# Patient Record
Sex: MALE | Race: WHITE | NOT HISPANIC OR LATINO | Employment: OTHER | URBAN - METROPOLITAN AREA
[De-identification: names, ages, dates, MRNs, and addresses within clinical notes are randomized per-mention and may not be internally consistent; named-entity substitution may affect disease eponyms.]

---

## 2017-01-04 NOTE — TELEPHONE ENCOUNTER
----- Message from Ria Winters sent at 1/4/2017  2:47 PM EST -----  Contact: patient  Needs prescription for 90 day supply    insulin aspart NovoLOG 100 UNIT/ML injection          Inject 30 Units under the skin 3 Three Times a Day Before Meals    [facility] - [facility] [address] [facility] - [phone]  - [phone] (Phone)  [phone] (Fax)      Novolog  30 units 3 times daily  90 ml 3 refills  sent to [facility]

## 2017-02-07 ENCOUNTER — LAB (OUTPATIENT)
Dept: ENDOCRINOLOGY | Age: 72
End: 2017-02-07

## 2017-02-07 DIAGNOSIS — IMO0002 UNCONTROLLED TYPE 2 DIABETES MELLITUS WITH OTHER NEUROLOGIC COMPLICATION, WITH LONG-TERM CURRENT USE OF INSULIN: ICD-10-CM

## 2017-02-08 LAB — HBA1C MFR BLD: 8.4 % (ref 4.8–5.6)

## 2017-02-21 ENCOUNTER — OFFICE VISIT (OUTPATIENT)
Dept: ENDOCRINOLOGY | Age: 72
End: 2017-02-21

## 2017-02-21 VITALS
HEART RATE: 71 BPM | BODY MASS INDEX: 35.28 KG/M2 | SYSTOLIC BLOOD PRESSURE: 118 MMHG | DIASTOLIC BLOOD PRESSURE: 64 MMHG | HEIGHT: 71 IN | WEIGHT: 252 LBS

## 2017-02-21 DIAGNOSIS — E55.9 VITAMIN D DEFICIENCY: ICD-10-CM

## 2017-02-21 DIAGNOSIS — E11.9 TYPE 2 DIABETES MELLITUS WITHOUT COMPLICATION, WITH LONG-TERM CURRENT USE OF INSULIN (HCC): Primary | ICD-10-CM

## 2017-02-21 DIAGNOSIS — Z79.4 TYPE 2 DIABETES MELLITUS WITHOUT COMPLICATION, WITH LONG-TERM CURRENT USE OF INSULIN (HCC): Primary | ICD-10-CM

## 2017-02-21 DIAGNOSIS — E78.49 OTHER HYPERLIPIDEMIA: ICD-10-CM

## 2017-02-21 PROCEDURE — 99214 OFFICE O/P EST MOD 30 MIN: CPT | Performed by: INTERNAL MEDICINE

## 2017-02-21 NOTE — PROGRESS NOTES
71 y.o.    Patient Care Team:  Felix Blackman MD as PCP - General  Felix Blackman MD as PCP - Family Medicine    Chief Complaint:      Subjective     HPI Felix Olmedo,71 y.o. WM is here as a follow up for the management of Type 2 dm. Used to see Dr. Chacko and Dr. Vivar in the past.     Type 2 dm - Pt has been a diabetic since 2000, He has been on insulin since 2009. Currently pt is on levemir 32 units at bed time, Novolog 10/10/12 units with BF, Lunch and dinner time, along with moderate dose ssi tid ac - 2 units for 50 above 150.  Patient reports missing his NovoLog sliding scale most of the time as he eats out so doesn't check his blood sugars at that time to follow the sliding scale.  Patient sometimes takes 12 units with lunch and 10 units with dinner based on his meal size.  Checks BG 3 - 4 times a day. FBG - 200 - 220 mg/dl, Lunch time - 163 - 248 mg/dl, Dinner time - 164 - 230 mg/dl. Bed time - 78 - 199 mg/dl.   No increased urination or increased thirst. No BG less than 60 mg/dl in the last one month, does have hypoglycemic awareness. Feels low BG symptoms when BG is around 70s.  Patient had 2-3 blood sugars around 70s in the last 3 months.  Highest BG in the last 1 month was - 280 mg/dl.   Pt forgot his log book. No nausea and vomiting.   Up to date with eye exam and no dm retinopathy per pt.   C/o tingling or numbness in his b/l feet, no ulcers and amputations of his feet. Not on lyrica or gabapentin.   No hx of CAD, had cardiac catheterization in nov 2016 which 60% stenosis, no hx of  CKD or CVA per pt.   Pt is physically active. Weight has been stable.   Pt tries to follow DM diet for most part but not as much as he is eating out side.  Not on Ace inb or ARB.     UyQ2y-8.4%  Negative urine microalbuminuria.     Hyperlipidemia on Lipitor 40 mg oral daily.  LDL at goal-54 mg/dL.    Interval History      The following portions of the patient's history were reviewed and updated as appropriate: allergies,  current medications, past family history, past medical history, past social history, past surgical history and problem list.    Past Medical History   Diagnosis Date   • Acute sinusitis    • Arthritis    • Chest pain    • Contusion of elbow, left      acute   • Coronary artery disease      Cath 10/18/16: RCA chronically occluded at ostium, and fills via left to right collaterals.  LAD with 40-50% proximal disease, and diffuse 60% disease in mid-distal segment.    • ZELAYA (dyspnea on exertion)    • History of bone density study      never   • History of chest x-ray 12/24/2014   • LAFB (left anterior fascicular block)    • Neuropathy    • Pneumonia    • Right-sided back pain    • Sprain of left shoulder    • Squamous cell carcinoma      Left cheek s/p resection   • Type 2 diabetes mellitus      Since 2000     Family History   Problem Relation Age of Onset   • Cancer Mother      uterine   • Heart disease Father      Father with fatal MI in 80's   • Diabetes Sister    • Diabetes Other      grandmother   • Stroke Other    • Hypertension Other    • Esophageal cancer Brother      Social History     Social History   • Marital status:      Spouse name: N/A   • Number of children: N/A   • Years of education: N/A     Occupational History   • Not on file.     Social History Main Topics   • Smoking status: Former Smoker     Packs/day: 1.50   • Smokeless tobacco: Never Used      Comment: Smoked up to 1.5 ppd.  Smoked for 50 years.  Quit 2009.   • Alcohol use Yes      Comment: Rarely   • Drug use: No   • Sexual activity: Defer     Other Topics Concern   • Not on file     Social History Narrative     No Known Allergies    Current Outpatient Prescriptions:   •  aspirin 81 MG tablet, Take 81 mg by mouth Daily., Disp: , Rfl:   •  atorvastatin (LIPITOR) 40 MG tablet, Take 1 tablet by mouth Daily., Disp: 30 tablet, Rfl: 11  •  glucose blood (ONE TOUCH ULTRA TEST) test strip, To check bg 3 -4 times a day, Disp: 60 each, Rfl: 5  •   insulin aspart (novoLOG) 100 UNIT/ML injection, Inject 30 Units under the skin 3 (Three) Times a Day Before Meals. (Patient taking differently: Inject 30 Units under the skin 3 (Three) Times a Day Before Meals. 14 units 3 times daily), Disp: 90 mL, Rfl: 3  •  isosorbide mononitrate (IMDUR) 30 MG 24 hr tablet, Take 1 tablet by mouth 2 (Two) Times a Day., Disp: 90 tablet, Rfl: 3  •  Lancets (ONETOUCH ULTRASOFT) lancets, To check 3 -4 times a day, Disp: 60 each, Rfl: 5  •  metoprolol tartrate (LOPRESSOR) 25 MG tablet, Take 0.5 tablets by mouth 2 (Two) Times a Day., Disp: 60 tablet, Rfl: 11  •  Multiple Vitamins-Minerals (MULTIVITAMIN ADULT PO), Take 1 tablet by mouth Daily., Disp: , Rfl:   •  insulin detemir (LEVEMIR FLEXPEN) 100 UNIT/ML injection, Inject 34 Units under the skin Every Night., Disp: 5 pen, Rfl: 3  •  metFORMIN (GLUCOPHAGE) 1000 MG tablet, Take 1 tablet by mouth 2 (Two) Times a Day With Meals., Disp: 60 tablet, Rfl: 11        Review of Systems   Constitutional: Negative for appetite change, chills, diaphoresis and fatigue.   HENT: Negative for hearing loss, nosebleeds, postnasal drip, trouble swallowing and voice change.    Eyes: Negative for photophobia, discharge and visual disturbance.   Respiratory: Positive for shortness of breath. Negative for cough and wheezing.    Cardiovascular: Positive for chest pain and leg swelling. Negative for palpitations.   Gastrointestinal: Negative for abdominal distention, abdominal pain, constipation, diarrhea, nausea and vomiting.   Endocrine: Positive for polydipsia. Negative for cold intolerance, heat intolerance and polyuria.   Genitourinary: Positive for frequency. Negative for dysuria and hematuria.   Musculoskeletal: Positive for back pain. Negative for arthralgias and myalgias.   Skin: Negative for pallor, rash and wound.   Neurological: Positive for numbness. Negative for dizziness, tremors, seizures, syncope, weakness and headaches.   Hematological:  "Negative for adenopathy.   Psychiatric/Behavioral: Negative for agitation, confusion and sleep disturbance. The patient is not nervous/anxious.        Objective       Vitals:    02/21/17 1416   BP: 118/64   Pulse: 71   Weight: 252 lb (114 kg)   Height: 70.5\" (179.1 cm)     Body mass index is 35.65 kg/(m^2).      Physical Exam   Constitutional: He is oriented to person, place, and time. He appears well-developed and well-nourished.   HENT:   Head: Normocephalic and atraumatic.   Mouth/Throat: Oropharynx is clear and moist.   Eyes: Conjunctivae and EOM are normal. Pupils are equal, round, and reactive to light.   Neck: Normal range of motion. Neck supple. Carotid bruit is not present. No tracheal deviation present. No thyromegaly present.   Acanthosis nigricans   Cardiovascular: Normal rate, regular rhythm and normal heart sounds.    Pulmonary/Chest: Effort normal and breath sounds normal. No stridor. He has no wheezes.   Abdominal: Soft. Bowel sounds are normal. He exhibits no distension. There is no tenderness. No hernia.   Central obesity   Musculoskeletal: Normal range of motion. He exhibits no edema.   Lymphadenopathy:     He has no cervical adenopathy.   Neurological: He is alert and oriented to person, place, and time. He has normal reflexes.   Decreased pin prick and intact proprioception   Skin: Skin is warm and dry.   Psychiatric: He has a normal mood and affect. His behavior is normal. Judgment normal.   Vitals reviewed.    Results Review:     I reviewed the patient's new clinical results.    Medical records reviewed  Summary:Done      Lab on 02/07/2017   Component Date Value Ref Range Status   • Hemoglobin A1C 02/07/2017 8.40* 4.80 - 5.60 % Final    Comment: Hemoglobin A1C Ranges:  Increased Risk for Diabetes  5.7% to 6.4%  Diabetes                     >= 6.5%  Diabetic Goal                < 7.0%       Lab Results   Component Value Date    HGBA1C 8.40 (H) 02/07/2017    HGBA1C 8.90 (H) 12/19/2016    HGBA1C " 9.4 (H) 04/05/2016     Lab Results   Component Value Date    MICROALBUR <3.0 12/19/2016    CREATININE 1.24 10/14/2016     Imaging Results (most recent)     None                Assessment and Plan:    Diagnoses and all orders for this visit:    Type 2 diabetes mellitus without complication, with long-term current use of insulin  -     Comprehensive Metabolic Panel; Future  -     Hemoglobin A1c; Future  -     Lipid Panel; Future  -     Microalbumin / Creatinine Urine Ratio; Future  -     TSH; Future  -     Vitamin D 25 Hydroxy; Future    Vitamin D deficiency   -     Vitamin D 25 Hydroxy; Future    Other hyperlipidemia    Other orders  -     insulin detemir (LEVEMIR FLEXPEN) 100 UNIT/ML injection; Inject 34 Units under the skin Every Night.  -     metFORMIN (GLUCOPHAGE) 1000 MG tablet; Take 1 tablet by mouth 2 (Two) Times a Day With Meals.    Type 2 diabetes mellitus with HbA1c improving since last visit  Will increase levemir to 34 units at bedtime  Will increase NovoLog to 12 units with each meal  Will cover with NovoLog moderate dose sliding scale 2 units for 50 about 150  Will start patient on metformin thousand milligrams twice daily  Advised patient to check his blood sugars at least 2-3 times a day and to get his log book during his next visit    Will consider changing to NovoLog 7030 on NPH/regular insulin based on his insurance issues during next visit    Hyperlipidemia  LDL at goal  Continue Lipitor 40 mg oral daily    The total time spent more than 25 min for old record and lab review and face- to- face, of which greater than 50% of time was spent in counseling the patient on treatment options, instructions for management/treatment and follow up and importance of compliance with chosen management or treatment options

## 2017-03-27 ENCOUNTER — OFFICE VISIT (OUTPATIENT)
Dept: FAMILY MEDICINE CLINIC | Facility: CLINIC | Age: 72
End: 2017-03-27

## 2017-03-27 VITALS
RESPIRATION RATE: 16 BRPM | BODY MASS INDEX: 35.42 KG/M2 | HEART RATE: 68 BPM | SYSTOLIC BLOOD PRESSURE: 112 MMHG | TEMPERATURE: 97.8 F | DIASTOLIC BLOOD PRESSURE: 78 MMHG | WEIGHT: 253 LBS | HEIGHT: 71 IN | OXYGEN SATURATION: 93 %

## 2017-03-27 DIAGNOSIS — J01.90 ACUTE SINUSITIS, RECURRENCE NOT SPECIFIED, UNSPECIFIED LOCATION: Primary | ICD-10-CM

## 2017-03-27 PROCEDURE — 99213 OFFICE O/P EST LOW 20 MIN: CPT | Performed by: PHYSICIAN ASSISTANT

## 2017-03-27 RX ORDER — BENZONATATE 200 MG/1
200 CAPSULE ORAL 3 TIMES DAILY PRN
Qty: 30 CAPSULE | Refills: 0 | Status: SHIPPED | OUTPATIENT
Start: 2017-03-27 | End: 2017-04-03

## 2017-03-27 RX ORDER — AMOXICILLIN AND CLAVULANATE POTASSIUM 875; 125 MG/1; MG/1
1 TABLET, FILM COATED ORAL 2 TIMES DAILY
Qty: 20 TABLET | Refills: 0 | Status: SHIPPED | OUTPATIENT
Start: 2017-03-27 | End: 2017-04-03

## 2017-03-27 NOTE — PATIENT INSTRUCTIONS
For throat pain:  Can gargle with 1/2 Maalox and 1/2 Benadryl liquid ---mix, gargle and spit Take Tylenol for fever or aches  Rest as needed  Call not better in 7 days  Increase fluids  Call if worse  Can use generic Afrin nasal spray up to 5 days for nasal congestion  Finish antibiotic course of therapy

## 2017-03-27 NOTE — PROGRESS NOTES
Subjective   Felix Olmedo is a 71 y.o. male.     History of Present Illness   Felix Olmedo 71 y.o. male who presents for evaluation of sinus pressure and congestion, fever, sore throat, cough, ear pressure, wheezing, shortness of air, fatigue. Symptoms include nasal blockage, post nasal drip, cough described as productive of yellow sputum and runny nose.  Onset of symptoms was 10 days ago, unchanged since that time. Patient denies diarrhea, vomiting.   Evaluation to date: none Treatment to date:  OTC oral decongestants, OTC antihistamines, OTC cough suppressant and Tylenol.   SOA is from heart and not worse with this illness  Lab Results   Component Value Date    GLUCOSE 385 (H) 10/14/2016    CALCIUM 9.3 10/14/2016     10/14/2016    K 4.8 10/14/2016    CO2 28.3 10/14/2016    CL 98 10/14/2016    BUN 16 10/14/2016    CREATININE 1.24 10/14/2016    EGFRIFAFRI 78 04/05/2016    EGFRIFNONA 57 (L) 10/14/2016    BCR 12.9 10/14/2016    ANIONGAP 9.7 10/14/2016     Has DM and on insulin    The following portions of the patient's history were reviewed and updated as appropriate: allergies, current medications, past family history, past medical history, past social history, past surgical history and problem list.    Review of Systems   Constitutional: Positive for diaphoresis, fatigue and fever. Negative for activity change and unexpected weight change.   HENT: Positive for congestion, postnasal drip, rhinorrhea, sinus pressure and sore throat. Negative for ear pain.    Eyes: Negative for pain and discharge.   Respiratory: Positive for cough, choking, chest tightness, shortness of breath (same as usual) and wheezing.    Cardiovascular: Negative for chest pain and palpitations.   Gastrointestinal: Negative for abdominal pain, diarrhea and vomiting.   Endocrine: Negative.    Genitourinary: Positive for frequency and urgency.   Musculoskeletal: Negative for joint swelling.   Skin: Negative for color change, rash and wound.    Allergic/Immunologic: Negative.    Neurological: Positive for numbness. Negative for seizures and syncope.   Psychiatric/Behavioral: Negative.        Objective   Physical Exam   Constitutional: He is oriented to person, place, and time. He appears well-developed and well-nourished.  Non-toxic appearance. No distress.   HENT:   Head: Normocephalic and atraumatic. Hair is normal.   Right Ear: External ear normal. No drainage, swelling or tenderness. Tympanic membrane is retracted.   Left Ear: External ear normal. No drainage, swelling or tenderness. Tympanic membrane is retracted.   Nose: Mucosal edema present. No epistaxis.   Mouth/Throat: Uvula is midline and mucous membranes are normal. No oral lesions. No uvula swelling. Posterior oropharyngeal erythema present. No oropharyngeal exudate.   Eyes: Conjunctivae and EOM are normal. Pupils are equal, round, and reactive to light. Right eye exhibits no discharge. Left eye exhibits no discharge. No scleral icterus.   Neck: Normal range of motion. Neck supple.   Cardiovascular: Normal rate and regular rhythm.  Exam reveals no gallop.    Murmur (1-2/6) heard.  Pulmonary/Chest: Effort normal and breath sounds normal. No stridor. No respiratory distress. He has no wheezes. He has no rales. He exhibits no tenderness.   Abdominal: Soft. There is no tenderness.   Lymphadenopathy:     He has cervical adenopathy.   Neurological: He is alert and oriented to person, place, and time. He exhibits normal muscle tone.   Skin: Skin is warm and dry. No rash noted. He is not diaphoretic.   Psychiatric: He has a normal mood and affect. His behavior is normal. Judgment and thought content normal.   Nursing note and vitals reviewed.      Assessment/Plan   Problems Addressed this Visit     None      Visit Diagnoses     Acute sinusitis, recurrence not specified, unspecified location    -  Primary

## 2017-03-31 ENCOUNTER — TRANSCRIBE ORDERS (OUTPATIENT)
Dept: CARDIOLOGY | Facility: CLINIC | Age: 72
End: 2017-03-31

## 2017-03-31 ENCOUNTER — OFFICE VISIT (OUTPATIENT)
Dept: CARDIOLOGY | Facility: CLINIC | Age: 72
End: 2017-03-31

## 2017-03-31 ENCOUNTER — LAB (OUTPATIENT)
Dept: LAB | Facility: HOSPITAL | Age: 72
End: 2017-03-31
Attending: INTERNAL MEDICINE

## 2017-03-31 VITALS
BODY MASS INDEX: 35.28 KG/M2 | SYSTOLIC BLOOD PRESSURE: 98 MMHG | HEIGHT: 71 IN | WEIGHT: 252 LBS | HEART RATE: 63 BPM | DIASTOLIC BLOOD PRESSURE: 56 MMHG

## 2017-03-31 DIAGNOSIS — I25.118 ATHEROSCLEROSIS OF NATIVE CORONARY ARTERY OF NATIVE HEART WITH OTHER FORM OF ANGINA PECTORIS (HCC): ICD-10-CM

## 2017-03-31 DIAGNOSIS — Z01.810 PRE-OPERATIVE CARDIOVASCULAR EXAMINATION: ICD-10-CM

## 2017-03-31 DIAGNOSIS — Z01.810 PRE-OPERATIVE CARDIOVASCULAR EXAMINATION: Primary | ICD-10-CM

## 2017-03-31 DIAGNOSIS — E11.59 TYPE 2 DIABETES MELLITUS WITH OTHER CIRCULATORY COMPLICATION: ICD-10-CM

## 2017-03-31 DIAGNOSIS — Z13.6 SCREENING FOR ISCHEMIC HEART DISEASE: ICD-10-CM

## 2017-03-31 DIAGNOSIS — I25.118 CORONARY ARTERY DISEASE OF NATIVE ARTERY OF NATIVE HEART WITH STABLE ANGINA PECTORIS (HCC): Primary | ICD-10-CM

## 2017-03-31 LAB
ANION GAP SERPL CALCULATED.3IONS-SCNC: 11.3 MMOL/L
BASOPHILS # BLD AUTO: 0.1 10*3/MM3 (ref 0–0.2)
BASOPHILS NFR BLD AUTO: 0.9 % (ref 0–1.5)
BUN BLD-MCNC: 10 MG/DL (ref 8–23)
BUN/CREAT SERPL: 9 (ref 7–25)
CALCIUM SPEC-SCNC: 9.3 MG/DL (ref 8.6–10.5)
CHLORIDE SERPL-SCNC: 100 MMOL/L (ref 98–107)
CO2 SERPL-SCNC: 27.7 MMOL/L (ref 22–29)
CREAT BLD-MCNC: 1.11 MG/DL (ref 0.76–1.27)
DEPRECATED RDW RBC AUTO: 50.6 FL (ref 37–54)
EOSINOPHIL # BLD AUTO: 0.57 10*3/MM3 (ref 0–0.7)
EOSINOPHIL NFR BLD AUTO: 5.2 % (ref 0.3–6.2)
ERYTHROCYTE [DISTWIDTH] IN BLOOD BY AUTOMATED COUNT: 14.9 % (ref 11.5–14.5)
GFR SERPL CREATININE-BSD FRML MDRD: 65 ML/MIN/1.73
GLUCOSE BLD-MCNC: 155 MG/DL (ref 65–99)
HCT VFR BLD AUTO: 46.2 % (ref 40.4–52.2)
HGB BLD-MCNC: 15.4 G/DL (ref 13.7–17.6)
IMM GRANULOCYTES # BLD: 0.02 10*3/MM3 (ref 0–0.03)
IMM GRANULOCYTES NFR BLD: 0.2 % (ref 0–0.5)
LYMPHOCYTES # BLD AUTO: 3.54 10*3/MM3 (ref 0.9–4.8)
LYMPHOCYTES NFR BLD AUTO: 32 % (ref 19.6–45.3)
MCH RBC QN AUTO: 30.6 PG (ref 27–32.7)
MCHC RBC AUTO-ENTMCNC: 33.3 G/DL (ref 32.6–36.4)
MCV RBC AUTO: 91.8 FL (ref 79.8–96.2)
MONOCYTES # BLD AUTO: 0.97 10*3/MM3 (ref 0.2–1.2)
MONOCYTES NFR BLD AUTO: 8.8 % (ref 5–12)
NEUTROPHILS # BLD AUTO: 5.86 10*3/MM3 (ref 1.9–8.1)
NEUTROPHILS NFR BLD AUTO: 52.9 % (ref 42.7–76)
PLATELET # BLD AUTO: 200 10*3/MM3 (ref 140–500)
PMV BLD AUTO: 10.5 FL (ref 6–12)
POTASSIUM BLD-SCNC: 4.7 MMOL/L (ref 3.5–5.2)
RBC # BLD AUTO: 5.03 10*6/MM3 (ref 4.6–6)
SODIUM BLD-SCNC: 139 MMOL/L (ref 136–145)
WBC NRBC COR # BLD: 11.06 10*3/MM3 (ref 4.5–10.7)

## 2017-03-31 PROCEDURE — 99214 OFFICE O/P EST MOD 30 MIN: CPT | Performed by: INTERNAL MEDICINE

## 2017-03-31 PROCEDURE — 36415 COLL VENOUS BLD VENIPUNCTURE: CPT

## 2017-03-31 PROCEDURE — 80048 BASIC METABOLIC PNL TOTAL CA: CPT

## 2017-03-31 PROCEDURE — 85025 COMPLETE CBC W/AUTO DIFF WBC: CPT

## 2017-04-03 RX ORDER — BENZONATATE 200 MG/1
200 CAPSULE ORAL 3 TIMES DAILY PRN
COMMUNITY
End: 2017-04-07

## 2017-04-03 RX ORDER — ATORVASTATIN CALCIUM 40 MG/1
40 TABLET, FILM COATED ORAL DAILY
COMMUNITY
End: 2017-06-23 | Stop reason: SDUPTHER

## 2017-04-03 RX ORDER — ISOSORBIDE MONONITRATE 30 MG/1
30 TABLET, EXTENDED RELEASE ORAL 2 TIMES DAILY
COMMUNITY
End: 2017-10-02 | Stop reason: SDUPTHER

## 2017-04-03 RX ORDER — AMOXICILLIN AND CLAVULANATE POTASSIUM 875; 125 MG/1; MG/1
1 TABLET, FILM COATED ORAL 2 TIMES DAILY
COMMUNITY
End: 2017-04-07

## 2017-04-03 NOTE — PROGRESS NOTES
Date of Office Visit: 2017  Encounter Provider: Noah Montejo MD  Place of Service: [facility] CARDIOLOGY  Patient Name: Felix Olmedo  :1945    Chief complaint: Follow-up for CAD.  Chest pain and SOA.    History of Present Illness:    Dear Dr. Blackman:      I again had the pleasure of seeing your patient in cardiology office on 2017. As you   well know, he is a very pleasant 71 year-old white male with a past medical history   significant for coronary artery disease and diabetes who presents for follow-up. The   patient initially saw me on 10/3/2016. He had been experiencing 5-6 weeks of a vague   chest discomfort intermittently which he described as an aching and burning sensation   in the center and left chest. He also had been more short of breath with exertion. He   underwent a Lexiscan Myoview stress test on 10/12/2016 which showed a small infarct    in the inferior wall with a moderate amount of evon-infarct ischemia. He subsequently   underwent a left heart catheterization on 10/18/2016 which showed the right coronary   artery to be chronically occluded at the ostium, although left to right collaterals filled the   distal right coronary artery and PDA. He also had disease in the LAD of 40-50% in the   proximal portion, as well as diffuse 60% disease in the mid to distal portion. Medical   management was recommended at that time with consideration for percutaneous   intervention versus bypass grafting if the patient's symptoms did not improve. He did   have an echocardiogram on 10/12/2016 which confirmed a normal ejection fraction of  62% and grade 1 diastolic dysfunction. The patient was placed on Imdur after his   cardiac catheterization.      The patient presents today for follow-up.  Unfortunately, he has been having worsening   exertional symptoms recently.  He states that he is very short of breath with minimal   exertion, and often has to stop and  rest.  He has also been having aching pain in the center   of his chest with exertion.  His symptoms resolve after several minutes of rest.  He is   taking his Imdur and metoprolol.      Past Medical History:   Diagnosis Date   • Acute sinusitis    • Arthritis    • Chest pain    • Contusion of elbow, left     acute   • Coronary artery disease     Cath 10/18/16: RCA chronically occluded at ostium, and fills via left to right collaterals.  LAD with 40-50% proximal disease, and diffuse 60% disease in mid-distal segment.    • ZELAYA (dyspnea on exertion)    • History of bone density study     never   • History of chest x-ray 12/24/2014   • LAFB (left anterior fascicular block)    • Neuropathy    • Pneumonia    • Right-sided back pain    • Sprain of left shoulder    • Squamous cell carcinoma     Left cheek s/p resection   • Type 2 diabetes mellitus     Since 2000       Past Surgical History:   Procedure Laterality Date   • CARDIAC CATHETERIZATION N/A 10/18/2016    Procedure: Coronary angiography;  Surgeon: Jesus Guzman MD;  Location: [facility] CATH INVASIVE LOCATION;  Service:    • CARDIAC CATHETERIZATION N/A 10/18/2016    Procedure: Left heart cath;  Surgeon: Jesus Guzman MD;  Location: [facility] CATH INVASIVE LOCATION;  Service:    • CARDIAC CATHETERIZATION N/A 10/18/2016    Procedure: Left ventriculography;  Surgeon: Jesus Guzman MD;  Location: [facility] INVASIVE LOCATION;  Service:    • CATARACT EXTRACTION W/ INTRAOCULAR LENS IMPLANT Left    • KNEE SURGERY Left 2005    Dr. Gallegos   • MOHS SURGERY Left     CHEEK       Current Outpatient Prescriptions on File Prior to Visit   Medication Sig Dispense Refill   • amoxicillin-clavulanate (AUGMENTIN) 875-125 MG per tablet Take 1 tablet by mouth 2 (Two) Times a Day. One PO BID for infection 20 tablet 0   • aspirin 81 MG tablet Take 81 mg by mouth Daily.     • atorvastatin (LIPITOR) 40 MG tablet Take 1 tablet by mouth Daily. 30 tablet 11   • benzonatate  (TESSALON) 200 MG capsule Take 1 capsule by mouth 3 (Three) Times a Day As Needed for Cough. 30 capsule 0   • glucose blood (ONE TOUCH ULTRA TEST) test strip To check bg 3 -4 times a day 60 each 5   • insulin aspart (novoLOG) 100 UNIT/ML injection Inject 30 Units under the skin 3 (Three) Times a Day Before Meals. (Patient taking differently: Inject 30 Units under the skin 3 (Three) Times a Day Before Meals. 14 units 3 times daily) 90 mL 3   • insulin detemir (LEVEMIR FLEXPEN) 100 UNIT/ML injection Inject 34 Units under the skin Every Night. 5 pen 3   • isosorbide mononitrate (IMDUR) 30 MG 24 hr tablet Take 1 tablet by mouth 2 (Two) Times a Day. 90 tablet 3   • Lancets (ONETOUCH ULTRASOFT) lancets To check 3 -4 times a day 60 each 5   • metoprolol tartrate (LOPRESSOR) 25 MG tablet Take 0.5 tablets by mouth 2 (Two) Times a Day. 60 tablet 11   • Multiple Vitamins-Minerals (MULTIVITAMIN ADULT PO) Take 1 tablet by mouth Daily.       No current facility-administered medications on file prior to visit.      Allergies as of 03/31/2017   • (No Known Allergies)     Social History     Social History   • Marital status:      Spouse name: N/A   • Number of children: N/A   • Years of education: N/A     Occupational History   • Not on file.     Social History Main Topics   • Smoking status: Former Smoker     Packs/day: 1.50   • Smokeless tobacco: Never Used      Comment: Smoked up to 1.5 ppd.  Smoked for 50 years.  Quit 2009.   • Alcohol use Yes      Comment: Rarely   • Drug use: No   • Sexual activity: Defer     Other Topics Concern   • Not on file     Social History Narrative     Family History   Problem Relation Age of Onset   • Cancer Mother      uterine   • Heart disease Father      Father with fatal MI in 80's   • Diabetes Sister    • Diabetes Other      grandmother   • Stroke Other    • Hypertension Other    • Esophageal cancer Brother        Review of Systems   Cardiovascular: Positive for chest pain and dyspnea on  "exertion.   All other systems reviewed and are negative.    Objective:     Vitals:    03/31/17 1223   BP: 98/56   Pulse: 63   Weight: 252 lb (114 kg)   Height: 70.5\" (179.1 cm)     Body mass index is 35.65 kg/(m^2).    Physical Exam   Constitutional: He is oriented to person, place, and time. He appears well-developed and well-nourished.   HENT:   Head: Normocephalic and atraumatic.   Eyes: Conjunctivae are normal.   Neck: Neck supple.   Cardiovascular: Normal rate and regular rhythm.  Exam reveals no gallop and no friction rub.    No murmur heard.  Pulmonary/Chest: Effort normal and breath sounds normal.   Abdominal: Soft. There is no tenderness.   Musculoskeletal: He exhibits no edema.   Neurological: He is alert and oriented to person, place, and time.   Skin: Skin is warm.   Psychiatric: He has a normal mood and affect. His behavior is normal.     Lab Review:   Procedures    Cardiac Procedures:  1. Echocardiogram on 10/12/2016: Ejection fraction was 62%. There was grade 1   diastolic dysfunction. There was moderate to severe thickening of the noncoronary   cusp of the aortic valve, but no aortic stenosis.  2. Left heart catheterization on 10/18/2016 by Dr. Guzman: The left main had 20%   distal disease. The LAD had a 40-50% proximal stenosis. The LAD had diffuse 60%   mid to distal disease. The left circumflex coronary artery had a 20% mid stenosis. The   right coronary artery was chronically occluded at the ostium, left to right collaterals   filled the distal vessel and PDA.    Assessment:       Diagnosis Plan   1. Coronary artery disease of native artery of native heart with stable angina pectoris  Case Request Cath Lab: Coronary angiography, Left heart cath, Left ventriculography   2. Type 2 diabetes mellitus with other circulatory complication       Plan:       The patient is having progressive exertional anginal symptoms at this point.  His RCA is   occluded at the ostium, and his LAD had diffuse 60% " disease on his last cath.  The RCA   is unlikely to be amenable to intervention.  Neither the Imdur or metoprolol can be increased   at this point as his blood pressure is 98/56.  I have recommended a repeat left heart cath,   and I will set this up for Dr. Guzman.  I will also discuss the case with Dr. Guzman.  He may   ultimately need coronary artery bypass surgery evaluation.  He will continue on the aspirin   and Lipitor.  Further plans will be made pending the results of the heart catheterization.      Coronary Artery Disease  Assessment  • The patient has CCS class III - angina with activities of daily living  • The patient has stable angina     Plan  • The patient is being referred to interventional cardiology  • Lifestyle modifications discussed include adhering to a heart healthy diet, avoidance of tobacco products, maintenance of a healthy weight, medication compliance, regular exercise and regular monitoring of cholesterol and blood pressure    Subjective - Objective  • Current antiplatelet therapy includes aspirin 81 mg

## 2017-04-04 ENCOUNTER — HOSPITAL ENCOUNTER (OUTPATIENT)
Facility: HOSPITAL | Age: 72
Setting detail: OBSERVATION
Discharge: HOME | End: 2017-04-05
Attending: INTERNAL MEDICINE | Admitting: INTERNAL MEDICINE

## 2017-04-04 ENCOUNTER — APPOINTMENT (OUTPATIENT)
Dept: CARDIOLOGY | Facility: HOSPITAL | Age: 72
End: 2017-04-04
Attending: THORACIC SURGERY (CARDIOTHORACIC VASCULAR SURGERY)

## 2017-04-04 ENCOUNTER — APPOINTMENT (OUTPATIENT)
Dept: RESPIRATORY THERAPY | Facility: HOSPITAL | Age: 72
End: 2017-04-04
Attending: THORACIC SURGERY (CARDIOTHORACIC VASCULAR SURGERY)

## 2017-04-04 ENCOUNTER — APPOINTMENT (OUTPATIENT)
Dept: GENERAL RADIOLOGY | Facility: HOSPITAL | Age: 72
End: 2017-04-04

## 2017-04-04 DIAGNOSIS — I25.118 CORONARY ARTERY DISEASE OF NATIVE ARTERY OF NATIVE HEART WITH STABLE ANGINA PECTORIS (HCC): ICD-10-CM

## 2017-04-04 LAB
ABO GROUP BLD: NORMAL
ABO GROUP BLD: NORMAL
ALBUMIN SERPL-MCNC: 3.4 G/DL (ref 3.5–5.2)
ALBUMIN/GLOB SERPL: 0.9 G/DL
ALP SERPL-CCNC: 113 U/L (ref 39–117)
ALT SERPL W P-5'-P-CCNC: 34 U/L (ref 1–41)
ANION GAP SERPL CALCULATED.3IONS-SCNC: 13.3 MMOL/L
APTT PPP: 40.4 SECONDS (ref 22.7–35.4)
AST SERPL-CCNC: 32 U/L (ref 1–40)
BASOPHILS # BLD AUTO: 0.09 10*3/MM3 (ref 0–0.2)
BASOPHILS NFR BLD AUTO: 0.8 % (ref 0–1.5)
BH CV XLRA MEAS - DIST GSV CALF DIST LEFT: 0.31 CM
BH CV XLRA MEAS - DIST GSV CALF DIST RIGHT: 0.29 CM
BH CV XLRA MEAS - DIST GSV THIGH DIST LEFT: 0.43 CM
BH CV XLRA MEAS - DIST GSV THIGH DIST RIGHT: 0.43 CM
BH CV XLRA MEAS - GSV ANKLE DIST LEFT: 0.27 CM
BH CV XLRA MEAS - GSV ANKLE DIST RIGHT: 0.3 CM
BH CV XLRA MEAS - GSV KNEE DIST LEFT: 0.4 CM
BH CV XLRA MEAS - GSV KNEE DIST RIGHT: 0.41 CM
BH CV XLRA MEAS - GSV ORIGIN DIST LEFT: 0.68 CM
BH CV XLRA MEAS - GSV ORIGIN DIST RIGHT: 0.75 CM
BH CV XLRA MEAS - MID GSV CALF LEFT: 0.24 CM
BH CV XLRA MEAS - MID GSV CALF RIGHT: 0.29 CM
BH CV XLRA MEAS - MID GSV THIGH  LEFT: 0.45 CM
BH CV XLRA MEAS - MID GSV THIGH  RIGHT: 0.41 CM
BH CV XLRA MEAS - PROX GSV CALF DIST LEFT: 0.39 CM
BH CV XLRA MEAS - PROX GSV CALF DIST RIGHT: 0.25 CM
BH CV XLRA MEAS - PROX GSV THIGH  LEFT: 0.34 CM
BH CV XLRA MEAS - PROX GSV THIGH  RIGHT: 0.36 CM
BH CV XLRA MEAS LEFT DIST CCA EDV: -16.4 CM/SEC
BH CV XLRA MEAS LEFT DIST CCA PSV: -97.9 CM/SEC
BH CV XLRA MEAS LEFT DIST ICA EDV: -28.1 CM/SEC
BH CV XLRA MEAS LEFT DIST ICA PSV: -99.7 CM/SEC
BH CV XLRA MEAS LEFT MID ICA EDV: -25.8 CM/SEC
BH CV XLRA MEAS LEFT MID ICA PSV: -99.7 CM/SEC
BH CV XLRA MEAS LEFT PROX CCA EDV: 16.4 CM/SEC
BH CV XLRA MEAS LEFT PROX CCA PSV: 99.1 CM/SEC
BH CV XLRA MEAS LEFT PROX ECA EDV: -8.8 CM/SEC
BH CV XLRA MEAS LEFT PROX ECA PSV: -86.8 CM/SEC
BH CV XLRA MEAS LEFT PROX ICA EDV: -28.1 CM/SEC
BH CV XLRA MEAS LEFT PROX ICA PSV: -93.2 CM/SEC
BH CV XLRA MEAS LEFT PROX SCLA PSV: 112 CM/SEC
BH CV XLRA MEAS LEFT VERTEBRAL A EDV: -7.6 CM/SEC
BH CV XLRA MEAS LEFT VERTEBRAL A PSV: -43.4 CM/SEC
BH CV XLRA MEAS RIGHT DIST CCA EDV: -18.1 CM/SEC
BH CV XLRA MEAS RIGHT DIST CCA PSV: -92.7 CM/SEC
BH CV XLRA MEAS RIGHT DIST ICA EDV: -27.5 CM/SEC
BH CV XLRA MEAS RIGHT DIST ICA PSV: -96.6 CM/SEC
BH CV XLRA MEAS RIGHT MID ICA EDV: -23.6 CM/SEC
BH CV XLRA MEAS RIGHT MID ICA PSV: -84.1 CM/SEC
BH CV XLRA MEAS RIGHT PROX CCA EDV: 16.5 CM/SEC
BH CV XLRA MEAS RIGHT PROX CCA PSV: 109 CM/SEC
BH CV XLRA MEAS RIGHT PROX ECA EDV: -10.2 CM/SEC
BH CV XLRA MEAS RIGHT PROX ECA PSV: -88.8 CM/SEC
BH CV XLRA MEAS RIGHT PROX ICA EDV: 40.9 CM/SEC
BH CV XLRA MEAS RIGHT PROX ICA PSV: 166 CM/SEC
BH CV XLRA MEAS RIGHT PROX SCLA PSV: 163 CM/SEC
BH CV XLRA MEAS RIGHT VERTEBRAL A EDV: -15.7 CM/SEC
BH CV XLRA MEAS RIGHT VERTEBRAL A PSV: -54.2 CM/SEC
BILIRUB SERPL-MCNC: 0.6 MG/DL (ref 0.1–1.2)
BILIRUB UR QL STRIP: NEGATIVE
BLD GP AB SCN SERPL QL: NEGATIVE
BUN BLD-MCNC: 13 MG/DL (ref 8–23)
BUN/CREAT SERPL: 13 (ref 7–25)
CALCIUM SPEC-SCNC: 8.9 MG/DL (ref 8.6–10.5)
CHLORIDE SERPL-SCNC: 102 MMOL/L (ref 98–107)
CHOLEST SERPL-MCNC: 115 MG/DL (ref 0–200)
CLARITY UR: CLEAR
CLOSE TME COLL+ADP + EPINEP PNL BLD: 58 %
CO2 SERPL-SCNC: 21.7 MMOL/L (ref 22–29)
COLOR UR: YELLOW
CREAT BLD-MCNC: 1 MG/DL (ref 0.76–1.27)
DEPRECATED RDW RBC AUTO: 50.7 FL (ref 37–54)
EOSINOPHIL # BLD AUTO: 0.49 10*3/MM3 (ref 0–0.7)
EOSINOPHIL NFR BLD AUTO: 4.1 % (ref 0.3–6.2)
ERYTHROCYTE [DISTWIDTH] IN BLOOD BY AUTOMATED COUNT: 15 % (ref 11.5–14.5)
GFR SERPL CREATININE-BSD FRML MDRD: 73 ML/MIN/1.73
GLOBULIN UR ELPH-MCNC: 3.6 GM/DL
GLUCOSE BLD-MCNC: 157 MG/DL (ref 65–99)
GLUCOSE BLDC GLUCOMTR-MCNC: 136 MG/DL (ref 70–130)
GLUCOSE BLDC GLUCOMTR-MCNC: 144 MG/DL (ref 70–130)
GLUCOSE BLDC GLUCOMTR-MCNC: 228 MG/DL (ref 70–130)
GLUCOSE BLDC GLUCOMTR-MCNC: 232 MG/DL (ref 70–130)
GLUCOSE UR STRIP-MCNC: NEGATIVE MG/DL
HBA1C MFR BLD: 8.6 % (ref 4.8–5.6)
HCT VFR BLD AUTO: 47.6 % (ref 40.4–52.2)
HDLC SERPL-MCNC: 42 MG/DL (ref 40–60)
HGB BLD-MCNC: 15.7 G/DL (ref 13.7–17.6)
HGB UR QL STRIP.AUTO: NEGATIVE
IMM GRANULOCYTES # BLD: 0.04 10*3/MM3 (ref 0–0.03)
IMM GRANULOCYTES NFR BLD: 0.3 % (ref 0–0.5)
INR PPP: 1.19 (ref 0.9–1.1)
KETONES UR QL STRIP: NEGATIVE
LDLC SERPL CALC-MCNC: 63 MG/DL (ref 0–100)
LDLC/HDLC SERPL: 1.5 {RATIO}
LEFT ARM BP: NORMAL MMHG
LEUKOCYTE ESTERASE UR QL STRIP.AUTO: NEGATIVE
LYMPHOCYTES # BLD AUTO: 3.17 10*3/MM3 (ref 0.9–4.8)
LYMPHOCYTES NFR BLD AUTO: 26.8 % (ref 19.6–45.3)
MCH RBC QN AUTO: 30.3 PG (ref 27–32.7)
MCHC RBC AUTO-ENTMCNC: 33 G/DL (ref 32.6–36.4)
MCV RBC AUTO: 91.9 FL (ref 79.8–96.2)
MONOCYTES # BLD AUTO: 1.25 10*3/MM3 (ref 0.2–1.2)
MONOCYTES NFR BLD AUTO: 10.5 % (ref 5–12)
NEUTROPHILS # BLD AUTO: 6.81 10*3/MM3 (ref 1.9–8.1)
NEUTROPHILS NFR BLD AUTO: 57.5 % (ref 42.7–76)
NITRITE UR QL STRIP: NEGATIVE
PH UR STRIP.AUTO: 5.5 [PH] (ref 5–8)
PLATELET # BLD AUTO: 102 10*3/MM3 (ref 140–500)
PMV BLD AUTO: 10.7 FL (ref 6–12)
POTASSIUM BLD-SCNC: 4.4 MMOL/L (ref 3.5–5.2)
PROT SERPL-MCNC: 7 G/DL (ref 6–8.5)
PROT UR QL STRIP: NEGATIVE
PROTHROMBIN TIME: 14.7 SECONDS (ref 11.7–14.2)
RBC # BLD AUTO: 5.18 10*6/MM3 (ref 4.6–6)
RH BLD: NEGATIVE
RH BLD: NEGATIVE
RIGHT ARM BP: NORMAL MMHG
SODIUM BLD-SCNC: 137 MMOL/L (ref 136–145)
SP GR UR STRIP: >=1.03 (ref 1–1.03)
TRIGL SERPL-MCNC: 49 MG/DL (ref 0–150)
UROBILINOGEN UR QL STRIP: NORMAL
VLDLC SERPL-MCNC: 9.8 MG/DL (ref 5–40)
WBC NRBC COR # BLD: 11.85 10*3/MM3 (ref 4.5–10.7)

## 2017-04-04 PROCEDURE — 93571 IV DOP VEL&/PRESS C FLO 1ST: CPT | Performed by: INTERNAL MEDICINE

## 2017-04-04 PROCEDURE — 63710000001 INSULIN ASPART PER 5 UNITS: Performed by: INTERNAL MEDICINE

## 2017-04-04 PROCEDURE — 85576 BLOOD PLATELET AGGREGATION: CPT | Performed by: THORACIC SURGERY (CARDIOTHORACIC VASCULAR SURGERY)

## 2017-04-04 PROCEDURE — G0378 HOSPITAL OBSERVATION PER HR: HCPCS

## 2017-04-04 PROCEDURE — 94060 EVALUATION OF WHEEZING: CPT

## 2017-04-04 PROCEDURE — 85025 COMPLETE CBC W/AUTO DIFF WBC: CPT | Performed by: THORACIC SURGERY (CARDIOTHORACIC VASCULAR SURGERY)

## 2017-04-04 PROCEDURE — 86901 BLOOD TYPING SEROLOGIC RH(D): CPT | Performed by: THORACIC SURGERY (CARDIOTHORACIC VASCULAR SURGERY)

## 2017-04-04 PROCEDURE — 86901 BLOOD TYPING SEROLOGIC RH(D): CPT

## 2017-04-04 PROCEDURE — 85610 PROTHROMBIN TIME: CPT | Performed by: THORACIC SURGERY (CARDIOTHORACIC VASCULAR SURGERY)

## 2017-04-04 PROCEDURE — 93970 EXTREMITY STUDY: CPT

## 2017-04-04 PROCEDURE — 86900 BLOOD TYPING SEROLOGIC ABO: CPT

## 2017-04-04 PROCEDURE — C1769 GUIDE WIRE: HCPCS | Performed by: INTERNAL MEDICINE

## 2017-04-04 PROCEDURE — 0 IOPAMIDOL PER 1 ML: Performed by: INTERNAL MEDICINE

## 2017-04-04 PROCEDURE — 86923 COMPATIBILITY TEST ELECTRIC: CPT

## 2017-04-04 PROCEDURE — 25010000002 BH (CUPID ONLY) ADENOSINE 6 MG/100ML MIXTURE: Performed by: INTERNAL MEDICINE

## 2017-04-04 PROCEDURE — 25010000002 HEPARIN (PORCINE) PER 1000 UNITS: Performed by: INTERNAL MEDICINE

## 2017-04-04 PROCEDURE — 93880 EXTRACRANIAL BILAT STUDY: CPT

## 2017-04-04 PROCEDURE — 80061 LIPID PANEL: CPT | Performed by: THORACIC SURGERY (CARDIOTHORACIC VASCULAR SURGERY)

## 2017-04-04 PROCEDURE — 93010 ELECTROCARDIOGRAM REPORT: CPT | Performed by: INTERNAL MEDICINE

## 2017-04-04 PROCEDURE — 86900 BLOOD TYPING SEROLOGIC ABO: CPT | Performed by: THORACIC SURGERY (CARDIOTHORACIC VASCULAR SURGERY)

## 2017-04-04 PROCEDURE — 93005 ELECTROCARDIOGRAM TRACING: CPT | Performed by: INTERNAL MEDICINE

## 2017-04-04 PROCEDURE — 85730 THROMBOPLASTIN TIME PARTIAL: CPT | Performed by: THORACIC SURGERY (CARDIOTHORACIC VASCULAR SURGERY)

## 2017-04-04 PROCEDURE — 25010000002 MIDAZOLAM PER 1 MG: Performed by: INTERNAL MEDICINE

## 2017-04-04 PROCEDURE — 81003 URINALYSIS AUTO W/O SCOPE: CPT | Performed by: THORACIC SURGERY (CARDIOTHORACIC VASCULAR SURGERY)

## 2017-04-04 PROCEDURE — 86850 RBC ANTIBODY SCREEN: CPT | Performed by: THORACIC SURGERY (CARDIOTHORACIC VASCULAR SURGERY)

## 2017-04-04 PROCEDURE — 83036 HEMOGLOBIN GLYCOSYLATED A1C: CPT | Performed by: THORACIC SURGERY (CARDIOTHORACIC VASCULAR SURGERY)

## 2017-04-04 PROCEDURE — 82962 GLUCOSE BLOOD TEST: CPT

## 2017-04-04 PROCEDURE — 80053 COMPREHEN METABOLIC PANEL: CPT | Performed by: THORACIC SURGERY (CARDIOTHORACIC VASCULAR SURGERY)

## 2017-04-04 PROCEDURE — 25010000002 FENTANYL CITRATE (PF) 100 MCG/2ML SOLUTION: Performed by: INTERNAL MEDICINE

## 2017-04-04 PROCEDURE — 93458 L HRT ARTERY/VENTRICLE ANGIO: CPT | Performed by: INTERNAL MEDICINE

## 2017-04-04 PROCEDURE — 63710000001 INSULIN DETEMER PER 5 UNITS: Performed by: INTERNAL MEDICINE

## 2017-04-04 PROCEDURE — C1894 INTRO/SHEATH, NON-LASER: HCPCS | Performed by: INTERNAL MEDICINE

## 2017-04-04 PROCEDURE — 71020 HC CHEST PA AND LATERAL: CPT

## 2017-04-04 RX ORDER — ACETAMINOPHEN 325 MG/1
650 TABLET ORAL EVERY 4 HOURS PRN
Status: DISCONTINUED | OUTPATIENT
Start: 2017-04-04 | End: 2017-04-04

## 2017-04-04 RX ORDER — TEMAZEPAM 7.5 MG/1
7.5 CAPSULE ORAL NIGHTLY PRN
Status: DISCONTINUED | OUTPATIENT
Start: 2017-04-04 | End: 2017-04-05 | Stop reason: HOSPADM

## 2017-04-04 RX ORDER — ACETAMINOPHEN 325 MG/1
650 TABLET ORAL EVERY 4 HOURS PRN
Status: DISCONTINUED | OUTPATIENT
Start: 2017-04-04 | End: 2017-04-05 | Stop reason: HOSPADM

## 2017-04-04 RX ORDER — SODIUM CHLORIDE 9 MG/ML
75 INJECTION, SOLUTION INTRAVENOUS CONTINUOUS
Status: DISCONTINUED | OUTPATIENT
Start: 2017-04-04 | End: 2017-04-05 | Stop reason: HOSPADM

## 2017-04-04 RX ORDER — PROMETHAZINE HYDROCHLORIDE 25 MG/ML
12.5 INJECTION, SOLUTION INTRAMUSCULAR; INTRAVENOUS EVERY 4 HOURS PRN
Status: DISCONTINUED | OUTPATIENT
Start: 2017-04-04 | End: 2017-04-04 | Stop reason: HOSPADM

## 2017-04-04 RX ORDER — ALPRAZOLAM 0.25 MG/1
0.25 TABLET ORAL EVERY 8 HOURS PRN
Status: DISCONTINUED | OUTPATIENT
Start: 2017-04-04 | End: 2017-04-05 | Stop reason: HOSPADM

## 2017-04-04 RX ORDER — ISOSORBIDE MONONITRATE 30 MG/1
30 TABLET, EXTENDED RELEASE ORAL DAILY
Status: DISCONTINUED | OUTPATIENT
Start: 2017-04-04 | End: 2017-04-05 | Stop reason: HOSPADM

## 2017-04-04 RX ORDER — NALOXONE HCL 0.4 MG/ML
0.4 VIAL (ML) INJECTION
Status: DISCONTINUED | OUTPATIENT
Start: 2017-04-04 | End: 2017-04-05 | Stop reason: HOSPADM

## 2017-04-04 RX ORDER — ATORVASTATIN CALCIUM 40 MG/1
40 TABLET, FILM COATED ORAL DAILY
Status: DISCONTINUED | OUTPATIENT
Start: 2017-04-04 | End: 2017-04-05 | Stop reason: HOSPADM

## 2017-04-04 RX ORDER — BENZONATATE 200 MG/1
200 CAPSULE ORAL 3 TIMES DAILY PRN
Status: DISCONTINUED | OUTPATIENT
Start: 2017-04-04 | End: 2017-04-05 | Stop reason: HOSPADM

## 2017-04-04 RX ORDER — ONDANSETRON 2 MG/ML
4 INJECTION INTRAMUSCULAR; INTRAVENOUS EVERY 6 HOURS PRN
Status: DISCONTINUED | OUTPATIENT
Start: 2017-04-04 | End: 2017-04-04 | Stop reason: HOSPADM

## 2017-04-04 RX ORDER — HYDROCODONE BITARTRATE AND ACETAMINOPHEN 5; 325 MG/1; MG/1
1 TABLET ORAL EVERY 4 HOURS PRN
Status: DISCONTINUED | OUTPATIENT
Start: 2017-04-04 | End: 2017-04-05 | Stop reason: HOSPADM

## 2017-04-04 RX ORDER — MIDAZOLAM HYDROCHLORIDE 1 MG/ML
INJECTION INTRAMUSCULAR; INTRAVENOUS AS NEEDED
Status: DISCONTINUED | OUTPATIENT
Start: 2017-04-04 | End: 2017-04-04 | Stop reason: HOSPADM

## 2017-04-04 RX ORDER — FENTANYL CITRATE 50 UG/ML
INJECTION, SOLUTION INTRAMUSCULAR; INTRAVENOUS AS NEEDED
Status: DISCONTINUED | OUTPATIENT
Start: 2017-04-04 | End: 2017-04-04 | Stop reason: HOSPADM

## 2017-04-04 RX ORDER — NICOTINE POLACRILEX 4 MG
15 LOZENGE BUCCAL
Status: DISCONTINUED | OUTPATIENT
Start: 2017-04-04 | End: 2017-04-05 | Stop reason: HOSPADM

## 2017-04-04 RX ORDER — ASPIRIN 81 MG/1
81 TABLET, CHEWABLE ORAL ONCE
Status: DISCONTINUED | OUTPATIENT
Start: 2017-04-04 | End: 2017-04-05 | Stop reason: HOSPADM

## 2017-04-04 RX ORDER — METOCLOPRAMIDE HYDROCHLORIDE 5 MG/ML
10 INJECTION INTRAMUSCULAR; INTRAVENOUS EVERY 6 HOURS PRN
Status: DISCONTINUED | OUTPATIENT
Start: 2017-04-04 | End: 2017-04-04 | Stop reason: HOSPADM

## 2017-04-04 RX ORDER — CEFAZOLIN SODIUM 2 G/100ML
2 INJECTION, SOLUTION INTRAVENOUS
Status: DISCONTINUED | OUTPATIENT
Start: 2017-04-05 | End: 2017-04-04

## 2017-04-04 RX ORDER — SODIUM CHLORIDE 0.9 % (FLUSH) 0.9 %
1-10 SYRINGE (ML) INJECTION AS NEEDED
Status: DISCONTINUED | OUTPATIENT
Start: 2017-04-04 | End: 2017-04-05 | Stop reason: HOSPADM

## 2017-04-04 RX ORDER — LIDOCAINE HYDROCHLORIDE 20 MG/ML
INJECTION, SOLUTION INFILTRATION; PERINEURAL AS NEEDED
Status: DISCONTINUED | OUTPATIENT
Start: 2017-04-04 | End: 2017-04-04 | Stop reason: HOSPADM

## 2017-04-04 RX ORDER — SODIUM CHLORIDE 0.9 % (FLUSH) 0.9 %
1-10 SYRINGE (ML) INJECTION AS NEEDED
Status: DISCONTINUED | OUTPATIENT
Start: 2017-04-04 | End: 2017-04-04 | Stop reason: HOSPADM

## 2017-04-04 RX ORDER — ALBUTEROL SULFATE 2.5 MG/3ML
2.5 SOLUTION RESPIRATORY (INHALATION) ONCE
Status: COMPLETED | OUTPATIENT
Start: 2017-04-04 | End: 2017-04-04

## 2017-04-04 RX ORDER — LIDOCAINE HYDROCHLORIDE 10 MG/ML
0.1 INJECTION, SOLUTION EPIDURAL; INFILTRATION; INTRACAUDAL; PERINEURAL ONCE AS NEEDED
Status: DISCONTINUED | OUTPATIENT
Start: 2017-04-04 | End: 2017-04-04 | Stop reason: HOSPADM

## 2017-04-04 RX ORDER — DEXTROSE MONOHYDRATE 25 G/50ML
25 INJECTION, SOLUTION INTRAVENOUS
Status: DISCONTINUED | OUTPATIENT
Start: 2017-04-04 | End: 2017-04-05 | Stop reason: HOSPADM

## 2017-04-04 RX ORDER — AMOXICILLIN AND CLAVULANATE POTASSIUM 875; 125 MG/1; MG/1
1 TABLET, FILM COATED ORAL EVERY 12 HOURS SCHEDULED
Status: DISCONTINUED | OUTPATIENT
Start: 2017-04-04 | End: 2017-04-05 | Stop reason: HOSPADM

## 2017-04-04 RX ORDER — CHLORHEXIDINE GLUCONATE 0.12 MG/ML
15 RINSE ORAL EVERY 12 HOURS SCHEDULED
Status: DISCONTINUED | OUTPATIENT
Start: 2017-04-04 | End: 2017-04-04

## 2017-04-04 RX ADMIN — AMOXICILLIN AND CLAVULANATE POTASSIUM 1 TABLET: 875; 125 TABLET, FILM COATED ORAL at 21:07

## 2017-04-04 RX ADMIN — ISOSORBIDE MONONITRATE 30 MG: 30 TABLET ORAL at 16:57

## 2017-04-04 RX ADMIN — ATORVASTATIN CALCIUM 40 MG: 40 TABLET, FILM COATED ORAL at 21:07

## 2017-04-04 RX ADMIN — INSULIN ASPART 3 UNITS: 100 INJECTION, SOLUTION INTRAVENOUS; SUBCUTANEOUS at 16:57

## 2017-04-04 RX ADMIN — METOPROLOL TARTRATE 25 MG: 25 TABLET ORAL at 16:58

## 2017-04-04 RX ADMIN — INSULIN ASPART 3 UNITS: 100 INJECTION, SOLUTION INTRAVENOUS; SUBCUTANEOUS at 21:07

## 2017-04-04 RX ADMIN — ALBUTEROL SULFATE 2.5 MG: 2.5 SOLUTION RESPIRATORY (INHALATION) at 17:16

## 2017-04-04 RX ADMIN — SODIUM CHLORIDE 75 ML/HR: 9 INJECTION, SOLUTION INTRAVENOUS at 09:24

## 2017-04-04 RX ADMIN — INSULIN DETEMIR 30 UNITS: 100 INJECTION, SOLUTION SUBCUTANEOUS at 21:07

## 2017-04-04 NOTE — CONSULTS
I saw patient in Cath Lab with Dr. Guzman. LAD 80% and %. CABG advisable but he is finishing course of antibiotics for URI. Wife has PNA. Will call him next week to see how he is doing to schedule surgery.

## 2017-04-04 NOTE — NURSING NOTE
Dr. Poole at bedside to see patient and wife. CABG surgery is being canceled at this time due to patient has apparently been on PO antibiotics for URI and still has a couple days of antibiotics left.  Dr. Poole spoke with cardiologist and informed him of his decision. Plan will be to monitor over night and D/C home tomorrow and bring back as an OP in the next week or two.

## 2017-04-04 NOTE — DISCHARGE INSTRUCTIONS
[facility]  [address] [place] [address]    Coronary Angiogram (Radial/Ulnar Approach) After Care    Refer to this sheet in the next few weeks. These instructions provide you with information on caring for yourself after your procedure. Your caregiver may also give you more specific instructions. Your treatment has been planned according to current medical practices, but problems sometimes occur. Call your caregiver if you have any problems or questions after your procedure.    Home Care Instructions:  · You may shower the day after the procedure. Remove the bandage (dressing) and gently wash the site with plain soap and water. Gently pat the site dry. You may apply a band aid daily for 2 days if desired.    · Do not apply powder or lotion to the site.  · Do not submerge the affected site in water for 3 to 5 days or until the site is completely healed.   · Do not lift, push or pull anything over 10 pounds for 2 days after your procedure.  · Inspect the site at least twice daily. You may notice some bruising at the site and it may be tender for 1 to 2 weeks.     · Increase your fluid intake for the next 2 days.    · Keep arm elevated for 24 hours. For the remainder of the day, keep your arm in “Pledge of Allegiance” position when up and about.     · You may drive 24 hours after the procedure unless otherwise instructed by your caregiver.  · Do not operate machinery or power tools for 24 hours.  · A responsible adult should be with you for the first 24 hours after you arrive home. Do not make any important legal decisions or sign legal papers for 24 hours.      Call Your Doctor if:   · You have unusual pain at the radial/ulnar (wrist) site.  · You have redness, warmth, swelling, or pain at the radial/ulnar (wrist) site.  · You have drainage (other than a small amount of blood on the dressing).  · You have chills or a fever > 101.  · Your arm becomes pale or dark, cool, tingly, or numb.  · You  have heavy bleeding from the site, hold pressure on the site for 20 minutes.  If the bleeding stops, apply a fresh bandage and call your cardiologist.  However, if you continue to have bleeding, call 911.

## 2017-04-04 NOTE — PLAN OF CARE
Problem: Patient Care Overview (Adult)  Goal: Plan of Care Review  Outcome: Ongoing (interventions implemented as appropriate)    04/04/17 1147   Coping/Psychosocial Response Interventions   Plan Of Care Reviewed With patient;spouse   Patient Care Overview   Progress improving   Outcome Evaluation   Outcome Summary/Follow up Plan ready for transfer       Goal: Adult Individualization and Mutuality  Outcome: Ongoing (interventions implemented as appropriate)  Goal: Discharge Needs Assessment  Outcome: Ongoing (interventions implemented as appropriate)    Problem: Cardiac Catheterization with/without PCI (Adult)  Goal: Signs and Symptoms of Listed Potential Problems Will be Absent or Manageable (Cardiac Catheterization with/without PCI)  Outcome: Ongoing (interventions implemented as appropriate)    04/04/17 1201   Cardiac Catheterization with/without PCI   Problems Assessed (Cardiac Catheterization) all   Problems Present (Cardiac Catheterization) none

## 2017-04-04 NOTE — H&P (VIEW-ONLY)
Date of Office Visit: 2017  Encounter Provider: Noah Montejo MD  Place of Service: [facility] CARDIOLOGY  Patient Name: Felix Olmedo  :1945    Chief complaint: Follow-up for CAD.  Chest pain and SOA.    History of Present Illness:    Dear Dr. Blackman:      I again had the pleasure of seeing your patient in cardiology office on 2017. As you   well know, he is a very pleasant 71 year-old white male with a past medical history   significant for coronary artery disease and diabetes who presents for follow-up. The   patient initially saw me on 10/3/2016. He had been experiencing 5-6 weeks of a vague   chest discomfort intermittently which he described as an aching and burning sensation   in the center and left chest. He also had been more short of breath with exertion. He   underwent a Lexiscan Myoview stress test on 10/12/2016 which showed a small infarct    in the inferior wall with a moderate amount of evon-infarct ischemia. He subsequently   underwent a left heart catheterization on 10/18/2016 which showed the right coronary   artery to be chronically occluded at the ostium, although left to right collaterals filled the   distal right coronary artery and PDA. He also had disease in the LAD of 40-50% in the   proximal portion, as well as diffuse 60% disease in the mid to distal portion. Medical   management was recommended at that time with consideration for percutaneous   intervention versus bypass grafting if the patient's symptoms did not improve. He did   have an echocardiogram on 10/12/2016 which confirmed a normal ejection fraction of  62% and grade 1 diastolic dysfunction. The patient was placed on Imdur after his   cardiac catheterization.      The patient presents today for follow-up.  Unfortunately, he has been having worsening   exertional symptoms recently.  He states that he is very short of breath with minimal   exertion, and often has to stop and  rest.  He has also been having aching pain in the center   of his chest with exertion.  His symptoms resolve after several minutes of rest.  He is   taking his Imdur and metoprolol.      Past Medical History:   Diagnosis Date   • Acute sinusitis    • Arthritis    • Chest pain    • Contusion of elbow, left     acute   • Coronary artery disease     Cath 10/18/16: RCA chronically occluded at ostium, and fills via left to right collaterals.  LAD with 40-50% proximal disease, and diffuse 60% disease in mid-distal segment.    • ZELAYA (dyspnea on exertion)    • History of bone density study     never   • History of chest x-ray 12/24/2014   • LAFB (left anterior fascicular block)    • Neuropathy    • Pneumonia    • Right-sided back pain    • Sprain of left shoulder    • Squamous cell carcinoma     Left cheek s/p resection   • Type 2 diabetes mellitus     Since 2000       Past Surgical History:   Procedure Laterality Date   • CARDIAC CATHETERIZATION N/A 10/18/2016    Procedure: Coronary angiography;  Surgeon: Jesus Guzman MD;  Location: [facility] CATH INVASIVE LOCATION;  Service:    • CARDIAC CATHETERIZATION N/A 10/18/2016    Procedure: Left heart cath;  Surgeon: Jesus Guzman MD;  Location: [facility] CATH INVASIVE LOCATION;  Service:    • CARDIAC CATHETERIZATION N/A 10/18/2016    Procedure: Left ventriculography;  Surgeon: Jesus Guzman MD;  Location: [facility] INVASIVE LOCATION;  Service:    • CATARACT EXTRACTION W/ INTRAOCULAR LENS IMPLANT Left    • KNEE SURGERY Left 2005    Dr. Gallegos   • MOHS SURGERY Left     CHEEK       Current Outpatient Prescriptions on File Prior to Visit   Medication Sig Dispense Refill   • amoxicillin-clavulanate (AUGMENTIN) 875-125 MG per tablet Take 1 tablet by mouth 2 (Two) Times a Day. One PO BID for infection 20 tablet 0   • aspirin 81 MG tablet Take 81 mg by mouth Daily.     • atorvastatin (LIPITOR) 40 MG tablet Take 1 tablet by mouth Daily. 30 tablet 11   • benzonatate  (TESSALON) 200 MG capsule Take 1 capsule by mouth 3 (Three) Times a Day As Needed for Cough. 30 capsule 0   • glucose blood (ONE TOUCH ULTRA TEST) test strip To check bg 3 -4 times a day 60 each 5   • insulin aspart (novoLOG) 100 UNIT/ML injection Inject 30 Units under the skin 3 (Three) Times a Day Before Meals. (Patient taking differently: Inject 30 Units under the skin 3 (Three) Times a Day Before Meals. 14 units 3 times daily) 90 mL 3   • insulin detemir (LEVEMIR FLEXPEN) 100 UNIT/ML injection Inject 34 Units under the skin Every Night. 5 pen 3   • isosorbide mononitrate (IMDUR) 30 MG 24 hr tablet Take 1 tablet by mouth 2 (Two) Times a Day. 90 tablet 3   • Lancets (ONETOUCH ULTRASOFT) lancets To check 3 -4 times a day 60 each 5   • metoprolol tartrate (LOPRESSOR) 25 MG tablet Take 0.5 tablets by mouth 2 (Two) Times a Day. 60 tablet 11   • Multiple Vitamins-Minerals (MULTIVITAMIN ADULT PO) Take 1 tablet by mouth Daily.       No current facility-administered medications on file prior to visit.      Allergies as of 03/31/2017   • (No Known Allergies)     Social History     Social History   • Marital status:      Spouse name: N/A   • Number of children: N/A   • Years of education: N/A     Occupational History   • Not on file.     Social History Main Topics   • Smoking status: Former Smoker     Packs/day: 1.50   • Smokeless tobacco: Never Used      Comment: Smoked up to 1.5 ppd.  Smoked for 50 years.  Quit 2009.   • Alcohol use Yes      Comment: Rarely   • Drug use: No   • Sexual activity: Defer     Other Topics Concern   • Not on file     Social History Narrative     Family History   Problem Relation Age of Onset   • Cancer Mother      uterine   • Heart disease Father      Father with fatal MI in 80's   • Diabetes Sister    • Diabetes Other      grandmother   • Stroke Other    • Hypertension Other    • Esophageal cancer Brother        Review of Systems   Cardiovascular: Positive for chest pain and dyspnea on  "exertion.   All other systems reviewed and are negative.    Objective:     Vitals:    03/31/17 1223   BP: 98/56   Pulse: 63   Weight: 252 lb (114 kg)   Height: 70.5\" (179.1 cm)     Body mass index is 35.65 kg/(m^2).    Physical Exam   Constitutional: He is oriented to person, place, and time. He appears well-developed and well-nourished.   HENT:   Head: Normocephalic and atraumatic.   Eyes: Conjunctivae are normal.   Neck: Neck supple.   Cardiovascular: Normal rate and regular rhythm.  Exam reveals no gallop and no friction rub.    No murmur heard.  Pulmonary/Chest: Effort normal and breath sounds normal.   Abdominal: Soft. There is no tenderness.   Musculoskeletal: He exhibits no edema.   Neurological: He is alert and oriented to person, place, and time.   Skin: Skin is warm.   Psychiatric: He has a normal mood and affect. His behavior is normal.     Lab Review:   Procedures    Cardiac Procedures:  1. Echocardiogram on 10/12/2016: Ejection fraction was 62%. There was grade 1   diastolic dysfunction. There was moderate to severe thickening of the noncoronary   cusp of the aortic valve, but no aortic stenosis.  2. Left heart catheterization on 10/18/2016 by Dr. Guzman: The left main had 20%   distal disease. The LAD had a 40-50% proximal stenosis. The LAD had diffuse 60%   mid to distal disease. The left circumflex coronary artery had a 20% mid stenosis. The   right coronary artery was chronically occluded at the ostium, left to right collaterals   filled the distal vessel and PDA.    Assessment:       Diagnosis Plan   1. Coronary artery disease of native artery of native heart with stable angina pectoris  Case Request Cath Lab: Coronary angiography, Left heart cath, Left ventriculography   2. Type 2 diabetes mellitus with other circulatory complication       Plan:       The patient is having progressive exertional anginal symptoms at this point.  His RCA is   occluded at the ostium, and his LAD had diffuse 60% " disease on his last cath.  The RCA   is unlikely to be amenable to intervention.  Neither the Imdur or metoprolol can be increased   at this point as his blood pressure is 98/56.  I have recommended a repeat left heart cath,   and I will set this up for Dr. Guzman.  I will also discuss the case with Dr. Guzman.  He may   ultimately need coronary artery bypass surgery evaluation.  He will continue on the aspirin   and Lipitor.  Further plans will be made pending the results of the heart catheterization.      Coronary Artery Disease  Assessment  • The patient has CCS class III - angina with activities of daily living  • The patient has stable angina     Plan  • The patient is being referred to interventional cardiology  • Lifestyle modifications discussed include adhering to a heart healthy diet, avoidance of tobacco products, maintenance of a healthy weight, medication compliance, regular exercise and regular monitoring of cholesterol and blood pressure    Subjective - Objective  • Current antiplatelet therapy includes aspirin 81 mg

## 2017-04-05 VITALS
TEMPERATURE: 98.2 F | DIASTOLIC BLOOD PRESSURE: 76 MMHG | HEIGHT: 70 IN | SYSTOLIC BLOOD PRESSURE: 160 MMHG | HEART RATE: 59 BPM | BODY MASS INDEX: 36.51 KG/M2 | RESPIRATION RATE: 18 BRPM | WEIGHT: 255 LBS | OXYGEN SATURATION: 92 %

## 2017-04-05 LAB
ABO + RH BLD: NORMAL
ABO + RH BLD: NORMAL
ANION GAP SERPL CALCULATED.3IONS-SCNC: 11.7 MMOL/L
BH BB BLOOD EXPIRATION DATE: NORMAL
BH BB BLOOD EXPIRATION DATE: NORMAL
BH BB BLOOD TYPE BARCODE: 600
BH BB BLOOD TYPE BARCODE: 600
BH BB DISPENSE STATUS: NORMAL
BH BB DISPENSE STATUS: NORMAL
BH BB PRODUCT CODE: NORMAL
BH BB PRODUCT CODE: NORMAL
BH BB UNIT NUMBER: NORMAL
BH BB UNIT NUMBER: NORMAL
BUN BLD-MCNC: 12 MG/DL (ref 8–23)
BUN/CREAT SERPL: 12.9 (ref 7–25)
CALCIUM SPEC-SCNC: 8.7 MG/DL (ref 8.6–10.5)
CHLORIDE SERPL-SCNC: 103 MMOL/L (ref 98–107)
CHOLEST SERPL-MCNC: 117 MG/DL (ref 0–200)
CO2 SERPL-SCNC: 24.3 MMOL/L (ref 22–29)
CREAT BLD-MCNC: 0.93 MG/DL (ref 0.76–1.27)
DEPRECATED RDW RBC AUTO: 48.1 FL (ref 37–54)
ERYTHROCYTE [DISTWIDTH] IN BLOOD BY AUTOMATED COUNT: 14.8 % (ref 11.5–14.5)
GFR SERPL CREATININE-BSD FRML MDRD: 80 ML/MIN/1.73
GLUCOSE BLD-MCNC: 176 MG/DL (ref 65–99)
GLUCOSE BLDC GLUCOMTR-MCNC: 127 MG/DL (ref 70–130)
HCT VFR BLD AUTO: 44.6 % (ref 40.4–52.2)
HDLC SERPL-MCNC: 41 MG/DL (ref 40–60)
HGB BLD-MCNC: 14.8 G/DL (ref 13.7–17.6)
LDLC SERPL CALC-MCNC: 60 MG/DL (ref 0–100)
LDLC/HDLC SERPL: 1.46 {RATIO}
MCH RBC QN AUTO: 29.8 PG (ref 27–32.7)
MCHC RBC AUTO-ENTMCNC: 33.2 G/DL (ref 32.6–36.4)
MCV RBC AUTO: 89.7 FL (ref 79.8–96.2)
PLATELET # BLD AUTO: 178 10*3/MM3 (ref 140–500)
PMV BLD AUTO: 10.8 FL (ref 6–12)
POTASSIUM BLD-SCNC: 3.8 MMOL/L (ref 3.5–5.2)
RBC # BLD AUTO: 4.97 10*6/MM3 (ref 4.6–6)
SODIUM BLD-SCNC: 139 MMOL/L (ref 136–145)
TRIGL SERPL-MCNC: 80 MG/DL (ref 0–150)
UNIT  ABO: NORMAL
UNIT  ABO: NORMAL
UNIT  RH: NORMAL
UNIT  RH: NORMAL
VLDLC SERPL-MCNC: 16 MG/DL (ref 5–40)
WBC NRBC COR # BLD: 11.12 10*3/MM3 (ref 4.5–10.7)

## 2017-04-05 PROCEDURE — 99217 PR OBSERVATION CARE DISCHARGE MANAGEMENT: CPT | Performed by: INTERNAL MEDICINE

## 2017-04-05 PROCEDURE — 93010 ELECTROCARDIOGRAM REPORT: CPT | Performed by: INTERNAL MEDICINE

## 2017-04-05 PROCEDURE — 82962 GLUCOSE BLOOD TEST: CPT

## 2017-04-05 PROCEDURE — 80061 LIPID PANEL: CPT | Performed by: INTERNAL MEDICINE

## 2017-04-05 PROCEDURE — G0378 HOSPITAL OBSERVATION PER HR: HCPCS

## 2017-04-05 PROCEDURE — 80048 BASIC METABOLIC PNL TOTAL CA: CPT | Performed by: INTERNAL MEDICINE

## 2017-04-05 PROCEDURE — 93005 ELECTROCARDIOGRAM TRACING: CPT | Performed by: INTERNAL MEDICINE

## 2017-04-05 PROCEDURE — 85027 COMPLETE CBC AUTOMATED: CPT | Performed by: INTERNAL MEDICINE

## 2017-04-05 RX ADMIN — METOPROLOL TARTRATE 25 MG: 25 TABLET ORAL at 08:30

## 2017-04-05 RX ADMIN — ISOSORBIDE MONONITRATE 30 MG: 30 TABLET ORAL at 08:30

## 2017-04-05 RX ADMIN — AMOXICILLIN AND CLAVULANATE POTASSIUM 1 TABLET: 875; 125 TABLET, FILM COATED ORAL at 08:30

## 2017-04-05 RX ADMIN — ATORVASTATIN CALCIUM 40 MG: 40 TABLET, FILM COATED ORAL at 08:30

## 2017-04-05 NOTE — DISCHARGE SUMMARY
Date of Admission: 4/4/2017    Date of Discharge:  4/5/2017    Discharge Diagnoses:   1.  Coronary artery disease (severe)  2.  Stable angina  3.  Diabetes  4.  URI    Procedures Performed:  1. Left heart cath on 4/4/17 with Dr. Guzman.       Hospital Course:     This is a very pleasant 71-year-old white male who I know very well in the office.  He has a history of coronary artery disease, including a known occlusion of the ostial right coronary artery, and up to 60% disease in the LAD in the past.  The LAD was also known to supply collaterals to the occluded right coronary artery.  The patient presented as an outpatient with progressive angina with exertion.  He was extremely short of breath and having aching discomfort with minimal exertion as an outpatient.  He subsequently underwent a repeat diagnostic left heart catheterization after medical therapy was maximized with metoprolol and Imdur.  He is ostial right coronary artery was again occluded, and the LAD had a 60-70% mid vessel stenosis.  FFR of this lesion was borderline at 0.8.  It was felt that the right coronary artery was very large and supplied a large territory.  This was felt not to be amenable to percutaneous intervention by Dr. Guzman.  It was felt that coronary artery bypass grafting surgery would be the best option for the patient at this point.  Dr. Poole saw the patient and agrees that bypass is indicated.  However, the patient has had an upper respiratory tract infection for the last several days, and is actually completing 2 more days of antibiotics.  Therefore, we are going to wait approximately one to 2 weeks before proceeding with his bypass.  Dr. Poole's office will call to schedule his bypass surgery.  I advised the patient to minimize exertion, and to return to the hospital immediately for any significant symptoms.  His cardiac catheterization site was normal with no hematoma or bruit.      Discharge Medications:   Felix Olmedo    Home Medication Instructions Banner:[id number]    Printed on:04/05/17 0704   Medication Information                      amoxicillin-clavulanate (AUGMENTIN) 875-125 MG per tablet  Take 1 tablet by mouth 2 (Two) Times a Day.             aspirin 81 MG tablet  Take 81 mg by mouth Daily.             atorvastatin (LIPITOR) 40 MG tablet  Take 40 mg by mouth Daily.             benzonatate (TESSALON) 200 MG capsule  Take 200 mg by mouth 3 (Three) Times a Day As Needed for Cough.             insulin aspart (novoLOG) 100 UNIT/ML injection  Inject  under the skin 3 (Three) Times a Day Before Meals.             insulin detemir (LEVEMIR) 100 UNIT/ML injection  Inject 34 Units under the skin Every Night.             isosorbide mononitrate (IMDUR) 30 MG 24 hr tablet  Take 30 mg by mouth Daily.             metoprolol tartrate (LOPRESSOR) 25 MG tablet  Take 25 mg by mouth 2 (Two) Times a Day.             Multiple Vitamins-Minerals (MULTIVITAMIN ADULT PO)  Take 1 tablet by mouth Daily.             Pseudoeph-Doxylamine-DM-APAP (NYQUIL D COLD/FLU PO)  Take  by mouth.                   Follow-up: Dr. Poole's office will call to schedule CABG.      Physical Exam:   General Appearance:    No acute distress, alert and oriented x4   Lungs:     Clear to auscultation bilaterally     Heart:    Regular rhythm and normal rate.  No murmurs, gallops, or       rubs.   Abdomen:     Soft, non-tender, non-distended.    Extremities:   Moves all extremities well.  No clubbing, cyanosis, or edema.       Time Spent on Discharge: >30 minutes       Noah Montejo MD  04/05/17  7:04 AM

## 2017-04-05 NOTE — PROGRESS NOTES
Continued Stay Note  [facility]     Patient Name: Felix Olmedo  MRN: [id number]  Today's Date: 4/5/2017    Admit Date: 4/4/2017          Discharge Plan       04/05/17 1040    Case Management/Social Work Plan    Additional Comments No needs per Nursing staff.  LIAM Webber    Final Note    Final Note Plan home.  LIAM Webber              Discharge Codes       04/05/17 1041    Discharge Codes    Discharge Codes 01  Discharge to home        Expected Discharge Date and Time     Expected Discharge Date Expected Discharge Time    Apr 5, 2017             Sophia Schmidt, RN

## 2017-04-05 NOTE — PLAN OF CARE
Problem: Patient Care Overview (Adult)  Goal: Plan of Care Review  Outcome: Ongoing (interventions implemented as appropriate)    04/04/17 2252   Coping/Psychosocial Response Interventions   Plan Of Care Reviewed With patient   Patient Care Overview   Progress improving       Goal: Adult Individualization and Mutuality  Outcome: Ongoing (interventions implemented as appropriate)  Goal: Discharge Needs Assessment  Outcome: Ongoing (interventions implemented as appropriate)    04/04/17 2252   Discharge Needs Assessment   Concerns To Be Addressed no discharge needs identified   Readmission Within The Last 30 Days no previous admission in last 30 days   Equipment Needed After Discharge none   Discharge Disposition still a patient   Current Health   Anticipated Changes Related to Illness none   Self-Care   Equipment Currently Used at Home none   Living Environment   Transportation Available family or friend will provide         Problem: Cardiac Catheterization with/without PCI (Adult)  Goal: Signs and Symptoms of Listed Potential Problems Will be Absent or Manageable (Cardiac Catheterization with/without PCI)  Outcome: Ongoing (interventions implemented as appropriate)    04/04/17 2252   Cardiac Catheterization with/without PCI   Problems Assessed (Cardiac Catheterization) all   Problems Present (Cardiac Catheterization) none

## 2017-04-07 ENCOUNTER — OFFICE VISIT (OUTPATIENT)
Dept: FAMILY MEDICINE CLINIC | Facility: CLINIC | Age: 72
End: 2017-04-07

## 2017-04-07 VITALS
BODY MASS INDEX: 35.93 KG/M2 | OXYGEN SATURATION: 93 % | DIASTOLIC BLOOD PRESSURE: 60 MMHG | HEIGHT: 70 IN | WEIGHT: 251 LBS | SYSTOLIC BLOOD PRESSURE: 126 MMHG | RESPIRATION RATE: 16 BRPM | TEMPERATURE: 97.5 F | HEART RATE: 65 BPM

## 2017-04-07 DIAGNOSIS — J32.0 MAXILLARY SINUSITIS, UNSPECIFIED CHRONICITY: ICD-10-CM

## 2017-04-07 DIAGNOSIS — J18.9 PNEUMONIA OF RIGHT LOWER LOBE DUE TO INFECTIOUS ORGANISM: Primary | ICD-10-CM

## 2017-04-07 PROCEDURE — 99213 OFFICE O/P EST LOW 20 MIN: CPT | Performed by: NURSE PRACTITIONER

## 2017-04-07 RX ORDER — LEVOFLOXACIN 500 MG/1
500 TABLET, FILM COATED ORAL DAILY
Qty: 10 TABLET | Refills: 0 | Status: SHIPPED | OUTPATIENT
Start: 2017-04-07 | End: 2017-04-17

## 2017-04-07 NOTE — PROGRESS NOTES
Subjective   Felix Olmedo is a 71 y.o. male.     History of Present Illness   Felix Olmedo 71 y.o. male who presents for evaluation of sinus pressure and congestion, cough. Symptoms include congestion, nasal blockage, post nasal drip, productive cough, headache and lower sinus pain.  Onset of symptoms was 2 weeks ago, gradually worsening since that time. Patient denies fever.   Evaluation to date: was seen in office on 3/27 and was given augmentin. He finished last dose yesterday.  Reports symptoms have not improved much.     The following portions of the patient's history were reviewed and updated as appropriate: allergies, current medications, past family history, past medical history, past social history, past surgical history and problem list.    Review of Systems   Constitutional: Negative for chills and fever.   HENT: Positive for congestion, postnasal drip, rhinorrhea and sinus pressure.    Respiratory: Positive for cough, chest tightness and shortness of breath.        Objective   Physical Exam   Constitutional: He is oriented to person, place, and time. He appears well-developed and well-nourished.   HENT:   Head: Normocephalic and atraumatic.   Right Ear: Tympanic membrane, external ear and ear canal normal.   Left Ear: Tympanic membrane, external ear and ear canal normal.   Nose: Right sinus exhibits maxillary sinus tenderness. Right sinus exhibits no frontal sinus tenderness. Left sinus exhibits maxillary sinus tenderness. Left sinus exhibits no frontal sinus tenderness.   Mouth/Throat: Uvula is midline and oropharynx is clear and moist.   Cardiovascular: Normal rate and regular rhythm.    Pulmonary/Chest: Effort normal and breath sounds normal.   Neurological: He is oriented to person, place, and time.   Skin: Skin is warm and dry.   Psychiatric: He has a normal mood and affect. His behavior is normal. Judgment and thought content normal.   Nursing note and vitals reviewed.      Assessment/Plan   Felix  was seen today for uri.    Diagnoses and all orders for this visit:    Pneumonia of right lower lobe due to infectious organism  -     levoFLOXacin (LEVAQUIN) 500 MG tablet; Take 1 tablet by mouth Daily for 10 days.    Maxillary sinusitis, unspecified chronicity  -     levoFLOXacin (LEVAQUIN) 500 MG tablet; Take 1 tablet by mouth Daily for 10 days.

## 2017-04-12 ENCOUNTER — TELEPHONE (OUTPATIENT)
Dept: CARDIAC SURGERY | Facility: CLINIC | Age: 72
End: 2017-04-12

## 2017-04-12 NOTE — TELEPHONE ENCOUNTER
I spoke to Mr Olmedo after speaking with Dr Poole. He will come to our office and see Dr Poole 4/19/17 to discuss surgery. He will finish the antibiotics on 4/16/17

## 2017-04-21 ENCOUNTER — PREP FOR SURGERY (OUTPATIENT)
Dept: CARDIOLOGY | Facility: CLINIC | Age: 72
End: 2017-04-21

## 2017-04-21 ENCOUNTER — PREP FOR SURGERY (OUTPATIENT)
Dept: CARDIAC SURGERY | Facility: CLINIC | Age: 72
End: 2017-04-21

## 2017-04-21 ENCOUNTER — OFFICE VISIT (OUTPATIENT)
Dept: CARDIAC SURGERY | Facility: CLINIC | Age: 72
End: 2017-04-21

## 2017-04-21 VITALS
HEART RATE: 60 BPM | SYSTOLIC BLOOD PRESSURE: 110 MMHG | OXYGEN SATURATION: 90 % | RESPIRATION RATE: 16 BRPM | BODY MASS INDEX: 36.59 KG/M2 | TEMPERATURE: 97.9 F | WEIGHT: 255 LBS | DIASTOLIC BLOOD PRESSURE: 62 MMHG

## 2017-04-21 DIAGNOSIS — I25.119 CORONARY ARTERY DISEASE INVOLVING NATIVE CORONARY ARTERY OF NATIVE HEART WITH ANGINA PECTORIS (HCC): Primary | ICD-10-CM

## 2017-04-21 DIAGNOSIS — I25.118 CORONARY ARTERY DISEASE OF NATIVE ARTERY OF NATIVE HEART WITH STABLE ANGINA PECTORIS (HCC): Primary | ICD-10-CM

## 2017-04-21 DIAGNOSIS — R79.1 ABNORMAL COAGULATION PROFILE: ICD-10-CM

## 2017-04-21 DIAGNOSIS — I99.8 OTHER DISORDER OF CIRCULATORY SYSTEM: ICD-10-CM

## 2017-04-21 DIAGNOSIS — R79.9 ABNORMAL FINDING OF BLOOD CHEMISTRY: ICD-10-CM

## 2017-04-21 DIAGNOSIS — E11.40 TYPE 2 DIABETES MELLITUS WITH DIABETIC NEUROPATHY, UNSPECIFIED LONG TERM INSULIN USE STATUS: ICD-10-CM

## 2017-04-21 PROCEDURE — 99213 OFFICE O/P EST LOW 20 MIN: CPT | Performed by: THORACIC SURGERY (CARDIOTHORACIC VASCULAR SURGERY)

## 2017-04-21 RX ORDER — CEFAZOLIN SODIUM 2 G/100ML
2 INJECTION, SOLUTION INTRAVENOUS ONCE
Status: CANCELLED | OUTPATIENT
Start: 2017-05-10

## 2017-04-21 RX ORDER — SODIUM CHLORIDE 0.9 % (FLUSH) 0.9 %
1-10 SYRINGE (ML) INJECTION AS NEEDED
Status: CANCELLED | OUTPATIENT
Start: 2017-04-21

## 2017-04-21 RX ORDER — CHLORHEXIDINE GLUCONATE 500 MG/1
1 CLOTH TOPICAL EVERY 12 HOURS PRN
Status: CANCELLED | OUTPATIENT
Start: 2017-04-21

## 2017-04-21 RX ORDER — CHLORHEXIDINE GLUCONATE 0.12 MG/ML
15 RINSE ORAL EVERY 12 HOURS
Status: CANCELLED | OUTPATIENT
Start: 2017-04-21 | End: 2017-04-22

## 2017-04-21 RX ORDER — NAPROXEN SODIUM 220 MG
220 TABLET ORAL AS NEEDED
COMMUNITY
End: 2017-05-09 | Stop reason: ALTCHOICE

## 2017-04-21 NOTE — PROGRESS NOTES
CARDIOVASCULAR SURGERY FOLLOW-UP PROGRESS NOTE  Chief Complaint: here for reevaluation for coronary artery disease after recent pulmonary infection        HPI:   Dear Dr. Felix Blackman MD and colleagues:    It was nice to see Felix Olmedo in follow up 2 weeks  after hospitalization and cardiac catheterization and respiratory infection.  As you know, he is a 71 y.o. male with coronary artery disease who underwent cardiac catheterization on 1/4/17. He had an upper respiratory tract infection at that time and was treated with anabiotic's.  His last dose was on Easter Sunday. He comes in today complaining of nothing.  His activity level has been good.       Physical Exam:         /62 (BP Location: Left arm, Patient Position: Sitting, Cuff Size: Adult)  Pulse 60  Temp 97.9 °F (36.6 °C) (Oral)   Resp 16  Wt 255 lb (116 kg)  SpO2 90%  BMI 36.59 kg/m2  Heart:  regular rate and rhythm, S1, S2 normal, no murmur, click, rub or gallop  Lungs:  clear to auscultation bilaterally  Extremities:  no edema  Incision(s):  None yet    Assessment/Plan:     S/P cardiac catheterization and treatment of respiratory infection. Overall, he is doing well.    He would like to have surgery on May 10 and we will set this up.    If he has any severe angina he'll come to the hospital ASAP.    No restrictions of activity.      Thank you for allowing me to participate in the care of your   patient.  Regards,  Alden Poole MD

## 2017-04-24 ENCOUNTER — TELEPHONE (OUTPATIENT)
Dept: CARDIAC SURGERY | Facility: CLINIC | Age: 72
End: 2017-04-24

## 2017-04-26 ENCOUNTER — RESULTS ENCOUNTER (OUTPATIENT)
Dept: CARDIAC SURGERY | Facility: CLINIC | Age: 72
End: 2017-04-26

## 2017-04-26 ENCOUNTER — RESULTS ENCOUNTER (OUTPATIENT)
Dept: CARDIOLOGY | Facility: CLINIC | Age: 72
End: 2017-04-26

## 2017-04-26 DIAGNOSIS — I25.118 CORONARY ARTERY DISEASE OF NATIVE ARTERY OF NATIVE HEART WITH STABLE ANGINA PECTORIS (HCC): ICD-10-CM

## 2017-04-26 DIAGNOSIS — I25.119 CORONARY ARTERY DISEASE INVOLVING NATIVE CORONARY ARTERY OF NATIVE HEART WITH ANGINA PECTORIS (HCC): ICD-10-CM

## 2017-05-09 ENCOUNTER — APPOINTMENT (OUTPATIENT)
Dept: PREADMISSION TESTING | Facility: HOSPITAL | Age: 72
End: 2017-05-09

## 2017-05-09 ENCOUNTER — HOSPITAL ENCOUNTER (OUTPATIENT)
Dept: GENERAL RADIOLOGY | Facility: HOSPITAL | Age: 72
Discharge: HOME | End: 2017-05-09
Admitting: THORACIC SURGERY (CARDIOTHORACIC VASCULAR SURGERY)

## 2017-05-09 ENCOUNTER — ANESTHESIA EVENT (OUTPATIENT)
Dept: PERIOP | Facility: HOSPITAL | Age: 72
End: 2017-05-09

## 2017-05-09 ENCOUNTER — PREP FOR SURGERY (OUTPATIENT)
Dept: CARDIAC SURGERY | Facility: CLINIC | Age: 72
End: 2017-05-09

## 2017-05-09 VITALS
HEART RATE: 64 BPM | TEMPERATURE: 96.8 F | BODY MASS INDEX: 36.73 KG/M2 | SYSTOLIC BLOOD PRESSURE: 111 MMHG | WEIGHT: 248 LBS | RESPIRATION RATE: 16 BRPM | OXYGEN SATURATION: 95 % | DIASTOLIC BLOOD PRESSURE: 62 MMHG | HEIGHT: 69 IN

## 2017-05-09 DIAGNOSIS — R79.9 ABNORMAL FINDING OF BLOOD CHEMISTRY: ICD-10-CM

## 2017-05-09 DIAGNOSIS — Z01.818 PREOP TESTING: Primary | ICD-10-CM

## 2017-05-09 DIAGNOSIS — R79.1 ABNORMAL COAGULATION PROFILE: ICD-10-CM

## 2017-05-09 DIAGNOSIS — I25.118 CORONARY ARTERY DISEASE OF NATIVE ARTERY OF NATIVE HEART WITH STABLE ANGINA PECTORIS (HCC): ICD-10-CM

## 2017-05-09 LAB
ABO GROUP BLD: NORMAL
ALBUMIN SERPL-MCNC: 3.6 G/DL (ref 3.5–5.2)
ALBUMIN/GLOB SERPL: 1 G/DL
ALP SERPL-CCNC: 103 U/L (ref 39–117)
ALT SERPL W P-5'-P-CCNC: 23 U/L (ref 1–41)
ANION GAP SERPL CALCULATED.3IONS-SCNC: 10.6 MMOL/L
APTT PPP: 34 SECONDS (ref 22.7–35.4)
ARTERIAL PATENCY WRIST A: POSITIVE
AST SERPL-CCNC: 24 U/L (ref 1–40)
ATMOSPHERIC PRESS: 749.5 MMHG
BASE EXCESS BLDA CALC-SCNC: 0.4 MMOL/L (ref 0–2)
BASOPHILS # BLD AUTO: 0.07 10*3/MM3 (ref 0–0.2)
BASOPHILS NFR BLD AUTO: 0.6 % (ref 0–1.5)
BDY SITE: ABNORMAL
BILIRUB SERPL-MCNC: 0.5 MG/DL (ref 0.1–1.2)
BILIRUB UR QL STRIP: NEGATIVE
BLD GP AB SCN SERPL QL: NEGATIVE
BUN BLD-MCNC: 12 MG/DL (ref 8–23)
BUN/CREAT SERPL: 12.4 (ref 7–25)
CALCIUM SPEC-SCNC: 9.2 MG/DL (ref 8.6–10.5)
CHLORIDE SERPL-SCNC: 101 MMOL/L (ref 98–107)
CLARITY UR: CLEAR
CLOSE TME COLL+ADP + EPINEP PNL BLD: 97 % (ref 86–100)
CO2 SERPL-SCNC: 26.4 MMOL/L (ref 22–29)
COLOR UR: YELLOW
CREAT BLD-MCNC: 0.97 MG/DL (ref 0.76–1.27)
DEPRECATED RDW RBC AUTO: 50.2 FL (ref 37–54)
EOSINOPHIL # BLD AUTO: 0.62 10*3/MM3 (ref 0–0.7)
EOSINOPHIL NFR BLD AUTO: 5.7 % (ref 0.3–6.2)
ERYTHROCYTE [DISTWIDTH] IN BLOOD BY AUTOMATED COUNT: 14.8 % (ref 11.5–14.5)
GFR SERPL CREATININE-BSD FRML MDRD: 76 ML/MIN/1.73
GLOBULIN UR ELPH-MCNC: 3.6 GM/DL
GLUCOSE BLD-MCNC: 198 MG/DL (ref 65–99)
GLUCOSE UR STRIP-MCNC: NEGATIVE MG/DL
HBA1C MFR BLD: 8.05 % (ref 4.8–5.6)
HCO3 BLDA-SCNC: 25.3 MMOL/L (ref 22–28)
HCT VFR BLD AUTO: 46.3 % (ref 40.4–52.2)
HGB BLD-MCNC: 15.2 G/DL (ref 13.7–17.6)
HGB UR QL STRIP.AUTO: NEGATIVE
IMM GRANULOCYTES # BLD: 0.02 10*3/MM3 (ref 0–0.03)
IMM GRANULOCYTES NFR BLD: 0.2 % (ref 0–0.5)
INR PPP: 1.11 (ref 0.9–1.1)
KETONES UR QL STRIP: NEGATIVE
LEUKOCYTE ESTERASE UR QL STRIP.AUTO: NEGATIVE
LYMPHOCYTES # BLD AUTO: 2.79 10*3/MM3 (ref 0.9–4.8)
LYMPHOCYTES NFR BLD AUTO: 25.7 % (ref 19.6–45.3)
MCH RBC QN AUTO: 30.6 PG (ref 27–32.7)
MCHC RBC AUTO-ENTMCNC: 32.8 G/DL (ref 32.6–36.4)
MCV RBC AUTO: 93.2 FL (ref 79.8–96.2)
MODALITY: ABNORMAL
MONOCYTES # BLD AUTO: 1.38 10*3/MM3 (ref 0.2–1.2)
MONOCYTES NFR BLD AUTO: 12.7 % (ref 5–12)
NEUTROPHILS # BLD AUTO: 5.98 10*3/MM3 (ref 1.9–8.1)
NEUTROPHILS NFR BLD AUTO: 55.1 % (ref 42.7–76)
NITRITE UR QL STRIP: NEGATIVE
PCO2 BLDA: 40.5 MM HG (ref 35–45)
PH BLDA: 7.4 PH UNITS (ref 7.35–7.45)
PH UR STRIP.AUTO: <=5 [PH] (ref 5–8)
PLATELET # BLD AUTO: 155 10*3/MM3 (ref 140–500)
PMV BLD AUTO: 10.3 FL (ref 6–12)
PO2 BLDA: 65.2 MM HG (ref 80–100)
POTASSIUM BLD-SCNC: 4.4 MMOL/L (ref 3.5–5.2)
PROT SERPL-MCNC: 7.2 G/DL (ref 6–8.5)
PROT UR QL STRIP: NEGATIVE
PROTHROMBIN TIME: 13.9 SECONDS (ref 11.7–14.2)
RBC # BLD AUTO: 4.97 10*6/MM3 (ref 4.6–6)
RH BLD: NEGATIVE
SAO2 % BLDCOA: 92.5 % (ref 92–99)
SODIUM BLD-SCNC: 138 MMOL/L (ref 136–145)
SP GR UR STRIP: 1.01 (ref 1–1.03)
TOTAL RATE: 16 BREATHS/MINUTE
UROBILINOGEN UR QL STRIP: NORMAL
WBC NRBC COR # BLD: 10.86 10*3/MM3 (ref 4.5–10.7)

## 2017-05-09 PROCEDURE — 86923 COMPATIBILITY TEST ELECTRIC: CPT

## 2017-05-09 PROCEDURE — 93010 ELECTROCARDIOGRAM REPORT: CPT | Performed by: INTERNAL MEDICINE

## 2017-05-09 RX ORDER — CEFAZOLIN SODIUM 2 G/100ML
2 INJECTION, SOLUTION INTRAVENOUS ONCE
Status: CANCELLED | OUTPATIENT
Start: 2017-05-09 | End: 2017-05-09

## 2017-05-09 RX ORDER — SODIUM CHLORIDE 0.9 % (FLUSH) 0.9 %
1-10 SYRINGE (ML) INJECTION AS NEEDED
Status: CANCELLED | OUTPATIENT
Start: 2017-05-09

## 2017-05-09 RX ORDER — CHLORHEXIDINE GLUCONATE 0.12 MG/ML
15 RINSE ORAL
COMMUNITY
End: 2017-05-15 | Stop reason: HOSPADM

## 2017-05-09 RX ORDER — CHLORHEXIDINE GLUCONATE 0.12 MG/ML
15 RINSE ORAL EVERY 12 HOURS
Status: DISPENSED | OUTPATIENT
Start: 2017-05-09 | End: 2017-05-10

## 2017-05-10 ENCOUNTER — HOSPITAL ENCOUNTER (INPATIENT)
Facility: HOSPITAL | Age: 72
LOS: 5 days | Discharge: NURSING, REHAB OR LONG-TERM CARE | End: 2017-05-15
Attending: THORACIC SURGERY (CARDIOTHORACIC VASCULAR SURGERY) | Admitting: THORACIC SURGERY (CARDIOTHORACIC VASCULAR SURGERY)

## 2017-05-10 ENCOUNTER — APPOINTMENT (OUTPATIENT)
Dept: PERIOP | Facility: HOSPITAL | Age: 72
End: 2017-05-10
Attending: ANESTHESIOLOGY

## 2017-05-10 ENCOUNTER — ANESTHESIA (OUTPATIENT)
Dept: PERIOP | Facility: HOSPITAL | Age: 72
End: 2017-05-10

## 2017-05-10 ENCOUNTER — APPOINTMENT (OUTPATIENT)
Dept: GENERAL RADIOLOGY | Facility: HOSPITAL | Age: 72
End: 2017-05-10

## 2017-05-10 DIAGNOSIS — Z95.1 S/P CABG X 2: Primary | ICD-10-CM

## 2017-05-10 DIAGNOSIS — R26.2 DIFFICULTY WALKING: ICD-10-CM

## 2017-05-10 DIAGNOSIS — I25.118 CORONARY ARTERY DISEASE OF NATIVE ARTERY OF NATIVE HEART WITH STABLE ANGINA PECTORIS (HCC): ICD-10-CM

## 2017-05-10 DIAGNOSIS — E11.8 UNCONTROLLED TYPE 2 DIABETES MELLITUS WITH COMPLICATION, UNSPECIFIED LONG TERM INSULIN USE STATUS: ICD-10-CM

## 2017-05-10 DIAGNOSIS — E11.65 UNCONTROLLED TYPE 2 DIABETES MELLITUS WITH COMPLICATION, UNSPECIFIED LONG TERM INSULIN USE STATUS: ICD-10-CM

## 2017-05-10 LAB
ACT BLD: 121 SECONDS (ref 82–152)
ACT BLD: 126 SECONDS (ref 82–152)
ACT BLD: 564 SECONDS (ref 82–152)
ACT BLD: 744 SECONDS (ref 82–152)
ALBUMIN SERPL-MCNC: 3.6 G/DL (ref 3.5–5.2)
ALBUMIN SERPL-MCNC: 3.8 G/DL (ref 3.5–5.2)
ANION GAP SERPL CALCULATED.3IONS-SCNC: 13 MMOL/L
ANION GAP SERPL CALCULATED.3IONS-SCNC: 13.9 MMOL/L
APTT PPP: 36.4 SECONDS (ref 22.7–35.4)
ARTERIAL PATENCY WRIST A: ABNORMAL
ATMOSPHERIC PRESS: 746.9 MMHG
ATMOSPHERIC PRESS: 747.5 MMHG
ATMOSPHERIC PRESS: 748.3 MMHG
BASE EXCESS BLDA CALC-SCNC: -0.1 MMOL/L (ref 0–2)
BASE EXCESS BLDA CALC-SCNC: -1 MMOL/L (ref -5–5)
BASE EXCESS BLDA CALC-SCNC: -1.7 MMOL/L (ref 0–2)
BASE EXCESS BLDA CALC-SCNC: -2.7 MMOL/L (ref 0–2)
BASE EXCESS BLDA CALC-SCNC: 0 MMOL/L (ref -5–5)
BASE EXCESS BLDA CALC-SCNC: 3 MMOL/L (ref -5–5)
BASE EXCESS BLDA CALC-SCNC: 4 MMOL/L (ref -5–5)
BASOPHILS # BLD AUTO: 0.04 10*3/MM3 (ref 0–0.2)
BASOPHILS NFR BLD AUTO: 0.3 % (ref 0–1.5)
BDY SITE: ABNORMAL
BUN BLD-MCNC: 10 MG/DL (ref 8–23)
BUN BLD-MCNC: 10 MG/DL (ref 8–23)
BUN/CREAT SERPL: 10.5 (ref 7–25)
BUN/CREAT SERPL: 10.8 (ref 7–25)
CA-I BLD-MCNC: 4.6 MG/DL (ref 4.6–5.4)
CA-I SERPL ISE-MCNC: 1.15 MMOL/L (ref 1.1–1.35)
CALCIUM SPEC-SCNC: 8.2 MG/DL (ref 8.6–10.5)
CALCIUM SPEC-SCNC: 8.4 MG/DL (ref 8.6–10.5)
CHLORIDE SERPL-SCNC: 102 MMOL/L (ref 98–107)
CHLORIDE SERPL-SCNC: 107 MMOL/L (ref 98–107)
CO2 BLDA-SCNC: 26 MMOL/L (ref 24–29)
CO2 BLDA-SCNC: 27 MMOL/L (ref 24–29)
CO2 BLDA-SCNC: 29 MMOL/L (ref 24–29)
CO2 BLDA-SCNC: 30 MMOL/L (ref 24–29)
CO2 SERPL-SCNC: 23.1 MMOL/L (ref 22–29)
CO2 SERPL-SCNC: 24 MMOL/L (ref 22–29)
CREAT BLD-MCNC: 0.93 MG/DL (ref 0.76–1.27)
CREAT BLD-MCNC: 0.95 MG/DL (ref 0.76–1.27)
DEPRECATED RDW RBC AUTO: 48.6 FL (ref 37–54)
DEPRECATED RDW RBC AUTO: 50 FL (ref 37–54)
EOSINOPHIL # BLD AUTO: 0.46 10*3/MM3 (ref 0–0.7)
EOSINOPHIL NFR BLD AUTO: 2.9 % (ref 0.3–6.2)
ERYTHROCYTE [DISTWIDTH] IN BLOOD BY AUTOMATED COUNT: 14.7 % (ref 11.5–14.5)
ERYTHROCYTE [DISTWIDTH] IN BLOOD BY AUTOMATED COUNT: 14.9 % (ref 11.5–14.5)
GFR SERPL CREATININE-BSD FRML MDRD: 78 ML/MIN/1.73
GFR SERPL CREATININE-BSD FRML MDRD: 80 ML/MIN/1.73
GLUCOSE BLD-MCNC: 110 MG/DL (ref 65–99)
GLUCOSE BLD-MCNC: 165 MG/DL (ref 65–99)
GLUCOSE BLDC GLUCOMTR-MCNC: 100 MG/DL (ref 70–130)
GLUCOSE BLDC GLUCOMTR-MCNC: 113 MG/DL (ref 70–130)
GLUCOSE BLDC GLUCOMTR-MCNC: 116 MG/DL (ref 70–130)
GLUCOSE BLDC GLUCOMTR-MCNC: 130 MG/DL (ref 70–130)
GLUCOSE BLDC GLUCOMTR-MCNC: 133 MG/DL (ref 70–130)
GLUCOSE BLDC GLUCOMTR-MCNC: 134 MG/DL (ref 70–130)
GLUCOSE BLDC GLUCOMTR-MCNC: 142 MG/DL (ref 70–130)
GLUCOSE BLDC GLUCOMTR-MCNC: 151 MG/DL (ref 70–130)
GLUCOSE BLDC GLUCOMTR-MCNC: 153 MG/DL (ref 70–130)
GLUCOSE BLDC GLUCOMTR-MCNC: 156 MG/DL (ref 70–130)
GLUCOSE BLDC GLUCOMTR-MCNC: 173 MG/DL (ref 70–130)
HCO3 BLDA-SCNC: 22.4 MMOL/L (ref 22–28)
HCO3 BLDA-SCNC: 24.8 MMOL/L (ref 22–26)
HCO3 BLDA-SCNC: 25.1 MMOL/L (ref 22–28)
HCO3 BLDA-SCNC: 25.2 MMOL/L (ref 22–28)
HCO3 BLDA-SCNC: 25.8 MMOL/L (ref 22–26)
HCO3 BLDA-SCNC: 27.4 MMOL/L (ref 22–26)
HCO3 BLDA-SCNC: 28.8 MMOL/L (ref 22–26)
HCT VFR BLD AUTO: 38.4 % (ref 40.4–52.2)
HCT VFR BLD AUTO: 38.8 % (ref 40.4–52.2)
HCT VFR BLDA CALC: 34 % (ref 38–51)
HCT VFR BLDA CALC: 35 % (ref 38–51)
HCT VFR BLDA CALC: 35 % (ref 38–51)
HCT VFR BLDA CALC: 46 % (ref 38–51)
HGB BLD-MCNC: 12.9 G/DL (ref 13.7–17.6)
HGB BLD-MCNC: 13.2 G/DL (ref 13.7–17.6)
HGB BLDA-MCNC: 11.6 G/DL (ref 12–17)
HGB BLDA-MCNC: 11.9 G/DL (ref 12–17)
HGB BLDA-MCNC: 11.9 G/DL (ref 12–17)
HGB BLDA-MCNC: 15.6 G/DL (ref 12–17)
HOROWITZ INDEX BLD+IHG-RTO: 100 %
HOROWITZ INDEX BLD+IHG-RTO: 40 %
HOROWITZ INDEX BLD+IHG-RTO: 40 %
IMM GRANULOCYTES # BLD: 0.08 10*3/MM3 (ref 0–0.03)
IMM GRANULOCYTES NFR BLD: 0.5 % (ref 0–0.5)
INR PPP: 1.5 (ref 0.9–1.1)
LYMPHOCYTES # BLD AUTO: 2.6 10*3/MM3 (ref 0.9–4.8)
LYMPHOCYTES NFR BLD AUTO: 16.7 % (ref 19.6–45.3)
MAGNESIUM SERPL-MCNC: 2.5 MG/DL (ref 1.6–2.4)
MAGNESIUM SERPL-MCNC: 2.5 MG/DL (ref 1.6–2.4)
MCH RBC QN AUTO: 30.5 PG (ref 27–32.7)
MCH RBC QN AUTO: 30.9 PG (ref 27–32.7)
MCHC RBC AUTO-ENTMCNC: 33.4 G/DL (ref 32.6–36.4)
MCHC RBC AUTO-ENTMCNC: 33.6 G/DL (ref 32.6–36.4)
MCV RBC AUTO: 89.6 FL (ref 79.8–96.2)
MCV RBC AUTO: 91.9 FL (ref 79.8–96.2)
MODALITY: ABNORMAL
MONOCYTES # BLD AUTO: 0.79 10*3/MM3 (ref 0.2–1.2)
MONOCYTES NFR BLD AUTO: 5.1 % (ref 5–12)
NEUTROPHILS # BLD AUTO: 11.64 10*3/MM3 (ref 1.9–8.1)
NEUTROPHILS NFR BLD AUTO: 74.5 % (ref 42.7–76)
O2 A-A PPRESDIFF RESPIRATORY: 0.4 MMHG
PCO2 BLDA: 39.3 MM HG (ref 35–45)
PCO2 BLDA: 42.1 MM HG (ref 35–45)
PCO2 BLDA: 44 MM HG (ref 35–45)
PCO2 BLDA: 45.6 MM HG (ref 35–45)
PCO2 BLDA: 45.9 MM HG (ref 35–45)
PCO2 BLDA: 49.3 MM HG (ref 35–45)
PCO2 BLDA: 50.1 MM HG (ref 35–45)
PEEP RESPIRATORY: 5 CM[H2O]
PH BLDA: 7.31 PH UNITS (ref 7.35–7.45)
PH BLDA: 7.34 PH UNITS (ref 7.35–7.6)
PH BLDA: 7.36 PH UNITS (ref 7.35–7.45)
PH BLDA: 7.36 PH UNITS (ref 7.35–7.6)
PH BLDA: 7.38 PH UNITS (ref 7.35–7.45)
PH BLDA: 7.38 PH UNITS (ref 7.35–7.6)
PH BLDA: 7.4 PH UNITS (ref 7.35–7.6)
PHOSPHATE SERPL-MCNC: 3.7 MG/DL (ref 2.5–4.5)
PHOSPHATE SERPL-MCNC: 3.7 MG/DL (ref 2.5–4.5)
PLAT MORPH BLD: NORMAL
PLATELET # BLD AUTO: 108 10*3/MM3 (ref 140–500)
PLATELET # BLD AUTO: 98 10*3/MM3 (ref 140–500)
PMV BLD AUTO: 10.3 FL (ref 6–12)
PMV BLD AUTO: 10.8 FL (ref 6–12)
PO2 BLDA: 275 MMHG (ref 80–105)
PO2 BLDA: 295.6 MM HG (ref 80–100)
PO2 BLDA: 435 MMHG (ref 80–105)
PO2 BLDA: 44 MMHG (ref 80–105)
PO2 BLDA: 86.3 MM HG (ref 80–100)
PO2 BLDA: 91 MMHG (ref 80–105)
PO2 BLDA: 95.3 MM HG (ref 80–100)
POTASSIUM BLD-SCNC: 3.7 MMOL/L (ref 3.5–5.2)
POTASSIUM BLD-SCNC: 4 MMOL/L (ref 3.5–5.2)
POTASSIUM BLDA-SCNC: 3.7 MMOL/L (ref 3.5–4.9)
POTASSIUM BLDA-SCNC: 3.9 MMOL/L (ref 3.5–4.9)
POTASSIUM BLDA-SCNC: 4 MMOL/L (ref 3.5–4.9)
POTASSIUM BLDA-SCNC: 4.2 MMOL/L (ref 3.5–4.9)
PROTHROMBIN TIME: 17.6 SECONDS (ref 11.7–14.2)
PSV: 6 CMH2O
RBC # BLD AUTO: 4.18 10*6/MM3 (ref 4.6–6)
RBC # BLD AUTO: 4.33 10*6/MM3 (ref 4.6–6)
RBC MORPH BLD: NORMAL
SAO2 % BLDA: 100 % (ref 95–98)
SAO2 % BLDA: 100 % (ref 95–98)
SAO2 % BLDA: 76 % (ref 95–98)
SAO2 % BLDA: 97 % (ref 95–98)
SAO2 % BLDCOA: 96.4 % (ref 92–99)
SAO2 % BLDCOA: 96.5 % (ref 92–99)
SAO2 % BLDCOA: 99.9 % (ref 92–99)
SET MECH RESP RATE: 14
SET MECH RESP RATE: 14
SODIUM BLD-SCNC: 139 MMOL/L (ref 136–145)
SODIUM BLD-SCNC: 144 MMOL/L (ref 136–145)
TOTAL RATE: 12 BREATHS/MINUTE
TOTAL RATE: 14 BREATHS/MINUTE
TOTAL RATE: 14 BREATHS/MINUTE
VENTILATOR MODE: ABNORMAL
VT ON VENT VENT: 684 ML
VT ON VENT VENT: 700 ML
VT ON VENT VENT: 700 ML
WBC MORPH BLD: NORMAL
WBC NRBC COR # BLD: 15.61 10*3/MM3 (ref 4.5–10.7)
WBC NRBC COR # BLD: 17.43 10*3/MM3 (ref 4.5–10.7)

## 2017-05-10 PROCEDURE — A4648 IMPLANTABLE TISSUE MARKER: HCPCS | Performed by: THORACIC SURGERY (CARDIOTHORACIC VASCULAR SURGERY)

## 2017-05-10 PROCEDURE — 25010000002 MAGNESIUM SULFATE PER 500 MG OF MAGNESIUM: Performed by: ANESTHESIOLOGY

## 2017-05-10 PROCEDURE — 25010000002 HEPARIN (PORCINE) PER 1000 UNITS: Performed by: ANESTHESIOLOGY

## 2017-05-10 PROCEDURE — 85347 COAGULATION TIME ACTIVATED: CPT

## 2017-05-10 PROCEDURE — P9041 ALBUMIN (HUMAN),5%, 50ML: HCPCS | Performed by: THORACIC SURGERY (CARDIOTHORACIC VASCULAR SURGERY)

## 2017-05-10 PROCEDURE — 25010000002 ALBUMIN HUMAN 25% PER 50 ML

## 2017-05-10 PROCEDURE — 25010000002 FENTANYL CITRATE (PF) 100 MCG/2ML SOLUTION: Performed by: ANESTHESIOLOGY

## 2017-05-10 PROCEDURE — 25010000002 HEPARIN (PORCINE) PER 1000 UNITS: Performed by: THORACIC SURGERY (CARDIOTHORACIC VASCULAR SURGERY)

## 2017-05-10 PROCEDURE — 85610 PROTHROMBIN TIME: CPT | Performed by: THORACIC SURGERY (CARDIOTHORACIC VASCULAR SURGERY)

## 2017-05-10 PROCEDURE — 02100Z9 BYPASS CORONARY ARTERY, ONE ARTERY FROM LEFT INTERNAL MAMMARY, OPEN APPROACH: ICD-10-PCS | Performed by: THORACIC SURGERY (CARDIOTHORACIC VASCULAR SURGERY)

## 2017-05-10 PROCEDURE — 80069 RENAL FUNCTION PANEL: CPT | Performed by: THORACIC SURGERY (CARDIOTHORACIC VASCULAR SURGERY)

## 2017-05-10 PROCEDURE — 25010000002 VANCOMYCIN PER 500 MG

## 2017-05-10 PROCEDURE — 25010000002 MIDAZOLAM PER 1 MG: Performed by: ANESTHESIOLOGY

## 2017-05-10 PROCEDURE — 25010000002 PROPOFOL 10 MG/ML EMULSION: Performed by: ANESTHESIOLOGY

## 2017-05-10 PROCEDURE — 25010000002 MAGNESIUM SULFATE IN D5W 1G/100ML (PREMIX) 1-5 GM/100ML-% SOLUTION: Performed by: THORACIC SURGERY (CARDIOTHORACIC VASCULAR SURGERY)

## 2017-05-10 PROCEDURE — 85014 HEMATOCRIT: CPT

## 2017-05-10 PROCEDURE — 82803 BLOOD GASES ANY COMBINATION: CPT

## 2017-05-10 PROCEDURE — 94002 VENT MGMT INPAT INIT DAY: CPT

## 2017-05-10 PROCEDURE — 93005 ELECTROCARDIOGRAM TRACING: CPT | Performed by: THORACIC SURGERY (CARDIOTHORACIC VASCULAR SURGERY)

## 2017-05-10 PROCEDURE — 25010000002 ALBUMIN HUMAN 5% PER 50 ML: Performed by: THORACIC SURGERY (CARDIOTHORACIC VASCULAR SURGERY)

## 2017-05-10 PROCEDURE — 83735 ASSAY OF MAGNESIUM: CPT | Performed by: THORACIC SURGERY (CARDIOTHORACIC VASCULAR SURGERY)

## 2017-05-10 PROCEDURE — 06BQ4ZZ EXCISION OF LEFT SAPHENOUS VEIN, PERCUTANEOUS ENDOSCOPIC APPROACH: ICD-10-PCS | Performed by: THORACIC SURGERY (CARDIOTHORACIC VASCULAR SURGERY)

## 2017-05-10 PROCEDURE — 94799 UNLISTED PULMONARY SVC/PX: CPT

## 2017-05-10 PROCEDURE — 82330 ASSAY OF CALCIUM: CPT | Performed by: THORACIC SURGERY (CARDIOTHORACIC VASCULAR SURGERY)

## 2017-05-10 PROCEDURE — C1729 CATH, DRAINAGE: HCPCS | Performed by: THORACIC SURGERY (CARDIOTHORACIC VASCULAR SURGERY)

## 2017-05-10 PROCEDURE — 33517 CABG ARTERY-VEIN SINGLE: CPT | Performed by: THORACIC SURGERY (CARDIOTHORACIC VASCULAR SURGERY)

## 2017-05-10 PROCEDURE — C1713 ANCHOR/SCREW BN/BN,TIS/BN: HCPCS | Performed by: THORACIC SURGERY (CARDIOTHORACIC VASCULAR SURGERY)

## 2017-05-10 PROCEDURE — 5A1221Z PERFORMANCE OF CARDIAC OUTPUT, CONTINUOUS: ICD-10-PCS | Performed by: THORACIC SURGERY (CARDIOTHORACIC VASCULAR SURGERY)

## 2017-05-10 PROCEDURE — 93318 ECHO TRANSESOPHAGEAL INTRAOP: CPT

## 2017-05-10 PROCEDURE — 021009W BYPASS CORONARY ARTERY, ONE ARTERY FROM AORTA WITH AUTOLOGOUS VENOUS TISSUE, OPEN APPROACH: ICD-10-PCS | Performed by: THORACIC SURGERY (CARDIOTHORACIC VASCULAR SURGERY)

## 2017-05-10 PROCEDURE — P9046 ALBUMIN (HUMAN), 25%, 20 ML: HCPCS

## 2017-05-10 PROCEDURE — 25010000002 ONDANSETRON PER 1 MG: Performed by: ANESTHESIOLOGY

## 2017-05-10 PROCEDURE — 85018 HEMOGLOBIN: CPT

## 2017-05-10 PROCEDURE — 85730 THROMBOPLASTIN TIME PARTIAL: CPT | Performed by: THORACIC SURGERY (CARDIOTHORACIC VASCULAR SURGERY)

## 2017-05-10 PROCEDURE — 25010000002 CALCIUM GLUCONATE

## 2017-05-10 PROCEDURE — 82947 ASSAY GLUCOSE BLOOD QUANT: CPT

## 2017-05-10 PROCEDURE — 82962 GLUCOSE BLOOD TEST: CPT

## 2017-05-10 PROCEDURE — 85025 COMPLETE CBC W/AUTO DIFF WBC: CPT | Performed by: THORACIC SURGERY (CARDIOTHORACIC VASCULAR SURGERY)

## 2017-05-10 PROCEDURE — 85027 COMPLETE CBC AUTOMATED: CPT | Performed by: THORACIC SURGERY (CARDIOTHORACIC VASCULAR SURGERY)

## 2017-05-10 PROCEDURE — 33508 ENDOSCOPIC VEIN HARVEST: CPT | Performed by: THORACIC SURGERY (CARDIOTHORACIC VASCULAR SURGERY)

## 2017-05-10 PROCEDURE — C1751 CATH, INF, PER/CENT/MIDLINE: HCPCS | Performed by: ANESTHESIOLOGY

## 2017-05-10 PROCEDURE — 71010 HC CHEST PA OR AP: CPT

## 2017-05-10 PROCEDURE — 25010000002 METOCLOPRAMIDE PER 10 MG: Performed by: THORACIC SURGERY (CARDIOTHORACIC VASCULAR SURGERY)

## 2017-05-10 PROCEDURE — 25010000002 HEPARIN (PORCINE) PER 1000 UNITS

## 2017-05-10 PROCEDURE — 25010000003 CEFAZOLIN IN DEXTROSE 2-4 GM/100ML-% SOLUTION: Performed by: THORACIC SURGERY (CARDIOTHORACIC VASCULAR SURGERY)

## 2017-05-10 PROCEDURE — 85007 BL SMEAR W/DIFF WBC COUNT: CPT | Performed by: THORACIC SURGERY (CARDIOTHORACIC VASCULAR SURGERY)

## 2017-05-10 PROCEDURE — 93318 ECHO TRANSESOPHAGEAL INTRAOP: CPT | Performed by: ANESTHESIOLOGY

## 2017-05-10 PROCEDURE — 3E080GC INTRODUCTION OF OTHER THERAPEUTIC SUBSTANCE INTO HEART, OPEN APPROACH: ICD-10-PCS | Performed by: THORACIC SURGERY (CARDIOTHORACIC VASCULAR SURGERY)

## 2017-05-10 PROCEDURE — 63710000001 INSULIN REGULAR HUMAN PER 5 UNITS: Performed by: ANESTHESIOLOGY

## 2017-05-10 PROCEDURE — 25010000003 PROTAMINE SULFATE PER 10 MG: Performed by: THORACIC SURGERY (CARDIOTHORACIC VASCULAR SURGERY)

## 2017-05-10 PROCEDURE — B246ZZ4 ULTRASONOGRAPHY OF RIGHT AND LEFT HEART, TRANSESOPHAGEAL: ICD-10-PCS | Performed by: THORACIC SURGERY (CARDIOTHORACIC VASCULAR SURGERY)

## 2017-05-10 PROCEDURE — 93010 ELECTROCARDIOGRAM REPORT: CPT | Performed by: INTERNAL MEDICINE

## 2017-05-10 PROCEDURE — 25010000002 PROPOFOL 1000 MG/ML EMULSION: Performed by: ANESTHESIOLOGY

## 2017-05-10 PROCEDURE — 25010000003 POTASSIUM CHLORIDE PER 2 MEQ: Performed by: THORACIC SURGERY (CARDIOTHORACIC VASCULAR SURGERY)

## 2017-05-10 PROCEDURE — 25010000002 PHENYLEPHRINE PER 1 ML: Performed by: ANESTHESIOLOGY

## 2017-05-10 PROCEDURE — 25010000003 CEFAZOLIN IN DEXTROSE 2-4 GM/100ML-% SOLUTION: Performed by: PHYSICIAN ASSISTANT

## 2017-05-10 PROCEDURE — 25010000002 MORPHINE PER 10 MG: Performed by: THORACIC SURGERY (CARDIOTHORACIC VASCULAR SURGERY)

## 2017-05-10 PROCEDURE — 33533 CABG ARTERIAL SINGLE: CPT | Performed by: THORACIC SURGERY (CARDIOTHORACIC VASCULAR SURGERY)

## 2017-05-10 DEVICE — STERN FIX SYS ZIPFIX PK/5: Type: IMPLANTABLE DEVICE | Site: CHEST | Status: FUNCTIONAL

## 2017-05-10 RX ORDER — SODIUM CHLORIDE 0.9 % (FLUSH) 0.9 %
1-10 SYRINGE (ML) INJECTION AS NEEDED
Status: DISCONTINUED | OUTPATIENT
Start: 2017-05-10 | End: 2017-05-10 | Stop reason: HOSPADM

## 2017-05-10 RX ORDER — MAGNESIUM SULFATE HEPTAHYDRATE 40 MG/ML
2 INJECTION, SOLUTION INTRAVENOUS AS NEEDED
Status: DISCONTINUED | OUTPATIENT
Start: 2017-05-10 | End: 2017-05-15 | Stop reason: HOSPADM

## 2017-05-10 RX ORDER — SODIUM CHLORIDE 0.9 % (FLUSH) 0.9 %
30 SYRINGE (ML) INJECTION ONCE AS NEEDED
Status: DISCONTINUED | OUTPATIENT
Start: 2017-05-10 | End: 2017-05-15 | Stop reason: HOSPADM

## 2017-05-10 RX ORDER — ONDANSETRON 2 MG/ML
INJECTION INTRAMUSCULAR; INTRAVENOUS AS NEEDED
Status: DISCONTINUED | OUTPATIENT
Start: 2017-05-10 | End: 2017-05-10 | Stop reason: SURG

## 2017-05-10 RX ORDER — BISACODYL 10 MG
10 SUPPOSITORY, RECTAL RECTAL DAILY PRN
Status: DISCONTINUED | OUTPATIENT
Start: 2017-05-11 | End: 2017-05-15 | Stop reason: HOSPADM

## 2017-05-10 RX ORDER — NITROGLYCERIN 20 MG/100ML
INJECTION INTRAVENOUS CONTINUOUS PRN
Status: DISCONTINUED | OUTPATIENT
Start: 2017-05-10 | End: 2017-05-10 | Stop reason: SURG

## 2017-05-10 RX ORDER — MORPHINE SULFATE 2 MG/ML
1 INJECTION, SOLUTION INTRAMUSCULAR; INTRAVENOUS EVERY 4 HOURS PRN
Status: DISCONTINUED | OUTPATIENT
Start: 2017-05-10 | End: 2017-05-12

## 2017-05-10 RX ORDER — POTASSIUM CHLORIDE 7.45 MG/ML
10 INJECTION INTRAVENOUS
Status: DISCONTINUED | OUTPATIENT
Start: 2017-05-10 | End: 2017-05-15 | Stop reason: HOSPADM

## 2017-05-10 RX ORDER — FENTANYL CITRATE 50 UG/ML
50 INJECTION, SOLUTION INTRAMUSCULAR; INTRAVENOUS
Status: DISCONTINUED | OUTPATIENT
Start: 2017-05-10 | End: 2017-05-10 | Stop reason: HOSPADM

## 2017-05-10 RX ORDER — SENNA AND DOCUSATE SODIUM 50; 8.6 MG/1; MG/1
2 TABLET, FILM COATED ORAL NIGHTLY
Status: DISCONTINUED | OUTPATIENT
Start: 2017-05-11 | End: 2017-05-15 | Stop reason: HOSPADM

## 2017-05-10 RX ORDER — ALPRAZOLAM 0.25 MG/1
0.25 TABLET ORAL EVERY 8 HOURS PRN
Status: DISCONTINUED | OUTPATIENT
Start: 2017-05-10 | End: 2017-05-15 | Stop reason: HOSPADM

## 2017-05-10 RX ORDER — MIDAZOLAM HYDROCHLORIDE 1 MG/ML
1 INJECTION INTRAMUSCULAR; INTRAVENOUS
Status: DISCONTINUED | OUTPATIENT
Start: 2017-05-10 | End: 2017-05-10 | Stop reason: HOSPADM

## 2017-05-10 RX ORDER — ASPIRIN 81 MG/1
81 TABLET ORAL DAILY
Status: DISCONTINUED | OUTPATIENT
Start: 2017-05-11 | End: 2017-05-15 | Stop reason: HOSPADM

## 2017-05-10 RX ORDER — ROSUVASTATIN CALCIUM 20 MG/1
20 TABLET, COATED ORAL NIGHTLY
Status: DISCONTINUED | OUTPATIENT
Start: 2017-05-11 | End: 2017-05-15 | Stop reason: HOSPADM

## 2017-05-10 RX ORDER — MAGNESIUM SULFATE 1 G/100ML
1 INJECTION INTRAVENOUS EVERY 8 HOURS
Status: DISPENSED | OUTPATIENT
Start: 2017-05-10 | End: 2017-05-11

## 2017-05-10 RX ORDER — ROSUVASTATIN CALCIUM 20 MG/1
20 TABLET, COATED ORAL NIGHTLY
Status: DISCONTINUED | OUTPATIENT
Start: 2017-05-11 | End: 2017-05-10 | Stop reason: SDUPTHER

## 2017-05-10 RX ORDER — AMINOCAPROIC ACID 250 MG/ML
INJECTION, SOLUTION INTRAVENOUS AS NEEDED
Status: DISCONTINUED | OUTPATIENT
Start: 2017-05-10 | End: 2017-05-10 | Stop reason: SURG

## 2017-05-10 RX ORDER — FAMOTIDINE 10 MG/ML
20 INJECTION, SOLUTION INTRAVENOUS
Status: DISCONTINUED | OUTPATIENT
Start: 2017-05-10 | End: 2017-05-10 | Stop reason: HOSPADM

## 2017-05-10 RX ORDER — POTASSIUM CHLORIDE 1.5 G/1.77G
40 POWDER, FOR SOLUTION ORAL AS NEEDED
Status: DISCONTINUED | OUTPATIENT
Start: 2017-05-10 | End: 2017-05-15 | Stop reason: HOSPADM

## 2017-05-10 RX ORDER — NITROGLYCERIN 5 MG/ML
INJECTION, SOLUTION INTRAVENOUS AS NEEDED
Status: DISCONTINUED | OUTPATIENT
Start: 2017-05-10 | End: 2017-05-10 | Stop reason: SURG

## 2017-05-10 RX ORDER — SODIUM CHLORIDE, SODIUM LACTATE, POTASSIUM CHLORIDE, CALCIUM CHLORIDE 600; 310; 30; 20 MG/100ML; MG/100ML; MG/100ML; MG/100ML
9 INJECTION, SOLUTION INTRAVENOUS CONTINUOUS
Status: DISCONTINUED | OUTPATIENT
Start: 2017-05-10 | End: 2017-05-10

## 2017-05-10 RX ORDER — POTASSIUM CHLORIDE 29.8 MG/ML
20 INJECTION INTRAVENOUS
Status: DISCONTINUED | OUTPATIENT
Start: 2017-05-10 | End: 2017-05-15 | Stop reason: HOSPADM

## 2017-05-10 RX ORDER — SODIUM CHLORIDE 9 MG/ML
30 INJECTION, SOLUTION INTRAVENOUS CONTINUOUS PRN
Status: DISCONTINUED | OUTPATIENT
Start: 2017-05-10 | End: 2017-05-11

## 2017-05-10 RX ORDER — PAPAVERINE HYDROCHLORIDE 30 MG/ML
INJECTION INTRAMUSCULAR; INTRAVENOUS AS NEEDED
Status: DISCONTINUED | OUTPATIENT
Start: 2017-05-10 | End: 2017-05-10 | Stop reason: HOSPADM

## 2017-05-10 RX ORDER — PANTOPRAZOLE SODIUM 40 MG/10ML
40 INJECTION, POWDER, LYOPHILIZED, FOR SOLUTION INTRAVENOUS
Status: DISCONTINUED | OUTPATIENT
Start: 2017-05-11 | End: 2017-05-12

## 2017-05-10 RX ORDER — CEFAZOLIN SODIUM 2 G/100ML
2 INJECTION, SOLUTION INTRAVENOUS EVERY 8 HOURS
Status: COMPLETED | OUTPATIENT
Start: 2017-05-10 | End: 2017-05-12

## 2017-05-10 RX ORDER — PROTAMINE SULFATE 10 MG/ML
INJECTION, SOLUTION INTRAVENOUS AS NEEDED
Status: DISCONTINUED | OUTPATIENT
Start: 2017-05-10 | End: 2017-05-10 | Stop reason: HOSPADM

## 2017-05-10 RX ORDER — OXYCODONE HYDROCHLORIDE 5 MG/1
10 TABLET ORAL EVERY 4 HOURS PRN
Status: DISCONTINUED | OUTPATIENT
Start: 2017-05-10 | End: 2017-05-15 | Stop reason: HOSPADM

## 2017-05-10 RX ORDER — NITROGLYCERIN 20 MG/100ML
5-200 INJECTION INTRAVENOUS CONTINUOUS PRN
Status: DISCONTINUED | OUTPATIENT
Start: 2017-05-10 | End: 2017-05-11

## 2017-05-10 RX ORDER — SUFENTANIL CITRATE 50 UG/ML
INJECTION EPIDURAL; INTRAVENOUS AS NEEDED
Status: DISCONTINUED | OUTPATIENT
Start: 2017-05-10 | End: 2017-05-10 | Stop reason: SURG

## 2017-05-10 RX ORDER — HEPARIN SODIUM 5000 [USP'U]/ML
INJECTION, SOLUTION INTRAVENOUS; SUBCUTANEOUS AS NEEDED
Status: DISCONTINUED | OUTPATIENT
Start: 2017-05-10 | End: 2017-05-10 | Stop reason: HOSPADM

## 2017-05-10 RX ORDER — HEPARIN SODIUM 1000 [USP'U]/ML
INJECTION, SOLUTION INTRAVENOUS; SUBCUTANEOUS AS NEEDED
Status: DISCONTINUED | OUTPATIENT
Start: 2017-05-10 | End: 2017-05-10 | Stop reason: SURG

## 2017-05-10 RX ORDER — CEFAZOLIN SODIUM 2 G/100ML
2 INJECTION, SOLUTION INTRAVENOUS ONCE
Status: COMPLETED | OUTPATIENT
Start: 2017-05-10 | End: 2017-05-10

## 2017-05-10 RX ORDER — LIDOCAINE HYDROCHLORIDE 40 MG/ML
SOLUTION TOPICAL AS NEEDED
Status: DISCONTINUED | OUTPATIENT
Start: 2017-05-10 | End: 2017-05-10 | Stop reason: SURG

## 2017-05-10 RX ORDER — SODIUM CHLORIDE 9 MG/ML
9 INJECTION, SOLUTION INTRAVENOUS CONTINUOUS
Status: DISCONTINUED | OUTPATIENT
Start: 2017-05-10 | End: 2017-05-10

## 2017-05-10 RX ORDER — POTASSIUM CHLORIDE 750 MG/1
40 CAPSULE, EXTENDED RELEASE ORAL AS NEEDED
Status: DISCONTINUED | OUTPATIENT
Start: 2017-05-10 | End: 2017-05-15 | Stop reason: HOSPADM

## 2017-05-10 RX ORDER — ATORVASTATIN CALCIUM 40 MG/1
40 TABLET, FILM COATED ORAL NIGHTLY
Status: DISCONTINUED | OUTPATIENT
Start: 2017-05-11 | End: 2017-05-10 | Stop reason: CLARIF

## 2017-05-10 RX ORDER — MILRINONE LACTATE 0.2 MG/ML
.25-.75 INJECTION, SOLUTION INTRAVENOUS CONTINUOUS PRN
Status: DISCONTINUED | OUTPATIENT
Start: 2017-05-10 | End: 2017-05-11

## 2017-05-10 RX ORDER — MAGNESIUM SULFATE HEPTAHYDRATE 500 MG/ML
INJECTION, SOLUTION INTRAMUSCULAR; INTRAVENOUS AS NEEDED
Status: DISCONTINUED | OUTPATIENT
Start: 2017-05-10 | End: 2017-05-10 | Stop reason: SURG

## 2017-05-10 RX ORDER — CYCLOBENZAPRINE HCL 10 MG
10 TABLET ORAL EVERY 8 HOURS PRN
Status: DISCONTINUED | OUTPATIENT
Start: 2017-05-11 | End: 2017-05-15 | Stop reason: HOSPADM

## 2017-05-10 RX ORDER — POTASSIUM CHLORIDE 29.8 MG/ML
20 INJECTION INTRAVENOUS
Status: COMPLETED | OUTPATIENT
Start: 2017-05-10 | End: 2017-05-10

## 2017-05-10 RX ORDER — PROMETHAZINE HYDROCHLORIDE 25 MG/ML
12.5 INJECTION, SOLUTION INTRAMUSCULAR; INTRAVENOUS EVERY 6 HOURS PRN
Status: DISCONTINUED | OUTPATIENT
Start: 2017-05-10 | End: 2017-05-15 | Stop reason: HOSPADM

## 2017-05-10 RX ORDER — PROPOFOL 10 MG/ML
VIAL (ML) INTRAVENOUS AS NEEDED
Status: DISCONTINUED | OUTPATIENT
Start: 2017-05-10 | End: 2017-05-10 | Stop reason: SURG

## 2017-05-10 RX ORDER — MIDAZOLAM HYDROCHLORIDE 1 MG/ML
2 INJECTION INTRAMUSCULAR; INTRAVENOUS
Status: DISCONTINUED | OUTPATIENT
Start: 2017-05-10 | End: 2017-05-10 | Stop reason: HOSPADM

## 2017-05-10 RX ORDER — ACETAMINOPHEN 325 MG/1
650 TABLET ORAL EVERY 4 HOURS PRN
Status: DISCONTINUED | OUTPATIENT
Start: 2017-05-10 | End: 2017-05-15 | Stop reason: HOSPADM

## 2017-05-10 RX ORDER — NALOXONE HCL 0.4 MG/ML
0.4 VIAL (ML) INJECTION
Status: DISCONTINUED | OUTPATIENT
Start: 2017-05-10 | End: 2017-05-12

## 2017-05-10 RX ORDER — MIDAZOLAM HYDROCHLORIDE 1 MG/ML
2 INJECTION INTRAMUSCULAR; INTRAVENOUS
Status: DISCONTINUED | OUTPATIENT
Start: 2017-05-10 | End: 2017-05-11

## 2017-05-10 RX ORDER — CHLORHEXIDINE GLUCONATE 0.12 MG/ML
15 RINSE ORAL EVERY 12 HOURS
Status: DISCONTINUED | OUTPATIENT
Start: 2017-05-10 | End: 2017-05-11

## 2017-05-10 RX ORDER — ALBUMIN, HUMAN INJ 5% 5 %
1500 SOLUTION INTRAVENOUS AS NEEDED
Status: DISCONTINUED | OUTPATIENT
Start: 2017-05-10 | End: 2017-05-11

## 2017-05-10 RX ORDER — METOCLOPRAMIDE HYDROCHLORIDE 5 MG/ML
10 INJECTION INTRAMUSCULAR; INTRAVENOUS EVERY 6 HOURS
Status: DISPENSED | OUTPATIENT
Start: 2017-05-10 | End: 2017-05-11

## 2017-05-10 RX ORDER — ACETAMINOPHEN 10 MG/ML
1000 INJECTION, SOLUTION INTRAVENOUS ONCE
Status: COMPLETED | OUTPATIENT
Start: 2017-05-10 | End: 2017-05-10

## 2017-05-10 RX ORDER — SODIUM CHLORIDE 9 MG/ML
30 INJECTION, SOLUTION INTRAVENOUS CONTINUOUS
Status: DISCONTINUED | OUTPATIENT
Start: 2017-05-10 | End: 2017-05-11

## 2017-05-10 RX ORDER — BISACODYL 5 MG/1
10 TABLET, DELAYED RELEASE ORAL DAILY PRN
Status: DISCONTINUED | OUTPATIENT
Start: 2017-05-10 | End: 2017-05-15 | Stop reason: HOSPADM

## 2017-05-10 RX ORDER — PANTOPRAZOLE SODIUM 40 MG/1
40 TABLET, DELAYED RELEASE ORAL
Status: DISCONTINUED | OUTPATIENT
Start: 2017-05-11 | End: 2017-05-15 | Stop reason: HOSPADM

## 2017-05-10 RX ORDER — FUROSEMIDE 10 MG/ML
40 INJECTION INTRAMUSCULAR; INTRAVENOUS EVERY 6 HOURS PRN
Status: DISCONTINUED | OUTPATIENT
Start: 2017-05-10 | End: 2017-05-11

## 2017-05-10 RX ORDER — DOPAMINE HYDROCHLORIDE 160 MG/100ML
2-20 INJECTION, SOLUTION INTRAVENOUS CONTINUOUS PRN
Status: DISCONTINUED | OUTPATIENT
Start: 2017-05-10 | End: 2017-05-11

## 2017-05-10 RX ORDER — ONDANSETRON 2 MG/ML
4 INJECTION INTRAMUSCULAR; INTRAVENOUS EVERY 6 HOURS PRN
Status: DISCONTINUED | OUTPATIENT
Start: 2017-05-10 | End: 2017-05-15 | Stop reason: HOSPADM

## 2017-05-10 RX ORDER — CEFAZOLIN SODIUM 2 G/100ML
2 INJECTION, SOLUTION INTRAVENOUS ONCE
Status: DISCONTINUED | OUTPATIENT
Start: 2017-05-10 | End: 2017-05-10

## 2017-05-10 RX ORDER — SODIUM CHLORIDE, SODIUM LACTATE, POTASSIUM CHLORIDE, CALCIUM CHLORIDE 600; 310; 30; 20 MG/100ML; MG/100ML; MG/100ML; MG/100ML
9 INJECTION, SOLUTION INTRAVENOUS CONTINUOUS PRN
Status: DISCONTINUED | OUTPATIENT
Start: 2017-05-10 | End: 2017-05-10 | Stop reason: HOSPADM

## 2017-05-10 RX ORDER — PROMETHAZINE HYDROCHLORIDE 25 MG/1
12.5 TABLET ORAL EVERY 6 HOURS PRN
Status: DISCONTINUED | OUTPATIENT
Start: 2017-05-10 | End: 2017-05-15 | Stop reason: HOSPADM

## 2017-05-10 RX ORDER — VECURONIUM BROMIDE 1 MG/ML
INJECTION, POWDER, LYOPHILIZED, FOR SOLUTION INTRAVENOUS AS NEEDED
Status: DISCONTINUED | OUTPATIENT
Start: 2017-05-10 | End: 2017-05-10 | Stop reason: SURG

## 2017-05-10 RX ORDER — FAMOTIDINE 10 MG/ML
20 INJECTION, SOLUTION INTRAVENOUS ONCE
Status: COMPLETED | OUTPATIENT
Start: 2017-05-10 | End: 2017-05-10

## 2017-05-10 RX ORDER — DEXMEDETOMIDINE HYDROCHLORIDE 4 UG/ML
.2-1.5 INJECTION, SOLUTION INTRAVENOUS
Status: DISCONTINUED | OUTPATIENT
Start: 2017-05-10 | End: 2017-05-11

## 2017-05-10 RX ORDER — HYDROCODONE BITARTRATE AND ACETAMINOPHEN 5; 325 MG/1; MG/1
2 TABLET ORAL EVERY 4 HOURS PRN
Status: DISCONTINUED | OUTPATIENT
Start: 2017-05-10 | End: 2017-05-15 | Stop reason: HOSPADM

## 2017-05-10 RX ORDER — ACETAMINOPHEN 650 MG/1
650 SUPPOSITORY RECTAL EVERY 4 HOURS PRN
Status: DISCONTINUED | OUTPATIENT
Start: 2017-05-10 | End: 2017-05-15 | Stop reason: HOSPADM

## 2017-05-10 RX ORDER — MAGNESIUM SULFATE HEPTAHYDRATE 40 MG/ML
4 INJECTION, SOLUTION INTRAVENOUS AS NEEDED
Status: DISCONTINUED | OUTPATIENT
Start: 2017-05-10 | End: 2017-05-15 | Stop reason: HOSPADM

## 2017-05-10 RX ADMIN — NITROGLYCERIN 100 MCG: 5 INJECTION, SOLUTION INTRAVENOUS at 13:46

## 2017-05-10 RX ADMIN — MIDAZOLAM HYDROCHLORIDE 5 MG: 1 INJECTION, SOLUTION INTRAMUSCULAR; INTRAVENOUS at 11:51

## 2017-05-10 RX ADMIN — PROPOFOL 120 MG: 10 INJECTION, EMULSION INTRAVENOUS at 11:51

## 2017-05-10 RX ADMIN — HEPARIN SODIUM 30000 UNITS: 1000 INJECTION, SOLUTION INTRAVENOUS; SUBCUTANEOUS at 12:45

## 2017-05-10 RX ADMIN — SODIUM CHLORIDE 1.6 UNITS/HR: 9 INJECTION, SOLUTION INTRAVENOUS at 12:15

## 2017-05-10 RX ADMIN — MORPHINE SULFATE 4 MG: 4 INJECTION, SOLUTION INTRAMUSCULAR; INTRAVENOUS at 20:02

## 2017-05-10 RX ADMIN — SUFENTANIL CITRATE 100 MCG: 50 INJECTION, SOLUTION EPIDURAL; INTRAVENOUS at 11:51

## 2017-05-10 RX ADMIN — POTASSIUM CHLORIDE 20 MEQ: 29.8 INJECTION, SOLUTION INTRAVENOUS at 20:03

## 2017-05-10 RX ADMIN — MIDAZOLAM HYDROCHLORIDE 5 MG: 1 INJECTION, SOLUTION INTRAMUSCULAR; INTRAVENOUS at 13:29

## 2017-05-10 RX ADMIN — NITROGLYCERIN 100 MCG: 5 INJECTION, SOLUTION INTRAVENOUS at 13:55

## 2017-05-10 RX ADMIN — FENTANYL CITRATE 50 MCG: 50 INJECTION INTRAMUSCULAR; INTRAVENOUS at 09:57

## 2017-05-10 RX ADMIN — PROPOFOL 80 MG: 10 INJECTION, EMULSION INTRAVENOUS at 12:31

## 2017-05-10 RX ADMIN — VECURONIUM BROMIDE 10 MG: 1 INJECTION, POWDER, LYOPHILIZED, FOR SOLUTION INTRAVENOUS at 11:51

## 2017-05-10 RX ADMIN — EPHEDRINE SULFATE 10 MG: 50 INJECTION INTRAMUSCULAR; INTRAVENOUS; SUBCUTANEOUS at 11:57

## 2017-05-10 RX ADMIN — PHENYLEPHRINE HYDROCHLORIDE 100 MCG: 10 INJECTION INTRAVENOUS at 12:06

## 2017-05-10 RX ADMIN — NITROGLYCERIN 0.2 MCG/KG/MIN: 20 INJECTION INTRAVENOUS at 12:27

## 2017-05-10 RX ADMIN — CEFAZOLIN SODIUM 2 G: 2 INJECTION, SOLUTION INTRAVENOUS at 20:28

## 2017-05-10 RX ADMIN — PHENYLEPHRINE HYDROCHLORIDE 100 MCG: 10 INJECTION INTRAVENOUS at 11:57

## 2017-05-10 RX ADMIN — SUFENTANIL CITRATE 50 MCG: 50 INJECTION, SOLUTION EPIDURAL; INTRAVENOUS at 13:07

## 2017-05-10 RX ADMIN — VECURONIUM BROMIDE 5 MG: 1 INJECTION, POWDER, LYOPHILIZED, FOR SOLUTION INTRAVENOUS at 12:48

## 2017-05-10 RX ADMIN — SODIUM CHLORIDE 15 ML/HR: 9 INJECTION, SOLUTION INTRAVENOUS at 15:04

## 2017-05-10 RX ADMIN — NITROGLYCERIN 50 MCG: 5 INJECTION, SOLUTION INTRAVENOUS at 12:57

## 2017-05-10 RX ADMIN — ACETAMINOPHEN 1000 MG: 10 INJECTION, SOLUTION INTRAVENOUS at 15:07

## 2017-05-10 RX ADMIN — PROPOFOL 50 MCG/KG/MIN: 10 INJECTION, EMULSION INTRAVENOUS at 12:46

## 2017-05-10 RX ADMIN — HYDROCODONE BITARTRATE AND ACETAMINOPHEN 2 TABLET: 5; 325 TABLET ORAL at 23:30

## 2017-05-10 RX ADMIN — SODIUM CHLORIDE: 9 INJECTION, SOLUTION INTRAVENOUS at 11:30

## 2017-05-10 RX ADMIN — NITROGLYCERIN 100 MCG: 5 INJECTION, SOLUTION INTRAVENOUS at 13:48

## 2017-05-10 RX ADMIN — ALBUMIN (HUMAN) 250 ML: 12.5 INJECTION, SOLUTION INTRAVENOUS at 15:53

## 2017-05-10 RX ADMIN — EPHEDRINE SULFATE 20 MG: 50 INJECTION INTRAMUSCULAR; INTRAVENOUS; SUBCUTANEOUS at 12:06

## 2017-05-10 RX ADMIN — SODIUM CHLORIDE 9 ML/HR: 9 INJECTION, SOLUTION INTRAVENOUS at 09:32

## 2017-05-10 RX ADMIN — VECURONIUM BROMIDE 5 MG: 1 INJECTION, POWDER, LYOPHILIZED, FOR SOLUTION INTRAVENOUS at 13:37

## 2017-05-10 RX ADMIN — AMINOCAPROIC ACID 10 G: 250 INJECTION, SOLUTION INTRAVENOUS at 12:11

## 2017-05-10 RX ADMIN — CEFAZOLIN SODIUM 2 G: 2 INJECTION, SOLUTION INTRAVENOUS at 12:10

## 2017-05-10 RX ADMIN — SUFENTANIL CITRATE 50 MCG: 50 INJECTION, SOLUTION EPIDURAL; INTRAVENOUS at 12:27

## 2017-05-10 RX ADMIN — NITROGLYCERIN 100 MCG: 5 INJECTION, SOLUTION INTRAVENOUS at 13:43

## 2017-05-10 RX ADMIN — MIDAZOLAM HYDROCHLORIDE 1 MG: 1 INJECTION, SOLUTION INTRAMUSCULAR; INTRAVENOUS at 09:47

## 2017-05-10 RX ADMIN — FAMOTIDINE 20 MG: 10 INJECTION, SOLUTION INTRAVENOUS at 09:47

## 2017-05-10 RX ADMIN — LIDOCAINE HYDROCHLORIDE 1 EACH: 40 SPRAY LARYNGEAL; TRANSTRACHEAL at 11:55

## 2017-05-10 RX ADMIN — NITROGLYCERIN 100 MCG: 5 INJECTION, SOLUTION INTRAVENOUS at 14:32

## 2017-05-10 RX ADMIN — AMINOCAPROIC ACID 10 G: 250 INJECTION, SOLUTION INTRAVENOUS at 13:45

## 2017-05-10 RX ADMIN — FENTANYL CITRATE 50 MCG: 50 INJECTION INTRAMUSCULAR; INTRAVENOUS at 09:47

## 2017-05-10 RX ADMIN — ALBUMIN (HUMAN) 500 ML: 12.5 INJECTION, SOLUTION INTRAVENOUS at 16:38

## 2017-05-10 RX ADMIN — MIDAZOLAM HYDROCHLORIDE 1 MG: 1 INJECTION, SOLUTION INTRAMUSCULAR; INTRAVENOUS at 09:57

## 2017-05-10 RX ADMIN — ONDANSETRON 4 MG: 2 INJECTION INTRAMUSCULAR; INTRAVENOUS at 13:35

## 2017-05-10 RX ADMIN — METOCLOPRAMIDE 10 MG: 5 INJECTION, SOLUTION INTRAMUSCULAR; INTRAVENOUS at 15:08

## 2017-05-10 RX ADMIN — MAGNESIUM SULFATE HEPTAHYDRATE 1 G: 1 INJECTION, SOLUTION INTRAVENOUS at 22:46

## 2017-05-10 RX ADMIN — CEFAZOLIN SODIUM 2 G: 2 INJECTION, SOLUTION INTRAVENOUS at 13:35

## 2017-05-10 RX ADMIN — MAGNESIUM SULFATE HEPTAHYDRATE 1 G: 1 INJECTION, SOLUTION INTRAVENOUS at 16:10

## 2017-05-10 RX ADMIN — METOPROLOL TARTRATE 12.5 MG: 25 TABLET ORAL at 09:34

## 2017-05-10 RX ADMIN — NITROGLYCERIN 100 MCG: 5 INJECTION, SOLUTION INTRAVENOUS at 12:31

## 2017-05-10 RX ADMIN — ALBUMIN (HUMAN) 500 ML: 12.5 INJECTION, SOLUTION INTRAVENOUS at 20:06

## 2017-05-10 RX ADMIN — MAGNESIUM SULFATE HEPTAHYDRATE 2 G: 500 INJECTION, SOLUTION INTRAMUSCULAR; INTRAVENOUS at 13:20

## 2017-05-10 RX ADMIN — METOCLOPRAMIDE 10 MG: 5 INJECTION, SOLUTION INTRAMUSCULAR; INTRAVENOUS at 21:11

## 2017-05-10 RX ADMIN — NITROGLYCERIN 100 MCG: 5 INJECTION, SOLUTION INTRAVENOUS at 12:28

## 2017-05-10 RX ADMIN — SUFENTANIL CITRATE 50 MCG: 50 INJECTION, SOLUTION EPIDURAL; INTRAVENOUS at 13:39

## 2017-05-11 ENCOUNTER — APPOINTMENT (OUTPATIENT)
Dept: GENERAL RADIOLOGY | Facility: HOSPITAL | Age: 72
End: 2017-05-11

## 2017-05-11 LAB
ALBUMIN SERPL-MCNC: 4.3 G/DL (ref 3.5–5.2)
ANION GAP SERPL CALCULATED.3IONS-SCNC: 16 MMOL/L
BASOPHILS # BLD AUTO: 0.03 10*3/MM3 (ref 0–0.2)
BASOPHILS NFR BLD AUTO: 0.1 % (ref 0–1.5)
BUN BLD-MCNC: 11 MG/DL (ref 8–23)
BUN/CREAT SERPL: 11.2 (ref 7–25)
CALCIUM SPEC-SCNC: 8.2 MG/DL (ref 8.6–10.5)
CHLORIDE SERPL-SCNC: 106 MMOL/L (ref 98–107)
CO2 SERPL-SCNC: 23 MMOL/L (ref 22–29)
CREAT BLD-MCNC: 0.98 MG/DL (ref 0.76–1.27)
DEPRECATED RDW RBC AUTO: 51.6 FL (ref 37–54)
EOSINOPHIL # BLD AUTO: 0 10*3/MM3 (ref 0–0.7)
EOSINOPHIL NFR BLD AUTO: 0 % (ref 0.3–6.2)
ERYTHROCYTE [DISTWIDTH] IN BLOOD BY AUTOMATED COUNT: 15.1 % (ref 11.5–14.5)
GFR SERPL CREATININE-BSD FRML MDRD: 75 ML/MIN/1.73
GLUCOSE BLD-MCNC: 166 MG/DL (ref 65–99)
GLUCOSE BLDC GLUCOMTR-MCNC: 135 MG/DL (ref 70–130)
GLUCOSE BLDC GLUCOMTR-MCNC: 140 MG/DL (ref 70–130)
GLUCOSE BLDC GLUCOMTR-MCNC: 155 MG/DL (ref 70–130)
GLUCOSE BLDC GLUCOMTR-MCNC: 161 MG/DL (ref 70–130)
GLUCOSE BLDC GLUCOMTR-MCNC: 232 MG/DL (ref 70–130)
GLUCOSE BLDC GLUCOMTR-MCNC: 237 MG/DL (ref 70–130)
GLUCOSE BLDC GLUCOMTR-MCNC: 272 MG/DL (ref 70–130)
HCT VFR BLD AUTO: 39.1 % (ref 40.4–52.2)
HGB BLD-MCNC: 12.5 G/DL (ref 13.7–17.6)
IMM GRANULOCYTES # BLD: 0.08 10*3/MM3 (ref 0–0.03)
IMM GRANULOCYTES NFR BLD: 0.4 % (ref 0–0.5)
INR PPP: 1.36 (ref 0.9–1.1)
LYMPHOCYTES # BLD AUTO: 0.97 10*3/MM3 (ref 0.9–4.8)
LYMPHOCYTES NFR BLD AUTO: 4.7 % (ref 19.6–45.3)
MAGNESIUM SERPL-MCNC: 2.6 MG/DL (ref 1.6–2.4)
MCH RBC QN AUTO: 29.9 PG (ref 27–32.7)
MCHC RBC AUTO-ENTMCNC: 32 G/DL (ref 32.6–36.4)
MCV RBC AUTO: 93.5 FL (ref 79.8–96.2)
MONOCYTES # BLD AUTO: 1.81 10*3/MM3 (ref 0.2–1.2)
MONOCYTES NFR BLD AUTO: 8.8 % (ref 5–12)
NEUTROPHILS # BLD AUTO: 17.67 10*3/MM3 (ref 1.9–8.1)
NEUTROPHILS NFR BLD AUTO: 86 % (ref 42.7–76)
PHOSPHATE SERPL-MCNC: 4.1 MG/DL (ref 2.5–4.5)
PLATELET # BLD AUTO: 102 10*3/MM3 (ref 140–500)
PMV BLD AUTO: 10.7 FL (ref 6–12)
POTASSIUM BLD-SCNC: 3.8 MMOL/L (ref 3.5–5.2)
PROTHROMBIN TIME: 16.3 SECONDS (ref 11.7–14.2)
RBC # BLD AUTO: 4.18 10*6/MM3 (ref 4.6–6)
SODIUM BLD-SCNC: 145 MMOL/L (ref 136–145)
WBC NRBC COR # BLD: 20.56 10*3/MM3 (ref 4.5–10.7)

## 2017-05-11 PROCEDURE — 71010 HC CHEST PA OR AP: CPT

## 2017-05-11 PROCEDURE — 25010000003 CEFAZOLIN IN DEXTROSE 2-4 GM/100ML-% SOLUTION: Performed by: THORACIC SURGERY (CARDIOTHORACIC VASCULAR SURGERY)

## 2017-05-11 PROCEDURE — 25010000002 MORPHINE PER 10 MG: Performed by: THORACIC SURGERY (CARDIOTHORACIC VASCULAR SURGERY)

## 2017-05-11 PROCEDURE — 25010000003 POTASSIUM CHLORIDE PER 2 MEQ: Performed by: THORACIC SURGERY (CARDIOTHORACIC VASCULAR SURGERY)

## 2017-05-11 PROCEDURE — 80069 RENAL FUNCTION PANEL: CPT | Performed by: THORACIC SURGERY (CARDIOTHORACIC VASCULAR SURGERY)

## 2017-05-11 PROCEDURE — 63710000001 INSULIN ASPART PER 5 UNITS: Performed by: THORACIC SURGERY (CARDIOTHORACIC VASCULAR SURGERY)

## 2017-05-11 PROCEDURE — 83735 ASSAY OF MAGNESIUM: CPT | Performed by: THORACIC SURGERY (CARDIOTHORACIC VASCULAR SURGERY)

## 2017-05-11 PROCEDURE — 99024 POSTOP FOLLOW-UP VISIT: CPT | Performed by: NURSE PRACTITIONER

## 2017-05-11 PROCEDURE — 99232 SBSQ HOSP IP/OBS MODERATE 35: CPT | Performed by: INTERNAL MEDICINE

## 2017-05-11 PROCEDURE — 82962 GLUCOSE BLOOD TEST: CPT

## 2017-05-11 PROCEDURE — 97162 PT EVAL MOD COMPLEX 30 MIN: CPT

## 2017-05-11 PROCEDURE — 85610 PROTHROMBIN TIME: CPT | Performed by: THORACIC SURGERY (CARDIOTHORACIC VASCULAR SURGERY)

## 2017-05-11 PROCEDURE — 97110 THERAPEUTIC EXERCISES: CPT

## 2017-05-11 PROCEDURE — 25010000002 METOCLOPRAMIDE PER 10 MG: Performed by: THORACIC SURGERY (CARDIOTHORACIC VASCULAR SURGERY)

## 2017-05-11 PROCEDURE — 85025 COMPLETE CBC W/AUTO DIFF WBC: CPT | Performed by: THORACIC SURGERY (CARDIOTHORACIC VASCULAR SURGERY)

## 2017-05-11 PROCEDURE — 93005 ELECTROCARDIOGRAM TRACING: CPT | Performed by: THORACIC SURGERY (CARDIOTHORACIC VASCULAR SURGERY)

## 2017-05-11 PROCEDURE — 25010000002 FUROSEMIDE PER 20 MG: Performed by: THORACIC SURGERY (CARDIOTHORACIC VASCULAR SURGERY)

## 2017-05-11 PROCEDURE — 93010 ELECTROCARDIOGRAM REPORT: CPT | Performed by: INTERNAL MEDICINE

## 2017-05-11 RX ORDER — SODIUM CHLORIDE 9 MG/ML
100 INJECTION, SOLUTION INTRAVENOUS CONTINUOUS
Status: ACTIVE | OUTPATIENT
Start: 2017-05-11 | End: 2017-05-11

## 2017-05-11 RX ORDER — FUROSEMIDE 10 MG/ML
40 INJECTION INTRAMUSCULAR; INTRAVENOUS ONCE
Status: COMPLETED | OUTPATIENT
Start: 2017-05-11 | End: 2017-05-11

## 2017-05-11 RX ORDER — DEXTROSE MONOHYDRATE 25 G/50ML
25 INJECTION, SOLUTION INTRAVENOUS
Status: DISCONTINUED | OUTPATIENT
Start: 2017-05-11 | End: 2017-05-15 | Stop reason: HOSPADM

## 2017-05-11 RX ORDER — NICOTINE POLACRILEX 4 MG
15 LOZENGE BUCCAL
Status: DISCONTINUED | OUTPATIENT
Start: 2017-05-11 | End: 2017-05-15 | Stop reason: HOSPADM

## 2017-05-11 RX ORDER — MORPHINE SULFATE 2 MG/ML
2 INJECTION, SOLUTION INTRAMUSCULAR; INTRAVENOUS EVERY 4 HOURS PRN
Status: DISCONTINUED | OUTPATIENT
Start: 2017-05-11 | End: 2017-05-11

## 2017-05-11 RX ORDER — CHLORHEXIDINE GLUCONATE 500 MG/1
1 CLOTH TOPICAL EVERY 12 HOURS PRN
Status: DISCONTINUED | OUTPATIENT
Start: 2017-05-11 | End: 2017-05-15 | Stop reason: HOSPADM

## 2017-05-11 RX ORDER — CHLORHEXIDINE GLUCONATE 0.12 MG/ML
15 RINSE ORAL EVERY 12 HOURS
Status: ACTIVE | OUTPATIENT
Start: 2017-05-11 | End: 2017-05-12

## 2017-05-11 RX ADMIN — CHLORHEXIDINE GLUCONATE 15 ML: 1.2 RINSE ORAL at 02:40

## 2017-05-11 RX ADMIN — ASPIRIN 81 MG: 81 TABLET ORAL at 08:07

## 2017-05-11 RX ADMIN — METOPROLOL TARTRATE 12.5 MG: 25 TABLET ORAL at 21:10

## 2017-05-11 RX ADMIN — METOCLOPRAMIDE 10 MG: 5 INJECTION, SOLUTION INTRAMUSCULAR; INTRAVENOUS at 02:40

## 2017-05-11 RX ADMIN — HYDROCODONE BITARTRATE AND ACETAMINOPHEN 2 TABLET: 5; 325 TABLET ORAL at 21:48

## 2017-05-11 RX ADMIN — CEFAZOLIN SODIUM 2 G: 2 INJECTION, SOLUTION INTRAVENOUS at 04:27

## 2017-05-11 RX ADMIN — SODIUM CHLORIDE 100 ML/HR: 9 INJECTION, SOLUTION INTRAVENOUS at 11:09

## 2017-05-11 RX ADMIN — FUROSEMIDE 40 MG: 10 INJECTION, SOLUTION INTRAMUSCULAR; INTRAVENOUS at 21:48

## 2017-05-11 RX ADMIN — CYCLOBENZAPRINE HYDROCHLORIDE 10 MG: 10 TABLET, FILM COATED ORAL at 13:35

## 2017-05-11 RX ADMIN — MORPHINE SULFATE 1 MG: 2 INJECTION, SOLUTION INTRAMUSCULAR; INTRAVENOUS at 21:11

## 2017-05-11 RX ADMIN — CEFAZOLIN SODIUM 2 G: 2 INJECTION, SOLUTION INTRAVENOUS at 21:11

## 2017-05-11 RX ADMIN — ALPRAZOLAM 0.25 MG: 0.25 TABLET ORAL at 00:24

## 2017-05-11 RX ADMIN — METOPROLOL TARTRATE 12.5 MG: 25 TABLET ORAL at 08:41

## 2017-05-11 RX ADMIN — INSULIN ASPART 6 UNITS: 100 INJECTION, SOLUTION INTRAVENOUS; SUBCUTANEOUS at 16:10

## 2017-05-11 RX ADMIN — MUPIROCIN 1 APPLICATION: 20 OINTMENT TOPICAL at 21:11

## 2017-05-11 RX ADMIN — INSULIN ASPART 4 UNITS: 100 INJECTION, SOLUTION INTRAVENOUS; SUBCUTANEOUS at 11:15

## 2017-05-11 RX ADMIN — MUPIROCIN CALCIUM 1 APPLICATION: 20 OINTMENT TOPICAL at 08:04

## 2017-05-11 RX ADMIN — PANTOPRAZOLE SODIUM 40 MG: 40 INJECTION, POWDER, FOR SOLUTION INTRAVENOUS at 05:07

## 2017-05-11 RX ADMIN — DOCUSATE SODIUM,SENNOSIDES 2 TABLET: 50; 8.6 TABLET, FILM COATED ORAL at 21:10

## 2017-05-11 RX ADMIN — HYDROCODONE BITARTRATE AND ACETAMINOPHEN 2 TABLET: 5; 325 TABLET ORAL at 16:10

## 2017-05-11 RX ADMIN — HYDROCODONE BITARTRATE AND ACETAMINOPHEN 2 TABLET: 5; 325 TABLET ORAL at 11:15

## 2017-05-11 RX ADMIN — OXYCODONE HYDROCHLORIDE 10 MG: 5 TABLET ORAL at 07:31

## 2017-05-11 RX ADMIN — CEFAZOLIN SODIUM 2 G: 2 INJECTION, SOLUTION INTRAVENOUS at 13:20

## 2017-05-11 RX ADMIN — CHLORHEXIDINE GLUCONATE 15 ML: 1.2 RINSE ORAL at 21:10

## 2017-05-11 RX ADMIN — INSULIN ASPART 4 UNITS: 100 INJECTION, SOLUTION INTRAVENOUS; SUBCUTANEOUS at 21:11

## 2017-05-11 RX ADMIN — POTASSIUM CHLORIDE 20 MEQ: 29.8 INJECTION, SOLUTION INTRAVENOUS at 03:58

## 2017-05-11 RX ADMIN — CYCLOBENZAPRINE HYDROCHLORIDE 10 MG: 10 TABLET, FILM COATED ORAL at 02:08

## 2017-05-11 RX ADMIN — MORPHINE SULFATE 2 MG: 2 INJECTION, SOLUTION INTRAMUSCULAR; INTRAVENOUS at 07:42

## 2017-05-12 ENCOUNTER — APPOINTMENT (OUTPATIENT)
Dept: GENERAL RADIOLOGY | Facility: HOSPITAL | Age: 72
End: 2017-05-12

## 2017-05-12 LAB
ANION GAP SERPL CALCULATED.3IONS-SCNC: 19.1 MMOL/L
BACTERIA UR QL AUTO: ABNORMAL /HPF
BILIRUB UR QL STRIP: NEGATIVE
BUN BLD-MCNC: 26 MG/DL (ref 8–23)
BUN/CREAT SERPL: 17.2 (ref 7–25)
CALCIUM SPEC-SCNC: 7.9 MG/DL (ref 8.6–10.5)
CHLORIDE SERPL-SCNC: 97 MMOL/L (ref 98–107)
CLARITY UR: ABNORMAL
CO2 SERPL-SCNC: 16.9 MMOL/L (ref 22–29)
COLOR UR: ABNORMAL
CREAT BLD-MCNC: 1.51 MG/DL (ref 0.76–1.27)
DEPRECATED RDW RBC AUTO: 54.5 FL (ref 37–54)
ERYTHROCYTE [DISTWIDTH] IN BLOOD BY AUTOMATED COUNT: 15.7 % (ref 11.5–14.5)
GFR SERPL CREATININE-BSD FRML MDRD: 46 ML/MIN/1.73
GLUCOSE BLD-MCNC: 262 MG/DL (ref 65–99)
GLUCOSE BLDC GLUCOMTR-MCNC: 174 MG/DL (ref 70–130)
GLUCOSE BLDC GLUCOMTR-MCNC: 219 MG/DL (ref 70–130)
GLUCOSE BLDC GLUCOMTR-MCNC: 245 MG/DL (ref 70–130)
GLUCOSE BLDC GLUCOMTR-MCNC: 315 MG/DL (ref 70–130)
GLUCOSE UR STRIP-MCNC: ABNORMAL MG/DL
HCT VFR BLD AUTO: 39.4 % (ref 40.4–52.2)
HGB BLD-MCNC: 12.6 G/DL (ref 13.7–17.6)
HGB UR QL STRIP.AUTO: ABNORMAL
HYALINE CASTS UR QL AUTO: ABNORMAL /LPF
KETONES UR QL STRIP: ABNORMAL
LEUKOCYTE ESTERASE UR QL STRIP.AUTO: ABNORMAL
MCH RBC QN AUTO: 30.3 PG (ref 27–32.7)
MCHC RBC AUTO-ENTMCNC: 32 G/DL (ref 32.6–36.4)
MCV RBC AUTO: 94.7 FL (ref 79.8–96.2)
NITRITE UR QL STRIP: NEGATIVE
PH UR STRIP.AUTO: <=5 [PH] (ref 5–8)
PLATELET # BLD AUTO: 106 10*3/MM3 (ref 140–500)
PMV BLD AUTO: 11.5 FL (ref 6–12)
POTASSIUM BLD-SCNC: 4.5 MMOL/L (ref 3.5–5.2)
PROT UR QL STRIP: ABNORMAL
RBC # BLD AUTO: 4.16 10*6/MM3 (ref 4.6–6)
RBC # UR: ABNORMAL /HPF
REF LAB TEST METHOD: ABNORMAL
SODIUM BLD-SCNC: 133 MMOL/L (ref 136–145)
SP GR UR STRIP: 1.03 (ref 1–1.03)
SQUAMOUS #/AREA URNS HPF: ABNORMAL /HPF
UROBILINOGEN UR QL STRIP: ABNORMAL
WBC NRBC COR # BLD: 30.96 10*3/MM3 (ref 4.5–10.7)
WBC UR QL AUTO: ABNORMAL /HPF

## 2017-05-12 PROCEDURE — 82962 GLUCOSE BLOOD TEST: CPT

## 2017-05-12 PROCEDURE — 99024 POSTOP FOLLOW-UP VISIT: CPT | Performed by: NURSE PRACTITIONER

## 2017-05-12 PROCEDURE — 87086 URINE CULTURE/COLONY COUNT: CPT | Performed by: NURSE PRACTITIONER

## 2017-05-12 PROCEDURE — 93010 ELECTROCARDIOGRAM REPORT: CPT | Performed by: INTERNAL MEDICINE

## 2017-05-12 PROCEDURE — 87040 BLOOD CULTURE FOR BACTERIA: CPT | Performed by: NURSE PRACTITIONER

## 2017-05-12 PROCEDURE — 80048 BASIC METABOLIC PNL TOTAL CA: CPT | Performed by: THORACIC SURGERY (CARDIOTHORACIC VASCULAR SURGERY)

## 2017-05-12 PROCEDURE — 97110 THERAPEUTIC EXERCISES: CPT

## 2017-05-12 PROCEDURE — 94799 UNLISTED PULMONARY SVC/PX: CPT

## 2017-05-12 PROCEDURE — 71010 HC CHEST PA OR AP: CPT

## 2017-05-12 PROCEDURE — 25010000003 CEFAZOLIN IN DEXTROSE 2-4 GM/100ML-% SOLUTION: Performed by: THORACIC SURGERY (CARDIOTHORACIC VASCULAR SURGERY)

## 2017-05-12 PROCEDURE — 63710000001 INSULIN DETEMER PER 5 UNITS: Performed by: NURSE PRACTITIONER

## 2017-05-12 PROCEDURE — 87070 CULTURE OTHR SPECIMN AEROBIC: CPT | Performed by: NURSE PRACTITIONER

## 2017-05-12 PROCEDURE — 81001 URINALYSIS AUTO W/SCOPE: CPT | Performed by: NURSE PRACTITIONER

## 2017-05-12 PROCEDURE — 85027 COMPLETE CBC AUTOMATED: CPT | Performed by: THORACIC SURGERY (CARDIOTHORACIC VASCULAR SURGERY)

## 2017-05-12 PROCEDURE — 93005 ELECTROCARDIOGRAM TRACING: CPT | Performed by: THORACIC SURGERY (CARDIOTHORACIC VASCULAR SURGERY)

## 2017-05-12 PROCEDURE — 87205 SMEAR GRAM STAIN: CPT | Performed by: NURSE PRACTITIONER

## 2017-05-12 PROCEDURE — 63710000001 INSULIN ASPART PER 5 UNITS: Performed by: THORACIC SURGERY (CARDIOTHORACIC VASCULAR SURGERY)

## 2017-05-12 PROCEDURE — 99232 SBSQ HOSP IP/OBS MODERATE 35: CPT | Performed by: INTERNAL MEDICINE

## 2017-05-12 PROCEDURE — 63710000001 INSULIN ASPART PER 5 UNITS: Performed by: NURSE PRACTITIONER

## 2017-05-12 PROCEDURE — 25010000002 MORPHINE PER 10 MG: Performed by: THORACIC SURGERY (CARDIOTHORACIC VASCULAR SURGERY)

## 2017-05-12 RX ORDER — NALOXONE HCL 0.4 MG/ML
0.4 VIAL (ML) INJECTION
Status: DISCONTINUED | OUTPATIENT
Start: 2017-05-12 | End: 2017-05-15 | Stop reason: HOSPADM

## 2017-05-12 RX ORDER — MORPHINE SULFATE 2 MG/ML
2 INJECTION, SOLUTION INTRAMUSCULAR; INTRAVENOUS EVERY 4 HOURS PRN
Status: DISCONTINUED | OUTPATIENT
Start: 2017-05-12 | End: 2017-05-15 | Stop reason: HOSPADM

## 2017-05-12 RX ORDER — BUMETANIDE 0.25 MG/ML
2 INJECTION INTRAMUSCULAR; INTRAVENOUS ONCE
Status: COMPLETED | OUTPATIENT
Start: 2017-05-12 | End: 2017-05-12

## 2017-05-12 RX ADMIN — ASPIRIN 81 MG: 81 TABLET ORAL at 09:35

## 2017-05-12 RX ADMIN — INSULIN DETEMIR 30 UNITS: 100 INJECTION, SOLUTION SUBCUTANEOUS at 20:54

## 2017-05-12 RX ADMIN — ACETAMINOPHEN 650 MG: 325 TABLET ORAL at 06:28

## 2017-05-12 RX ADMIN — METOPROLOL TARTRATE 25 MG: 25 TABLET ORAL at 09:35

## 2017-05-12 RX ADMIN — MORPHINE SULFATE 2 MG: 2 INJECTION, SOLUTION INTRAMUSCULAR; INTRAVENOUS at 01:23

## 2017-05-12 RX ADMIN — INSULIN ASPART 2 UNITS: 100 INJECTION, SOLUTION INTRAVENOUS; SUBCUTANEOUS at 20:54

## 2017-05-12 RX ADMIN — INSULIN ASPART 30 UNITS: 100 INJECTION, SOLUTION INTRAVENOUS; SUBCUTANEOUS at 11:17

## 2017-05-12 RX ADMIN — OXYCODONE HYDROCHLORIDE 10 MG: 5 TABLET ORAL at 06:28

## 2017-05-12 RX ADMIN — INSULIN ASPART 6 UNITS: 100 INJECTION, SOLUTION INTRAVENOUS; SUBCUTANEOUS at 06:28

## 2017-05-12 RX ADMIN — PANTOPRAZOLE SODIUM 40 MG: 40 TABLET, DELAYED RELEASE ORAL at 05:43

## 2017-05-12 RX ADMIN — OXYCODONE HYDROCHLORIDE 10 MG: 5 TABLET ORAL at 02:14

## 2017-05-12 RX ADMIN — HYDROCODONE BITARTRATE AND ACETAMINOPHEN 2 TABLET: 5; 325 TABLET ORAL at 14:54

## 2017-05-12 RX ADMIN — CEFAZOLIN SODIUM 2 G: 2 INJECTION, SOLUTION INTRAVENOUS at 05:43

## 2017-05-12 RX ADMIN — DOCUSATE SODIUM,SENNOSIDES 2 TABLET: 50; 8.6 TABLET, FILM COATED ORAL at 20:53

## 2017-05-12 RX ADMIN — INSULIN ASPART 4 UNITS: 100 INJECTION, SOLUTION INTRAVENOUS; SUBCUTANEOUS at 17:36

## 2017-05-12 RX ADMIN — METOPROLOL TARTRATE 25 MG: 25 TABLET ORAL at 20:53

## 2017-05-12 RX ADMIN — ROSUVASTATIN CALCIUM 20 MG: 20 TABLET, FILM COATED ORAL at 20:53

## 2017-05-12 RX ADMIN — CYCLOBENZAPRINE HYDROCHLORIDE 10 MG: 10 TABLET, FILM COATED ORAL at 00:04

## 2017-05-12 RX ADMIN — BUMETANIDE 2 MG: 0.25 INJECTION, SOLUTION INTRAMUSCULAR; INTRAVENOUS at 11:16

## 2017-05-12 RX ADMIN — INSULIN ASPART 7 UNITS: 100 INJECTION, SOLUTION INTRAVENOUS; SUBCUTANEOUS at 11:17

## 2017-05-12 RX ADMIN — ACETAMINOPHEN 650 MG: 325 TABLET ORAL at 02:14

## 2017-05-12 RX ADMIN — INSULIN ASPART 30 UNITS: 100 INJECTION, SOLUTION INTRAVENOUS; SUBCUTANEOUS at 17:36

## 2017-05-12 RX ADMIN — MUPIROCIN: 20 OINTMENT TOPICAL at 09:35

## 2017-05-13 ENCOUNTER — APPOINTMENT (OUTPATIENT)
Dept: GENERAL RADIOLOGY | Facility: HOSPITAL | Age: 72
End: 2017-05-13

## 2017-05-13 LAB
ANION GAP SERPL CALCULATED.3IONS-SCNC: 14.1 MMOL/L
BACTERIA SPEC AEROBE CULT: NO GROWTH
BUN BLD-MCNC: 25 MG/DL (ref 8–23)
BUN/CREAT SERPL: 25 (ref 7–25)
CALCIUM SPEC-SCNC: 7.9 MG/DL (ref 8.6–10.5)
CHLORIDE SERPL-SCNC: 97 MMOL/L (ref 98–107)
CO2 SERPL-SCNC: 22.9 MMOL/L (ref 22–29)
CREAT BLD-MCNC: 1 MG/DL (ref 0.76–1.27)
DEPRECATED RDW RBC AUTO: 50.9 FL (ref 37–54)
ERYTHROCYTE [DISTWIDTH] IN BLOOD BY AUTOMATED COUNT: 15.1 % (ref 11.5–14.5)
GFR SERPL CREATININE-BSD FRML MDRD: 73 ML/MIN/1.73
GLUCOSE BLD-MCNC: 165 MG/DL (ref 65–99)
GLUCOSE BLDC GLUCOMTR-MCNC: 159 MG/DL (ref 70–130)
GLUCOSE BLDC GLUCOMTR-MCNC: 175 MG/DL (ref 70–130)
GLUCOSE BLDC GLUCOMTR-MCNC: 229 MG/DL (ref 70–130)
GLUCOSE BLDC GLUCOMTR-MCNC: 248 MG/DL (ref 70–130)
HCT VFR BLD AUTO: 35.7 % (ref 40.4–52.2)
HGB BLD-MCNC: 11.9 G/DL (ref 13.7–17.6)
MCH RBC QN AUTO: 30.7 PG (ref 27–32.7)
MCHC RBC AUTO-ENTMCNC: 33.3 G/DL (ref 32.6–36.4)
MCV RBC AUTO: 92.2 FL (ref 79.8–96.2)
PLATELET # BLD AUTO: 120 10*3/MM3 (ref 140–500)
PMV BLD AUTO: 11.8 FL (ref 6–12)
POTASSIUM BLD-SCNC: 3.9 MMOL/L (ref 3.5–5.2)
RBC # BLD AUTO: 3.87 10*6/MM3 (ref 4.6–6)
SODIUM BLD-SCNC: 134 MMOL/L (ref 136–145)
WBC NRBC COR # BLD: 26.56 10*3/MM3 (ref 4.5–10.7)

## 2017-05-13 PROCEDURE — 82962 GLUCOSE BLOOD TEST: CPT

## 2017-05-13 PROCEDURE — 71010 HC CHEST PA OR AP: CPT

## 2017-05-13 PROCEDURE — 99024 POSTOP FOLLOW-UP VISIT: CPT | Performed by: THORACIC SURGERY (CARDIOTHORACIC VASCULAR SURGERY)

## 2017-05-13 PROCEDURE — 63710000001 INSULIN ASPART PER 5 UNITS: Performed by: NURSE PRACTITIONER

## 2017-05-13 PROCEDURE — 63710000001 INSULIN ASPART PER 5 UNITS: Performed by: THORACIC SURGERY (CARDIOTHORACIC VASCULAR SURGERY)

## 2017-05-13 PROCEDURE — 97110 THERAPEUTIC EXERCISES: CPT

## 2017-05-13 PROCEDURE — 99232 SBSQ HOSP IP/OBS MODERATE 35: CPT | Performed by: INTERNAL MEDICINE

## 2017-05-13 PROCEDURE — 85027 COMPLETE CBC AUTOMATED: CPT | Performed by: THORACIC SURGERY (CARDIOTHORACIC VASCULAR SURGERY)

## 2017-05-13 PROCEDURE — 80048 BASIC METABOLIC PNL TOTAL CA: CPT | Performed by: THORACIC SURGERY (CARDIOTHORACIC VASCULAR SURGERY)

## 2017-05-13 PROCEDURE — 63710000001 INSULIN DETEMER PER 5 UNITS: Performed by: NURSE PRACTITIONER

## 2017-05-13 RX ADMIN — METOPROLOL TARTRATE 25 MG: 25 TABLET ORAL at 08:49

## 2017-05-13 RX ADMIN — MUPIROCIN: 20 OINTMENT TOPICAL at 08:49

## 2017-05-13 RX ADMIN — INSULIN ASPART 30 UNITS: 100 INJECTION, SOLUTION INTRAVENOUS; SUBCUTANEOUS at 08:50

## 2017-05-13 RX ADMIN — ASPIRIN 81 MG: 81 TABLET ORAL at 08:50

## 2017-05-13 RX ADMIN — METOPROLOL TARTRATE 25 MG: 25 TABLET ORAL at 20:42

## 2017-05-13 RX ADMIN — ROSUVASTATIN CALCIUM 20 MG: 20 TABLET, FILM COATED ORAL at 20:42

## 2017-05-13 RX ADMIN — INSULIN ASPART 30 UNITS: 100 INJECTION, SOLUTION INTRAVENOUS; SUBCUTANEOUS at 11:14

## 2017-05-13 RX ADMIN — INSULIN ASPART 2 UNITS: 100 INJECTION, SOLUTION INTRAVENOUS; SUBCUTANEOUS at 17:53

## 2017-05-13 RX ADMIN — INSULIN ASPART 2 UNITS: 100 INJECTION, SOLUTION INTRAVENOUS; SUBCUTANEOUS at 08:50

## 2017-05-13 RX ADMIN — INSULIN ASPART 30 UNITS: 100 INJECTION, SOLUTION INTRAVENOUS; SUBCUTANEOUS at 17:53

## 2017-05-13 RX ADMIN — INSULIN DETEMIR 30 UNITS: 100 INJECTION, SOLUTION SUBCUTANEOUS at 20:43

## 2017-05-13 RX ADMIN — PANTOPRAZOLE SODIUM 40 MG: 40 TABLET, DELAYED RELEASE ORAL at 06:45

## 2017-05-13 RX ADMIN — INSULIN ASPART 4 UNITS: 100 INJECTION, SOLUTION INTRAVENOUS; SUBCUTANEOUS at 20:42

## 2017-05-13 RX ADMIN — INSULIN ASPART 4 UNITS: 100 INJECTION, SOLUTION INTRAVENOUS; SUBCUTANEOUS at 11:15

## 2017-05-14 LAB
ANION GAP SERPL CALCULATED.3IONS-SCNC: 14.2 MMOL/L
BUN BLD-MCNC: 20 MG/DL (ref 8–23)
BUN/CREAT SERPL: 20.8 (ref 7–25)
CALCIUM SPEC-SCNC: 8.1 MG/DL (ref 8.6–10.5)
CHLORIDE SERPL-SCNC: 100 MMOL/L (ref 98–107)
CO2 SERPL-SCNC: 23.8 MMOL/L (ref 22–29)
CREAT BLD-MCNC: 0.96 MG/DL (ref 0.76–1.27)
DEPRECATED RDW RBC AUTO: 51.8 FL (ref 37–54)
ERYTHROCYTE [DISTWIDTH] IN BLOOD BY AUTOMATED COUNT: 15.2 % (ref 11.5–14.5)
GFR SERPL CREATININE-BSD FRML MDRD: 77 ML/MIN/1.73
GLUCOSE BLD-MCNC: 86 MG/DL (ref 65–99)
GLUCOSE BLDC GLUCOMTR-MCNC: 131 MG/DL (ref 70–130)
GLUCOSE BLDC GLUCOMTR-MCNC: 135 MG/DL (ref 70–130)
GLUCOSE BLDC GLUCOMTR-MCNC: 63 MG/DL (ref 70–130)
GLUCOSE BLDC GLUCOMTR-MCNC: 66 MG/DL (ref 70–130)
GLUCOSE BLDC GLUCOMTR-MCNC: 92 MG/DL (ref 70–130)
GLUCOSE BLDC GLUCOMTR-MCNC: 93 MG/DL (ref 70–130)
HCT VFR BLD AUTO: 35.6 % (ref 40.4–52.2)
HGB BLD-MCNC: 11.8 G/DL (ref 13.7–17.6)
MCH RBC QN AUTO: 30.7 PG (ref 27–32.7)
MCHC RBC AUTO-ENTMCNC: 33.1 G/DL (ref 32.6–36.4)
MCV RBC AUTO: 92.7 FL (ref 79.8–96.2)
PLATELET # BLD AUTO: 166 10*3/MM3 (ref 140–500)
PMV BLD AUTO: 10.7 FL (ref 6–12)
POTASSIUM BLD-SCNC: 3.5 MMOL/L (ref 3.5–5.2)
POTASSIUM BLD-SCNC: 4 MMOL/L (ref 3.5–5.2)
RBC # BLD AUTO: 3.84 10*6/MM3 (ref 4.6–6)
SODIUM BLD-SCNC: 138 MMOL/L (ref 136–145)
WBC NRBC COR # BLD: 21.93 10*3/MM3 (ref 4.5–10.7)

## 2017-05-14 PROCEDURE — 84132 ASSAY OF SERUM POTASSIUM: CPT | Performed by: THORACIC SURGERY (CARDIOTHORACIC VASCULAR SURGERY)

## 2017-05-14 PROCEDURE — 82962 GLUCOSE BLOOD TEST: CPT

## 2017-05-14 PROCEDURE — 94762 N-INVAS EAR/PLS OXIMTRY CONT: CPT

## 2017-05-14 PROCEDURE — 80048 BASIC METABOLIC PNL TOTAL CA: CPT | Performed by: THORACIC SURGERY (CARDIOTHORACIC VASCULAR SURGERY)

## 2017-05-14 PROCEDURE — 93010 ELECTROCARDIOGRAM REPORT: CPT | Performed by: INTERNAL MEDICINE

## 2017-05-14 PROCEDURE — 94799 UNLISTED PULMONARY SVC/PX: CPT

## 2017-05-14 PROCEDURE — 99232 SBSQ HOSP IP/OBS MODERATE 35: CPT | Performed by: INTERNAL MEDICINE

## 2017-05-14 PROCEDURE — 93005 ELECTROCARDIOGRAM TRACING: CPT | Performed by: THORACIC SURGERY (CARDIOTHORACIC VASCULAR SURGERY)

## 2017-05-14 PROCEDURE — 63710000001 INSULIN ASPART PER 5 UNITS: Performed by: NURSE PRACTITIONER

## 2017-05-14 PROCEDURE — 97110 THERAPEUTIC EXERCISES: CPT

## 2017-05-14 PROCEDURE — 85027 COMPLETE CBC AUTOMATED: CPT | Performed by: THORACIC SURGERY (CARDIOTHORACIC VASCULAR SURGERY)

## 2017-05-14 PROCEDURE — 99024 POSTOP FOLLOW-UP VISIT: CPT | Performed by: THORACIC SURGERY (CARDIOTHORACIC VASCULAR SURGERY)

## 2017-05-14 RX ORDER — DIPHENOXYLATE HYDROCHLORIDE AND ATROPINE SULFATE 2.5; .025 MG/1; MG/1
1 TABLET ORAL DAILY
Status: DISCONTINUED | OUTPATIENT
Start: 2017-05-14 | End: 2017-05-15 | Stop reason: HOSPADM

## 2017-05-14 RX ORDER — FERROUS SULFATE 325(65) MG
325 TABLET ORAL
Status: DISCONTINUED | OUTPATIENT
Start: 2017-05-14 | End: 2017-05-15 | Stop reason: HOSPADM

## 2017-05-14 RX ORDER — LEVOFLOXACIN 500 MG/1
500 TABLET, FILM COATED ORAL EVERY 24 HOURS
Status: DISCONTINUED | OUTPATIENT
Start: 2017-05-14 | End: 2017-05-15

## 2017-05-14 RX ADMIN — POTASSIUM CHLORIDE 40 MEQ: 750 CAPSULE, EXTENDED RELEASE ORAL at 09:54

## 2017-05-14 RX ADMIN — ACETAMINOPHEN 650 MG: 325 TABLET ORAL at 08:36

## 2017-05-14 RX ADMIN — METOPROLOL TARTRATE 25 MG: 25 TABLET ORAL at 08:08

## 2017-05-14 RX ADMIN — INSULIN DETEMIR 30 UNITS: 100 INJECTION, SOLUTION SUBCUTANEOUS at 20:29

## 2017-05-14 RX ADMIN — ROSUVASTATIN CALCIUM 20 MG: 20 TABLET, FILM COATED ORAL at 20:31

## 2017-05-14 RX ADMIN — ASPIRIN 81 MG: 81 TABLET ORAL at 08:08

## 2017-05-14 RX ADMIN — PANTOPRAZOLE SODIUM 40 MG: 40 TABLET, DELAYED RELEASE ORAL at 05:38

## 2017-05-14 RX ADMIN — MUPIROCIN: 20 OINTMENT TOPICAL at 08:08

## 2017-05-14 RX ADMIN — FERROUS SULFATE TAB 325 MG (65 MG ELEMENTAL FE) 325 MG: 325 (65 FE) TAB at 12:41

## 2017-05-14 RX ADMIN — METOPROLOL TARTRATE 25 MG: 25 TABLET ORAL at 20:29

## 2017-05-14 RX ADMIN — Medication 1 TABLET: at 12:41

## 2017-05-14 RX ADMIN — INSULIN ASPART 30 UNITS: 100 INJECTION, SOLUTION INTRAVENOUS; SUBCUTANEOUS at 11:30

## 2017-05-14 RX ADMIN — LEVOFLOXACIN 500 MG: 500 TABLET, FILM COATED ORAL at 21:10

## 2017-05-14 RX ADMIN — INSULIN ASPART 30 UNITS: 100 INJECTION, SOLUTION INTRAVENOUS; SUBCUTANEOUS at 08:08

## 2017-05-14 RX ADMIN — POTASSIUM CHLORIDE 40 MEQ: 750 CAPSULE, EXTENDED RELEASE ORAL at 05:38

## 2017-05-15 VITALS
BODY MASS INDEX: 36.46 KG/M2 | RESPIRATION RATE: 16 BRPM | OXYGEN SATURATION: 94 % | TEMPERATURE: 98.1 F | WEIGHT: 246.2 LBS | SYSTOLIC BLOOD PRESSURE: 114 MMHG | HEART RATE: 86 BPM | DIASTOLIC BLOOD PRESSURE: 73 MMHG | HEIGHT: 69 IN

## 2017-05-15 LAB
ANION GAP SERPL CALCULATED.3IONS-SCNC: 12.5 MMOL/L
BACTERIA SPEC RESP CULT: NORMAL
BUN BLD-MCNC: 14 MG/DL (ref 8–23)
BUN/CREAT SERPL: 16.9 (ref 7–25)
CALCIUM SPEC-SCNC: 8.1 MG/DL (ref 8.6–10.5)
CHLORIDE SERPL-SCNC: 99 MMOL/L (ref 98–107)
CO2 SERPL-SCNC: 24.5 MMOL/L (ref 22–29)
CREAT BLD-MCNC: 0.83 MG/DL (ref 0.76–1.27)
GFR SERPL CREATININE-BSD FRML MDRD: 91 ML/MIN/1.73
GLUCOSE BLD-MCNC: 124 MG/DL (ref 65–99)
GLUCOSE BLDC GLUCOMTR-MCNC: 121 MG/DL (ref 70–130)
GLUCOSE BLDC GLUCOMTR-MCNC: 151 MG/DL (ref 70–130)
GRAM STN SPEC: NORMAL
POTASSIUM BLD-SCNC: 3.7 MMOL/L (ref 3.5–5.2)
SODIUM BLD-SCNC: 136 MMOL/L (ref 136–145)

## 2017-05-15 PROCEDURE — 99231 SBSQ HOSP IP/OBS SF/LOW 25: CPT | Performed by: INTERNAL MEDICINE

## 2017-05-15 PROCEDURE — 97110 THERAPEUTIC EXERCISES: CPT

## 2017-05-15 PROCEDURE — 63710000001 INSULIN ASPART PER 5 UNITS: Performed by: THORACIC SURGERY (CARDIOTHORACIC VASCULAR SURGERY)

## 2017-05-15 PROCEDURE — 80048 BASIC METABOLIC PNL TOTAL CA: CPT | Performed by: THORACIC SURGERY (CARDIOTHORACIC VASCULAR SURGERY)

## 2017-05-15 PROCEDURE — 63710000001 INSULIN ASPART PER 5 UNITS: Performed by: NURSE PRACTITIONER

## 2017-05-15 PROCEDURE — 94799 UNLISTED PULMONARY SVC/PX: CPT

## 2017-05-15 PROCEDURE — 99024 POSTOP FOLLOW-UP VISIT: CPT | Performed by: NURSE PRACTITIONER

## 2017-05-15 PROCEDURE — 82962 GLUCOSE BLOOD TEST: CPT

## 2017-05-15 RX ORDER — ACETAMINOPHEN 325 MG/1
650 TABLET ORAL EVERY 4 HOURS PRN
Qty: 1 BOTTLE | Refills: 99 | Status: SHIPPED | OUTPATIENT
Start: 2017-05-15 | End: 2021-05-10

## 2017-05-15 RX ORDER — ISOSORBIDE MONONITRATE 30 MG/1
30 TABLET, EXTENDED RELEASE ORAL EVERY 12 HOURS
Status: DISCONTINUED | OUTPATIENT
Start: 2017-05-15 | End: 2017-05-15 | Stop reason: HOSPADM

## 2017-05-15 RX ORDER — HYDROCODONE BITARTRATE AND ACETAMINOPHEN 5; 325 MG/1; MG/1
2 TABLET ORAL EVERY 4 HOURS PRN
Qty: 90 TABLET | Refills: 0 | Status: SHIPPED | OUTPATIENT
Start: 2017-05-15 | End: 2017-05-22 | Stop reason: HOSPADM

## 2017-05-15 RX ADMIN — INSULIN ASPART 30 UNITS: 100 INJECTION, SOLUTION INTRAVENOUS; SUBCUTANEOUS at 08:56

## 2017-05-15 RX ADMIN — Medication 1 TABLET: at 08:55

## 2017-05-15 RX ADMIN — ASPIRIN 81 MG: 81 TABLET ORAL at 08:55

## 2017-05-15 RX ADMIN — PANTOPRAZOLE SODIUM 40 MG: 40 TABLET, DELAYED RELEASE ORAL at 05:27

## 2017-05-15 RX ADMIN — INSULIN ASPART 2 UNITS: 100 INJECTION, SOLUTION INTRAVENOUS; SUBCUTANEOUS at 11:12

## 2017-05-15 RX ADMIN — FERROUS SULFATE TAB 325 MG (65 MG ELEMENTAL FE) 325 MG: 325 (65 FE) TAB at 08:55

## 2017-05-15 RX ADMIN — METOPROLOL TARTRATE 25 MG: 25 TABLET ORAL at 08:55

## 2017-05-15 RX ADMIN — ISOSORBIDE MONONITRATE 30 MG: 30 TABLET ORAL at 08:55

## 2017-05-15 RX ADMIN — MUPIROCIN: 20 OINTMENT TOPICAL at 08:56

## 2017-05-16 ENCOUNTER — HOSPITAL ENCOUNTER (EMERGENCY)
Facility: HOSPITAL | Age: 72
Discharge: HOME | End: 2017-05-16
Attending: EMERGENCY MEDICINE | Admitting: EMERGENCY MEDICINE

## 2017-05-16 ENCOUNTER — APPOINTMENT (OUTPATIENT)
Dept: GENERAL RADIOLOGY | Facility: HOSPITAL | Age: 72
End: 2017-05-16

## 2017-05-16 ENCOUNTER — TELEPHONE (OUTPATIENT)
Dept: FAMILY MEDICINE CLINIC | Facility: CLINIC | Age: 72
End: 2017-05-16

## 2017-05-16 VITALS
HEIGHT: 70 IN | TEMPERATURE: 98.5 F | OXYGEN SATURATION: 93 % | SYSTOLIC BLOOD PRESSURE: 129 MMHG | WEIGHT: 245 LBS | RESPIRATION RATE: 16 BRPM | BODY MASS INDEX: 35.07 KG/M2 | DIASTOLIC BLOOD PRESSURE: 79 MMHG | HEART RATE: 94 BPM

## 2017-05-16 DIAGNOSIS — R79.89 ELEVATED LFTS: ICD-10-CM

## 2017-05-16 DIAGNOSIS — I47.1 PAROXYSMAL SVT (SUPRAVENTRICULAR TACHYCARDIA) (HCC): Primary | ICD-10-CM

## 2017-05-16 LAB
ABO + RH BLD: NORMAL
ABO + RH BLD: NORMAL
ALBUMIN SERPL-MCNC: 3 G/DL (ref 3.5–5.2)
ALBUMIN/GLOB SERPL: 0.9 G/DL
ALP SERPL-CCNC: 465 U/L (ref 39–117)
ALT SERPL W P-5'-P-CCNC: 48 U/L (ref 1–41)
ANION GAP SERPL CALCULATED.3IONS-SCNC: 17.2 MMOL/L
AST SERPL-CCNC: 59 U/L (ref 1–40)
BASOPHILS # BLD AUTO: 0.11 10*3/MM3 (ref 0–0.2)
BASOPHILS NFR BLD AUTO: 0.8 % (ref 0–1.5)
BH BB BLOOD EXPIRATION DATE: NORMAL
BH BB BLOOD EXPIRATION DATE: NORMAL
BH BB BLOOD TYPE BARCODE: 600
BH BB BLOOD TYPE BARCODE: 600
BH BB DISPENSE STATUS: NORMAL
BH BB DISPENSE STATUS: NORMAL
BH BB PRODUCT CODE: NORMAL
BH BB PRODUCT CODE: NORMAL
BH BB UNIT NUMBER: NORMAL
BH BB UNIT NUMBER: NORMAL
BILIRUB SERPL-MCNC: 1.1 MG/DL (ref 0.1–1.2)
BUN BLD-MCNC: 20 MG/DL (ref 8–23)
BUN/CREAT SERPL: 19.4 (ref 7–25)
CALCIUM SPEC-SCNC: 8.3 MG/DL (ref 8.6–10.5)
CHLORIDE SERPL-SCNC: 97 MMOL/L (ref 98–107)
CO2 SERPL-SCNC: 21.8 MMOL/L (ref 22–29)
CREAT BLD-MCNC: 1.03 MG/DL (ref 0.76–1.27)
DEPRECATED RDW RBC AUTO: 51.9 FL (ref 37–54)
EOSINOPHIL # BLD AUTO: 0.67 10*3/MM3 (ref 0–0.7)
EOSINOPHIL NFR BLD AUTO: 4.7 % (ref 0.3–6.2)
ERYTHROCYTE [DISTWIDTH] IN BLOOD BY AUTOMATED COUNT: 15.4 % (ref 11.5–14.5)
GFR SERPL CREATININE-BSD FRML MDRD: 71 ML/MIN/1.73
GLOBULIN UR ELPH-MCNC: 3.5 GM/DL
GLUCOSE BLD-MCNC: 203 MG/DL (ref 65–99)
HCT VFR BLD AUTO: 35.1 % (ref 40.4–52.2)
HGB BLD-MCNC: 11.7 G/DL (ref 13.7–17.6)
HOLD SPECIMEN: NORMAL
HOLD SPECIMEN: NORMAL
IMM GRANULOCYTES # BLD: 0.26 10*3/MM3 (ref 0–0.03)
IMM GRANULOCYTES NFR BLD: 1.8 % (ref 0–0.5)
LYMPHOCYTES # BLD AUTO: 2.47 10*3/MM3 (ref 0.9–4.8)
LYMPHOCYTES NFR BLD AUTO: 17.5 % (ref 19.6–45.3)
MAGNESIUM SERPL-MCNC: 2.2 MG/DL (ref 1.6–2.4)
MCH RBC QN AUTO: 30.6 PG (ref 27–32.7)
MCHC RBC AUTO-ENTMCNC: 33.3 G/DL (ref 32.6–36.4)
MCV RBC AUTO: 91.9 FL (ref 79.8–96.2)
MONOCYTES # BLD AUTO: 2.24 10*3/MM3 (ref 0.2–1.2)
MONOCYTES NFR BLD AUTO: 15.9 % (ref 5–12)
NEUTROPHILS # BLD AUTO: 8.37 10*3/MM3 (ref 1.9–8.1)
NEUTROPHILS NFR BLD AUTO: 59.3 % (ref 42.7–76)
NRBC BLD MANUAL-RTO: 0 /100 WBC (ref 0–0)
PLATELET # BLD AUTO: 250 10*3/MM3 (ref 140–500)
PMV BLD AUTO: 11.1 FL (ref 6–12)
POTASSIUM BLD-SCNC: 4.4 MMOL/L (ref 3.5–5.2)
PROT SERPL-MCNC: 6.5 G/DL (ref 6–8.5)
RBC # BLD AUTO: 3.82 10*6/MM3 (ref 4.6–6)
SODIUM BLD-SCNC: 136 MMOL/L (ref 136–145)
TROPONIN T SERPL-MCNC: 0.01 NG/ML (ref 0–0.03)
UNIT  ABO: NORMAL
UNIT  ABO: NORMAL
UNIT  RH: NORMAL
UNIT  RH: NORMAL
WBC NRBC COR # BLD: 14.12 10*3/MM3 (ref 4.5–10.7)
WHOLE BLOOD HOLD SPECIMEN: NORMAL
WHOLE BLOOD HOLD SPECIMEN: NORMAL

## 2017-05-16 PROCEDURE — 93010 ELECTROCARDIOGRAM REPORT: CPT | Performed by: INTERNAL MEDICINE

## 2017-05-16 RX ORDER — ASPIRIN 325 MG
325 TABLET ORAL ONCE
Status: DISCONTINUED | OUTPATIENT
Start: 2017-05-16 | End: 2017-05-16

## 2017-05-16 RX ORDER — SODIUM CHLORIDE 0.9 % (FLUSH) 0.9 %
10 SYRINGE (ML) INJECTION AS NEEDED
Status: DISCONTINUED | OUTPATIENT
Start: 2017-05-16 | End: 2017-05-16 | Stop reason: HOSPADM

## 2017-05-17 LAB
BACTERIA SPEC AEROBE CULT: NORMAL
BACTERIA SPEC AEROBE CULT: NORMAL

## 2017-05-19 ENCOUNTER — HOSPITAL ENCOUNTER (INPATIENT)
Facility: HOSPITAL | Age: 72
LOS: 3 days | Discharge: HOME HEALTH | End: 2017-05-22
Attending: INTERNAL MEDICINE | Admitting: INTERNAL MEDICINE

## 2017-05-19 ENCOUNTER — APPOINTMENT (OUTPATIENT)
Dept: GENERAL RADIOLOGY | Facility: HOSPITAL | Age: 72
End: 2017-05-19
Attending: THORACIC SURGERY (CARDIOTHORACIC VASCULAR SURGERY)

## 2017-05-19 ENCOUNTER — OFFICE VISIT (OUTPATIENT)
Dept: CARDIOLOGY | Facility: CLINIC | Age: 72
End: 2017-05-19

## 2017-05-19 VITALS
BODY MASS INDEX: 35.36 KG/M2 | DIASTOLIC BLOOD PRESSURE: 68 MMHG | WEIGHT: 247 LBS | HEIGHT: 70 IN | HEART RATE: 90 BPM | SYSTOLIC BLOOD PRESSURE: 142 MMHG

## 2017-05-19 DIAGNOSIS — R60.9 EDEMA, UNSPECIFIED TYPE: ICD-10-CM

## 2017-05-19 DIAGNOSIS — I47.1 SVT (SUPRAVENTRICULAR TACHYCARDIA) (HCC): ICD-10-CM

## 2017-05-19 DIAGNOSIS — R06.02 SHORTNESS OF BREATH: ICD-10-CM

## 2017-05-19 DIAGNOSIS — L24.A9 WOUND DRAINAGE: ICD-10-CM

## 2017-05-19 DIAGNOSIS — E11.59 TYPE 2 DIABETES MELLITUS WITH OTHER CIRCULATORY COMPLICATION: ICD-10-CM

## 2017-05-19 DIAGNOSIS — I25.810 CORONARY ARTERY DISEASE INVOLVING CORONARY BYPASS GRAFT OF NATIVE HEART WITHOUT ANGINA PECTORIS: Primary | ICD-10-CM

## 2017-05-19 LAB
ALBUMIN SERPL-MCNC: 2.9 G/DL (ref 3.5–5.2)
ALBUMIN/GLOB SERPL: 0.7 G/DL
ALP SERPL-CCNC: 339 U/L (ref 39–117)
ALT SERPL W P-5'-P-CCNC: 39 U/L (ref 1–41)
ANION GAP SERPL CALCULATED.3IONS-SCNC: 12.2 MMOL/L
AST SERPL-CCNC: 34 U/L (ref 1–40)
BASOPHILS # BLD AUTO: 0.13 10*3/MM3 (ref 0–0.2)
BASOPHILS NFR BLD AUTO: 0.8 % (ref 0–1.5)
BILIRUB SERPL-MCNC: 0.7 MG/DL (ref 0.1–1.2)
BUN BLD-MCNC: 8 MG/DL (ref 8–23)
BUN/CREAT SERPL: 9.3 (ref 7–25)
CALCIUM SPEC-SCNC: 9.1 MG/DL (ref 8.6–10.5)
CHLORIDE SERPL-SCNC: 98 MMOL/L (ref 98–107)
CO2 SERPL-SCNC: 26.8 MMOL/L (ref 22–29)
CREAT BLD-MCNC: 0.86 MG/DL (ref 0.76–1.27)
DEPRECATED RDW RBC AUTO: 52.7 FL (ref 37–54)
EOSINOPHIL # BLD AUTO: 0.81 10*3/MM3 (ref 0–0.7)
EOSINOPHIL NFR BLD AUTO: 4.7 % (ref 0.3–6.2)
ERYTHROCYTE [DISTWIDTH] IN BLOOD BY AUTOMATED COUNT: 15.5 % (ref 11.5–14.5)
GFR SERPL CREATININE-BSD FRML MDRD: 87 ML/MIN/1.73
GLOBULIN UR ELPH-MCNC: 4 GM/DL
GLUCOSE BLD-MCNC: 233 MG/DL (ref 65–99)
GLUCOSE BLDC GLUCOMTR-MCNC: 170 MG/DL (ref 70–130)
GLUCOSE BLDC GLUCOMTR-MCNC: 225 MG/DL (ref 70–130)
HCT VFR BLD AUTO: 34.6 % (ref 40.4–52.2)
HGB BLD-MCNC: 11.4 G/DL (ref 13.7–17.6)
IMM GRANULOCYTES # BLD: 0.34 10*3/MM3 (ref 0–0.03)
IMM GRANULOCYTES NFR BLD: 2 % (ref 0–0.5)
LYMPHOCYTES # BLD AUTO: 3.1 10*3/MM3 (ref 0.9–4.8)
LYMPHOCYTES NFR BLD AUTO: 17.9 % (ref 19.6–45.3)
MAGNESIUM SERPL-MCNC: 1.9 MG/DL (ref 1.6–2.4)
MCH RBC QN AUTO: 30.7 PG (ref 27–32.7)
MCHC RBC AUTO-ENTMCNC: 32.9 G/DL (ref 32.6–36.4)
MCV RBC AUTO: 93.3 FL (ref 79.8–96.2)
MONOCYTES # BLD AUTO: 1.96 10*3/MM3 (ref 0.2–1.2)
MONOCYTES NFR BLD AUTO: 11.3 % (ref 5–12)
NEUTROPHILS # BLD AUTO: 10.98 10*3/MM3 (ref 1.9–8.1)
NEUTROPHILS NFR BLD AUTO: 63.3 % (ref 42.7–76)
PLATELET # BLD AUTO: 496 10*3/MM3 (ref 140–500)
PMV BLD AUTO: 10.1 FL (ref 6–12)
POTASSIUM BLD-SCNC: 3.8 MMOL/L (ref 3.5–5.2)
PROT SERPL-MCNC: 6.9 G/DL (ref 6–8.5)
RBC # BLD AUTO: 3.71 10*6/MM3 (ref 4.6–6)
SODIUM BLD-SCNC: 137 MMOL/L (ref 136–145)
WBC NRBC COR # BLD: 17.32 10*3/MM3 (ref 4.5–10.7)

## 2017-05-19 PROCEDURE — 93000 ELECTROCARDIOGRAM COMPLETE: CPT | Performed by: INTERNAL MEDICINE

## 2017-05-19 PROCEDURE — 63710000001 INSULIN ASPART PER 5 UNITS: Performed by: INTERNAL MEDICINE

## 2017-05-19 PROCEDURE — 87070 CULTURE OTHR SPECIMN AEROBIC: CPT | Performed by: INTERNAL MEDICINE

## 2017-05-19 PROCEDURE — 85025 COMPLETE CBC W/AUTO DIFF WBC: CPT | Performed by: INTERNAL MEDICINE

## 2017-05-19 PROCEDURE — 87040 BLOOD CULTURE FOR BACTERIA: CPT | Performed by: INTERNAL MEDICINE

## 2017-05-19 PROCEDURE — 25010000002 ENOXAPARIN PER 10 MG: Performed by: INTERNAL MEDICINE

## 2017-05-19 PROCEDURE — 82962 GLUCOSE BLOOD TEST: CPT

## 2017-05-19 PROCEDURE — 83735 ASSAY OF MAGNESIUM: CPT | Performed by: INTERNAL MEDICINE

## 2017-05-19 PROCEDURE — 99214 OFFICE O/P EST MOD 30 MIN: CPT | Performed by: INTERNAL MEDICINE

## 2017-05-19 PROCEDURE — 87205 SMEAR GRAM STAIN: CPT | Performed by: INTERNAL MEDICINE

## 2017-05-19 PROCEDURE — 63710000001 INSULIN DETEMER PER 5 UNITS: Performed by: INTERNAL MEDICINE

## 2017-05-19 PROCEDURE — 80053 COMPREHEN METABOLIC PANEL: CPT | Performed by: INTERNAL MEDICINE

## 2017-05-19 PROCEDURE — 71020 HC CHEST PA AND LATERAL: CPT

## 2017-05-19 RX ORDER — HYDROCODONE BITARTRATE AND ACETAMINOPHEN 5; 325 MG/1; MG/1
2 TABLET ORAL EVERY 4 HOURS PRN
Status: DISPENSED | OUTPATIENT
Start: 2017-05-19 | End: 2017-05-20

## 2017-05-19 RX ORDER — FUROSEMIDE 10 MG/ML
40 INJECTION INTRAMUSCULAR; INTRAVENOUS ONCE
Status: DISCONTINUED | OUTPATIENT
Start: 2017-05-19 | End: 2017-05-19

## 2017-05-19 RX ORDER — SODIUM CHLORIDE 0.9 % (FLUSH) 0.9 %
1-10 SYRINGE (ML) INJECTION AS NEEDED
Status: DISCONTINUED | OUTPATIENT
Start: 2017-05-19 | End: 2017-05-22 | Stop reason: HOSPADM

## 2017-05-19 RX ORDER — ASPIRIN 81 MG/1
81 TABLET ORAL DAILY
Status: DISCONTINUED | OUTPATIENT
Start: 2017-05-19 | End: 2017-05-22 | Stop reason: HOSPADM

## 2017-05-19 RX ORDER — POTASSIUM CHLORIDE 750 MG/1
20 CAPSULE, EXTENDED RELEASE ORAL DAILY
Status: DISCONTINUED | OUTPATIENT
Start: 2017-05-19 | End: 2017-05-22 | Stop reason: HOSPADM

## 2017-05-19 RX ORDER — ISOSORBIDE MONONITRATE 30 MG/1
30 TABLET, EXTENDED RELEASE ORAL 2 TIMES DAILY
Status: DISCONTINUED | OUTPATIENT
Start: 2017-05-19 | End: 2017-05-22 | Stop reason: HOSPADM

## 2017-05-19 RX ORDER — FUROSEMIDE 40 MG/1
40 TABLET ORAL DAILY
Status: DISCONTINUED | OUTPATIENT
Start: 2017-05-19 | End: 2017-05-19

## 2017-05-19 RX ORDER — BUMETANIDE 0.25 MG/ML
2 INJECTION INTRAMUSCULAR; INTRAVENOUS 2 TIMES DAILY
Status: DISCONTINUED | OUTPATIENT
Start: 2017-05-19 | End: 2017-05-21

## 2017-05-19 RX ORDER — ROSUVASTATIN CALCIUM 20 MG/1
20 TABLET, COATED ORAL DAILY
Status: DISCONTINUED | OUTPATIENT
Start: 2017-05-19 | End: 2017-05-22 | Stop reason: HOSPADM

## 2017-05-19 RX ADMIN — ROSUVASTATIN CALCIUM 20 MG: 20 TABLET, FILM COATED ORAL at 18:43

## 2017-05-19 RX ADMIN — ISOSORBIDE MONONITRATE 30 MG: 30 TABLET ORAL at 18:43

## 2017-05-19 RX ADMIN — POTASSIUM CHLORIDE 20 MEQ: 750 CAPSULE, EXTENDED RELEASE ORAL at 18:43

## 2017-05-19 RX ADMIN — ASPIRIN 81 MG: 81 TABLET ORAL at 18:43

## 2017-05-19 RX ADMIN — ENOXAPARIN SODIUM 40 MG: 40 INJECTION SUBCUTANEOUS at 18:42

## 2017-05-19 RX ADMIN — METOPROLOL TARTRATE 25 MG: 25 TABLET ORAL at 18:43

## 2017-05-19 RX ADMIN — BUMETANIDE 2 MG: 0.25 INJECTION, SOLUTION INTRAMUSCULAR; INTRAVENOUS at 18:44

## 2017-05-19 RX ADMIN — INSULIN ASPART 30 UNITS: 100 INJECTION, SOLUTION INTRAVENOUS; SUBCUTANEOUS at 18:43

## 2017-05-19 RX ADMIN — INSULIN DETEMIR 34 UNITS: 100 INJECTION, SOLUTION SUBCUTANEOUS at 21:43

## 2017-05-20 ENCOUNTER — APPOINTMENT (OUTPATIENT)
Dept: CARDIOLOGY | Facility: HOSPITAL | Age: 72
End: 2017-05-20
Attending: INTERNAL MEDICINE

## 2017-05-20 LAB
ANION GAP SERPL CALCULATED.3IONS-SCNC: 10.6 MMOL/L
ASCENDING AORTA: 3.3 CM
BASOPHILS # BLD AUTO: 0.14 10*3/MM3 (ref 0–0.2)
BASOPHILS NFR BLD AUTO: 0.8 % (ref 0–1.5)
BH CV ECHO MEAS - ACS: 0.7 CM
BH CV ECHO MEAS - AO MAX PG (FULL): 2.5 MMHG
BH CV ECHO MEAS - AO MAX PG: 8.8 MMHG
BH CV ECHO MEAS - AO MEAN PG (FULL): 1 MMHG
BH CV ECHO MEAS - AO MEAN PG: 4 MMHG
BH CV ECHO MEAS - AO ROOT AREA (BSA CORRECTED): 1.4
BH CV ECHO MEAS - AO ROOT AREA: 8 CM^2
BH CV ECHO MEAS - AO ROOT DIAM: 3.2 CM
BH CV ECHO MEAS - AO V2 MAX: 148 CM/SEC
BH CV ECHO MEAS - AO V2 MEAN: 96 CM/SEC
BH CV ECHO MEAS - AO V2 VTI: 28.2 CM
BH CV ECHO MEAS - ASC AORTA: 2.8 CM
BH CV ECHO MEAS - AVA(I,A): 2.6 CM^2
BH CV ECHO MEAS - AVA(I,D): 2.6 CM^2
BH CV ECHO MEAS - AVA(V,A): 2.9 CM^2
BH CV ECHO MEAS - AVA(V,D): 2.9 CM^2
BH CV ECHO MEAS - BSA(HAYCOCK): 2.4 M^2
BH CV ECHO MEAS - BSA: 2.3 M^2
BH CV ECHO MEAS - BZI_BMI: 35.4 KILOGRAMS/M^2
BH CV ECHO MEAS - BZI_METRIC_HEIGHT: 177.8 CM
BH CV ECHO MEAS - BZI_METRIC_WEIGHT: 112 KG
BH CV ECHO MEAS - CONTRAST EF (2CH): 50.5 ML/M^2
BH CV ECHO MEAS - CONTRAST EF 4CH: 53.8 ML/M^2
BH CV ECHO MEAS - EDV(CUBED): 79.5 ML
BH CV ECHO MEAS - EDV(MOD-SP2): 91 ML
BH CV ECHO MEAS - EDV(MOD-SP4): 93 ML
BH CV ECHO MEAS - EDV(TEICH): 83.1 ML
BH CV ECHO MEAS - EF(CUBED): 13.3 %
BH CV ECHO MEAS - EF(MOD-SP2): 50.5 %
BH CV ECHO MEAS - EF(MOD-SP4): 53.8 %
BH CV ECHO MEAS - EF(TEICH): 10.6 %
BH CV ECHO MEAS - ESV(CUBED): 68.9 ML
BH CV ECHO MEAS - ESV(MOD-SP2): 45 ML
BH CV ECHO MEAS - ESV(MOD-SP4): 43 ML
BH CV ECHO MEAS - ESV(TEICH): 74.2 ML
BH CV ECHO MEAS - FS: 4.7 %
BH CV ECHO MEAS - IVS/LVPW: 1
BH CV ECHO MEAS - IVSD: 1.2 CM
BH CV ECHO MEAS - LAT PEAK E' VEL: 7 CM/SEC
BH CV ECHO MEAS - LV DIASTOLIC VOL/BSA (35-75): 40.7 ML/M^2
BH CV ECHO MEAS - LV MASS(C)D: 179.1 GRAMS
BH CV ECHO MEAS - LV MASS(C)DI: 78.5 GRAMS/M^2
BH CV ECHO MEAS - LV MAX PG: 6.3 MMHG
BH CV ECHO MEAS - LV MEAN PG: 3 MMHG
BH CV ECHO MEAS - LV SYSTOLIC VOL/BSA (12-30): 18.8 ML/M^2
BH CV ECHO MEAS - LV V1 MAX: 125 CM/SEC
BH CV ECHO MEAS - LV V1 MEAN: 85.7 CM/SEC
BH CV ECHO MEAS - LV V1 VTI: 21.1 CM
BH CV ECHO MEAS - LVIDD: 4.3 CM
BH CV ECHO MEAS - LVIDS: 4.1 CM
BH CV ECHO MEAS - LVLD AP2: 7.9 CM
BH CV ECHO MEAS - LVLD AP4: 8.6 CM
BH CV ECHO MEAS - LVLS AP2: 7 CM
BH CV ECHO MEAS - LVLS AP4: 7.3 CM
BH CV ECHO MEAS - LVOT AREA (M): 3.5 CM^2
BH CV ECHO MEAS - LVOT AREA: 3.5 CM^2
BH CV ECHO MEAS - LVOT DIAM: 2.1 CM
BH CV ECHO MEAS - LVPWD: 1.2 CM
BH CV ECHO MEAS - MED PEAK E' VEL: 4 CM/SEC
BH CV ECHO MEAS - MV A DUR: 0.14 SEC
BH CV ECHO MEAS - MV A MAX VEL: 115 CM/SEC
BH CV ECHO MEAS - MV DEC SLOPE: 297 CM/SEC^2
BH CV ECHO MEAS - MV DEC TIME: 0.31 SEC
BH CV ECHO MEAS - MV E MAX VEL: 95.8 CM/SEC
BH CV ECHO MEAS - MV E/A: 0.83
BH CV ECHO MEAS - MV MAX PG: 5.1 MMHG
BH CV ECHO MEAS - MV MEAN PG: 2 MMHG
BH CV ECHO MEAS - MV P1/2T MAX VEL: 83.6 CM/SEC
BH CV ECHO MEAS - MV P1/2T: 82.4 MSEC
BH CV ECHO MEAS - MV V2 MAX: 113 CM/SEC
BH CV ECHO MEAS - MV V2 MEAN: 64.5 CM/SEC
BH CV ECHO MEAS - MV V2 VTI: 22.4 CM
BH CV ECHO MEAS - MVA P1/2T LCG: 2.6 CM^2
BH CV ECHO MEAS - MVA(P1/2T): 2.7 CM^2
BH CV ECHO MEAS - MVA(VTI): 3.3 CM^2
BH CV ECHO MEAS - PA ACC TIME: 0.11 SEC
BH CV ECHO MEAS - PA MAX PG (FULL): 1.6 MMHG
BH CV ECHO MEAS - PA MAX PG: 3.3 MMHG
BH CV ECHO MEAS - PA PR(ACCEL): 31.3 MMHG
BH CV ECHO MEAS - PA V2 MAX: 90.9 CM/SEC
BH CV ECHO MEAS - PULM A REVS DUR: 0.13 SEC
BH CV ECHO MEAS - PULM A REVS VEL: 43.3 CM/SEC
BH CV ECHO MEAS - PULM DIAS VEL: 38.4 CM/SEC
BH CV ECHO MEAS - PULM S/D: 1.4
BH CV ECHO MEAS - PULM SYS VEL: 54.7 CM/SEC
BH CV ECHO MEAS - PVA(V,A): 2.7 CM^2
BH CV ECHO MEAS - PVA(V,D): 2.7 CM^2
BH CV ECHO MEAS - QP/QS: 0.57
BH CV ECHO MEAS - RAP SYSTOLE: 8 MMHG
BH CV ECHO MEAS - RV MAX PG: 1.7 MMHG
BH CV ECHO MEAS - RV MEAN PG: 1 MMHG
BH CV ECHO MEAS - RV V1 MAX: 64.9 CM/SEC
BH CV ECHO MEAS - RV V1 MEAN: 33.9 CM/SEC
BH CV ECHO MEAS - RV V1 VTI: 10.9 CM
BH CV ECHO MEAS - RVOT AREA: 3.8 CM^2
BH CV ECHO MEAS - RVOT DIAM: 2.2 CM
BH CV ECHO MEAS - SI(AO): 99.3 ML/M^2
BH CV ECHO MEAS - SI(CUBED): 4.6 ML/M^2
BH CV ECHO MEAS - SI(LVOT): 32 ML/M^2
BH CV ECHO MEAS - SI(MOD-SP2): 20.1 ML/M^2
BH CV ECHO MEAS - SI(MOD-SP4): 21.9 ML/M^2
BH CV ECHO MEAS - SI(TEICH): 3.9 ML/M^2
BH CV ECHO MEAS - SUP REN AO DIAM: 2.19 CM
BH CV ECHO MEAS - SV(AO): 226.8 ML
BH CV ECHO MEAS - SV(CUBED): 10.6 ML
BH CV ECHO MEAS - SV(LVOT): 73.1 ML
BH CV ECHO MEAS - SV(MOD-SP2): 46 ML
BH CV ECHO MEAS - SV(MOD-SP4): 50 ML
BH CV ECHO MEAS - SV(RVOT): 41.4 ML
BH CV ECHO MEAS - SV(TEICH): 8.8 ML
BH CV ECHO MEAS - TAPSE (>1.6): 1 CM2
BH CV XLRA - TDI S': 10 CM/SEC
BUN BLD-MCNC: 9 MG/DL (ref 8–23)
BUN/CREAT SERPL: 10.6 (ref 7–25)
CALCIUM SPEC-SCNC: 8.7 MG/DL (ref 8.6–10.5)
CHLORIDE SERPL-SCNC: 94 MMOL/L (ref 98–107)
CO2 SERPL-SCNC: 29.4 MMOL/L (ref 22–29)
CREAT BLD-MCNC: 0.85 MG/DL (ref 0.76–1.27)
DEPRECATED RDW RBC AUTO: 52 FL (ref 37–54)
E/E' RATIO: 20
EOSINOPHIL # BLD AUTO: 0.94 10*3/MM3 (ref 0–0.7)
EOSINOPHIL NFR BLD AUTO: 5.4 % (ref 0.3–6.2)
ERYTHROCYTE [DISTWIDTH] IN BLOOD BY AUTOMATED COUNT: 15.4 % (ref 11.5–14.5)
GFR SERPL CREATININE-BSD FRML MDRD: 88 ML/MIN/1.73
GLUCOSE BLD-MCNC: 246 MG/DL (ref 65–99)
GLUCOSE BLDC GLUCOMTR-MCNC: 171 MG/DL (ref 70–130)
GLUCOSE BLDC GLUCOMTR-MCNC: 179 MG/DL (ref 70–130)
GLUCOSE BLDC GLUCOMTR-MCNC: 211 MG/DL (ref 70–130)
GLUCOSE BLDC GLUCOMTR-MCNC: 229 MG/DL (ref 70–130)
HCT VFR BLD AUTO: 35.4 % (ref 40.4–52.2)
HGB BLD-MCNC: 11.5 G/DL (ref 13.7–17.6)
IMM GRANULOCYTES # BLD: 0.3 10*3/MM3 (ref 0–0.03)
IMM GRANULOCYTES NFR BLD: 1.7 % (ref 0–0.5)
LEFT ATRIUM VOLUME INDEX: 27 ML/M2
LV EF 2D ECHO EST: 54 %
LYMPHOCYTES # BLD AUTO: 2.9 10*3/MM3 (ref 0.9–4.8)
LYMPHOCYTES NFR BLD AUTO: 16.7 % (ref 19.6–45.3)
MCH RBC QN AUTO: 30 PG (ref 27–32.7)
MCHC RBC AUTO-ENTMCNC: 32.5 G/DL (ref 32.6–36.4)
MCV RBC AUTO: 92.4 FL (ref 79.8–96.2)
MONOCYTES # BLD AUTO: 2 10*3/MM3 (ref 0.2–1.2)
MONOCYTES NFR BLD AUTO: 11.5 % (ref 5–12)
NEUTROPHILS # BLD AUTO: 11.04 10*3/MM3 (ref 1.9–8.1)
NEUTROPHILS NFR BLD AUTO: 63.9 % (ref 42.7–76)
NRBC BLD MANUAL-RTO: 0 /100 WBC (ref 0–0)
PLAT MORPH BLD: NORMAL
PLATELET # BLD AUTO: 474 10*3/MM3 (ref 140–500)
PMV BLD AUTO: 9.9 FL (ref 6–12)
POTASSIUM BLD-SCNC: 4.2 MMOL/L (ref 3.5–5.2)
PROCALCITONIN SERPL-MCNC: 0.08 NG/ML (ref 0.1–0.25)
RBC # BLD AUTO: 3.83 10*6/MM3 (ref 4.6–6)
RBC MORPH BLD: NORMAL
SODIUM BLD-SCNC: 134 MMOL/L (ref 136–145)
WBC MORPH BLD: NORMAL
WBC NRBC COR # BLD: 17.32 10*3/MM3 (ref 4.5–10.7)

## 2017-05-20 PROCEDURE — 80048 BASIC METABOLIC PNL TOTAL CA: CPT | Performed by: INTERNAL MEDICINE

## 2017-05-20 PROCEDURE — 99232 SBSQ HOSP IP/OBS MODERATE 35: CPT | Performed by: INTERNAL MEDICINE

## 2017-05-20 PROCEDURE — 84145 PROCALCITONIN (PCT): CPT | Performed by: INTERNAL MEDICINE

## 2017-05-20 PROCEDURE — C8929 TTE W OR WO FOL WCON,DOPPLER: HCPCS

## 2017-05-20 PROCEDURE — 85025 COMPLETE CBC W/AUTO DIFF WBC: CPT | Performed by: INTERNAL MEDICINE

## 2017-05-20 PROCEDURE — 82962 GLUCOSE BLOOD TEST: CPT

## 2017-05-20 PROCEDURE — 25010000002 PERFLUTREN (DEFINITY) 8.476 MG IN SODIUM CHLORIDE 10 ML INJECTION: Performed by: INTERNAL MEDICINE

## 2017-05-20 PROCEDURE — 85007 BL SMEAR W/DIFF WBC COUNT: CPT | Performed by: INTERNAL MEDICINE

## 2017-05-20 PROCEDURE — 25010000002 ENOXAPARIN PER 10 MG: Performed by: INTERNAL MEDICINE

## 2017-05-20 PROCEDURE — 63710000001 INSULIN DETEMER PER 5 UNITS: Performed by: INTERNAL MEDICINE

## 2017-05-20 PROCEDURE — 93306 TTE W/DOPPLER COMPLETE: CPT | Performed by: INTERNAL MEDICINE

## 2017-05-20 PROCEDURE — 63710000001 INSULIN ASPART PER 5 UNITS: Performed by: INTERNAL MEDICINE

## 2017-05-20 RX ADMIN — PERFLUTREN 3 ML: 6.52 INJECTION, SUSPENSION INTRAVENOUS at 09:56

## 2017-05-20 RX ADMIN — BUMETANIDE 2 MG: 0.25 INJECTION, SOLUTION INTRAMUSCULAR; INTRAVENOUS at 18:12

## 2017-05-20 RX ADMIN — METOPROLOL TARTRATE 25 MG: 25 TABLET ORAL at 18:11

## 2017-05-20 RX ADMIN — INSULIN ASPART 10 UNITS: 100 INJECTION, SOLUTION INTRAVENOUS; SUBCUTANEOUS at 08:51

## 2017-05-20 RX ADMIN — METOPROLOL TARTRATE 25 MG: 25 TABLET ORAL at 08:43

## 2017-05-20 RX ADMIN — ISOSORBIDE MONONITRATE 30 MG: 30 TABLET ORAL at 08:43

## 2017-05-20 RX ADMIN — BUMETANIDE 2 MG: 0.25 INJECTION, SOLUTION INTRAMUSCULAR; INTRAVENOUS at 10:55

## 2017-05-20 RX ADMIN — ENOXAPARIN SODIUM 40 MG: 40 INJECTION SUBCUTANEOUS at 08:44

## 2017-05-20 RX ADMIN — ISOSORBIDE MONONITRATE 30 MG: 30 TABLET ORAL at 18:11

## 2017-05-20 RX ADMIN — INSULIN ASPART 10 UNITS: 100 INJECTION, SOLUTION INTRAVENOUS; SUBCUTANEOUS at 12:43

## 2017-05-20 RX ADMIN — INSULIN ASPART 12 UNITS: 100 INJECTION, SOLUTION INTRAVENOUS; SUBCUTANEOUS at 18:11

## 2017-05-20 RX ADMIN — ROSUVASTATIN CALCIUM 20 MG: 20 TABLET, FILM COATED ORAL at 08:43

## 2017-05-20 RX ADMIN — INSULIN DETEMIR 34 UNITS: 100 INJECTION, SOLUTION SUBCUTANEOUS at 20:55

## 2017-05-20 RX ADMIN — POTASSIUM CHLORIDE 20 MEQ: 750 CAPSULE, EXTENDED RELEASE ORAL at 08:44

## 2017-05-20 RX ADMIN — ASPIRIN 81 MG: 81 TABLET ORAL at 08:43

## 2017-05-21 ENCOUNTER — APPOINTMENT (OUTPATIENT)
Dept: GENERAL RADIOLOGY | Facility: HOSPITAL | Age: 72
End: 2017-05-21

## 2017-05-21 LAB
ANION GAP SERPL CALCULATED.3IONS-SCNC: 13.3 MMOL/L
BASOPHILS # BLD AUTO: 0.13 10*3/MM3 (ref 0–0.2)
BASOPHILS NFR BLD AUTO: 0.7 % (ref 0–1.5)
BUN BLD-MCNC: 14 MG/DL (ref 8–23)
BUN/CREAT SERPL: 14 (ref 7–25)
CALCIUM SPEC-SCNC: 8.6 MG/DL (ref 8.6–10.5)
CHLORIDE SERPL-SCNC: 94 MMOL/L (ref 98–107)
CO2 SERPL-SCNC: 28.7 MMOL/L (ref 22–29)
CREAT BLD-MCNC: 1 MG/DL (ref 0.76–1.27)
DEPRECATED RDW RBC AUTO: 50.4 FL (ref 37–54)
EOSINOPHIL # BLD AUTO: 0.7 10*3/MM3 (ref 0–0.7)
EOSINOPHIL NFR BLD AUTO: 3.9 % (ref 0.3–6.2)
ERYTHROCYTE [DISTWIDTH] IN BLOOD BY AUTOMATED COUNT: 15 % (ref 11.5–14.5)
GFR SERPL CREATININE-BSD FRML MDRD: 73 ML/MIN/1.73
GLUCOSE BLD-MCNC: 216 MG/DL (ref 65–99)
GLUCOSE BLDC GLUCOMTR-MCNC: 102 MG/DL (ref 70–130)
GLUCOSE BLDC GLUCOMTR-MCNC: 139 MG/DL (ref 70–130)
GLUCOSE BLDC GLUCOMTR-MCNC: 166 MG/DL (ref 70–130)
GLUCOSE BLDC GLUCOMTR-MCNC: 243 MG/DL (ref 70–130)
HCT VFR BLD AUTO: 37.4 % (ref 40.4–52.2)
HGB BLD-MCNC: 12.4 G/DL (ref 13.7–17.6)
IMM GRANULOCYTES # BLD: 0.19 10*3/MM3 (ref 0–0.03)
IMM GRANULOCYTES NFR BLD: 1.1 % (ref 0–0.5)
LYMPHOCYTES # BLD AUTO: 3 10*3/MM3 (ref 0.9–4.8)
LYMPHOCYTES NFR BLD AUTO: 16.7 % (ref 19.6–45.3)
MCH RBC QN AUTO: 30.5 PG (ref 27–32.7)
MCHC RBC AUTO-ENTMCNC: 33.2 G/DL (ref 32.6–36.4)
MCV RBC AUTO: 91.9 FL (ref 79.8–96.2)
MONOCYTES # BLD AUTO: 1.63 10*3/MM3 (ref 0.2–1.2)
MONOCYTES NFR BLD AUTO: 9.1 % (ref 5–12)
NEUTROPHILS # BLD AUTO: 12.31 10*3/MM3 (ref 1.9–8.1)
NEUTROPHILS NFR BLD AUTO: 68.5 % (ref 42.7–76)
PLATELET # BLD AUTO: 519 10*3/MM3 (ref 140–500)
PMV BLD AUTO: 9.7 FL (ref 6–12)
POTASSIUM BLD-SCNC: 3.7 MMOL/L (ref 3.5–5.2)
RBC # BLD AUTO: 4.07 10*6/MM3 (ref 4.6–6)
SODIUM BLD-SCNC: 136 MMOL/L (ref 136–145)
WBC NRBC COR # BLD: 17.96 10*3/MM3 (ref 4.5–10.7)

## 2017-05-21 PROCEDURE — 82962 GLUCOSE BLOOD TEST: CPT

## 2017-05-21 PROCEDURE — 99024 POSTOP FOLLOW-UP VISIT: CPT | Performed by: NURSE PRACTITIONER

## 2017-05-21 PROCEDURE — 85025 COMPLETE CBC W/AUTO DIFF WBC: CPT | Performed by: INTERNAL MEDICINE

## 2017-05-21 PROCEDURE — 80048 BASIC METABOLIC PNL TOTAL CA: CPT | Performed by: INTERNAL MEDICINE

## 2017-05-21 PROCEDURE — 99232 SBSQ HOSP IP/OBS MODERATE 35: CPT | Performed by: INTERNAL MEDICINE

## 2017-05-21 PROCEDURE — 25010000002 ENOXAPARIN PER 10 MG: Performed by: INTERNAL MEDICINE

## 2017-05-21 PROCEDURE — 71010 HC CHEST PA OR AP: CPT

## 2017-05-21 PROCEDURE — 63710000001 INSULIN ASPART PER 5 UNITS: Performed by: INTERNAL MEDICINE

## 2017-05-21 RX ORDER — BUMETANIDE 2 MG/1
2 TABLET ORAL DAILY
Status: DISCONTINUED | OUTPATIENT
Start: 2017-05-21 | End: 2017-05-22 | Stop reason: HOSPADM

## 2017-05-21 RX ADMIN — INSULIN DETEMIR 34 UNITS: 100 INJECTION, SOLUTION SUBCUTANEOUS at 21:37

## 2017-05-21 RX ADMIN — POTASSIUM CHLORIDE 20 MEQ: 750 CAPSULE, EXTENDED RELEASE ORAL at 08:40

## 2017-05-21 RX ADMIN — METOPROLOL TARTRATE 25 MG: 25 TABLET ORAL at 17:30

## 2017-05-21 RX ADMIN — INSULIN ASPART 10 UNITS: 100 INJECTION, SOLUTION INTRAVENOUS; SUBCUTANEOUS at 17:30

## 2017-05-21 RX ADMIN — INSULIN ASPART 12 UNITS: 100 INJECTION, SOLUTION INTRAVENOUS; SUBCUTANEOUS at 12:18

## 2017-05-21 RX ADMIN — BUMETANIDE 2 MG: 0.25 INJECTION, SOLUTION INTRAMUSCULAR; INTRAVENOUS at 10:41

## 2017-05-21 RX ADMIN — INSULIN ASPART 6 UNITS: 100 INJECTION, SOLUTION INTRAVENOUS; SUBCUTANEOUS at 08:39

## 2017-05-21 RX ADMIN — ISOSORBIDE MONONITRATE 30 MG: 30 TABLET ORAL at 17:30

## 2017-05-21 RX ADMIN — ISOSORBIDE MONONITRATE 30 MG: 30 TABLET ORAL at 10:41

## 2017-05-21 RX ADMIN — ASPIRIN 81 MG: 81 TABLET ORAL at 08:40

## 2017-05-21 RX ADMIN — METOPROLOL TARTRATE 25 MG: 25 TABLET ORAL at 10:41

## 2017-05-21 RX ADMIN — ROSUVASTATIN CALCIUM 20 MG: 20 TABLET, FILM COATED ORAL at 08:40

## 2017-05-21 RX ADMIN — ENOXAPARIN SODIUM 40 MG: 40 INJECTION SUBCUTANEOUS at 08:40

## 2017-05-22 ENCOUNTER — RESULTS ENCOUNTER (OUTPATIENT)
Dept: ENDOCRINOLOGY | Age: 72
End: 2017-05-22

## 2017-05-22 ENCOUNTER — TELEPHONE (OUTPATIENT)
Dept: CARDIOLOGY | Facility: CLINIC | Age: 72
End: 2017-05-22

## 2017-05-22 VITALS
BODY MASS INDEX: 34.36 KG/M2 | DIASTOLIC BLOOD PRESSURE: 69 MMHG | HEIGHT: 70 IN | HEART RATE: 86 BPM | TEMPERATURE: 98.3 F | WEIGHT: 240 LBS | OXYGEN SATURATION: 93 % | SYSTOLIC BLOOD PRESSURE: 120 MMHG | RESPIRATION RATE: 17 BRPM

## 2017-05-22 DIAGNOSIS — E55.9 VITAMIN D DEFICIENCY: ICD-10-CM

## 2017-05-22 DIAGNOSIS — E11.9 TYPE 2 DIABETES MELLITUS WITHOUT COMPLICATION, WITH LONG-TERM CURRENT USE OF INSULIN (HCC): ICD-10-CM

## 2017-05-22 DIAGNOSIS — Z79.4 TYPE 2 DIABETES MELLITUS WITHOUT COMPLICATION, WITH LONG-TERM CURRENT USE OF INSULIN (HCC): ICD-10-CM

## 2017-05-22 LAB
ANION GAP SERPL CALCULATED.3IONS-SCNC: 12.8 MMOL/L
BACTERIA SPEC AEROBE CULT: NORMAL
BUN BLD-MCNC: 17 MG/DL (ref 8–23)
BUN/CREAT SERPL: 14.7 (ref 7–25)
CALCIUM SPEC-SCNC: 8.6 MG/DL (ref 8.6–10.5)
CHLORIDE SERPL-SCNC: 94 MMOL/L (ref 98–107)
CO2 SERPL-SCNC: 30.2 MMOL/L (ref 22–29)
CREAT BLD-MCNC: 1.16 MG/DL (ref 0.76–1.27)
GFR SERPL CREATININE-BSD FRML MDRD: 62 ML/MIN/1.73
GLUCOSE BLD-MCNC: 123 MG/DL (ref 65–99)
GLUCOSE BLDC GLUCOMTR-MCNC: 120 MG/DL (ref 70–130)
GRAM STN SPEC: NORMAL
POTASSIUM BLD-SCNC: 3.9 MMOL/L (ref 3.5–5.2)
SODIUM BLD-SCNC: 137 MMOL/L (ref 136–145)

## 2017-05-22 PROCEDURE — 82962 GLUCOSE BLOOD TEST: CPT

## 2017-05-22 PROCEDURE — 99239 HOSP IP/OBS DSCHRG MGMT >30: CPT | Performed by: INTERNAL MEDICINE

## 2017-05-22 PROCEDURE — 63710000001 INSULIN ASPART PER 5 UNITS: Performed by: INTERNAL MEDICINE

## 2017-05-22 PROCEDURE — 94620 HC PULMONARY STRESS TEST SIMPLE: CPT

## 2017-05-22 PROCEDURE — 25010000002 ENOXAPARIN PER 10 MG: Performed by: INTERNAL MEDICINE

## 2017-05-22 PROCEDURE — 80048 BASIC METABOLIC PNL TOTAL CA: CPT | Performed by: INTERNAL MEDICINE

## 2017-05-22 RX ORDER — BUMETANIDE 2 MG/1
2 TABLET ORAL DAILY
Qty: 30 TABLET | Refills: 6 | Status: SHIPPED | OUTPATIENT
Start: 2017-05-22 | End: 2017-05-31 | Stop reason: SDUPTHER

## 2017-05-22 RX ORDER — POTASSIUM CHLORIDE 750 MG/1
20 CAPSULE, EXTENDED RELEASE ORAL DAILY
Qty: 30 CAPSULE | Refills: 6 | Status: SHIPPED | OUTPATIENT
Start: 2017-05-22 | End: 2017-05-22 | Stop reason: SDUPTHER

## 2017-05-22 RX ORDER — POTASSIUM CHLORIDE 750 MG/1
20 CAPSULE, EXTENDED RELEASE ORAL DAILY
Status: DISCONTINUED | OUTPATIENT
Start: 2017-05-22 | End: 2017-05-22 | Stop reason: HOSPADM

## 2017-05-22 RX ORDER — POTASSIUM CHLORIDE 750 MG/1
20 CAPSULE, EXTENDED RELEASE ORAL DAILY
Qty: 60 CAPSULE | Refills: 6 | Status: SHIPPED | OUTPATIENT
Start: 2017-05-22 | End: 2017-05-23 | Stop reason: SDUPTHER

## 2017-05-22 RX ADMIN — ROSUVASTATIN CALCIUM 20 MG: 20 TABLET, FILM COATED ORAL at 08:19

## 2017-05-22 RX ADMIN — BUMETANIDE 2 MG: 2 TABLET ORAL at 08:18

## 2017-05-22 RX ADMIN — METOPROLOL TARTRATE 25 MG: 25 TABLET ORAL at 08:18

## 2017-05-22 RX ADMIN — POTASSIUM CHLORIDE 20 MEQ: 750 CAPSULE, EXTENDED RELEASE ORAL at 08:19

## 2017-05-22 RX ADMIN — INSULIN ASPART 30 UNITS: 100 INJECTION, SOLUTION INTRAVENOUS; SUBCUTANEOUS at 08:19

## 2017-05-22 RX ADMIN — ENOXAPARIN SODIUM 40 MG: 40 INJECTION SUBCUTANEOUS at 08:19

## 2017-05-22 RX ADMIN — ISOSORBIDE MONONITRATE 30 MG: 30 TABLET ORAL at 08:19

## 2017-05-22 RX ADMIN — ASPIRIN 81 MG: 81 TABLET ORAL at 08:19

## 2017-05-23 RX ORDER — POTASSIUM CHLORIDE 750 MG/1
20 CAPSULE, EXTENDED RELEASE ORAL DAILY
Qty: 180 CAPSULE | Refills: 1 | Status: SHIPPED | OUTPATIENT
Start: 2017-05-23 | End: 2017-06-23 | Stop reason: SDUPTHER

## 2017-05-24 LAB
BACTERIA SPEC AEROBE CULT: NORMAL
BACTERIA SPEC AEROBE CULT: NORMAL

## 2017-05-31 ENCOUNTER — OFFICE VISIT (OUTPATIENT)
Dept: CARDIOLOGY | Facility: CLINIC | Age: 72
End: 2017-05-31

## 2017-05-31 ENCOUNTER — LAB (OUTPATIENT)
Dept: LAB | Facility: HOSPITAL | Age: 72
End: 2017-05-31

## 2017-05-31 VITALS
WEIGHT: 236.4 LBS | SYSTOLIC BLOOD PRESSURE: 108 MMHG | HEIGHT: 70 IN | BODY MASS INDEX: 33.84 KG/M2 | DIASTOLIC BLOOD PRESSURE: 64 MMHG | HEART RATE: 82 BPM

## 2017-05-31 DIAGNOSIS — I25.118 CORONARY ARTERY DISEASE OF NATIVE ARTERY OF NATIVE HEART WITH STABLE ANGINA PECTORIS (HCC): ICD-10-CM

## 2017-05-31 DIAGNOSIS — E11.59 TYPE 2 DIABETES MELLITUS WITH OTHER CIRCULATORY COMPLICATION: ICD-10-CM

## 2017-05-31 DIAGNOSIS — I25.118 CORONARY ARTERY DISEASE OF NATIVE ARTERY OF NATIVE HEART WITH STABLE ANGINA PECTORIS (HCC): Primary | ICD-10-CM

## 2017-05-31 DIAGNOSIS — I47.1 SVT (SUPRAVENTRICULAR TACHYCARDIA) (HCC): ICD-10-CM

## 2017-05-31 DIAGNOSIS — I50.31 ACUTE DIASTOLIC CHF (CONGESTIVE HEART FAILURE) (HCC): ICD-10-CM

## 2017-05-31 LAB
ANION GAP SERPL CALCULATED.3IONS-SCNC: 12.5 MMOL/L
BUN BLD-MCNC: 19 MG/DL (ref 8–23)
BUN/CREAT SERPL: 16.5 (ref 7–25)
CALCIUM SPEC-SCNC: 9.1 MG/DL (ref 8.6–10.5)
CHLORIDE SERPL-SCNC: 95 MMOL/L (ref 98–107)
CO2 SERPL-SCNC: 27.5 MMOL/L (ref 22–29)
CREAT BLD-MCNC: 1.15 MG/DL (ref 0.76–1.27)
GFR SERPL CREATININE-BSD FRML MDRD: 62 ML/MIN/1.73
GLUCOSE BLD-MCNC: 101 MG/DL (ref 65–99)
POTASSIUM BLD-SCNC: 3.8 MMOL/L (ref 3.5–5.2)
SODIUM BLD-SCNC: 135 MMOL/L (ref 136–145)

## 2017-05-31 PROCEDURE — 80048 BASIC METABOLIC PNL TOTAL CA: CPT

## 2017-05-31 PROCEDURE — 93000 ELECTROCARDIOGRAM COMPLETE: CPT | Performed by: NURSE PRACTITIONER

## 2017-05-31 PROCEDURE — 99214 OFFICE O/P EST MOD 30 MIN: CPT | Performed by: NURSE PRACTITIONER

## 2017-05-31 PROCEDURE — 36415 COLL VENOUS BLD VENIPUNCTURE: CPT

## 2017-05-31 RX ORDER — BUMETANIDE 2 MG/1
1 TABLET ORAL DAILY
Qty: 30 TABLET | Refills: 6
Start: 2017-05-31 | End: 2017-06-23 | Stop reason: SDUPTHER

## 2017-06-07 ENCOUNTER — TRANSCRIBE ORDERS (OUTPATIENT)
Dept: CARDIAC REHAB | Facility: HOSPITAL | Age: 72
End: 2017-06-07

## 2017-06-20 ENCOUNTER — OFFICE VISIT (OUTPATIENT)
Dept: CARDIAC REHAB | Facility: HOSPITAL | Age: 72
End: 2017-06-20

## 2017-06-20 VITALS — HEIGHT: 70 IN | BODY MASS INDEX: 34.22 KG/M2 | WEIGHT: 239 LBS

## 2017-06-20 DIAGNOSIS — Z95.1 STATUS POST CORONARY ARTERY BYPASS GRAFT: Primary | ICD-10-CM

## 2017-06-20 PROCEDURE — 93797 PHYS/QHP OP CAR RHAB WO ECG: CPT

## 2017-06-20 NOTE — PROGRESS NOTES
Cardiac Rehab Initial Assessment      Name: Felix Olmedo  :1945 Allergies:Adhesive tape   MRN: [id number] 71 y.o. Physician: Felix Blackman MD   Primary Diagnosis:    Diagnosis Plan   1. Status post coronary artery bypass graft      Event Date: 5/10/17 Specialist: Dr. Montejo   Secondary Diagnosis:  Risk Stratification:Moderate Risk Note Author: Duyen Colindres RN     Cardiovascular History: Comments first heart event     EXERCISE AT HOME  Yes was not doing Cardio was doing stretches and weights  40min  2 days per week    EF: 54%      Source: Echo 17            Ambulatory Status:Independent  Ambulatory Fall Risk Assessed on Initial Visit: yes 6 Minute Walk Pre- Cardiac Rehab:  Distance:918ft      RPE:3  Max. HR: 90       SPO2:89    MET: 2.6  MPH: 2.0             RPD: 3  Resting BP: 116/62 LA, 122/66 RA    Peak BP: 160/60  Recovery BP: 110/64  Comments: At 2 minutes oxygen saturation dropped to 89% rested for one minute O2 back up to 93 and walk continued.       NUTRITION  Lipids:yes If yes, labs as follows;  Total: No components found for: CHOLESTEROL  HDL:   HDL Cholesterol   Date Value Ref Range Status   2017 41 40 - 60 mg/dL Final    Lipids continued:  LDL:  LDL Cholesterol    Date Value Ref Range Status   2016 54 0 - 100 mg/dL Final     Triglyceride: No components found for: TRIGLYCERIDE   Weight Management:                 Weight: 239  Height: 70                                BMI: Body mass index is 34.29 kg/(m^2).  Waist Circumference: 49.5 inches   Alcohol Use: defer   Diabetes:Yes,  Monitors BS at home- yes, Frequency: 3 times daily, Random BS: 166 fasting    Last HGBA1C with date if applicable:No components found for: A1C         SOCIAL HISTORY  Social History     Social History   • Marital status:      Spouse name: N/A   • Number of children: N/A   • Years of education: N/A     Social History Main Topics   • Smoking status: Former Smoker     Packs/day: 1.50      Years: 40.00     Quit date: 2007   • Smokeless tobacco: Never Used      Comment: Smoked up to 1.5 ppd.  Smoked for 50 years.  Quit 2009.   • Alcohol use Yes      Comment: 2-3 BEERS YEARLY    • Drug use: No   • Sexual activity: Defer     Other Topics Concern   • Not on file     Social History Narrative       Educational Level (choose one that applies) college graduate Learning Barriers:Ready to Learn    Family Support:yes    Living Arrangement: lives with their spouse    Risk Factors: Stress  Yes, Clinical Depression  No, Heredity  Yes If Yes father was in his 80's with onset, Hyperlipidemia  No, Diabetes  Yes If Yes: Do you check blood glucose daily  Yes Today's glucose level 166, Exercise prior to event  Yes If Yes: Activity stretching and resistance training, Minutes per day2, Days per week 2, Obesity  Yes and correction exercises 45 minutes 2 times per week.      Tobacco Adjunct: No  Quit smoking April 2007  Smoked 1 and 1/2 ppd for about 50 years      Comorbidities: Diabetes Mellitus      PSYCHOSOCIAL  Clinical Depression: no    Stress: yes     Assess presence or absence of depression using a valid screening tool: yes      PHYSICAL ASSESSMENT  Influenza vaccine: no  Pneumococcal vaccine: yes          Angina: no    Describe angina scale of 0 - 4: 0 = none    Today are you having incisional pain? Yes. If, Yes, Scale: 2  Feels as if the pain is in the sternum.  Midline incision well-healed..   Below knee sites bilaterally at GSV harvest sites are well healed.         Today are you having any other pain?No . If, Yes, Scale: none     Diagnosed with Hypertension:no    Heart Sounds: regular rhythm     Lung Sounds: normal air entry, lungs clear to auscultation         Assessment: Some ankle swelling after surgery and was re-hospitalized Volume overload and acute diastolic CHF and left lower lobe atelectasis 5/19/17. Orthopedic Problems: Had cartilage surgery on left knee about 10 years ago and this still gives him some  difficulty.     Are you being hurt, hit, or frightened by anyone at home or in your life? no    Are you being neglected by a caregiver? N/A Shoulder flexibility/Range of motion: Average     Recommended arm activity: Any    Chair sit and reach within: 4 inches   Leg flexibility: Average    Leg Strength/Balance/Five times sit to stand: 13 seconds.     Chose one: Average    Recommended stretching: Standing    Assessment: Good flexibility and balance demonstrated.     Family attends IA: no Time of arrival: 0930  Time of departure: 1035     Patient Goals: To build up stamina strength and energy.  To get back to cutting his own grass uses self-propelled mower and do the weed-eating and trimming. 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  To be able to crank the wench of the boat.           6/20/2017  10:45 AM  Duyen Colindres RN

## 2017-06-21 ENCOUNTER — TREATMENT (OUTPATIENT)
Dept: CARDIAC REHAB | Facility: HOSPITAL | Age: 72
End: 2017-06-21

## 2017-06-21 DIAGNOSIS — Z95.1 STATUS POST CORONARY ARTERY BYPASS GRAFT: Primary | ICD-10-CM

## 2017-06-21 PROCEDURE — 93798 PHYS/QHP OP CAR RHAB W/ECG: CPT

## 2017-06-23 ENCOUNTER — TREATMENT (OUTPATIENT)
Dept: CARDIAC REHAB | Facility: HOSPITAL | Age: 72
End: 2017-06-23

## 2017-06-23 ENCOUNTER — OFFICE VISIT (OUTPATIENT)
Dept: CARDIOLOGY | Facility: CLINIC | Age: 72
End: 2017-06-23

## 2017-06-23 VITALS
BODY MASS INDEX: 34.22 KG/M2 | WEIGHT: 239 LBS | HEART RATE: 79 BPM | SYSTOLIC BLOOD PRESSURE: 102 MMHG | DIASTOLIC BLOOD PRESSURE: 62 MMHG | HEIGHT: 70 IN

## 2017-06-23 DIAGNOSIS — Z95.1 STATUS POST CORONARY ARTERY BYPASS GRAFT: Primary | ICD-10-CM

## 2017-06-23 DIAGNOSIS — I25.810 CORONARY ARTERY DISEASE INVOLVING CORONARY BYPASS GRAFT OF NATIVE HEART WITHOUT ANGINA PECTORIS: Primary | ICD-10-CM

## 2017-06-23 DIAGNOSIS — I51.89 DIASTOLIC DYSFUNCTION: ICD-10-CM

## 2017-06-23 DIAGNOSIS — E11.59 TYPE 2 DIABETES MELLITUS WITH OTHER CIRCULATORY COMPLICATION: ICD-10-CM

## 2017-06-23 PROCEDURE — 93798 PHYS/QHP OP CAR RHAB W/ECG: CPT

## 2017-06-23 PROCEDURE — 99213 OFFICE O/P EST LOW 20 MIN: CPT | Performed by: INTERNAL MEDICINE

## 2017-06-23 RX ORDER — BUMETANIDE 2 MG/1
1 TABLET ORAL DAILY
Qty: 90 TABLET | Refills: 3 | Status: SHIPPED | OUTPATIENT
Start: 2017-06-23 | End: 2020-10-05 | Stop reason: SDUPTHER

## 2017-06-23 RX ORDER — POTASSIUM CHLORIDE 750 MG/1
10 CAPSULE, EXTENDED RELEASE ORAL DAILY
Qty: 180 CAPSULE | Refills: 3 | Status: SHIPPED | OUTPATIENT
Start: 2017-06-23 | End: 2017-07-25 | Stop reason: SDUPTHER

## 2017-06-23 RX ORDER — ATORVASTATIN CALCIUM 40 MG/1
40 TABLET, FILM COATED ORAL DAILY
Qty: 90 TABLET | Refills: 3 | Status: SHIPPED | OUTPATIENT
Start: 2017-06-23 | End: 2018-07-26 | Stop reason: SDUPTHER

## 2017-06-26 ENCOUNTER — OFFICE VISIT (OUTPATIENT)
Dept: CARDIAC SURGERY | Facility: CLINIC | Age: 72
End: 2017-06-26

## 2017-06-26 ENCOUNTER — TREATMENT (OUTPATIENT)
Dept: CARDIAC REHAB | Facility: HOSPITAL | Age: 72
End: 2017-06-26

## 2017-06-26 VITALS
RESPIRATION RATE: 16 BRPM | OXYGEN SATURATION: 93 % | TEMPERATURE: 98.5 F | HEART RATE: 78 BPM | WEIGHT: 239 LBS | SYSTOLIC BLOOD PRESSURE: 113 MMHG | BODY MASS INDEX: 34.29 KG/M2 | DIASTOLIC BLOOD PRESSURE: 72 MMHG

## 2017-06-26 DIAGNOSIS — Z95.1 STATUS POST CORONARY ARTERY BYPASS GRAFT: Primary | ICD-10-CM

## 2017-06-26 DIAGNOSIS — Z95.1 S/P CABG X 2: Primary | ICD-10-CM

## 2017-06-26 PROCEDURE — 93798 PHYS/QHP OP CAR RHAB W/ECG: CPT

## 2017-06-26 PROCEDURE — 99024 POSTOP FOLLOW-UP VISIT: CPT | Performed by: THORACIC SURGERY (CARDIOTHORACIC VASCULAR SURGERY)

## 2017-06-26 NOTE — PROGRESS NOTES
CARDIOVASCULAR SURGERY FOLLOW-UP PROGRESS NOTE  Chief Complaint: Here for follow-up 4 weeks after CABG ×2        HPI:   Dear Dr. Felix Blackman MD and colleagues:    It was nice to see Felix Olmedo in follow up 4 weeks  after surgery.  As you know, he is a 71 y.o. male with coronary artery disease who underwent CABG ×2 on 5/10/17. He did well postoperatively and continues to do well. He comes in today complaining of nothing.  His activity level has been good.       Physical Exam:         /72 (BP Location: Left arm, Patient Position: Sitting, Cuff Size: Adult)  Pulse 78  Temp 98.5 °F (36.9 °C) (Oral)   Resp 16  Wt 239 lb (108 kg)  SpO2 93%  BMI 34.29 kg/m2  Heart:  regular rate and rhythm, S1, S2 normal, no murmur, click, rub or gallop  Lungs:  clear to auscultation bilaterally  Extremities:  no edema  Incision(s):  mid chest healing well    Assessment/Plan:     S/P CABG. Overall, he is doing well.    No significant post-op complications    OK to drive if not taking narcotic pain medicine  OK to begin cardiac rehab  Follow-up as scheduled with cardiology  Follow-up as scheduled with PCP  Follow-up with CT surgery prn    No restrictions of activity.      Thank you for allowing me to participate in the care of your   patient.  Regards,  Alden Poole MD

## 2017-06-28 ENCOUNTER — TREATMENT (OUTPATIENT)
Dept: CARDIAC REHAB | Facility: HOSPITAL | Age: 72
End: 2017-06-28

## 2017-06-28 DIAGNOSIS — Z95.1 STATUS POST CORONARY ARTERY BYPASS GRAFT: Primary | ICD-10-CM

## 2017-06-28 PROCEDURE — 93798 PHYS/QHP OP CAR RHAB W/ECG: CPT

## 2017-06-30 ENCOUNTER — TREATMENT (OUTPATIENT)
Dept: CARDIAC REHAB | Facility: HOSPITAL | Age: 72
End: 2017-06-30

## 2017-06-30 DIAGNOSIS — Z95.1 STATUS POST CORONARY ARTERY BYPASS GRAFT: Primary | ICD-10-CM

## 2017-06-30 PROCEDURE — 93798 PHYS/QHP OP CAR RHAB W/ECG: CPT

## 2017-07-03 ENCOUNTER — TREATMENT (OUTPATIENT)
Dept: CARDIAC REHAB | Facility: HOSPITAL | Age: 72
End: 2017-07-03

## 2017-07-03 DIAGNOSIS — Z95.1 STATUS POST CORONARY ARTERY BYPASS GRAFT: Primary | ICD-10-CM

## 2017-07-03 PROCEDURE — 93798 PHYS/QHP OP CAR RHAB W/ECG: CPT

## 2017-07-04 NOTE — PROGRESS NOTES
Date of Office Visit: 2017  Encounter Provider: Noah Montejo MD  Place of Service: [facility] CARDIOLOGY  Patient Name: Felix Olmedo  :1945    Chief complaint: Follow-up for coronary artery disease, diastolic dysfunction.    History of Present Illness:    Dear Dr. Blackman:     I again had the pleasure of seeing your patient in cardiology office on 2017. As you   well know, he is a very pleasant 71 year-old white male with a past medical history   significant for coronary artery disease status post coronary artery bypass grafting,   diabetes, and postoperative SVT who presents for follow-up. I initially saw the patient   on 10/3/2016. He had been experiencing 5-6 weeks of a vague chest discomfort   intermittently which she described as an aching and burning sensation in the center of   his chest and over the left precordium. He underwent a Lexiscan Myoview stress test   on 10/12/2016 which showed a small infarct and inferior wall with a moderate amount   of evon-infarct ischemia. He continued to have symptoms, and underwent a left heart   catheterization on 10/18/2016 which showed the right coronary artery to be chronically   occluded the ostium, although left to right collaterals filled the distal right coronary   artery and PDA. The LAD also had up to 60% disease in the mid to distal section.   Medical management was recommended at that time, although the patient was placed   on beta blockers and Imdur and still continued to have exertional angina which actually   worsened. He underwent a repeat left heart catheterization on 2017 by Dr. Guzman, which again showed the right coronary artery to be occluded at the ostium   chronically, and a 60-70% stenosis in the mid LAD. FFR of the mid LAD was   borderline significant at 0.80. These lesions were not felt to be amenable to   percutaneous intervention optimally, and he was referred for coronary artery  bypass   surgery. He ultimately underwent a two-vessel coronary artery bypass grafting   surgery on 5/10/2017 by Dr. Poole (BEAN to LAD, SVG to PDA). He did have some   brief postoperative SVT, but no atrial fibrillation. He also had postoperative leukocytosis   without evidence of infection.     The patient presents today for follow-up.  At his last visit, he was significantly volume   overloaded postoperatively.  He was readmitted and diuresis.  He has been on Bumex   1 mg per day, and is doing much better.  His edema has been much better controlled,   and he has been much less short of breath.  He has not had any significant chest   discomfort.  He did start cardiac rehabilitation within the last several days.    Past Medical History:   Diagnosis Date   • Acute sinusitis    • Arthritis     OSTEO   • Atelectasis of left lung    • Chest pain    • Contusion of elbow, left     acute   • Coronary artery disease     Status post 2 vessel CABG 5/10/17 by Dr. Poole (LIMA-LAD, SVG-PDA)   • Diabetes    • Diastolic dysfunction    • ZELAYA (dyspnea on exertion)    • History of bone density study     never   • History of chest x-ray 12/24/2014   • Hyperlipidemia    • Hypertension    • LAFB (left anterior fascicular block)    • Leukocytosis     post-operative   • Neuropathy    • Pneumonia 02/2017   • Right-sided back pain    • SOB (shortness of breath)    • Sprain of left shoulder    • Squamous cell carcinoma     Left cheek s/p resection   • SVT (supraventricular tachycardia)     Diagnosed following CABG   • Type 2 diabetes mellitus     Since 2000   • Volume overload    • Wound drainage     from central chest tube sites       Past Surgical History:   Procedure Laterality Date   • CARDIAC CATHETERIZATION N/A 10/18/2016    Procedure: Coronary angiography;  Surgeon: Jesus Guzman MD;  Location: [facility] CATH INVASIVE LOCATION;  Service:    • CARDIAC CATHETERIZATION N/A 10/18/2016    Procedure: Left heart cath;  Surgeon:  Jesus Guzman MD;  Location: [facility] CATH INVASIVE LOCATION;  Service:    • CARDIAC CATHETERIZATION N/A 10/18/2016    Procedure: Left ventriculography;  Surgeon: Jesus Guzman MD;  Location: [facility] CATH INVASIVE LOCATION;  Service:    • CARDIAC CATHETERIZATION N/A 4/4/2017    Procedure: Coronary angiography;  Surgeon: Jesus Guzman MD;  Location: [facility] CATH INVASIVE LOCATION;  Service:    • CARDIAC CATHETERIZATION N/A 4/4/2017    Procedure: Left heart cath;  Surgeon: Jesus Guzman MD;  Location: [facility] CATH INVASIVE LOCATION;  Service:    • CARDIAC CATHETERIZATION N/A 4/4/2017    Procedure: Left ventriculography;  Surgeon: Jesus Guzman MD;  Location: [facility] CATH INVASIVE LOCATION;  Service:    • CARDIAC CATHETERIZATION  4/4/2017    Procedure: Functional Flow [place];  Surgeon: Jesus Guzman MD;  Location: [facility] CATH INVASIVE LOCATION;  Service:    • CARDIAC SURGERY     • CATARACT EXTRACTION W/ INTRAOCULAR LENS IMPLANT Left    • COLONOSCOPY     • CORONARY ARTERY BYPASS GRAFT N/A 5/10/2017    Procedure: CORONARY ARTERY BYPASS TIMES TWO UTILIZING THE LEFT GREATER SAPHENOUS VEIN WITH INTERNAL MAMMARY ARTERY GRAFT;  TRANSESOPHAGEAL ECHOCARDIOGRAM;  Surgeon: Alden Poole MD;  Location: [facility];  Service:    • EYE SURGERY     • KNEE SURGERY Left 2005    Dr. Gallegos   • MOHS SURGERY Left     CHEEK   • SKIN BIOPSY         Current Outpatient Prescriptions on File Prior to Visit   Medication Sig Dispense Refill   • acetaminophen (TYLENOL) 325 MG tablet Take 2 tablets by mouth Every 4 (Four) Hours As Needed for Mild Pain (1-3). 1 bottle 99   • aspirin 81 MG tablet Take 81 mg by mouth Daily.     • insulin aspart (novoLOG) 100 UNIT/ML injection Inject 30 Units under the skin 3 (Three) Times a Day Before Meals. CAN CHANGE DEPENDING ON MEAL - 30-34 units     • insulin detemir (LEVEMIR) 100 UNIT/ML injection Inject 34 Units under the skin Every Night. 32-34 unit     •  "isosorbide mononitrate (IMDUR) 30 MG 24 hr tablet Take 30 mg by mouth 2 (Two) Times a Day.     • Menthol, Topical Analgesic, (BIOFREEZE ROLL-ON) 4 % gel Apply 1 application topically Daily As Needed.     • Multiple Vitamins-Minerals (MULTIVITAMIN ADULT PO) Take 1 tablet by mouth Daily.       No current facility-administered medications on file prior to visit.      Allergies as of 06/23/2017 - Juan Ramon as Reviewed 06/23/2017   Allergen Reaction Noted   • Adhesive tape  05/19/2017     Social History     Social History   • Marital status:      Spouse name: N/A   • Number of children: N/A   • Years of education: N/A     Occupational History   • Not on file.     Social History Main Topics   • Smoking status: Former Smoker     Packs/day: 1.50     Years: 40.00     Quit date: 2007   • Smokeless tobacco: Never Used      Comment: Smoked up to 1.5 ppd.  Smoked for 50 years.  Quit 2009.   • Alcohol use Yes      Comment: 2-3 BEERS YEARLY    • Drug use: No   • Sexual activity: Defer     Other Topics Concern   • Not on file     Social History Narrative     Family History   Problem Relation Age of Onset   • Cancer Mother      uterine   • Heart disease Father      Father with fatal MI in 80's   • Diabetes Sister    • Diabetes Other      grandmother   • Stroke Other    • Hypertension Other    • Esophageal cancer Brother        Review of Systems   Constitution: Positive for malaise/fatigue.   Cardiovascular: Positive for dyspnea on exertion.   Respiratory: Positive for cough.    Musculoskeletal: Positive for joint pain.   Genitourinary: Positive for frequency.   Neurological: Positive for excessive daytime sleepiness.   All other systems reviewed and are negative.    Objective:     Vitals:    06/23/17 1205   BP: 102/62   Pulse: 79   Weight: 239 lb (108 kg)   Height: 70\" (177.8 cm)     Body mass index is 34.29 kg/(m^2).    Physical Exam   Constitutional: He is oriented to person, place, and time. He appears well-developed and " well-nourished.   HENT:   Head: Normocephalic and atraumatic.   Eyes: Conjunctivae are normal.   Neck: Neck supple.   Cardiovascular: Normal rate and regular rhythm.  Exam reveals no gallop and no friction rub.    No murmur heard.  Pulmonary/Chest: Effort normal and breath sounds normal.   Abdominal: Soft. There is no tenderness.   Musculoskeletal: He exhibits no edema.   Neurological: He is alert and oriented to person, place, and time.   Skin: Skin is warm.   Psychiatric: He has a normal mood and affect. His behavior is normal.     Lab Review:   Procedures    Cardiac Procedures:  1. Echocardiogram on 10/12/2016: Ejection fraction was 62%. There was grade 1   diastolic dysfunction. There was moderate to severe thickening of the noncoronary   cusp of the aortic valve, but no aortic stenosis.  2. Left heart catheterization on 10/18/2016 by Dr. Guzman: The left main had 20%   distal disease. The LAD had a 40-50% proximal stenosis. The LAD had diffuse 60%   mid to distal disease. The left circumflex coronary artery had a 20% mid stenosis. The   right coronary artery was chronically occluded at the ostium, left to right collaterals   filled the distal vessel and PDA.  3.  Left heart catheterization on 4/4/2017: The left main was normal. The LAD had   30-40% proximal disease and 60-70% mid disease. FFR the mid LAD was borderline   significant at 0.80. The left circumflex had a 20% mid stenosis. The right coronary   artery was chronically occluded at the ostium. Coronary artery bypass grafting was   recommended at that time.  4. Carotid Doppler ultrasound on 4/4/2017: The right internal carotid artery had   50-59% disease. The left internal carotid artery had 16-49% disease.  5. Status post two-vessel coronary artery bypass grafting on 5/10/2017 by Dr. Poole   (LIMA to LAD, SVG to PDA).  6.  Echocardiogram on 5/20/2017: Ejection fraction was 54%.  There was mild LVH.    There was grade 1a diastolic  dysfunction.    Assessment:       Diagnosis Plan   1. Coronary artery disease involving coronary bypass graft of native heart without angina pectoris     2. Diastolic dysfunction     3. Type 2 diabetes mellitus with other circulatory complication       Plan:       The patient does have diastolic dysfunction, and I do feel that he needs to be on a   baseline diuretic.  He did not do well off of diuretics after surgery, and ended up   back in the hospital for IV diuresis briefly.  He is currently on Bumex at 1 mg per   day, and appears to be euvolemic on exam.  I am going to keep him on this.  He   did ask whether he can get off any medications.  I told him that he could try to wean   his Imdur if possible.  He is currently on 30 mg twice a day.  If he does not have any   significant chest discomfort while coming off of this, this would be one potential   medication that could be discontinued.  He will remain on the aspirin, Lipitor, and   metoprolol given the coronary artery disease.  He has not had any angina.  He did   start cardiac rehabilitation.  I will see him back in the office within 2 months unless   other issues arise.    Coronary Artery Disease  Assessment  • The patient has no angina   Plan  • Lifestyle modifications discussed include adhering to a heart healthy diet, avoidance of tobacco products, maintenance of a healthy weight, medication compliance, regular exercise and regular monitoring of cholesterol and blood pressure   Subjective - Objective  • There is a history of previous coronary artery bypass graft on or around 5/10/2017  • Current antiplatelet therapy includes aspirin 81 mg

## 2017-07-05 ENCOUNTER — TREATMENT (OUTPATIENT)
Dept: CARDIAC REHAB | Facility: HOSPITAL | Age: 72
End: 2017-07-05

## 2017-07-05 DIAGNOSIS — Z95.1 STATUS POST CORONARY ARTERY BYPASS GRAFT: Primary | ICD-10-CM

## 2017-07-05 PROCEDURE — 93798 PHYS/QHP OP CAR RHAB W/ECG: CPT

## 2017-07-07 ENCOUNTER — TREATMENT (OUTPATIENT)
Dept: CARDIAC REHAB | Facility: HOSPITAL | Age: 72
End: 2017-07-07

## 2017-07-07 DIAGNOSIS — Z95.1 STATUS POST CORONARY ARTERY BYPASS GRAFT: Primary | ICD-10-CM

## 2017-07-07 PROCEDURE — 93798 PHYS/QHP OP CAR RHAB W/ECG: CPT

## 2017-07-10 ENCOUNTER — TREATMENT (OUTPATIENT)
Dept: CARDIAC REHAB | Facility: HOSPITAL | Age: 72
End: 2017-07-10

## 2017-07-10 DIAGNOSIS — Z95.1 STATUS POST CORONARY ARTERY BYPASS GRAFT: Primary | ICD-10-CM

## 2017-07-10 PROCEDURE — 93798 PHYS/QHP OP CAR RHAB W/ECG: CPT

## 2017-07-12 ENCOUNTER — TREATMENT (OUTPATIENT)
Dept: CARDIAC REHAB | Facility: HOSPITAL | Age: 72
End: 2017-07-12

## 2017-07-12 DIAGNOSIS — Z95.1 STATUS POST CORONARY ARTERY BYPASS GRAFT: Primary | ICD-10-CM

## 2017-07-12 PROCEDURE — 93798 PHYS/QHP OP CAR RHAB W/ECG: CPT

## 2017-07-14 ENCOUNTER — TREATMENT (OUTPATIENT)
Dept: CARDIAC REHAB | Facility: HOSPITAL | Age: 72
End: 2017-07-14

## 2017-07-14 DIAGNOSIS — Z95.1 STATUS POST CORONARY ARTERY BYPASS GRAFT: Primary | ICD-10-CM

## 2017-07-14 PROCEDURE — 93798 PHYS/QHP OP CAR RHAB W/ECG: CPT

## 2017-07-17 ENCOUNTER — TELEPHONE (OUTPATIENT)
Dept: CARDIOLOGY | Facility: CLINIC | Age: 72
End: 2017-07-17

## 2017-07-17 ENCOUNTER — TREATMENT (OUTPATIENT)
Dept: CARDIAC REHAB | Facility: HOSPITAL | Age: 72
End: 2017-07-17

## 2017-07-17 DIAGNOSIS — Z95.1 STATUS POST CORONARY ARTERY BYPASS GRAFT: Primary | ICD-10-CM

## 2017-07-17 DIAGNOSIS — R07.89 OTHER CHEST PAIN: Primary | ICD-10-CM

## 2017-07-17 PROCEDURE — 93798 PHYS/QHP OP CAR RHAB W/ECG: CPT

## 2017-07-17 NOTE — TELEPHONE ENCOUNTER
Marissa,    I spoke with him on the phone.  This is not cardiac, but seems to be getting worse (with movement, sneezing, coughing).  I am going to schedule a CT scan of the chest without contrast in the upcoming days.  Thanks     GM

## 2017-07-17 NOTE — TELEPHONE ENCOUNTER
Pt's wife called. Pt has been experiencing pain in the side of his chest since his CABG in May. She states they are very sharp and appear to be getting worse. Pt is also questioning if he is getting a cold as he has developed a cough and has been sneezing more. Pt is still attending rehab.    They would like you to advise.     Pt can be reached at [phone] or [phone]

## 2017-07-19 ENCOUNTER — TREATMENT (OUTPATIENT)
Dept: CARDIAC REHAB | Facility: HOSPITAL | Age: 72
End: 2017-07-19

## 2017-07-19 DIAGNOSIS — Z95.1 STATUS POST CORONARY ARTERY BYPASS GRAFT: Primary | ICD-10-CM

## 2017-07-19 PROCEDURE — 93798 PHYS/QHP OP CAR RHAB W/ECG: CPT

## 2017-07-21 ENCOUNTER — HOSPITAL ENCOUNTER (OUTPATIENT)
Dept: CT IMAGING | Facility: HOSPITAL | Age: 72
Discharge: HOME | End: 2017-07-21
Attending: INTERNAL MEDICINE | Admitting: INTERNAL MEDICINE

## 2017-07-21 ENCOUNTER — TREATMENT (OUTPATIENT)
Dept: CARDIAC REHAB | Facility: HOSPITAL | Age: 72
End: 2017-07-21

## 2017-07-21 DIAGNOSIS — Z95.1 STATUS POST CORONARY ARTERY BYPASS GRAFT: Primary | ICD-10-CM

## 2017-07-21 DIAGNOSIS — R07.89 OTHER CHEST PAIN: ICD-10-CM

## 2017-07-21 PROCEDURE — 71250 CT THORAX DX C-: CPT

## 2017-07-21 PROCEDURE — 93798 PHYS/QHP OP CAR RHAB W/ECG: CPT

## 2017-07-24 ENCOUNTER — TREATMENT (OUTPATIENT)
Dept: CARDIAC REHAB | Facility: HOSPITAL | Age: 72
End: 2017-07-24

## 2017-07-24 DIAGNOSIS — Z95.1 STATUS POST CORONARY ARTERY BYPASS GRAFT: Primary | ICD-10-CM

## 2017-07-24 PROCEDURE — 93798 PHYS/QHP OP CAR RHAB W/ECG: CPT

## 2017-07-25 LAB
25(OH)D3+25(OH)D2 SERPL-MCNC: 39.4 NG/ML (ref 30–100)
ALBUMIN SERPL-MCNC: 3.7 G/DL (ref 3.5–5.2)
ALBUMIN/CREAT UR: 3.1 MG/G CREAT (ref 0–30)
ALBUMIN/GLOB SERPL: 1 G/DL
ALP SERPL-CCNC: 117 U/L (ref 39–117)
ALT SERPL-CCNC: 21 U/L (ref 1–41)
AST SERPL-CCNC: 23 U/L (ref 1–40)
BILIRUB SERPL-MCNC: 0.3 MG/DL (ref 0.1–1.2)
BUN SERPL-MCNC: 9 MG/DL (ref 8–23)
BUN/CREAT SERPL: 8.8 (ref 7–25)
CALCIUM SERPL-MCNC: 9.3 MG/DL (ref 8.6–10.5)
CHLORIDE SERPL-SCNC: 102 MMOL/L (ref 98–107)
CHOLEST SERPL-MCNC: 114 MG/DL (ref 0–200)
CO2 SERPL-SCNC: 25.3 MMOL/L (ref 22–29)
CREAT SERPL-MCNC: 1.02 MG/DL (ref 0.76–1.27)
CREAT UR-MCNC: 108.1 MG/DL
GLOBULIN SER CALC-MCNC: 3.7 GM/DL
GLUCOSE SERPL-MCNC: 177 MG/DL (ref 65–99)
HBA1C MFR BLD: 8.3 % (ref 4.8–5.6)
HDLC SERPL-MCNC: 44 MG/DL (ref 40–60)
LDLC SERPL CALC-MCNC: 57 MG/DL (ref 0–100)
MICROALBUMIN UR-MCNC: 3.4 UG/ML
POTASSIUM SERPL-SCNC: 4.5 MMOL/L (ref 3.5–5.2)
PROT SERPL-MCNC: 7.4 G/DL (ref 6–8.5)
SODIUM SERPL-SCNC: 142 MMOL/L (ref 136–145)
TRIGL SERPL-MCNC: 63 MG/DL (ref 0–150)
TSH SERPL DL<=0.005 MIU/L-ACNC: 2.07 MIU/ML (ref 0.27–4.2)
VLDLC SERPL CALC-MCNC: 12.6 MG/DL (ref 5–40)

## 2017-07-25 RX ORDER — POTASSIUM CHLORIDE 750 MG/1
10 CAPSULE, EXTENDED RELEASE ORAL 2 TIMES DAILY
Qty: 180 CAPSULE | Refills: 3 | Status: SHIPPED | OUTPATIENT
Start: 2017-07-25 | End: 2017-12-05 | Stop reason: SDUPTHER

## 2017-07-26 ENCOUNTER — TREATMENT (OUTPATIENT)
Dept: CARDIAC REHAB | Facility: HOSPITAL | Age: 72
End: 2017-07-26

## 2017-07-26 DIAGNOSIS — Z95.1 STATUS POST CORONARY ARTERY BYPASS GRAFT: Primary | ICD-10-CM

## 2017-07-26 PROCEDURE — 93798 PHYS/QHP OP CAR RHAB W/ECG: CPT

## 2017-07-28 ENCOUNTER — TREATMENT (OUTPATIENT)
Dept: CARDIAC REHAB | Facility: HOSPITAL | Age: 72
End: 2017-07-28

## 2017-07-28 DIAGNOSIS — Z95.1 STATUS POST CORONARY ARTERY BYPASS GRAFT: Primary | ICD-10-CM

## 2017-07-28 PROCEDURE — 93798 PHYS/QHP OP CAR RHAB W/ECG: CPT

## 2017-07-31 ENCOUNTER — TREATMENT (OUTPATIENT)
Dept: CARDIAC REHAB | Facility: HOSPITAL | Age: 72
End: 2017-07-31

## 2017-07-31 DIAGNOSIS — Z95.1 STATUS POST CORONARY ARTERY BYPASS GRAFT: Primary | ICD-10-CM

## 2017-07-31 PROCEDURE — 93798 PHYS/QHP OP CAR RHAB W/ECG: CPT

## 2017-08-02 ENCOUNTER — TREATMENT (OUTPATIENT)
Dept: CARDIAC REHAB | Facility: HOSPITAL | Age: 72
End: 2017-08-02

## 2017-08-02 DIAGNOSIS — Z95.1 STATUS POST CORONARY ARTERY BYPASS GRAFT: Primary | ICD-10-CM

## 2017-08-02 PROCEDURE — 93798 PHYS/QHP OP CAR RHAB W/ECG: CPT

## 2017-08-07 ENCOUNTER — OFFICE VISIT (OUTPATIENT)
Dept: ENDOCRINOLOGY | Age: 72
End: 2017-08-07

## 2017-08-07 ENCOUNTER — TREATMENT (OUTPATIENT)
Dept: CARDIAC REHAB | Facility: HOSPITAL | Age: 72
End: 2017-08-07

## 2017-08-07 VITALS
BODY MASS INDEX: 34.5 KG/M2 | HEART RATE: 67 BPM | WEIGHT: 241 LBS | DIASTOLIC BLOOD PRESSURE: 62 MMHG | SYSTOLIC BLOOD PRESSURE: 116 MMHG | OXYGEN SATURATION: 92 % | HEIGHT: 70 IN

## 2017-08-07 DIAGNOSIS — E78.49 OTHER HYPERLIPIDEMIA: ICD-10-CM

## 2017-08-07 DIAGNOSIS — E11.9 TYPE 2 DIABETES MELLITUS WITHOUT COMPLICATION, WITH LONG-TERM CURRENT USE OF INSULIN (HCC): Primary | ICD-10-CM

## 2017-08-07 DIAGNOSIS — Z79.4 TYPE 2 DIABETES MELLITUS WITHOUT COMPLICATION, WITH LONG-TERM CURRENT USE OF INSULIN (HCC): Primary | ICD-10-CM

## 2017-08-07 DIAGNOSIS — Z95.1 STATUS POST CORONARY ARTERY BYPASS GRAFT: Primary | ICD-10-CM

## 2017-08-07 DIAGNOSIS — Z95.1 S/P CABG X 2: ICD-10-CM

## 2017-08-07 PROCEDURE — 99214 OFFICE O/P EST MOD 30 MIN: CPT | Performed by: INTERNAL MEDICINE

## 2017-08-07 PROCEDURE — 93798 PHYS/QHP OP CAR RHAB W/ECG: CPT

## 2017-08-07 NOTE — PROGRESS NOTES
71 y.o.    Patient Care Team:  Felix Blackman MD as PCP - General  Felix Blackman MD as PCP - Family Medicine  Noah Montejo MD as Consulting Physician (Cardiology)  Alden Poole MD as Surgeon (Cardiothoracic Surgery)  Jesus Guzman MD as Consulting Physician (Cardiology)  Mukul Alicea MD as Consulting Physician (Pulmonary Disease)    Chief Complaint:    Follow Up / Type 2 Diabetes Mellitus  Subjective     HPI  Felix Olmedo,71 y.o. WM is here as a follow up for the management of Type 2 dm. Pt underwent CABG on May 10 th 2017, at [facility]. He is currently in the cardiac rehab.     Type 2 dm - Pt has been a diabetic since 2000, He has been on insulin since 2009.   Currently on levemir 32 - 34 units at bed time, Novolog 8 - 10 units for BF, 6 - 12 units with lunch, 10 - 12 units for dinner. Pt doesn't follow sliding scale. Pt counts carbohydrates and does 1 unit for 8 gm.   Checks 2 - 4 times a day. FBG - 140 - 160 mg/dl, pre - meal - 200's. Pt didn't get his log book for me to review.   Does c/o increased urination and increased thirst. No BG less than 60 mg/dl in the last month, but reports that he feels low when he is around 70's with symptoms of dizzy, blurry vision and sweaty.   Highest BG is 230 mg/dl.  Last eye exam was in April 2017, reports mild retinopathy in his eyes.   C/o tingling or numbness in his b/l feet, no ulcers and amputations of his feet. Not on lyrica or gabapentin.   Hx of CAD s/p CABG in May 2017, no CKD and CVA.   Pt is physically active, participating in rehab. Weight has been stable.   Pt tries to follow DM diet for most part. He reports the is trying hard.   Not on Ace inb or ARB.      HpU1b-0.3%  Negative urine microalbuminuria.      Hyperlipidemia on Lipitor 40 mg oral daily.      CAD - s/p CABG in May 2017.     Interval History      The following portions of the patient's history were reviewed and updated as appropriate: allergies, current medications, past  family history, past medical history, past social history, past surgical history and problem list.    Past Medical History:   Diagnosis Date   • Acute sinusitis    • Arthritis     OSTEO   • Atelectasis of left lung    • Chest pain    • Contusion of elbow, left     acute   • Coronary artery disease     Status post 2 vessel CABG 5/10/17 by Dr. Poole (LIMA-LAD, SVG-PDA)   • Diabetes    • Diastolic dysfunction    • ZELAYA (dyspnea on exertion)    • History of bone density study     never   • History of chest x-ray 12/24/2014   • Hyperlipidemia    • Hypertension    • LAFB (left anterior fascicular block)    • Leukocytosis     post-operative   • Neuropathy    • Pneumonia 02/2017   • Right-sided back pain    • SOB (shortness of breath)    • Sprain of left shoulder    • Squamous cell carcinoma     Left cheek s/p resection   • SVT (supraventricular tachycardia)     Diagnosed following CABG   • Type 2 diabetes mellitus     Since 2000   • Volume overload    • Wound drainage     from central chest tube sites     Family History   Problem Relation Age of Onset   • Cancer Mother      uterine   • Heart disease Father      Father with fatal MI in 80's   • Diabetes Sister    • Diabetes Other      grandmother   • Stroke Other    • Hypertension Other    • Esophageal cancer Brother      Social History     Social History   • Marital status:      Spouse name: N/A   • Number of children: N/A   • Years of education: N/A     Occupational History   • Not on file.     Social History Main Topics   • Smoking status: Former Smoker     Packs/day: 1.50     Years: 40.00     Quit date: 2007   • Smokeless tobacco: Never Used      Comment: Smoked up to 1.5 ppd.  Smoked for 50 years.  Quit 2009.   • Alcohol use Yes      Comment: 2-3 BEERS YEARLY    • Drug use: No   • Sexual activity: Defer     Other Topics Concern   • Not on file     Social History Narrative     Allergies   Allergen Reactions   • Adhesive Tape        Current Outpatient  Prescriptions:   •  acetaminophen (TYLENOL) 325 MG tablet, Take 2 tablets by mouth Every 4 (Four) Hours As Needed for Mild Pain (1-3)., Disp: 1 bottle, Rfl: 99  •  aspirin 81 MG tablet, Take 81 mg by mouth Daily., Disp: , Rfl:   •  atorvastatin (LIPITOR) 40 MG tablet, Take 1 tablet by mouth Daily., Disp: 90 tablet, Rfl: 3  •  bumetanide (BUMEX) 2 MG tablet, Take 0.5 tablets by mouth Daily., Disp: 90 tablet, Rfl: 3  •  insulin aspart (novoLOG) 100 UNIT/ML injection, Inject 30 Units under the skin 3 (Three) Times a Day Before Meals. CAN CHANGE DEPENDING ON MEAL - 30-34 units, Disp: , Rfl:   •  insulin detemir (LEVEMIR) 100 UNIT/ML injection, Inject 34 Units under the skin Every Night. 32-34 unit, Disp: , Rfl:   •  isosorbide mononitrate (IMDUR) 30 MG 24 hr tablet, Take 30 mg by mouth 2 (Two) Times a Day., Disp: , Rfl:   •  Menthol, Topical Analgesic, (BIOFREEZE ROLL-ON) 4 % gel, Apply 1 application topically Daily As Needed., Disp: , Rfl:   •  metoprolol tartrate (LOPRESSOR) 25 MG tablet, Take 1 tablet by mouth 2 (Two) Times a Day., Disp: 180 tablet, Rfl: 3  •  Multiple Vitamins-Minerals (MULTIVITAMIN ADULT PO), Take 1 tablet by mouth Daily., Disp: , Rfl:   •  potassium chloride (MICRO-K) 10 MEQ CR capsule, Take 1 capsule by mouth 2 (Two) Times a Day., Disp: 180 capsule, Rfl: 3  •  Empagliflozin (JARDIANCE) 25 MG tablet, Take 25 mg by mouth Daily., Disp: 30 tablet, Rfl: 11        Review of Systems   Constitutional: Negative for fever.   HENT: Positive for voice change. Negative for facial swelling, nosebleeds and trouble swallowing.    Eyes: Negative for pain and redness.   Respiratory: Positive for shortness of breath and wheezing.    Cardiovascular: Positive for leg swelling. Negative for palpitations.   Gastrointestinal: Negative for abdominal pain, diarrhea and vomiting.   Endocrine: Positive for polydipsia. Negative for polyuria.   Genitourinary: Positive for frequency. Negative for decreased urine volume.  "  Musculoskeletal: Positive for joint swelling. Negative for neck pain.   Skin: Negative for rash.   Allergic/Immunologic: Negative for immunocompromised state.   Neurological: Negative for seizures and facial asymmetry.   Hematological: Bruises/bleeds easily.   Psychiatric/Behavioral: Negative for agitation and confusion.       Objective       Vitals:    08/07/17 1441   BP: 116/62   Pulse: 67   SpO2: 92%   Weight: 241 lb (109 kg)   Height: 70\" (177.8 cm)     Body mass index is 34.58 kg/(m^2).      Physical Exam   Constitutional: He is oriented to person, place, and time. He appears well-nourished.   obese   HENT:   Head: Normocephalic and atraumatic.   Eyes: Conjunctivae and EOM are normal.   Neck: Normal range of motion. Neck supple. Carotid bruit is not present. No thyromegaly present.   Acanthosis nigricans   Cardiovascular: Normal rate and normal heart sounds.    CABG scar   Pulmonary/Chest: Effort normal and breath sounds normal. No stridor. No respiratory distress. He has no wheezes.   Abdominal: Soft. Bowel sounds are normal. He exhibits no distension. There is no tenderness.   Central obesity   Musculoskeletal: He exhibits edema. He exhibits no tenderness.   Lymphadenopathy:     He has no cervical adenopathy.   Neurological: He is alert and oriented to person, place, and time.   Hammer toes, decreased pin prick and intact proprioception.    Skin: Skin is warm and dry.   Psychiatric: He has a normal mood and affect. His behavior is normal.   Vitals reviewed.    Results Review:     I reviewed the patient's new clinical results.    Medical records reviewed  Summary: done      Lab on 05/31/2017   Component Date Value Ref Range Status   • Glucose 05/31/2017 101* 65 - 99 mg/dL Final   • BUN 05/31/2017 19  8 - 23 mg/dL Final   • Creatinine 05/31/2017 1.15  0.76 - 1.27 mg/dL Final   • Sodium 05/31/2017 135* 136 - 145 mmol/L Final   • Potassium 05/31/2017 3.8  3.5 - 5.2 mmol/L Final   • Chloride 05/31/2017 95* 98 - " 107 mmol/L Final   • CO2 05/31/2017 27.5  22.0 - 29.0 mmol/L Final   • Calcium 05/31/2017 9.1  8.6 - 10.5 mg/dL Final   • eGFR Non African Amer 05/31/2017 62  >60 mL/min/1.73 Final   • BUN/Creatinine Ratio 05/31/2017 16.5  7.0 - 25.0 Final   • Anion Gap 05/31/2017 12.5  mmol/L Final     Lab Results   Component Value Date    HGBA1C 8.30 (H) 07/24/2017    HGBA1C 8.05 (H) 05/09/2017    HGBA1C 8.60 (H) 04/04/2017     Lab Results   Component Value Date    MICROALBUR 3.4 07/24/2017    LDLCALC 60 04/05/2017    CREATININE 1.02 07/24/2017     Imaging Results (most recent)     None                Assessment and Plan:    Felix was seen today for diabetes.    Diagnoses and all orders for this visit:    Type 2 diabetes mellitus without complication, with long-term current use of insulin  -     Hemoglobin A1c; Future  -     Basic Metabolic Panel; Future  -     Lipid Panel; Future  -     Microalbumin / Creatinine Urine Ratio; Future  -     TSH; Future  -     Vitamin B12 & Folate; Future    Other hyperlipidemia  -     Hemoglobin A1c; Future  -     Basic Metabolic Panel; Future  -     Lipid Panel; Future  -     Microalbumin / Creatinine Urine Ratio; Future  -     TSH; Future  -     Vitamin B12 & Folate; Future    S/P CABG x 2  -     Hemoglobin A1c; Future  -     Basic Metabolic Panel; Future  -     Lipid Panel; Future  -     Microalbumin / Creatinine Urine Ratio; Future  -     TSH; Future  -     Vitamin B12 & Folate; Future    Other orders  -     Empagliflozin (JARDIANCE) 25 MG tablet; Take 25 mg by mouth Daily.    Type 2 diabetes mellitus-uncontrolled with long term insulin use status  Continue the current insulin regimen  Based on patient's continues glucose monitoring data will further adjust his insulin regimen.  Placed continuous glucose monitoring 1 the patient to give us information on his blood glucose trends.  Will start patient on Jardiance 25 mg oral daily.  Advised the patient to start the medication 2 weeks later as we  don't want the medication interferes with and continues glucose monitoring data. ( Patient might or might not continue this medication due to cost issues)   Counseled the patient the importance of glycemic control and preventing further cardiac issues.  Counseled the patient the side effects of Jardiance-urinary tract infections and yeast infections.    Hyperlipidemia continue Lipitor 40 mg oral daily.    I suspect that patient's carbohydrate counting skills are not so good that he needs a refresher on the carbohydrate counting.  Patient at this time reports that he is busy with cardiac rehabilitation activities and would want to defer this until his next appointment.    13 minutes out of 25 minutes face to face spent in counseling the patient extensively on benefit of continuous glucose monitoring, new medication Jardiance.

## 2017-08-11 ENCOUNTER — TREATMENT (OUTPATIENT)
Dept: CARDIAC REHAB | Facility: HOSPITAL | Age: 72
End: 2017-08-11

## 2017-08-11 DIAGNOSIS — Z95.1 STATUS POST CORONARY ARTERY BYPASS GRAFT: Primary | ICD-10-CM

## 2017-08-11 PROCEDURE — 93798 PHYS/QHP OP CAR RHAB W/ECG: CPT

## 2017-08-14 ENCOUNTER — TREATMENT (OUTPATIENT)
Dept: CARDIAC REHAB | Facility: HOSPITAL | Age: 72
End: 2017-08-14

## 2017-08-14 DIAGNOSIS — Z95.1 STATUS POST CORONARY ARTERY BYPASS GRAFT: Primary | ICD-10-CM

## 2017-08-14 PROCEDURE — 93798 PHYS/QHP OP CAR RHAB W/ECG: CPT

## 2017-08-16 ENCOUNTER — TREATMENT (OUTPATIENT)
Dept: CARDIAC REHAB | Facility: HOSPITAL | Age: 72
End: 2017-08-16

## 2017-08-16 DIAGNOSIS — Z95.1 STATUS POST CORONARY ARTERY BYPASS GRAFT: Primary | ICD-10-CM

## 2017-08-16 PROCEDURE — 93798 PHYS/QHP OP CAR RHAB W/ECG: CPT

## 2017-08-18 ENCOUNTER — TREATMENT (OUTPATIENT)
Dept: CARDIAC REHAB | Facility: HOSPITAL | Age: 72
End: 2017-08-18

## 2017-08-18 DIAGNOSIS — Z95.1 STATUS POST CORONARY ARTERY BYPASS GRAFT: Primary | ICD-10-CM

## 2017-08-18 PROCEDURE — 93798 PHYS/QHP OP CAR RHAB W/ECG: CPT

## 2017-08-21 ENCOUNTER — TREATMENT (OUTPATIENT)
Dept: CARDIAC REHAB | Facility: HOSPITAL | Age: 72
End: 2017-08-21

## 2017-08-21 DIAGNOSIS — Z95.1 STATUS POST CORONARY ARTERY BYPASS GRAFT: Primary | ICD-10-CM

## 2017-08-21 PROCEDURE — 93798 PHYS/QHP OP CAR RHAB W/ECG: CPT

## 2017-08-23 ENCOUNTER — TELEPHONE (OUTPATIENT)
Dept: ENDOCRINOLOGY | Age: 72
End: 2017-08-23

## 2017-08-23 ENCOUNTER — TREATMENT (OUTPATIENT)
Dept: ENDOCRINOLOGY | Age: 72
End: 2017-08-23

## 2017-08-23 ENCOUNTER — TREATMENT (OUTPATIENT)
Dept: CARDIAC REHAB | Facility: HOSPITAL | Age: 72
End: 2017-08-23

## 2017-08-23 ENCOUNTER — OFFICE VISIT (OUTPATIENT)
Dept: CARDIOLOGY | Facility: CLINIC | Age: 72
End: 2017-08-23

## 2017-08-23 VITALS
BODY MASS INDEX: 34.36 KG/M2 | DIASTOLIC BLOOD PRESSURE: 64 MMHG | WEIGHT: 240 LBS | HEART RATE: 68 BPM | SYSTOLIC BLOOD PRESSURE: 124 MMHG | HEIGHT: 70 IN | OXYGEN SATURATION: 98 %

## 2017-08-23 DIAGNOSIS — Z95.1 STATUS POST CORONARY ARTERY BYPASS GRAFT: Primary | ICD-10-CM

## 2017-08-23 DIAGNOSIS — E11.9 DIABETES MELLITUS TYPE 2, NONINSULIN DEPENDENT (HCC): ICD-10-CM

## 2017-08-23 DIAGNOSIS — E11.59 TYPE 2 DIABETES MELLITUS WITH OTHER CIRCULATORY COMPLICATION: ICD-10-CM

## 2017-08-23 DIAGNOSIS — I10 ESSENTIAL HYPERTENSION: ICD-10-CM

## 2017-08-23 DIAGNOSIS — I51.89 DIASTOLIC DYSFUNCTION: ICD-10-CM

## 2017-08-23 DIAGNOSIS — I25.810 CORONARY ARTERY DISEASE INVOLVING CORONARY BYPASS GRAFT OF NATIVE HEART WITHOUT ANGINA PECTORIS: Primary | ICD-10-CM

## 2017-08-23 PROCEDURE — 99213 OFFICE O/P EST LOW 20 MIN: CPT | Performed by: INTERNAL MEDICINE

## 2017-08-23 PROCEDURE — 95251 CONT GLUC MNTR ANALYSIS I&R: CPT | Performed by: INTERNAL MEDICINE

## 2017-08-23 PROCEDURE — 93798 PHYS/QHP OP CAR RHAB W/ECG: CPT

## 2017-08-23 NOTE — PROGRESS NOTES
Continues glucose monitoring data    Patient wore continuous glucose monitoring-free style aurea Pro from 7 August 2017-21st August, 2017-15 days  Average blood glucose reading was 1 58 mg/dL  Estimated HbA1c 7.1%  Likelihood of low blood glucose levels was 0  Likelihood of high blood glucose levels was moderate  Blood glucose trends showed mildly elevated blood sugars after breakfast and mildly elevated blood sugars after dinner    Current treatment regimen  Currently on levemir 32 - 34 units at bed time, Novolog 8 - 10 units for BF, 6 - 12 units with lunch, 10 - 12 units for dinner. Pt doesn't follow sliding scale. Pt counts carbohydrates and does 1 unit for 8 gm.       Changes to the treatment regimen  Start Jardiance 25 mg oral daily.  Continue levemir 32-34 units at bedtime.  NovoLog 1 unit for 6 g of carbohydrates for breakfast  NovoLog 1 unit for 8 g of carbohydrates for lunch  NovoLog 1 unit for 6 g of carbohydrates for dinner  Will call the patient and make this changes.

## 2017-08-28 ENCOUNTER — TREATMENT (OUTPATIENT)
Dept: CARDIAC REHAB | Facility: HOSPITAL | Age: 72
End: 2017-08-28

## 2017-08-28 DIAGNOSIS — Z95.1 STATUS POST CORONARY ARTERY BYPASS GRAFT: Primary | ICD-10-CM

## 2017-08-28 PROCEDURE — 93798 PHYS/QHP OP CAR RHAB W/ECG: CPT

## 2017-08-30 NOTE — PROGRESS NOTES
Date of Office Visit: 2017  Encounter Provider: Noah Montejo MD  Place of Service: [facility] CARDIOLOGY  Patient Name: Felix Olmedo  :1945    Chief complaint: Follow-up for coronary artery disease, diastolic dysfunction.    History of Present Illness:    Dear Dr. Blackman:      I again had the pleasure of seeing your patient in cardiology office on 2017. As you   well know, he is a very pleasant 71 year-old white male with a past medical history   significant for coronary artery disease status post coronary artery bypass grafting,   diabetes, and postoperative SVT who presents for follow-up. I initially saw the patient   on 10/3/2016. He had been experiencing 5-6 weeks of a vague chest discomfort   intermittently which she described as an aching and burning sensation in the center of   his chest and over the left precordium. He underwent a Lexiscan Myoview stress test   on 10/12/2016 which showed a small infarct and inferior wall with a moderate amount   of evon-infarct ischemia. He continued to have symptoms, and underwent a left heart   catheterization on 10/18/2016 which showed the right coronary artery to be chronically   occluded the ostium, although left to right collaterals filled the distal right coronary   artery and PDA. The LAD also had up to 60% disease in the mid to distal section.   Medical management was recommended at that time, although the patient was placed   on beta blockers and Imdur and still continued to have exertional angina which actually   worsened. He underwent a repeat left heart catheterization on 2017 by Dr. Guzman, which again showed the right coronary artery to be occluded at the ostium   chronically, and a 60-70% stenosis in the mid LAD. FFR of the mid LAD was   borderline significant at 0.80. These lesions were not felt to be amenable to   percutaneous intervention optimally, and he was referred for coronary artery  bypass   surgery. He ultimately underwent a two-vessel coronary artery bypass grafting   surgery on 5/10/2017 by Dr. Poole (BEAN to LAD, SVG to PDA). He did have some   brief postoperative SVT, but no atrial fibrillation. He also had postoperative leukocytosis   without evidence of infection.      The patient presents today for follow-up.  Overall, he is doing very well.  He has not   had any chest pressure with exertion or significant shortness of breath.  He is still   coughing up a significant amount of phlegm, and he continues to have some left   sided chest discomfort which is more pleuritic and with movement.  A recent CT   scan of the chest on 7/21/2017 did not show any significant pathology, although he   did have a 5 mm pulmonary nodule in the lingula, and a CT was recommended in 6   months.  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    Past Medical History:   Diagnosis Date   • Acute sinusitis    • Arthritis     OSTEO   • Atelectasis of left lung    • Chest pain    • Contusion of elbow, left     acute   • Coronary artery disease     Status post 2 vessel CABG 5/10/17 by Dr. Poole (LIMA-LAD, SVG-PDA)   • Diabetes    • Diastolic dysfunction    • ZELAYA (dyspnea on exertion)    • History of bone density study     never   • History of chest x-ray 12/24/2014   • Hyperlipidemia    • Hypertension    • LAFB (left anterior fascicular block)    • Leukocytosis     post-operative   • Neuropathy    • Pneumonia 02/2017   • Right-sided back pain    • SOB (shortness of breath)    • Sprain of left shoulder    • Squamous cell carcinoma     Left cheek s/p resection   • SVT (supraventricular tachycardia)     Diagnosed following CABG   • Type 2 diabetes mellitus     Since 2000   • Volume overload    • Wound drainage     from central chest tube sites       Past Surgical History:   Procedure Laterality Date   • CARDIAC CATHETERIZATION N/A 10/18/2016    Procedure: Coronary angiography;  Surgeon: Jesus  KIMBERLY Guzman MD;  Location: [facility] CATH INVASIVE LOCATION;  Service:    • CARDIAC CATHETERIZATION N/A 10/18/2016    Procedure: Left heart cath;  Surgeon: Jesus Guzman MD;  Location: [facility] CATH INVASIVE LOCATION;  Service:    • CARDIAC CATHETERIZATION N/A 10/18/2016    Procedure: Left ventriculography;  Surgeon: Jesus Guzman MD;  Location: [facility] CATH INVASIVE LOCATION;  Service:    • CARDIAC CATHETERIZATION N/A 4/4/2017    Procedure: Coronary angiography;  Surgeon: Jesus Guzman MD;  Location: [facility] CATH INVASIVE LOCATION;  Service:    • CARDIAC CATHETERIZATION N/A 4/4/2017    Procedure: Left heart cath;  Surgeon: Jesus Guzman MD;  Location: [facility] CATH INVASIVE LOCATION;  Service:    • CARDIAC CATHETERIZATION N/A 4/4/2017    Procedure: Left ventriculography;  Surgeon: Jesus Guzman MD;  Location: [facility] CATH INVASIVE LOCATION;  Service:    • CARDIAC CATHETERIZATION  4/4/2017    Procedure: Functional Flow [place];  Surgeon: Jesus Guzman MD;  Location: [facility] INVASIVE LOCATION;  Service:    • CARDIAC SURGERY     • CATARACT EXTRACTION W/ INTRAOCULAR LENS IMPLANT Left    • COLONOSCOPY     • CORONARY ARTERY BYPASS GRAFT N/A 5/10/2017    Procedure: CORONARY ARTERY BYPASS TIMES TWO UTILIZING THE LEFT GREATER SAPHENOUS VEIN WITH INTERNAL MAMMARY ARTERY GRAFT;  TRANSESOPHAGEAL ECHOCARDIOGRAM;  Surgeon: Alden Poole MD;  Location: [facility];  Service:    • EYE SURGERY     • KNEE SURGERY Left 2005    Dr. Gallegos   • MOHS SURGERY Left     CHEEK   • SKIN BIOPSY         Current Outpatient Prescriptions on File Prior to Visit   Medication Sig Dispense Refill   • acetaminophen (TYLENOL) 325 MG tablet Take 2 tablets by mouth Every 4 (Four) Hours As Needed for Mild Pain (1-3). 1 bottle 99   • aspirin 81 MG tablet Take 81 mg by mouth Daily.     • atorvastatin (LIPITOR) 40 MG tablet Take 1 tablet by mouth Daily. 90 tablet 3   • bumetanide (BUMEX) 2 MG tablet Take 0.5  tablets by mouth Daily. 90 tablet 3   • Empagliflozin (JARDIANCE) 25 MG tablet Take 25 mg by mouth Daily. 30 tablet 11   • insulin aspart (novoLOG) 100 UNIT/ML injection Inject 30 Units under the skin 3 (Three) Times a Day Before Meals. CAN CHANGE DEPENDING ON MEAL - 30-34 units     • insulin detemir (LEVEMIR) 100 UNIT/ML injection Inject 34 Units under the skin Every Night. 32-34 unit     • isosorbide mononitrate (IMDUR) 30 MG 24 hr tablet Take 30 mg by mouth 2 (Two) Times a Day.     • Menthol, Topical Analgesic, (BIOFREEZE ROLL-ON) 4 % gel Apply 1 application topically Daily As Needed.     • metoprolol tartrate (LOPRESSOR) 25 MG tablet Take 1 tablet by mouth 2 (Two) Times a Day. 180 tablet 3   • Multiple Vitamins-Minerals (MULTIVITAMIN ADULT PO) Take 1 tablet by mouth Daily.     • potassium chloride (MICRO-K) 10 MEQ CR capsule Take 1 capsule by mouth 2 (Two) Times a Day. 180 capsule 3     No current facility-administered medications on file prior to visit.      Allergies as of 08/23/2017 - Juan Ramon as Reviewed 08/23/2017   Allergen Reaction Noted   • Adhesive tape  05/19/2017     Social History     Social History   • Marital status:      Spouse name: N/A   • Number of children: N/A   • Years of education: N/A     Occupational History   • Not on file.     Social History Main Topics   • Smoking status: Former Smoker     Packs/day: 1.50     Years: 40.00     Quit date: 2007   • Smokeless tobacco: Never Used      Comment: Smoked up to 1.5 ppd.  Smoked for 50 years.  Quit 2009.   • Alcohol use Yes      Comment: 2-3 BEERS YEARLY    • Drug use: No   • Sexual activity: Defer     Other Topics Concern   • Not on file     Social History Narrative     Family History   Problem Relation Age of Onset   • Cancer Mother      uterine   • Heart disease Father      Father with fatal MI in 80's   • Diabetes Sister    • Diabetes Other      grandmother   • Stroke Other    • Hypertension Other    • Esophageal cancer Brother   "      Review of Systems   Respiratory: Positive for cough.    All other systems reviewed and are negative.    Objective:     Vitals:    08/23/17 1400   BP: 124/64   Pulse: 68   SpO2: 98%   Weight: 240 lb (109 kg)   Height: 70\" (177.8 cm)     Body mass index is 34.44 kg/(m^2).    Physical Exam   Constitutional: He is oriented to person, place, and time. He appears well-developed and well-nourished.   HENT:   Head: Normocephalic and atraumatic.   Eyes: Conjunctivae are normal.   Neck: Neck supple.   Cardiovascular: Normal rate and regular rhythm.  Exam reveals no gallop and no friction rub.    No murmur heard.  Pulmonary/Chest: Effort normal and breath sounds normal.   Abdominal: Soft. There is no tenderness.   Musculoskeletal: He exhibits no edema.   Neurological: He is alert and oriented to person, place, and time.   Skin: Skin is warm.   Psychiatric: He has a normal mood and affect. His behavior is normal.     Lab Review:   Procedures    Cardiac Procedures:  1. Echocardiogram on 10/12/2016: Ejection fraction was 62%. There was grade 1   diastolic dysfunction. There was moderate to severe thickening of the noncoronary   cusp of the aortic valve, but no aortic stenosis.  2. Left heart catheterization on 10/18/2016 by Dr. Guzman: The left main had 20%   distal disease. The LAD had a 40-50% proximal stenosis. The LAD had diffuse 60%   mid to distal disease. The left circumflex coronary artery had a 20% mid stenosis. The   right coronary artery was chronically occluded at the ostium, left to right collaterals   filled the distal vessel and PDA.  3.  Left heart catheterization on 4/4/2017: The left main was normal. The LAD had   30-40% proximal disease and 60-70% mid disease. FFR the mid LAD was borderline   significant at 0.80. The left circumflex had a 20% mid stenosis. The right coronary   artery was chronically occluded at the ostium. Coronary artery bypass grafting was   recommended at that time.  4. Carotid Doppler " ultrasound on 4/4/2017: The right internal carotid artery had   50-59% disease. The left internal carotid artery had 16-49% disease.  5. Status post two-vessel coronary artery bypass grafting on 5/10/2017 by Dr. Poole   (LIMA to LAD, SVG to PDA).  6.  Echocardiogram on 5/20/2017: Ejection fraction was 54%.  There was mild LVH.    There was grade 1a diastolic dysfunction.    Assessment:       Diagnosis Plan   1. Coronary artery disease involving coronary bypass graft of native heart without angina pectoris     2. Essential hypertension     3. Type 2 diabetes mellitus with other circulatory complication     4. Diastolic dysfunction       Plan:       The patient seems to be doing well.  The left sided discomfort that he is experiencing is   not cardiac.  It is mainly up near his shoulder, and worsens with shoulder movement at   times.  His CT scan of the chest without contrast did not show any pathology in this   area.  He did have a noncalcified 5 mm pulmonary nodule in the lingular segment, and   a 6 month follow-up CT of the chest was recommended.  Either myself or Dr. Blackman   can order this scan at the recommended 6 month interval.  He also had a porcelain   gallbladder incidentally noted, but is having no symptoms currently.  He will continue on   aspirin for life.  He is not having any angina currently on the Imdur at 30 mg twice a day   and the metoprolol at 25 mg twice a day.  I do feel that he will need a baseline diuretic,   and he is going to continue on the Bumex at 1 mg per day.  He appears to be euvolemic   on exam today.  He is taking the Lipitor without any difficulty.  He is participating in   cardiac rehabilitation and doing well.  For now, I will plan on seeing him back in the   office within the next 4 months unless other issues arise.    Coronary Artery Disease  Assessment  • The patient has no angina    Plan  • Lifestyle modifications discussed include adhering to a heart healthy diet,  avoidance of tobacco products, maintenance of a healthy weight, medication compliance, regular exercise and regular monitoring of cholesterol and blood pressure    Subjective - Objective  • There is a history of previous coronary artery bypass graft on or around 5/10/2017  • Current antiplatelet therapy includes aspirin 81 mg  • The patient qualifies for cardiac rehabilitation, and is already participating in a cardiac rehab program

## 2017-09-25 ENCOUNTER — OFFICE VISIT (OUTPATIENT)
Dept: INTERNAL MEDICINE | Age: 72
End: 2017-09-25

## 2017-09-25 ENCOUNTER — LAB (OUTPATIENT)
Dept: ENDOCRINOLOGY | Age: 72
End: 2017-09-25

## 2017-09-25 VITALS
OXYGEN SATURATION: 93 % | TEMPERATURE: 95.9 F | HEIGHT: 70 IN | HEART RATE: 74 BPM | WEIGHT: 238 LBS | SYSTOLIC BLOOD PRESSURE: 138 MMHG | BODY MASS INDEX: 34.07 KG/M2 | DIASTOLIC BLOOD PRESSURE: 70 MMHG

## 2017-09-25 DIAGNOSIS — R91.1 PULMONARY NODULE: ICD-10-CM

## 2017-09-25 DIAGNOSIS — IMO0002 UNCONTROLLED TYPE 2 DIABETES MELLITUS WITH OTHER NEUROLOGIC COMPLICATION, WITH LONG-TERM CURRENT USE OF INSULIN: Primary | Chronic | ICD-10-CM

## 2017-09-25 DIAGNOSIS — Z79.4 TYPE 2 DIABETES MELLITUS WITHOUT COMPLICATION, WITH LONG-TERM CURRENT USE OF INSULIN (HCC): ICD-10-CM

## 2017-09-25 DIAGNOSIS — E66.9 OBESITY (BMI 30-39.9): Chronic | ICD-10-CM

## 2017-09-25 DIAGNOSIS — I25.118 CORONARY ARTERY DISEASE OF NATIVE ARTERY OF NATIVE HEART WITH STABLE ANGINA PECTORIS (HCC): Chronic | ICD-10-CM

## 2017-09-25 DIAGNOSIS — E78.2 MIXED HYPERLIPIDEMIA: Chronic | ICD-10-CM

## 2017-09-25 DIAGNOSIS — Z95.1 S/P CABG X 2: ICD-10-CM

## 2017-09-25 DIAGNOSIS — E11.9 TYPE 2 DIABETES MELLITUS WITHOUT COMPLICATION, WITH LONG-TERM CURRENT USE OF INSULIN (HCC): ICD-10-CM

## 2017-09-25 DIAGNOSIS — E78.49 OTHER HYPERLIPIDEMIA: ICD-10-CM

## 2017-09-25 DIAGNOSIS — Z23 NEED FOR 23-POLYVALENT PNEUMOCOCCAL POLYSACCHARIDE VACCINE: ICD-10-CM

## 2017-09-25 DIAGNOSIS — M25.562 CHRONIC PAIN OF BOTH KNEES: Chronic | ICD-10-CM

## 2017-09-25 DIAGNOSIS — G89.29 CHRONIC PAIN OF BOTH KNEES: Chronic | ICD-10-CM

## 2017-09-25 DIAGNOSIS — M25.561 CHRONIC PAIN OF BOTH KNEES: Chronic | ICD-10-CM

## 2017-09-25 PROBLEM — I10 HYPERTENSION: Chronic | Status: ACTIVE | Noted: 2017-09-25

## 2017-09-25 PROBLEM — E78.5 HYPERLIPIDEMIA: Chronic | Status: ACTIVE | Noted: 2017-09-25

## 2017-09-25 PROCEDURE — 90732 PPSV23 VACC 2 YRS+ SUBQ/IM: CPT | Performed by: INTERNAL MEDICINE

## 2017-09-25 PROCEDURE — 99215 OFFICE O/P EST HI 40 MIN: CPT | Performed by: INTERNAL MEDICINE

## 2017-09-25 PROCEDURE — G0009 ADMIN PNEUMOCOCCAL VACCINE: HCPCS | Performed by: INTERNAL MEDICINE

## 2017-09-25 RX ORDER — CALCIUM CITRATE/VITAMIN D3 200MG-6.25
1 TABLET ORAL
COMMUNITY
Start: 2017-09-20 | End: 2020-06-22 | Stop reason: SDUPTHER

## 2017-09-25 RX ORDER — INSULIN ASPART 100 [IU]/ML
INJECTION, SOLUTION INTRAVENOUS; SUBCUTANEOUS
COMMUNITY
Start: 2017-09-20 | End: 2017-09-25

## 2017-09-25 RX ORDER — INSULIN DETEMIR 100 [IU]/ML
INJECTION, SOLUTION SUBCUTANEOUS
COMMUNITY
Start: 2017-08-09 | End: 2017-09-25

## 2017-09-26 LAB
BUN SERPL-MCNC: 23 MG/DL (ref 8–23)
BUN/CREAT SERPL: 18 (ref 7–25)
CALCIUM SERPL-MCNC: 9.8 MG/DL (ref 8.6–10.5)
CHLORIDE SERPL-SCNC: 99 MMOL/L (ref 98–107)
CHOLEST SERPL-MCNC: 116 MG/DL (ref 0–200)
CO2 SERPL-SCNC: 27.2 MMOL/L (ref 22–29)
CREAT SERPL-MCNC: 1.28 MG/DL (ref 0.76–1.27)
FOLATE SERPL-MCNC: >20 NG/ML (ref 4.78–24.2)
GLUCOSE SERPL-MCNC: 194 MG/DL (ref 65–99)
HBA1C MFR BLD: 8.4 % (ref 4.8–5.6)
HDLC SERPL-MCNC: 50 MG/DL (ref 40–60)
LDLC SERPL CALC-MCNC: 41 MG/DL (ref 0–100)
POTASSIUM SERPL-SCNC: 4.9 MMOL/L (ref 3.5–5.2)
SODIUM SERPL-SCNC: 139 MMOL/L (ref 136–145)
TRIGL SERPL-MCNC: 125 MG/DL (ref 0–150)
TSH SERPL DL<=0.005 MIU/L-ACNC: 2.2 MIU/ML (ref 0.27–4.2)
UNABLE TO VOID: NORMAL
VIT B12 SERPL-MCNC: 471 PG/ML (ref 211–946)
VLDLC SERPL CALC-MCNC: 25 MG/DL (ref 5–40)

## 2017-09-27 ENCOUNTER — HOSPITAL ENCOUNTER (OUTPATIENT)
Dept: GENERAL RADIOLOGY | Facility: HOSPITAL | Age: 72
Discharge: HOME | End: 2017-09-27
Admitting: INTERNAL MEDICINE

## 2017-09-27 PROCEDURE — 73565 X-RAY EXAM OF KNEES: CPT

## 2017-10-02 RX ORDER — ISOSORBIDE MONONITRATE 30 MG/1
30 TABLET, EXTENDED RELEASE ORAL 2 TIMES DAILY
Qty: 180 TABLET | Refills: 3 | Status: SHIPPED | OUTPATIENT
Start: 2017-10-02 | End: 2018-01-19 | Stop reason: SDUPTHER

## 2017-10-09 ENCOUNTER — OFFICE VISIT (OUTPATIENT)
Dept: ENDOCRINOLOGY | Age: 72
End: 2017-10-09

## 2017-10-09 VITALS
OXYGEN SATURATION: 93 % | HEART RATE: 64 BPM | BODY MASS INDEX: 34.22 KG/M2 | WEIGHT: 239 LBS | DIASTOLIC BLOOD PRESSURE: 64 MMHG | HEIGHT: 70 IN | SYSTOLIC BLOOD PRESSURE: 126 MMHG

## 2017-10-09 DIAGNOSIS — Z95.1 S/P CABG X 2: Chronic | ICD-10-CM

## 2017-10-09 DIAGNOSIS — E78.49 OTHER HYPERLIPIDEMIA: ICD-10-CM

## 2017-10-09 DIAGNOSIS — IMO0002 UNCONTROLLED TYPE 2 DIABETES MELLITUS WITH OTHER NEUROLOGIC COMPLICATION, WITH LONG-TERM CURRENT USE OF INSULIN: Primary | ICD-10-CM

## 2017-10-09 PROCEDURE — 99214 OFFICE O/P EST MOD 30 MIN: CPT | Performed by: INTERNAL MEDICINE

## 2017-10-09 NOTE — PROGRESS NOTES
72 y.o.    Patient Care Team:  Hilario Rahman MD as PCP - General (Internal Medicine)  Noah Montejo MD as Consulting Physician (Cardiology)  Alden Poole MD as Surgeon (Cardiothoracic Surgery)  Samira Recinos MD as Consulting Physician (Endocrinology)    Chief Complaint:    8 Week Follow Up/ Type 2 Diabetes Mellitus  Subjective     HPI    Felix Olmedo,71 y.o. WM is here as a follow up for the management of Type 2 dm. Pt underwent CABG on May 10 th 2017, at [facility]. He is currently in the cardiac rehab.      Type 2 dm - Pt has been a diabetic since 2000, He has been on insulin since 2009.   Currently on levemir 32  units at bed time, Novolog 8 - 10 units for BF, If he eats lunch, probably around 10 units with lunch, 10 units for dinner. Pt doesn't follow sliding scale.  Checks 2 - 3 times a day. FBG - 144 - 200 mg/dl, pre - meal - 200 - 250's. Pt didn't get his log book for me to review.    c/o increased urination and increased thirst.   Some BG around 60's per pt, but no BG less than 60 mg/dl. Today pt thinks he was forgetful and took extra novolog with BF and thinks he had a low BG, didn't check Bg at that time but had crackers and Juice which improved his symptoms of dizziness, fatigue and increased sweating.   Highest BG is 320 mg/dl.  Last eye exam was in April 2017, reports mild retinopathy in his eyes.   C/o tingling or numbness in his b/l feet, no ulcers and amputations of his feet. Not on lyrica or gabapentin.   Hx of CAD s/p CABG in May 2017, no CKD and CVA.   Pt is physically active, going Gym. Weight has been stable.   Pt tries to follow DM diet for most part. He reports the is trying hard.   Not on Ace inb or ARB.       VxI9z-6.3%  Negative urine microalbuminuria.      Hyperlipidemia on Lipitor 40 mg oral daily.       CAD - s/p CABG in May 2017.       Interval History      The following portions of the patient's history were reviewed and updated as appropriate: allergies, current  medications, past family history, past medical history, past social history, past surgical history and problem list.    Past Medical History:   Diagnosis Date   • Arthritis     OSTEO   • Coronary artery disease     Status post 2 vessel CABG 5/10/17 by Dr. Poole (LIMA-LAD, SVG-PDA)   • Diastolic dysfunction    • Hyperlipidemia    • Hypertension    • LAFB (left anterior fascicular block)    • Pneumonia 02/2017   • Squamous cell carcinoma     Left cheek s/p resection   • SVT (supraventricular tachycardia)     Diagnosed following CABG     Family History   Problem Relation Age of Onset   • Cancer Mother      uterine   • Heart disease Father      Father with fatal MI in 80's   • Diabetes Sister    • Esophageal cancer Brother    • Alzheimer's disease Maternal Grandfather      Social History     Social History   • Marital status:      Spouse name: Mendy Vargas   • Number of children: 3   • Years of education: N/A     Occupational History   • Retired (from [employer])      Social History Main Topics   • Smoking status: Former Smoker     Packs/day: 1.50     Years: 40.00     Types: Cigarettes     Quit date: 2007   • Smokeless tobacco: Never Used      Comment: Smoked up to 1.5 ppd.  Smoked for 50 years.  Quit 2009.   • Alcohol use Yes      Comment: 2-3 BEERS YEARLY    • Drug use: No   • Sexual activity: Not Currently     Other Topics Concern   • Not on file     Social History Narrative     Allergies   Allergen Reactions   • Adhesive Tape        Current Outpatient Prescriptions:   •  acetaminophen (TYLENOL) 325 MG tablet, Take 2 tablets by mouth Every 4 (Four) Hours As Needed for Mild Pain (1-3)., Disp: 1 bottle, Rfl: 99  •  aspirin 81 MG tablet, Take 81 mg by mouth Daily., Disp: , Rfl:   •  atorvastatin (LIPITOR) 40 MG tablet, Take 1 tablet by mouth Daily., Disp: 90 tablet, Rfl: 3  •  bumetanide (BUMEX) 2 MG tablet, Take 0.5 tablets by mouth Daily., Disp: 90 tablet, Rfl: 3  •  insulin aspart (novoLOG) 100  UNIT/ML injection, Inject 30 Units under the skin 3 (Three) Times a Day Before Meals. CAN CHANGE DEPENDING ON MEAL - 30-34 units, Disp: , Rfl:   •  insulin detemir (LEVEMIR) 100 UNIT/ML injection, Inject 34 Units under the skin Every Night. 32-34 unit, Disp: , Rfl:   •  isosorbide mononitrate (IMDUR) 30 MG 24 hr tablet, Take 1 tablet by mouth 2 (Two) Times a Day., Disp: 180 tablet, Rfl: 3  •  Menthol, Topical Analgesic, (BIOFREEZE ROLL-ON) 4 % gel, Apply 1 application topically Daily As Needed., Disp: , Rfl:   •  metoprolol tartrate (LOPRESSOR) 25 MG tablet, Take 1 tablet by mouth 2 (Two) Times a Day., Disp: 180 tablet, Rfl: 3  •  Multiple Vitamins-Minerals (MULTIVITAMIN ADULT PO), Take 1 tablet by mouth Daily., Disp: , Rfl:   •  potassium chloride (MICRO-K) 10 MEQ CR capsule, Take 1 capsule by mouth 2 (Two) Times a Day., Disp: 180 capsule, Rfl: 3  •  TRUE METRIX BLOOD GLUCOSE TEST test strip, 1 each by Other route., Disp: , Rfl:         Review of Systems   Constitutional: Negative for fever.   HENT: Negative for facial swelling, nosebleeds, trouble swallowing and voice change.    Eyes: Positive for visual disturbance (when sugar is low). Negative for pain and redness.   Respiratory: Negative for shortness of breath and wheezing.    Cardiovascular: Negative for palpitations and leg swelling.   Gastrointestinal: Negative for abdominal pain, diarrhea and vomiting.   Endocrine: Positive for polydipsia. Negative for polyuria.   Genitourinary: Positive for frequency. Negative for decreased urine volume.   Musculoskeletal: Positive for arthralgias. Negative for joint swelling and neck pain.   Skin: Negative for rash.   Allergic/Immunologic: Negative for immunocompromised state.   Neurological: Negative for seizures and facial asymmetry.   Hematological: Bruises/bleeds easily.   Psychiatric/Behavioral: Negative for agitation and confusion.       Objective       Vitals:    10/09/17 1314   BP: 126/64   Pulse: 64   SpO2: 93%  "  Weight: 239 lb (108 kg)   Height: 70\" (177.8 cm)     Body mass index is 34.29 kg/(m^2).      Physical Exam   Gen exam - alert and oriented x 3, obese male, not in distress.   HEENT - Acanthosis nigricans. Thyroid palpable.   Resp - Clear to auscultation.   CVS - S1,S2 heard and no murmurs.   Abd - Non tender, BS heard.   Ext - No edema and hammer toes, decreased pin prick and intact proprioception.    Results Review:     I reviewed the patient's new clinical results.    Medical records reviewed  Summary: done      Lab on 09/25/2017   Component Date Value Ref Range Status   • Hemoglobin A1C 09/25/2017 8.40* 4.80 - 5.60 % Final    Comment: Hemoglobin A1C Ranges:  Increased Risk for Diabetes  5.7% to 6.4%  Diabetes                     >= 6.5%  Diabetic Goal                < 7.0%     • Glucose 09/25/2017 194* 65 - 99 mg/dL Final   • BUN 09/25/2017 23  8 - 23 mg/dL Final   • Creatinine 09/25/2017 1.28* 0.76 - 1.27 mg/dL Final   • eGFR Non African Am 09/25/2017 55* >60 mL/min/1.73 Final    Comment: The MDRD GFR formula is only valid for adults with stable  renal function between ages 18 and 70.     • eGFR  Am 09/25/2017 67  >60 mL/min/1.73 Final   • BUN/Creatinine Ratio 09/25/2017 18.0  7.0 - 25.0 Final   • Sodium 09/25/2017 139  136 - 145 mmol/L Final   • Potassium 09/25/2017 4.9  3.5 - 5.2 mmol/L Final   • Chloride 09/25/2017 99  98 - 107 mmol/L Final   • Total CO2 09/25/2017 27.2  22.0 - 29.0 mmol/L Final   • Calcium 09/25/2017 9.8  8.6 - 10.5 mg/dL Final   • Total Cholesterol 09/25/2017 116  0 - 200 mg/dL Final   • Triglycerides 09/25/2017 125  0 - 150 mg/dL Final   • HDL Cholesterol 09/25/2017 50  40 - 60 mg/dL Final   • VLDL Cholesterol 09/25/2017 25  5 - 40 mg/dL Final   • LDL Cholesterol  09/25/2017 41  0 - 100 mg/dL Final   • TSH 09/25/2017 2.200  0.270 - 4.200 mIU/mL Final   • Vitamin B-12 09/25/2017 471  211 - 946 pg/mL Final   • Folate 09/25/2017 >20.00  4.78 - 24.20 ng/mL Final   • Unable to Void " 09/25/2017 Comment   Final    Comment: The patient was not able to render a urine sample and has been  instructed to return for a urine collection at their earliest  convenience.  The urine testing that you have requested has  been deleted from this report.  When the patient returns and  provides a urine specimen, the urine testing will be performed  and separately reported.       Lab Results   Component Value Date    HGBA1C 8.40 (H) 09/25/2017    HGBA1C 8.30 (H) 07/24/2017    HGBA1C 8.05 (H) 05/09/2017     Lab Results   Component Value Date    MICROALBUR 3.4 07/24/2017    LDLCALC 60 04/05/2017    CREATININE 1.28 (H) 09/25/2017     Imaging Results (most recent)     None                Assessment and Plan:    Felix was seen today for diabetes.    Diagnoses and all orders for this visit:    Uncontrolled type 2 diabetes mellitus with other neurologic complication, with long-term current use of insulin  -     Basic Metabolic Panel; Future  -     Hemoglobin A1c; Future  -     Lipid Panel; Future  -     Microalbumin / Creatinine Urine Ratio - Urine, Clean Catch; Future  -     TSH; Future  -     Vitamin B12 & Folate; Future    Other hyperlipidemia  -     Basic Metabolic Panel; Future  -     Hemoglobin A1c; Future  -     Lipid Panel; Future  -     Microalbumin / Creatinine Urine Ratio - Urine, Clean Catch; Future  -     TSH; Future  -     Vitamin B12 & Folate; Future    S/P CABG x 2  -     Basic Metabolic Panel; Future  -     Hemoglobin A1c; Future  -     Lipid Panel; Future  -     Microalbumin / Creatinine Urine Ratio - Urine, Clean Catch; Future  -     TSH; Future  -     Vitamin B12 & Folate; Future    Type 2 diabetes mellitus with diabetic neuropathy-uncontrolled  Increase levemir to 34 units at bedtime  Add levemir 10 units in the morning  NovoLog 10-12 units with breakfast, 10 units with lunch, 10-12 units with dinner  Patient does not want to be started on metformin.  Counseled the patient that he needs to watch his  carbohydrate intake for better blood sugar control.  Patient needs to get his log book prior to his next visit.    Hyperlipidemia  Continue Lipitor 40 mg oral daily.    Patient doesn't want to be started on any blood pressure medication- ACE or ARB at this time.      14 minutes out of 25 minutes face to face spent in counseling the patient extensively on insulin regimen changes

## 2017-12-05 RX ORDER — POTASSIUM CHLORIDE 750 MG/1
10 CAPSULE, EXTENDED RELEASE ORAL 2 TIMES DAILY
Qty: 60 CAPSULE | Refills: 0 | Status: ON HOLD | OUTPATIENT
Start: 2017-12-05 | End: 2022-01-01 | Stop reason: SDUPTHER

## 2017-12-27 ENCOUNTER — OFFICE VISIT (OUTPATIENT)
Dept: INTERNAL MEDICINE | Age: 72
End: 2017-12-27

## 2017-12-27 VITALS
HEIGHT: 70 IN | OXYGEN SATURATION: 95 % | SYSTOLIC BLOOD PRESSURE: 126 MMHG | BODY MASS INDEX: 35.07 KG/M2 | DIASTOLIC BLOOD PRESSURE: 68 MMHG | HEART RATE: 60 BPM | WEIGHT: 245 LBS | TEMPERATURE: 98.2 F

## 2017-12-27 DIAGNOSIS — IMO0002 UNCONTROLLED TYPE 2 DIABETES MELLITUS WITH OTHER NEUROLOGIC COMPLICATION, WITH LONG-TERM CURRENT USE OF INSULIN: Primary | Chronic | ICD-10-CM

## 2017-12-27 DIAGNOSIS — I25.118 CORONARY ARTERY DISEASE OF NATIVE ARTERY OF NATIVE HEART WITH STABLE ANGINA PECTORIS (HCC): Chronic | ICD-10-CM

## 2017-12-27 DIAGNOSIS — E78.2 MIXED HYPERLIPIDEMIA: Chronic | ICD-10-CM

## 2017-12-27 PROBLEM — R91.1 PULMONARY NODULE: Chronic | Status: ACTIVE | Noted: 2017-09-25

## 2017-12-27 PROCEDURE — 99214 OFFICE O/P EST MOD 30 MIN: CPT | Performed by: INTERNAL MEDICINE

## 2017-12-27 NOTE — PROGRESS NOTES
"[facility]  PEDRO STILES M.D.      Felix  Honorio / 72 y.o. / male  12/27/2017      MEDICATIONS  Current Outpatient Prescriptions   Medication Sig Dispense Refill   • acetaminophen (TYLENOL) 325 MG tablet Take 2 tablets by mouth Every 4 (Four) Hours As Needed for Mild Pain (1-3). 1 bottle 99   • aspirin 81 MG tablet Take 81 mg by mouth Daily.     • atorvastatin (LIPITOR) 40 MG tablet Take 1 tablet by mouth Daily. 90 tablet 3   • bumetanide (BUMEX) 2 MG tablet Take 0.5 tablets by mouth Daily. 90 tablet 3   • insulin aspart (novoLOG) 100 UNIT/ML injection Inject 30 Units under the skin 3 (Three) Times a Day Before Meals. CAN CHANGE DEPENDING ON MEAL - 30-34 units     • insulin detemir (LEVEMIR) 100 UNIT/ML injection Inject 34 Units under the skin Every Night. 32-34 unit     • isosorbide mononitrate (IMDUR) 30 MG 24 hr tablet Take 1 tablet by mouth 2 (Two) Times a Day. 180 tablet 3   • Menthol, Topical Analgesic, (BIOFREEZE ROLL-ON) 4 % gel Apply 1 application topically Daily As Needed.     • metoprolol tartrate (LOPRESSOR) 25 MG tablet Take 1 tablet by mouth 2 (Two) Times a Day. 180 tablet 3   • Multiple Vitamins-Minerals (MULTIVITAMIN ADULT PO) Take 1 tablet by mouth Daily.     • potassium chloride (MICRO-K) 10 MEQ CR capsule Take 1 capsule (10 mEq total) by mouth 2 (Two) Times a Day 60 capsule 0   • TRUE METRIX BLOOD GLUCOSE TEST test strip 1 each by Other route.       No current facility-administered medications for this visit.          VITALS:    Visit Vitals   • /68   • Pulse 60   • Temp 98.2 °F (36.8 °C)   • Ht 177.8 cm (70\")   • Wt 111 kg (245 lb)   • SpO2 95%   • BMI 35.15 kg/m2       BP Readings from Last 3 Encounters:   12/27/17 126/68   10/09/17 126/64   09/25/17 138/70     Wt Readings from Last 3 Encounters:   12/27/17 111 kg (245 lb)   10/09/17 108 kg (239 lb)   09/25/17 108 kg (238 lb)      Body mass index is 35.15 kg/(m^2).    CC:  Main reason(s) for today's visit: Diabetes      HPI: "      is followed by endocrine for DM 2. Most recent A1c was 8.4 (8.3). Sees endo every 3 months. Had 1 episode of low BS in the 60s. Previously insulin dose was raised.    CAD denies angina or new ZELAYA. On aspirin, statin, bblocker and nitroglycerin.     Chronic hyperlipidemia:  Current therapy include atorvastatin, denies problems with medication.    Most recent labs:   Lab Results   Component Value Date    LDL 41 09/25/2017    LDL 57 07/24/2017    LDL 54 12/19/2016    HDL 50 09/25/2017    HDL 44 07/24/2017    TRIG 125 09/25/2017     To have f/u CT chest next month for pulmonary nodule. Managed currently by cardiologist.     Patient Care Team:  Hilario Rahman MD as PCP - General (Internal Medicine)  Noah Montejo MD as Consulting Physician (Cardiology)  Alden Poole MD as Surgeon (Cardiothoracic Surgery)  Samira Recinos MD as Consulting Physician (Endocrinology)  ____________________________________________________________________    ASSESSMENT & PLAN:    Problem List Items Addressed This Visit     Type II diabetes mellitus with neurological manifestations, uncontrolled - Primary (Chronic)    Overview     Dx'ed 2000  *Sees endocrine, started insulin 2009  +retinopathy and peripheral neuropathy         Current Assessment & Plan     Counseled on improved diet. Noted increased dose of insulin by endo previously.          Relevant Medications    insulin aspart (novoLOG) 100 UNIT/ML injection    insulin detemir (LEVEMIR) 100 UNIT/ML injection    Coronary artery disease of native artery of native heart with stable angina pectoris (Chronic)    Overview     S/p 2V CABG ([place]) (5/2017)  *[place] (cards)    Stable. Continue ASA, atorvastatin, metoprolol and Imdur.          Relevant Medications    metoprolol tartrate (LOPRESSOR) 25 MG tablet    isosorbide mononitrate (IMDUR) 30 MG 24 hr tablet    Hyperlipidemia (Chronic)    Overview     Stable. Continue atorvastatin 40 mg daily         Relevant Medications     atorvastatin (LIPITOR) 40 MG tablet        No orders of the defined types were placed in this encounter.      Summary/Discussion:  ·     Return in about 6 months (around 6/27/2018) for Diabetes and co-morbid conditions, Hypertension, hyperlipidemia, CAD.    Future Appointments  Date Time Provider Department Center   12/28/2017 2:00 PM Noah Montejo MD [facility] None   12/28/2017 3:20 PM [facility] None   1/9/2018 11:00 AM MD [facility] None   6/29/2018 10:00 AM Hilario Rahman MD [facility] None       ____________________________________________________________________    REVIEW OF SYSTEMS    Review of Systems  Constitutional neg  Resp neg  CV neg  Gi neg      PHYSICAL EXAMINATION    Physical Exam  Constitutional  No distress, obese  Cardiovascular Rate  normal . Rhythm: regular . Heart sounds:  Normal. 1+ble edema.  Pulmonary/Chest  Effort normal. Breath sounds:  No rales, wheezing  Psychiatric  Alert. Judgment and thought content normal. Mood normal    REVIEWED DATA:    Labs:     Lab Results   Component Value Date     09/25/2017    K 4.9 09/25/2017    AST 23 07/24/2017    ALT 21 07/24/2017    BUN 23 09/25/2017    CREATININE 1.28 (H) 09/25/2017    CREATININE 1.02 07/24/2017    CREATININE 1.15 05/31/2017    EGFRIFNONA 55 (L) 09/25/2017    EGFRIFAFRI 67 09/25/2017       Lab Results   Component Value Date    HGBA1C 8.40 (H) 09/25/2017    HGBA1C 8.30 (H) 07/24/2017    HGBA1C 8.05 (H) 05/09/2017     (H) 09/25/2017     (H) 07/24/2017     (H) 04/05/2016    MICROALBUR 3.4 07/24/2017       Lab Results   Component Value Date    LDL 41 09/25/2017    LDL 57 07/24/2017    LDL 54 12/19/2016    HDL 50 09/25/2017    TRIG 125 09/25/2017       Lab Results   Component Value Date    TSH 2.200 09/25/2017       Lab Results   Component Value Date    WBC 17.96 (H) 05/21/2017    HGB 12.4 (L) 05/21/2017    HGB 11.5 (L) 05/20/2017    HGB 11.4 (L) 05/19/2017    PLT  519 (H) 05/21/2017       Imaging:   CT CHEST WITHOUT CONTRAST-      CLINICAL: Left-sided pleuritic chest pain, worsening after CABG.  Discomfort with movement.      COMPARISON: None.      FINDINGS: Sternotomy site is typical in appearance. No thoracic spine or  rib fracture.      Extensive bleb and bullae formation consistent with COPD/emphysema.  There are several calcified subpleural nodules demonstrated within both  lungs, greatest along the costophrenic sulcus of the right lower lobe.  Few calcified pleural plaques noted. Within the lingular segment left  upper lobe on image #62 there is a tiny noncalcified pulmonary nodule  measuring 5 mm in diameter. It is less than optimally demonstrated due  to respiratory motion artifact. There is a calcified benign granuloma  noted within the right lower lobe on image 73. The bilateral subpleural  calcified nodules may be related to old granulomatous disease or some  other postinflammatory process. No asymmetric left-sided pleural  thickening identified.      There are benign calcified hilar and mediastinal lymph nodes. There are  small noncalcified, centimeter/subcentimeter size mediastinal lymph  nodes. There is typical scarring within the anterior mediastinum deep to  the sternotomy. Cardiac size within normal limits, no pericardial or  esophageal abnormality. Limited images through the upper abdomen  demonstrate a porcelain gallbladder.      CONCLUSION:  1. Bleb and bullae formation compatible with COPD/emphysema.  2. Old granulomatous disease.  3. Noncalcified 5 mm pulmonary nodule lingular segment, 6 month  follow-up CT chest recommended.  4. No pleural thickening, no acute airspace disease. There are multiple  tiny calcified benign appearing subpleural nodules and pleural plaques,  these are greater on the right than the left and are consistent with  either old granulomatous disease or other postinflammatory process.  5. Porcelain gallbladder.  6. No rib  fracture/bone lesion or other chest wall abnormality with  special attention to the left.      This report was finalized on 7/25/2017       Medical Tests:   Echocardiogram  · Left ventricular wall thickness is consistent with mild concentric hypertrophy.  · Left ventricular diastolic dysfunction (grade I a) consistent with impaired relaxation.  · Left ventricular systolic function is normal. Estimated EF = 54%.    Summary of old records / correspondence / consultant report:         Request outside records:         ALLERGIES  Allergies   Allergen Reactions   • Adhesive Tape         [facility]:     The following portions of the patient's history were reviewed and updated as appropriate: Allergies / Current Medications / Past Medical History / Surgical History / Social History / Family History    PROBLEM LIST   Patient Active Problem List   Diagnosis   • Type II diabetes mellitus with neurological manifestations, uncontrolled   • Coronary artery disease of native artery of native heart with stable angina pectoris   • SVT (supraventricular tachycardia)   • Acute diastolic CHF (congestive heart failure)   • S/P CABG x 2   • Hyperlipidemia   • Chronic pain of both knees   • Obesity (BMI 30-39.9)   • Pulmonary nodule       PAST MEDICAL HISTORY  Past Medical History:   Diagnosis Date   • Arthritis     OSTEO   • Coronary artery disease     Status post 2 vessel CABG 5/10/17 by Dr. Poole (LIMA-LAD, SVG-PDA)   • Diastolic dysfunction    • Hyperlipidemia    • Hypertension    • LAFB (left anterior fascicular block)    • Pneumonia 02/2017   • Squamous cell carcinoma     Left cheek s/p resection   • SVT (supraventricular tachycardia)     Diagnosed following CABG       SURGICAL HISTORY  Past Surgical History:   Procedure Laterality Date   • CARDIAC CATHETERIZATION N/A 10/18/2016    Procedure: Coronary angiography;  Surgeon: Jesus Guzman MD;  Location: [facility] INVASIVE LOCATION;  Service:    • CARDIAC CATHETERIZATION N/A  10/18/2016    Procedure: Left heart cath;  Surgeon: Jesus Guzman MD;  Location: [facility] CATH INVASIVE LOCATION;  Service:    • CARDIAC CATHETERIZATION N/A 10/18/2016    Procedure: Left ventriculography;  Surgeon: Jesus Guzman MD;  Location: [facility] CATH INVASIVE LOCATION;  Service:    • CARDIAC CATHETERIZATION N/A 4/4/2017    Procedure: Coronary angiography;  Surgeon: Jesus Guzman MD;  Location: [facility] CATH INVASIVE LOCATION;  Service:    • CARDIAC CATHETERIZATION N/A 4/4/2017    Procedure: Left heart cath;  Surgeon: Jesus Guzman MD;  Location: [facility] CATH INVASIVE LOCATION;  Service:    • CARDIAC CATHETERIZATION N/A 4/4/2017    Procedure: Left ventriculography;  Surgeon: Jesus Guzman MD;  Location: [facility] CATH INVASIVE LOCATION;  Service:    • CARDIAC CATHETERIZATION  4/4/2017    Procedure: Functional Flow [place];  Surgeon: Jesus Guzman MD;  Location: [facility] INVASIVE LOCATION;  Service:    • CARDIAC SURGERY     • CATARACT EXTRACTION W/ INTRAOCULAR LENS IMPLANT Left    • COLONOSCOPY     • CORONARY ARTERY BYPASS GRAFT N/A 5/10/2017    Procedure: CORONARY ARTERY BYPASS TIMES TWO UTILIZING THE LEFT GREATER SAPHENOUS VEIN WITH INTERNAL MAMMARY ARTERY GRAFT;  TRANSESOPHAGEAL ECHOCARDIOGRAM;  Surgeon: Alden Poole MD;  Location: [facility];  Service:    • EYE SURGERY     • KNEE SURGERY Left 2005    Dr. Gallegos   • MOHS SURGERY Left     CHEEK   • SKIN BIOPSY         SOCIAL HISTORY  Social History     Social History   • Marital status:      Spouse name: Mendy Vargas   • Number of children: 3   • Years of education: N/A     Occupational History   • Retired (from [employer])      Social History Main Topics   • Smoking status: Former Smoker     Packs/day: 1.50     Years: 40.00     Types: Cigarettes     Quit date: 2009   • Smokeless tobacco: Never Used      Comment: Smoked up to 1.5 ppd.  Smoked for 50 years.  Quit 2009.   • Alcohol use Yes       Comment: 2-3 BEERS YEARLY    • Drug use: No   • Sexual activity: Not Currently     Other Topics Concern   • None     Social History Narrative       FAMILY HISTORY  Family History   Problem Relation Age of Onset   • Cancer Mother      uterine   • Heart disease Father      Father with fatal MI in 80's   • Diabetes Sister    • Esophageal cancer Brother    • Alzheimer's disease Maternal Grandfather          **Daryl Disclaimer:   Much of this encounter note is an electronic transcription/translation of spoken language to printed text. The electronic translation of spoken language may permit erroneous, or at times, nonsensical words or phrases to be inadvertently transcribed. Although I have reviewed the note for such errors, some may still exist.

## 2017-12-28 ENCOUNTER — OFFICE VISIT (OUTPATIENT)
Dept: CARDIOLOGY | Facility: CLINIC | Age: 72
End: 2017-12-28

## 2017-12-28 VITALS
OXYGEN SATURATION: 94 % | BODY MASS INDEX: 35.36 KG/M2 | DIASTOLIC BLOOD PRESSURE: 58 MMHG | HEART RATE: 66 BPM | HEIGHT: 70 IN | SYSTOLIC BLOOD PRESSURE: 110 MMHG | WEIGHT: 247 LBS

## 2017-12-28 DIAGNOSIS — I51.89 DIASTOLIC DYSFUNCTION: ICD-10-CM

## 2017-12-28 DIAGNOSIS — I25.810 CORONARY ARTERY DISEASE INVOLVING CORONARY BYPASS GRAFT OF NATIVE HEART WITHOUT ANGINA PECTORIS: Primary | ICD-10-CM

## 2017-12-28 DIAGNOSIS — R91.1 LUNG NODULE: ICD-10-CM

## 2017-12-28 DIAGNOSIS — Z95.1 S/P CABG X 2: Chronic | ICD-10-CM

## 2017-12-28 PROCEDURE — 99213 OFFICE O/P EST LOW 20 MIN: CPT | Performed by: INTERNAL MEDICINE

## 2018-01-11 ENCOUNTER — APPOINTMENT (OUTPATIENT)
Dept: CT IMAGING | Facility: HOSPITAL | Age: 73
End: 2018-01-11
Attending: INTERNAL MEDICINE

## 2018-01-19 RX ORDER — ISOSORBIDE MONONITRATE 30 MG/1
30 TABLET, EXTENDED RELEASE ORAL 2 TIMES DAILY
Qty: 60 TABLET | Refills: 3 | Status: SHIPPED | OUTPATIENT
Start: 2018-01-19 | End: 2019-01-29 | Stop reason: SDUPTHER

## 2018-01-22 ENCOUNTER — HOSPITAL ENCOUNTER (OUTPATIENT)
Dept: CT IMAGING | Facility: HOSPITAL | Age: 73
Discharge: HOME | End: 2018-01-22
Attending: INTERNAL MEDICINE | Admitting: INTERNAL MEDICINE

## 2018-01-22 DIAGNOSIS — R91.1 LUNG NODULE: ICD-10-CM

## 2018-01-22 PROCEDURE — 71250 CT THORAX DX C-: CPT

## 2018-02-06 ENCOUNTER — OFFICE VISIT (OUTPATIENT)
Dept: ENDOCRINOLOGY | Age: 73
End: 2018-02-06

## 2018-02-06 VITALS
HEART RATE: 68 BPM | WEIGHT: 243.6 LBS | SYSTOLIC BLOOD PRESSURE: 114 MMHG | BODY MASS INDEX: 34.88 KG/M2 | OXYGEN SATURATION: 94 % | DIASTOLIC BLOOD PRESSURE: 78 MMHG | HEIGHT: 70 IN

## 2018-02-06 DIAGNOSIS — E11.9 TYPE 2 DIABETES MELLITUS WITHOUT COMPLICATION, WITH LONG-TERM CURRENT USE OF INSULIN (HCC): Primary | ICD-10-CM

## 2018-02-06 DIAGNOSIS — Z95.1 S/P CABG X 2: ICD-10-CM

## 2018-02-06 DIAGNOSIS — E78.49 OTHER HYPERLIPIDEMIA: ICD-10-CM

## 2018-02-06 DIAGNOSIS — Z79.4 TYPE 2 DIABETES MELLITUS WITHOUT COMPLICATION, WITH LONG-TERM CURRENT USE OF INSULIN (HCC): Primary | ICD-10-CM

## 2018-02-06 DIAGNOSIS — E55.9 VITAMIN D DEFICIENCY: ICD-10-CM

## 2018-02-06 PROCEDURE — 99214 OFFICE O/P EST MOD 30 MIN: CPT | Performed by: INTERNAL MEDICINE

## 2018-02-06 NOTE — PROGRESS NOTES
72 y.o.    Patient Care Team:  Hilario Rahman MD as PCP - General (Internal Medicine)  Noah Montejo MD as Consulting Physician (Cardiology)  Alden Poole MD as Surgeon (Cardiothoracic Surgery)  Samira Recinos MD as Consulting Physician (Endocrinology)    Chief Complaint:    3 MONTH FOLLOW UP/ TYPE 2 DIABETES MELLITUS  Subjective     HPI    Felix Olmedo,72 y.o. WM is here as a follow up for the management of Type 2 dm. Pt underwent CABG on May 10th 2017, at [facility]. He is currently in the cardiac rehab.       Type 2 dm - Pt has been a diabetic since 2000, He has been on insulin since 2009.   Currently on levemir 34 units at bed time, novolog 10 -12 units for each meal, decides the doses based on what he eats.   Checks about 2 - 3 times a day.   FBG - 133 - 150 mg/dl but later in the day in 200's. Lowest Bg was 79 mg/dl, felt weak, increased sweats, highest Bg - 299 mg/dl.   No increased urination or thirst. No nausea/ vomiting.   Due for eye exam, last exam was in April 2017, mild dm retinopathy in eyes but stable.   C/o tingling or numbness in his b/l feet, no ulcers and amputations of his feet. Not on lyrica or gabapentin. Feels the symptoms are same.  Hx of CAD s/p CABG in May 2017, no CKD and CVA. No recent hospitalizations.   Pt is physically active. Weight has been stable.   Pt tries to follow DM diet for most part.   Not on Ace inb or ARB.       Hyperlipidemia on Lipitor 40 mg oral daily.        CAD - s/p CABG in May 2017.     Interval History      The following portions of the patient's history were reviewed and updated as appropriate: allergies, current medications, past family history, past medical history, past social history, past surgical history and problem list.    Past Medical History:   Diagnosis Date   • Arthritis     OSTEO   • Coronary artery disease     Status post 2 vessel CABG 5/10/17 by Dr. Poole (LIMA-LAD, SVG-PDA)   • Diastolic dysfunction    • Hyperlipidemia    •  Hypertension    • LAFB (left anterior fascicular block)    • Pneumonia 02/2017   • Squamous cell carcinoma     Left cheek s/p resection   • SVT (supraventricular tachycardia)     Diagnosed following CABG     Family History   Problem Relation Age of Onset   • Cancer Mother      uterine   • Heart disease Father      Father with fatal MI in 80's   • Diabetes Sister    • Esophageal cancer Brother    • Alzheimer's disease Maternal Grandfather      Social History     Social History   • Marital status:      Spouse name: Mendy Vargas   • Number of children: 3   • Years of education: N/A     Occupational History   • Retired (from [employer])      Social History Main Topics   • Smoking status: Former Smoker     Packs/day: 1.50     Years: 40.00     Types: Cigarettes     Quit date: 2009   • Smokeless tobacco: Never Used      Comment: Smoked up to 1.5 ppd.  Smoked for 50 years.  Quit 2009.   • Alcohol use Yes      Comment: 2-3 BEERS YEARLY    • Drug use: No   • Sexual activity: Not Currently     Other Topics Concern   • Not on file     Social History Narrative     Allergies   Allergen Reactions   • Adhesive Tape        Current Outpatient Prescriptions:   •  acetaminophen (TYLENOL) 325 MG tablet, Take 2 tablets by mouth Every 4 (Four) Hours As Needed for Mild Pain (1-3)., Disp: 1 bottle, Rfl: 99  •  aspirin 81 MG tablet, Take 81 mg by mouth Daily., Disp: , Rfl:   •  atorvastatin (LIPITOR) 40 MG tablet, Take 1 tablet by mouth Daily., Disp: 90 tablet, Rfl: 3  •  bumetanide (BUMEX) 2 MG tablet, Take 0.5 tablets by mouth Daily., Disp: 90 tablet, Rfl: 3  •  insulin aspart (novoLOG) 100 UNIT/ML injection, Inject 30 Units under the skin 3 (Three) Times a Day Before Meals. CAN CHANGE DEPENDING ON MEAL - 30-34 units, Disp: , Rfl:   •  insulin detemir (LEVEMIR) 100 UNIT/ML injection, Inject 34 Units under the skin Every Night. 32-34 unit, Disp: , Rfl:   •  isosorbide mononitrate (IMDUR) 30 MG 24 hr tablet, Take 1 tablet by  "mouth 2 (Two) Times a Day., Disp: 60 tablet, Rfl: 3  •  Menthol, Topical Analgesic, (BIOFREEZE ROLL-ON) 4 % gel, Apply 1 application topically Daily As Needed., Disp: , Rfl:   •  metoprolol tartrate (LOPRESSOR) 25 MG tablet, Take 1 tablet by mouth 2 (Two) Times a Day., Disp: 180 tablet, Rfl: 3  •  Multiple Vitamins-Minerals (MULTIVITAMIN ADULT PO), Take 1 tablet by mouth Daily., Disp: , Rfl:   •  potassium chloride (MICRO-K) 10 MEQ CR capsule, Take 1 capsule (10 mEq total) by mouth 2 (Two) Times a Day, Disp: 60 capsule, Rfl: 0  •  TRUE METRIX BLOOD GLUCOSE TEST test strip, 1 each by Other route., Disp: , Rfl:   •  Empagliflozin 25 MG tablet, Take 25 mg by mouth Daily., Disp: 30 tablet, Rfl: 11  •  Liraglutide (VICTOZA) 18 MG/3ML solution pen-injector injection, Inject 1.8 mg under the skin Daily., Disp: 5 pen, Rfl: 3        Review of Systems   Constitutional: Positive for fatigue. Negative for fever.   HENT: Negative for facial swelling, nosebleeds, trouble swallowing and voice change.    Eyes: Negative for pain and redness.   Respiratory: Positive for shortness of breath. Negative for wheezing.    Cardiovascular: Positive for leg swelling. Negative for palpitations.   Gastrointestinal: Negative for abdominal pain, diarrhea and vomiting.   Endocrine: Positive for polyuria. Negative for polydipsia.   Genitourinary: Positive for frequency. Negative for decreased urine volume.   Musculoskeletal: Positive for joint swelling. Negative for neck pain.   Skin: Negative for rash.   Allergic/Immunologic: Positive for immunocompromised state.   Neurological: Negative for seizures and facial asymmetry.   Hematological: Bruises/bleeds easily.   Psychiatric/Behavioral: Negative for agitation and confusion.       Objective       Vitals:    02/06/18 1048   BP: 114/78   Pulse: 68   SpO2: 94%   Weight: 110 kg (243 lb 9.6 oz)   Height: 177.8 cm (70\")     Body mass index is 34.95 kg/(m^2).      Physical Exam   Gen exam - alert and " oriented x 3, obese male, not in distress.   HEENT - Acanthosis nigricans. Thyroid palpable.   Resp - Clear to auscultation.   CVS - S1,S2 heard and no murmurs.   Abd - Non tender, BS heard.   Ext - No edema and intact pin prick and proprioception.     Results Review:     I reviewed the patient's new clinical results.    Medical records reviewed  Summary: done      Lab on 09/25/2017   Component Date Value Ref Range Status   • Hemoglobin A1C 09/25/2017 8.40* 4.80 - 5.60 % Final    Comment: Hemoglobin A1C Ranges:  Increased Risk for Diabetes  5.7% to 6.4%  Diabetes                     >= 6.5%  Diabetic Goal                < 7.0%     • Glucose 09/25/2017 194* 65 - 99 mg/dL Final   • BUN 09/25/2017 23  8 - 23 mg/dL Final   • Creatinine 09/25/2017 1.28* 0.76 - 1.27 mg/dL Final   • eGFR Non African Am 09/25/2017 55* >60 mL/min/1.73 Final    Comment: The MDRD GFR formula is only valid for adults with stable  renal function between ages 18 and 70.     • eGFR  Am 09/25/2017 67  >60 mL/min/1.73 Final   • BUN/Creatinine Ratio 09/25/2017 18.0  7.0 - 25.0 Final   • Sodium 09/25/2017 139  136 - 145 mmol/L Final   • Potassium 09/25/2017 4.9  3.5 - 5.2 mmol/L Final   • Chloride 09/25/2017 99  98 - 107 mmol/L Final   • Total CO2 09/25/2017 27.2  22.0 - 29.0 mmol/L Final   • Calcium 09/25/2017 9.8  8.6 - 10.5 mg/dL Final   • Total Cholesterol 09/25/2017 116  0 - 200 mg/dL Final   • Triglycerides 09/25/2017 125  0 - 150 mg/dL Final   • HDL Cholesterol 09/25/2017 50  40 - 60 mg/dL Final   • VLDL Cholesterol 09/25/2017 25  5 - 40 mg/dL Final   • LDL Cholesterol  09/25/2017 41  0 - 100 mg/dL Final   • TSH 09/25/2017 2.200  0.270 - 4.200 mIU/mL Final   • Vitamin B-12 09/25/2017 471  211 - 946 pg/mL Final   • Folate 09/25/2017 >20.00  4.78 - 24.20 ng/mL Final   • Unable to Void 09/25/2017 Comment   Final    Comment: The patient was not able to render a urine sample and has been  instructed to return for a urine collection at their  earliest  convenience.  The urine testing that you have requested has  been deleted from this report.  When the patient returns and  provides a urine specimen, the urine testing will be performed  and separately reported.       Lab Results   Component Value Date    HGBA1C 8.40 (H) 09/25/2017    HGBA1C 8.30 (H) 07/24/2017    HGBA1C 8.05 (H) 05/09/2017     Lab Results   Component Value Date    MICROALBUR 3.4 07/24/2017    LDLCALC 60 04/05/2017    CREATININE 1.28 (H) 09/25/2017     Imaging Results (most recent)     None                Assessment and Plan:    Felix was seen today for diabetes.    Diagnoses and all orders for this visit:    Type 2 diabetes mellitus without complication, with long-term current use of insulin  -     Hemoglobin A1c; Future  -     Basic Metabolic Panel; Future  -     Lipid Panel; Future  -     Microalbumin / Creatinine Urine Ratio - Urine, Clean Catch; Future  -     TSH; Future  -     Vitamin B12 & Folate; Future  -     Vitamin D 25 Hydroxy; Future    Other hyperlipidemia  -     Hemoglobin A1c; Future  -     Basic Metabolic Panel; Future  -     Lipid Panel; Future  -     Microalbumin / Creatinine Urine Ratio - Urine, Clean Catch; Future  -     TSH; Future  -     Vitamin B12 & Folate; Future  -     Vitamin D 25 Hydroxy; Future    S/P CABG x 2  -     Hemoglobin A1c; Future  -     Basic Metabolic Panel; Future  -     Lipid Panel; Future  -     Microalbumin / Creatinine Urine Ratio - Urine, Clean Catch; Future  -     TSH; Future  -     Vitamin B12 & Folate; Future  -     Vitamin D 25 Hydroxy; Future    Vitamin D deficiency   -     Hemoglobin A1c; Future  -     Basic Metabolic Panel; Future  -     Lipid Panel; Future  -     Microalbumin / Creatinine Urine Ratio - Urine, Clean Catch; Future  -     TSH; Future  -     Vitamin B12 & Folate; Future  -     Vitamin D 25 Hydroxy; Future    Other orders  -     Empagliflozin 25 MG tablet; Take 25 mg by mouth Daily.  -     Liraglutide (VICTOZA) 18 MG/3ML  solution pen-injector injection; Inject 1.8 mg under the skin Daily.    Type 2 diabetes mellitus-uncontrolled  Continue the current insulin regimen  Continue levemir 34 units at bedtime  Continue NovoLog 10-12 units with each meal  Suspected that patient is having postprandial hyperglycemia.  Will start patient on Victoza  Will start patient on Jardiance 25 mg oral daily  Counseled the patient on the side effects of these medications and to alert others if he develops any of these.  No history of prior pancreatitis.    Hyperlipidemia  Continue Lipitor 40 mg oral daily.    History of CABG  Explained to the cardiac mortality benefit on Jardiance.    Reviewed Lab results with the patient.     14 minutes out of 25 minutes face to face spent in counseling the patient extensively on medication changes and treatment plan.

## 2018-04-24 ENCOUNTER — LAB (OUTPATIENT)
Dept: ENDOCRINOLOGY | Age: 73
End: 2018-04-24

## 2018-04-24 DIAGNOSIS — E78.49 OTHER HYPERLIPIDEMIA: ICD-10-CM

## 2018-04-24 DIAGNOSIS — Z79.4 TYPE 2 DIABETES MELLITUS WITHOUT COMPLICATION, WITH LONG-TERM CURRENT USE OF INSULIN (HCC): ICD-10-CM

## 2018-04-24 DIAGNOSIS — Z95.1 S/P CABG X 2: ICD-10-CM

## 2018-04-24 DIAGNOSIS — E11.9 TYPE 2 DIABETES MELLITUS WITHOUT COMPLICATION, WITH LONG-TERM CURRENT USE OF INSULIN (HCC): ICD-10-CM

## 2018-04-24 DIAGNOSIS — E55.9 VITAMIN D DEFICIENCY: ICD-10-CM

## 2018-04-25 LAB
25(OH)D3+25(OH)D2 SERPL-MCNC: 40.6 NG/ML (ref 30–100)
ALBUMIN/CREAT UR: <4.4 MG/G CREAT (ref 0–30)
BUN SERPL-MCNC: 18 MG/DL (ref 8–23)
BUN/CREAT SERPL: 14.6 (ref 7–25)
CALCIUM SERPL-MCNC: 9.3 MG/DL (ref 8.6–10.5)
CHLORIDE SERPL-SCNC: 101 MMOL/L (ref 98–107)
CHOLEST SERPL-MCNC: 122 MG/DL (ref 0–200)
CO2 SERPL-SCNC: 25.5 MMOL/L (ref 22–29)
CREAT SERPL-MCNC: 1.23 MG/DL (ref 0.76–1.27)
CREAT UR-MCNC: 68.5 MG/DL
FOLATE SERPL-MCNC: >20 NG/ML (ref 4.78–24.2)
GFR SERPLBLD CREATININE-BSD FMLA CKD-EPI: 58 ML/MIN/1.73
GFR SERPLBLD CREATININE-BSD FMLA CKD-EPI: 70 ML/MIN/1.73
GLUCOSE SERPL-MCNC: 187 MG/DL (ref 65–99)
HBA1C MFR BLD: 7.7 % (ref 4.8–5.6)
HDLC SERPL-MCNC: 49 MG/DL (ref 40–60)
INTERPRETATION: NORMAL
LDLC SERPL CALC-MCNC: 53 MG/DL (ref 0–100)
Lab: NORMAL
MICROALBUMIN UR-MCNC: <3 UG/ML
POTASSIUM SERPL-SCNC: 4.9 MMOL/L (ref 3.5–5.2)
SODIUM SERPL-SCNC: 142 MMOL/L (ref 136–145)
TRIGL SERPL-MCNC: 98 MG/DL (ref 0–150)
TSH SERPL DL<=0.005 MIU/L-ACNC: 1.53 MIU/ML (ref 0.27–4.2)
VIT B12 SERPL-MCNC: 602 PG/ML (ref 211–946)
VLDLC SERPL CALC-MCNC: 19.6 MG/DL (ref 5–40)

## 2018-05-21 ENCOUNTER — OFFICE VISIT (OUTPATIENT)
Dept: ENDOCRINOLOGY | Age: 73
End: 2018-05-21

## 2018-05-21 VITALS
DIASTOLIC BLOOD PRESSURE: 72 MMHG | BODY MASS INDEX: 32.34 KG/M2 | OXYGEN SATURATION: 93 % | HEIGHT: 71 IN | WEIGHT: 231 LBS | HEART RATE: 70 BPM | SYSTOLIC BLOOD PRESSURE: 112 MMHG

## 2018-05-21 DIAGNOSIS — E78.49 OTHER HYPERLIPIDEMIA: ICD-10-CM

## 2018-05-21 DIAGNOSIS — E11.9 TYPE 2 DIABETES MELLITUS WITHOUT COMPLICATION, WITH LONG-TERM CURRENT USE OF INSULIN (HCC): Primary | ICD-10-CM

## 2018-05-21 DIAGNOSIS — Z79.4 TYPE 2 DIABETES MELLITUS WITHOUT COMPLICATION, WITH LONG-TERM CURRENT USE OF INSULIN (HCC): Primary | ICD-10-CM

## 2018-05-21 PROCEDURE — 99214 OFFICE O/P EST MOD 30 MIN: CPT | Performed by: INTERNAL MEDICINE

## 2018-05-21 NOTE — PROGRESS NOTES
72 y.o.    Patient Care Team:  Hilario Rahman MD as PCP - General (Internal Medicine)  Noah Montejo MD as Consulting Physician (Cardiology)  Alden Poole MD as Surgeon (Cardiothoracic Surgery)  Samira Recinos MD as Consulting Physician (Endocrinology)    Chief Complaint:    3 months f/u visit : type 2 Diabetes  Subjective     HPI  Felix Olmedo,72 y.o. WM is here as a follow up for the management of Type 2 dm. Pt underwent CABG on May 10th 2017, at [facility]. He is currently in the cardiac rehab.       Type 2 dm - Pt has been a diabetic since 2000, He has been on insulin since 2009.    Today in clinic pt is on levemir 32 units at bed time, Novolog 8 - 12 units per meal, he varies the novolog dose based on what on what he eats and based on Bg numbers.   Jardiance 25 mg po daily, Victoza 1.2 mg sub q daily, cannot do 1.8 mg subq daily due to extreme nausea.   No urinary tract infections and yeast infections.   Checks BG 3 - 4 times a day.   FBG - 120 - 180 mg/dl. Premeal - 79 - 120 mg/dl.   Lowest BG was 79 mg/dl and did feel like low BG. Highest Bg - 284 mg/dl.   Some increased urination and thirst, on bumex.   No nausea or vomiting.   Last eye exam was in April 2018, mild non - proliferative dm retinopathy, stable.   C/o tingling or numbness in his b/l feet, no ulcers and amputations of his feet. Not on lyrica or gabapentin. Feels the symptoms are same.  Hx of CAD s/p CABG in may 2017, no hx of CKD, no CVA.   Pt is physically active. Weight has been stable.   Pt tries to follow DM diet for most part.   Not on Ace inb or ARB.       Hyperlipidemia -  on Lipitor 40 mg oral daily.        CAD - s/p CABG in May 2017.     Interval History      The following portions of the patient's history were reviewed and updated as appropriate: allergies, current medications, past family history, past medical history, past social history, past surgical history and problem list.    Past Medical History:   Diagnosis  Date   • Arthritis     OSTEO   • Coronary artery disease     Status post 2 vessel CABG 5/10/17 by Dr. Poole (LIMA-LAD, SVG-PDA)   • Diastolic dysfunction    • Hyperlipidemia    • Hypertension    • LAFB (left anterior fascicular block)    • Pneumonia 02/2017   • Squamous cell carcinoma     Left cheek s/p resection   • SVT (supraventricular tachycardia)     Diagnosed following CABG     Family History   Problem Relation Age of Onset   • Cancer Mother         uterine   • Heart disease Father         Father with fatal MI in 80's   • Diabetes Sister    • Esophageal cancer Brother    • Alzheimer's disease Maternal Grandfather      Social History     Social History   • Marital status:      Spouse name: Mendy Vargas   • Number of children: 3   • Years of education: N/A     Occupational History   • Retired (from [employer])      Social History Main Topics   • Smoking status: Former Smoker     Packs/day: 1.50     Years: 40.00     Types: Cigarettes     Quit date: 2009   • Smokeless tobacco: Never Used      Comment: Smoked up to 1.5 ppd.  Smoked for 50 years.  Quit 2009.   • Alcohol use Yes      Comment: 2-3 BEERS YEARLY    • Drug use: No   • Sexual activity: Not Currently     Other Topics Concern   • Not on file     Social History Narrative   • No narrative on file     Allergies   Allergen Reactions   • Adhesive Tape        Current Outpatient Prescriptions:   •  aspirin 81 MG tablet, Take 81 mg by mouth Daily., Disp: , Rfl:   •  atorvastatin (LIPITOR) 40 MG tablet, Take 1 tablet by mouth Daily., Disp: 90 tablet, Rfl: 3  •  bumetanide (BUMEX) 2 MG tablet, Take 0.5 tablets by mouth Daily., Disp: 90 tablet, Rfl: 3  •  Empagliflozin 25 MG tablet, Take 25 mg by mouth Daily., Disp: 30 tablet, Rfl: 11  •  insulin aspart (novoLOG) 100 UNIT/ML injection, Inject 30 Units under the skin 3 (Three) Times a Day Before Meals. CAN CHANGE DEPENDING ON MEAL - 30-34 units, Disp: , Rfl:   •  insulin detemir (LEVEMIR) 100 UNIT/ML  "injection, Inject 34 Units under the skin Every Night. 32-34 unit, Disp: , Rfl:   •  isosorbide mononitrate (IMDUR) 30 MG 24 hr tablet, Take 1 tablet by mouth 2 (Two) Times a Day., Disp: 60 tablet, Rfl: 3  •  Liraglutide (VICTOZA) 18 MG/3ML solution pen-injector injection, Inject 1.8 mg under the skin Daily., Disp: 5 pen, Rfl: 3  •  Multiple Vitamins-Minerals (MULTIVITAMIN ADULT PO), Take 1 tablet by mouth Daily., Disp: , Rfl:   •  potassium chloride (MICRO-K) 10 MEQ CR capsule, Take 1 capsule (10 mEq total) by mouth 2 (Two) Times a Day, Disp: 60 capsule, Rfl: 0  •  TRUE METRIX BLOOD GLUCOSE TEST test strip, 1 each by Other route., Disp: , Rfl:   •  acetaminophen (TYLENOL) 325 MG tablet, Take 2 tablets by mouth Every 4 (Four) Hours As Needed for Mild Pain (1-3)., Disp: 1 bottle, Rfl: 99  •  Menthol, Topical Analgesic, (BIOFREEZE ROLL-ON) 4 % gel, Apply 1 application topically Daily As Needed., Disp: , Rfl:   •  metoprolol tartrate (LOPRESSOR) 25 MG tablet, Take 1 tablet by mouth 2 (Two) Times a Day., Disp: 180 tablet, Rfl: 3        Review of Systems   Constitutional: Positive for fatigue. Negative for appetite change and fever.   Eyes: Negative for visual disturbance.   Respiratory: Positive for shortness of breath.    Cardiovascular: Negative for palpitations and leg swelling.   Gastrointestinal: Negative for abdominal pain and vomiting.   Endocrine: Negative for polyuria.   Musculoskeletal: Negative for neck pain.   Skin: Positive for rash.   Neurological: Positive for weakness and numbness.   Psychiatric/Behavioral: Negative for behavioral problems.       Objective       Vitals:    05/21/18 1032   BP: 112/72   Pulse: 70   SpO2: 93%   Weight: 105 kg (231 lb)   Height: 179.1 cm (70.5\")     Body mass index is 32.68 kg/m².      Physical Exam   Constitutional: He is oriented to person, place, and time. He appears well-nourished.   obese   HENT:   Head: Normocephalic and atraumatic.   Wide neck   Eyes: Conjunctivae and " EOM are normal.   Neck: Normal range of motion. Neck supple. Carotid bruit is not present. No thyromegaly present.   Acanthosis nigricans   Cardiovascular: Normal rate and normal heart sounds.    Pulmonary/Chest: Effort normal and breath sounds normal. No stridor. No respiratory distress.   Abdominal: Soft. Bowel sounds are normal. He exhibits no distension. There is no tenderness.   Central obesity   Musculoskeletal: He exhibits no edema or tenderness.   Neurological: He is alert and oriented to person, place, and time.   Skin: Skin is warm and dry.   Psychiatric: He has a normal mood and affect. His behavior is normal.   Vitals reviewed.    Results Review:     I reviewed the patient's new clinical results.    Medical records reviewed  Summary: done      Lab on 04/24/2018   Component Date Value Ref Range Status   • Hemoglobin A1C 04/24/2018 7.70* 4.80 - 5.60 % Final    Comment: Hemoglobin A1C Ranges:  Increased Risk for Diabetes  5.7% to 6.4%  Diabetes                     >= 6.5%  Diabetic Goal                < 7.0%     • Glucose 04/24/2018 187* 65 - 99 mg/dL Final   • BUN 04/24/2018 18  8 - 23 mg/dL Final   • Creatinine 04/24/2018 1.23  0.76 - 1.27 mg/dL Final   • eGFR Non  Am 04/24/2018 58* >60 mL/min/1.73 Final    Comment: The MDRD GFR formula is only valid for adults with stable  renal function between ages 18 and 70.     • eGFR  Am 04/24/2018 70  >60 mL/min/1.73 Final   • BUN/Creatinine Ratio 04/24/2018 14.6  7.0 - 25.0 Final   • Sodium 04/24/2018 142  136 - 145 mmol/L Final   • Potassium 04/24/2018 4.9  3.5 - 5.2 mmol/L Final   • Chloride 04/24/2018 101  98 - 107 mmol/L Final   • Total CO2 04/24/2018 25.5  22.0 - 29.0 mmol/L Final   • Calcium 04/24/2018 9.3  8.6 - 10.5 mg/dL Final   • Total Cholesterol 04/24/2018 122  0 - 200 mg/dL Final   • Triglycerides 04/24/2018 98  0 - 150 mg/dL Final   • HDL Cholesterol 04/24/2018 49  40 - 60 mg/dL Final   • VLDL Cholesterol 04/24/2018 19.6  5 - 40 mg/dL  Final   • LDL Cholesterol  04/24/2018 53  0 - 100 mg/dL Final   • Creatinine, Urine 04/24/2018 68.5  Not Estab. mg/dL Final   • Microalbumin, Urine 04/24/2018 <3.0  Not Estab. ug/mL Final   • Microalbumin/Creatinine Ratio 04/24/2018 <4.4  0.0 - 30.0 mg/g creat Final   • TSH 04/24/2018 1.530  0.270 - 4.200 mIU/mL Final   • Vitamin B-12 04/24/2018 602  211 - 946 pg/mL Final   • Folate 04/24/2018 >20.00  4.78 - 24.20 ng/mL Final   • 25 Hydroxy, Vitamin D 04/24/2018 40.6  30.0 - 100.0 ng/ml Final    Comment: Reference Range for Total Vitamin D 25(OH)  Deficiency    <20.0 ng/mL  Insufficiency 21-29 ng/mL  Sufficiency    ng/mL  Toxicity      >100 ng/ml        • Interpretation 04/24/2018 Note   Final    Supplemental report is available.   • PDF Image 04/24/2018 Not applicable   Final     Lab Results   Component Value Date    HGBA1C 7.70 (H) 04/24/2018    HGBA1C 8.40 (H) 09/25/2017    HGBA1C 8.30 (H) 07/24/2017     Lab Results   Component Value Date    MICROALBUR <3.0 04/24/2018    CREATININE 1.23 04/24/2018     Imaging Results (most recent)     None                Assessment and Plan:    Felix was seen today for diabetes.    Diagnoses and all orders for this visit:    Type 2 diabetes mellitus without complication, with long-term current use of insulin  -     Hemoglobin A1c; Future  -     Basic Metabolic Panel; Future  -     Lipid Panel; Future  -     TSH; Future  -     Vitamin B12 & Folate; Future    Other hyperlipidemia  -     Hemoglobin A1c; Future  -     Basic Metabolic Panel; Future  -     Lipid Panel; Future  -     TSH; Future  -     Vitamin B12 & Folate; Future    Type 2 diabetes mellitus- uncontrolled  Continue Jardiance 25 mg oral daily  Continue Victoza 1.2 mg subcutaneous daily  Continue levemir 32 units at bedtime  Will increase the dosage of NovoLog by 1-2 units at dinnertime.  Will continue the same dosage of NovoLog for breakfast and with dinner.    Patient's Hba1c he has improved since his prior  "visits.    Hyperlipidemia  Continue Lipitor 40 mg oral daily  LDL at goal.    Reviewed Lab results with the patient.         Samira Recinos MD  05/21/18    EMR Dragon / transcription disclaimer:     \"Dictated utilizing Dragon dictation\".  "

## 2018-06-14 RX ORDER — INSULIN DETEMIR 100 [IU]/ML
INJECTION, SOLUTION SUBCUTANEOUS
Qty: 15 PEN | Refills: 3 | Status: SHIPPED | OUTPATIENT
Start: 2018-06-14 | End: 2018-06-29 | Stop reason: SDUPTHER

## 2018-06-14 NOTE — TELEPHONE ENCOUNTER
SPOKE WITH PATIENT AND HIS WIFE ABOUT MEDICATION COST. BOTH STATED IT WAS GOING TO OVER $2300 FOR ALL THE PATIENT DIABETIC MEDICATION.  AND THEY COULD NOT AFFORD THAT .  LET PATIENT KNOW THAT I WILL GET WITH THE [facility] PERSONAL  TO GET A TIER EXCEPTION   SAMPLE WERE GIVEN TO PATIENT.            ----- Message from Arun Kline sent at 6/13/2018  3:09 PM EDT -----  Contact: PATIENT/WIFE  [phone] [facility]    PATIENT/WIFE WOULD LIKE FOR DR TO CALL THE PHARMACY IN PREFERENCE TO ALL OF THE DIABETIC MEDICATION THAT THE PATIENT IS TAKING. PATIENT CALLED THE PHARMACY TO SEE ABOUT A CHEAPER MEDICATION BUT PHARMACY TOLD PATIENT TO HAVE DR TO CALL AND TALK TO THEM. PATIENT TIER IS COSTING HIM $[phone] A MONTH FOR HIS SUPPLIES.    JESI [phone]

## 2018-06-28 ENCOUNTER — OFFICE VISIT (OUTPATIENT)
Dept: CARDIOLOGY | Facility: CLINIC | Age: 73
End: 2018-06-28

## 2018-06-28 VITALS
HEIGHT: 70 IN | BODY MASS INDEX: 33.36 KG/M2 | DIASTOLIC BLOOD PRESSURE: 62 MMHG | WEIGHT: 233 LBS | HEART RATE: 70 BPM | SYSTOLIC BLOOD PRESSURE: 106 MMHG

## 2018-06-28 DIAGNOSIS — I25.810 CORONARY ARTERY DISEASE INVOLVING CORONARY BYPASS GRAFT OF NATIVE HEART WITHOUT ANGINA PECTORIS: ICD-10-CM

## 2018-06-28 DIAGNOSIS — I47.1 SVT (SUPRAVENTRICULAR TACHYCARDIA) (HCC): ICD-10-CM

## 2018-06-28 DIAGNOSIS — Z95.1 S/P CABG X 2: Primary | ICD-10-CM

## 2018-06-28 DIAGNOSIS — I10 ESSENTIAL HYPERTENSION: ICD-10-CM

## 2018-06-28 DIAGNOSIS — I51.89 DIASTOLIC DYSFUNCTION: ICD-10-CM

## 2018-06-28 PROCEDURE — 93000 ELECTROCARDIOGRAM COMPLETE: CPT | Performed by: NURSE PRACTITIONER

## 2018-06-28 PROCEDURE — 99214 OFFICE O/P EST MOD 30 MIN: CPT | Performed by: NURSE PRACTITIONER

## 2018-06-28 NOTE — PROGRESS NOTES
Date of Office Visit: 2018  Encounter Provider: MILADYS Ya  Place of Service: [facility] CARDIOLOGY  Patient Name: Felix Olmedo  :1945    No chief complaint on file.  :     HPI: Felix Olmedo is a 72 y.o. male is a patient of Dr. Montejo. I am seeing him today for the first time and have reviewed the records.     His past medical history is significant of hypertension, hyperlipidemia, coronary artery disease status post CABG ×2, supraventricular tachycardia and left anterior fascicular block, lung nodule, and squamous cell carcinoma.  In 2016 he had been experiencing a 5-6 week length of vague chest discomfort.  This was intermittent and he described it as an aching and burning sensation in the center of his chest over the left precordium.  He underwent a Lexiscan Myoview stress test showed a small inferior and inferior wall with a moderate amount of evon-infarct ischemia.  He continued to have symptoms and underwent a left heart catheterization which showed the right coronary artery to be chronically occluded at the ostium, although left-to-right collaterals filled the distal right coronary artery and PDA.  The LAD also had up to 60% disease in the mid to distal section however medical management was recommended at that time.  He was placed on beta blocker and Imdur as he continued to have exertional angina.  He underwent a repeat left heart catheterization in 2017 which showed RCA to be occluded at the ostium chronically, 60-70% stenosis in the mid LAD.  FFR of the mid LAD was borderline significant at 0.80.  The lesions were felt not to be amenable to percutaneous intervention and he was referred for coronary artery bypass surgery.  He underwent a two-vessel coronary bypass surgery on 5/10/2017 with LIMA to LAD and SVG to PDA.  He had some brief postoperative SVT but no atrial fibrillation.  He also had postoperative leukocytosis  without evidence of infection.  He was last seen in the office in December 2017.  He had no chest pain or exertional symptoms his blood pressure was also well-controlled.  He had shortness of breath with exertion which was mild and unchanged.  He was participating in cardiac rehabilitation and will well with that.  He had a repeat CT scan of the chest to follow up on pulmonary nodule which was unchanged he was to follow-up in 6 months.  He presents today for reassessment.  He denies palpitations, chest pain, edema, lightheadedness, near syncope, syncope, or blood in the urine or stool.  He continues to have shortness of breath with exertion which is unchanged.  He also has some fatigue.  He has not been exercising for 5-6 months.  Him and his wife to discuss starting Silver sneakers program.  He requests to come off some of his medication if possible.  His last hemoglobin A1c was in April 2018 as well as lipid panel.      Allergies   Allergen Reactions   • Adhesive Tape Rash       Past Medical History:   Diagnosis Date   • Arthritis     OSTEO   • Coronary artery disease     Status post 2 vessel CABG 5/10/17 by Dr. Poole (LIMA-LAD, SVG-PDA)   • Diastolic dysfunction    • Hyperlipidemia    • Hypertension    • LAFB (left anterior fascicular block)    • Lung nodule    • Pneumonia 02/2017   • Squamous cell carcinoma     Left cheek s/p resection   • SVT (supraventricular tachycardia)     Diagnosed following CABG       Past Surgical History:   Procedure Laterality Date   • CARDIAC CATHETERIZATION N/A 10/18/2016    Procedure: Coronary angiography;  Surgeon: Jesus Guzman MD;  Location: [facility] INVASIVE LOCATION;  Service:    • CARDIAC CATHETERIZATION N/A 10/18/2016    Procedure: Left heart cath;  Surgeon: Jesus Guzman MD;  Location: [facility] INVASIVE LOCATION;  Service:    • CARDIAC CATHETERIZATION N/A 10/18/2016    Procedure: Left ventriculography;  Surgeon: Jesus Guzman MD;  Location:   "[facility] CATH INVASIVE LOCATION;  Service:    • CARDIAC CATHETERIZATION N/A 4/4/2017    Procedure: Coronary angiography;  Surgeon: Jesus Guzman MD;  Location: [facility] CATH INVASIVE LOCATION;  Service:    • CARDIAC CATHETERIZATION N/A 4/4/2017    Procedure: Left heart cath;  Surgeon: Jesus Guzman MD;  Location: [facility] CATH INVASIVE LOCATION;  Service:    • CARDIAC CATHETERIZATION N/A 4/4/2017    Procedure: Left ventriculography;  Surgeon: Jesus Guzman MD;  Location: [facility] CATH INVASIVE LOCATION;  Service:    • CARDIAC CATHETERIZATION  4/4/2017    Procedure: Functional Flow [place];  Surgeon: Jesus Guzman MD;  Location: [facility] CATH INVASIVE LOCATION;  Service:    • CARDIAC SURGERY     • CATARACT EXTRACTION W/ INTRAOCULAR LENS IMPLANT Left    • COLONOSCOPY     • CORONARY ARTERY BYPASS GRAFT N/A 5/10/2017    Procedure: CORONARY ARTERY BYPASS TIMES TWO UTILIZING THE LEFT GREATER SAPHENOUS VEIN WITH INTERNAL MAMMARY ARTERY GRAFT;  TRANSESOPHAGEAL ECHOCARDIOGRAM;  Surgeon: Alden Poole MD;  Location: [facility];  Service:    • EYE SURGERY     • KNEE SURGERY Left 2005    Dr. Gallegos   • MOHS SURGERY Left     CHEEK   • SKIN BIOPSY           Family and social history reviewed.     Review of Systems   Constitution: Positive for malaise/fatigue.   Cardiovascular: Positive for dyspnea on exertion (\"baseline\").     All other systems were reviewed and are negative          Objective:     Vitals:    06/28/18 1511   BP: 106/62   BP Location: Right arm   Patient Position: Sitting   Pulse: 70   Weight: 106 kg (233 lb)   Height: 177.8 cm (70\")     Body mass index is 33.43 kg/m².    PHYSICAL EXAM:  Physical Exam   Constitutional: He is oriented to person, place, and time. He appears well-developed and well-nourished. No distress.   HENT:   Head: Normocephalic.   Eyes: Conjunctivae are normal.   Glasses on   Neck: Normal range of motion. No JVD present.   Cardiovascular: Normal rate, regular " rhythm, normal heart sounds and intact distal pulses.    No murmur heard.  Pulses:       Carotid pulses are 2+ on the right side, and 2+ on the left side.       Radial pulses are 2+ on the right side, and 2+ on the left side.        Posterior tibial pulses are 2+ on the right side, and 2+ on the left side.   Varicose veins R>L   Pulmonary/Chest: Effort normal and breath sounds normal. No respiratory distress. He has no wheezes. He has no rhonchi. He has no rales. He exhibits no tenderness.   Abdominal: Soft. Bowel sounds are normal. He exhibits no distension.   Musculoskeletal: Normal range of motion. He exhibits no edema.   Neurological: He is alert and oriented to person, place, and time.   Skin: Skin is warm, dry and intact. No rash noted. He is not diaphoretic. No cyanosis.   Psychiatric: He has a normal mood and affect. His behavior is normal. Judgment and thought content normal.         ECG 12 Lead  Date/Time: 6/28/2018 5:34 PM  Performed by: SUZANNE SALEEM  Authorized by: SUZANNE SALEEM   Comparison: compared with previous ECG from 5/31/2017  Similar to previous ECG  Rhythm: sinus rhythm  Rate: normal  BPM: 70  T depression: III  T flattening: II, aVF and V6  QRS axis: left  Clinical impression: non-specific ECG            Current Outpatient Prescriptions   Medication Sig Dispense Refill   • acetaminophen (TYLENOL) 325 MG tablet Take 2 tablets by mouth Every 4 (Four) Hours As Needed for Mild Pain (1-3). 1 bottle 99   • aspirin 81 MG tablet Take 81 mg by mouth Daily.     • atorvastatin (LIPITOR) 40 MG tablet Take 1 tablet by mouth Daily. 90 tablet 3   • bumetanide (BUMEX) 2 MG tablet Take 0.5 tablets by mouth Daily. 90 tablet 3   • Empagliflozin 25 MG tablet Take 25 mg by mouth Daily. 30 tablet 11   • insulin aspart (novoLOG) 100 UNIT/ML injection Inject 30 Units under the skin 3 (Three) Times a Day Before Meals. CAN CHANGE DEPENDING ON MEAL - 30-34 units     • insulin detemir (LEVEMIR) 100 UNIT/ML injection  Inject 34 Units under the skin Every Night. 32-34 unit     • Insulin Lispro (HUMALOG KWIKPEN) 100 UNIT/ML solution pen-injector Inject 30 Units under the skin 3 (Three) Times a Day Before Meals 30 pen 3   • Insulin Pen Needle 31G X 8 MM misc Use to inject insulin 500 each 3   • isosorbide mononitrate (IMDUR) 30 MG 24 hr tablet Take 1 tablet by mouth 2 (Two) Times a Day. 60 tablet 3   • LEVEMIR FLEXTOUCH 100 UNIT/ML injection INJECT  34 UNITS SUBCUTANEOUSLY EVERY NIGHT 15 pen 3   • Liraglutide (VICTOZA) 18 MG/3ML solution pen-injector injection Inject 1.8 mg under the skin Daily. 5 pen 3   • Menthol, Topical Analgesic, (BIOFREEZE ROLL-ON) 4 % gel Apply 1 application topically Daily As Needed.     • metoprolol tartrate (LOPRESSOR) 25 MG tablet TAKE 1 TABLET TWICE DAILY 180 tablet 3   • Multiple Vitamins-Minerals (MULTIVITAMIN ADULT PO) Take 1 tablet by mouth Daily.     • potassium chloride (MICRO-K) 10 MEQ CR capsule Take 1 capsule (10 mEq total) by mouth 2 (Two) Times a Day 60 capsule 0   • TRUE METRIX BLOOD GLUCOSE TEST test strip 1 each by Other route.     • Dapagliflozin Propanediol (FARXIGA) 10 MG tablet TAKE 1 TABLET BY MOUTH DAILY 90 tablet 3     No current facility-administered medications for this visit.      Assessment:       Diagnosis Plan   1. S/P CABG x 2     2. Coronary artery disease involving coronary bypass graft of native heart without angina pectoris     3. Essential hypertension     4. SVT (supraventricular tachycardia)     5. Diastolic dysfunction          No orders of the defined types were placed in this encounter.        Plan:     1.  Coronary artery disease status post coronary artery bypass grafting ×2 in May 2017 with LIMA to LAD, and SVG to PDA.  He denies exertional symptoms.  Her shortness of breath with exertion is at baseline.  Will continue on aspirin 81 mg daily. Due to his request to come off medication I instructed him to stop isosorbide as this was started prior to bypass.  He will  call the office if he starts to have chest discomfort and we'll discuss resuming it for that reason.    Coronary Artery Disease  Assessment  • The patient has no angina    Plan  • Lifestyle modifications discussed include adhering to a heart healthy diet, avoidance of tobacco products, maintenance of a healthy weight, medication compliance, regular exercise and regular monitoring of cholesterol and blood pressure    Subjective - Objective  • There is a history of previous coronary artery bypass graft on or around 5/10/2017  • Current antiplatelet therapy includes aspirin 81 mg  • The patient qualifies for cardiac rehabilitation, and is already participating in a cardiac rehab program        2.  Hypertension-blood pressure 106/62 well-controlled.  Continue the same  3.  Hyperlipidemia on atorvastatin 40 mg.  LDL in April 2018 53.  Goal LDL less than 70.  Continue the same  4.  Diabetes mellitus followed by endocrinology last hemoglobin A1c 7.7 in April 2018.  Goal would be less than 7.0  5.  Lower extremity edema-likely exacerbated by varicose veins.  I encouraged elevation as often as possible and utilizing compression stockings to minimize swelling if he desires to come off the medication.  He takes 1 mg Bumex for swelling.  I instructed him to take this as needed for swelling and to only take potassium supplement when he takes Bumex.  He verbalized understanding    Follow up in 6 months with Dr. Santiago    Patient was instructed to call the office if new symptoms develop or report to nearest ER if heart attack or stroke is suspected.        It has been a pleasure to participate in this patient's care.      Thank you,  MILADYS Ya      **Daryl Disclaimer:**  Much of this encounter note is an electronic transcription/translation of spoken language to printed text. The electronic translation of spoken language may permit erroneous, or at times, nonsensical words or phrases to be inadvertently transcribed.  Although I have reviewed the note for such errors, some may still exist.

## 2018-06-29 ENCOUNTER — OFFICE VISIT (OUTPATIENT)
Dept: INTERNAL MEDICINE | Age: 73
End: 2018-06-29

## 2018-06-29 VITALS
BODY MASS INDEX: 33.36 KG/M2 | SYSTOLIC BLOOD PRESSURE: 114 MMHG | OXYGEN SATURATION: 95 % | TEMPERATURE: 97.6 F | WEIGHT: 233 LBS | DIASTOLIC BLOOD PRESSURE: 70 MMHG | HEART RATE: 80 BPM | HEIGHT: 70 IN

## 2018-06-29 DIAGNOSIS — IMO0002 UNCONTROLLED TYPE 2 DIABETES MELLITUS WITH OTHER NEUROLOGIC COMPLICATION, WITH LONG-TERM CURRENT USE OF INSULIN: Primary | Chronic | ICD-10-CM

## 2018-06-29 DIAGNOSIS — I25.118 CORONARY ARTERY DISEASE OF NATIVE ARTERY OF NATIVE HEART WITH STABLE ANGINA PECTORIS (HCC): Chronic | ICD-10-CM

## 2018-06-29 DIAGNOSIS — R91.1 PULMONARY NODULE: Chronic | ICD-10-CM

## 2018-06-29 DIAGNOSIS — E78.2 MIXED HYPERLIPIDEMIA: Chronic | ICD-10-CM

## 2018-06-29 PROCEDURE — 99214 OFFICE O/P EST MOD 30 MIN: CPT | Performed by: INTERNAL MEDICINE

## 2018-06-29 NOTE — PROGRESS NOTES
[facility] – [place] INTERNAL MEDICINE  PEDRO STILES M.D.      Felix David Olmedo / 72 y.o. / male  06/29/2018      ASSESSMENT & PLAN:    Problem List Items Addressed This Visit        High    Type II diabetes mellitus with neurological manifestations, uncontrolled - Primary (Chronic)    Overview     Dx'ed 2000  *Sees endocrine, started insulin 2009  +retinopathy and peripheral neuropathy         Current Assessment & Plan     Medication changes by endocrinologist noted. A1c improved to 7.7. Continue per endocrinologist.          Relevant Medications    insulin aspart (novoLOG) 100 UNIT/ML injection    insulin detemir (LEVEMIR) 100 UNIT/ML injection    Empagliflozin 25 MG tablet    Liraglutide (VICTOZA) 18 MG/3ML solution pen-injector injection    Coronary artery disease of native artery of native heart with stable angina pectoris (Chronic)    Overview     S/p 2V CABG ([place]) (5/2017)  *[place] (cards)    Stable. Continue ASA, atorvastatin, metoprolol and Imdur.          Relevant Medications    isosorbide mononitrate (IMDUR) 30 MG 24 hr tablet    metoprolol tartrate (LOPRESSOR) 25 MG tablet       Medium    Hyperlipidemia (Chronic)    Overview     Stable. Continue atorvastatin 40 mg daily         Relevant Medications    atorvastatin (LIPITOR) 40 MG tablet    Pulmonary nodule (Chronic)    Current Assessment & Plan     12/2017 CT showed no change. Will repeat CT.          Relevant Orders    CT Chest Without Contrast        Orders Placed This Encounter   Procedures   • CT Chest Without Contrast     No orders of the defined types were placed in this encounter.      Summary/Discussion:  Recommend Shingrix (new shingles vaccine) and Hep A vaccines at the pharmacy.      Return in about 6 months (around 12/29/2018) for Diabetes and co-morbid conditions.    ____________________________________________________________________    MEDICATIONS  Current Outpatient Prescriptions   Medication Sig Dispense Refill   • acetaminophen (TYLENOL) 325 MG  "tablet Take 2 tablets by mouth Every 4 (Four) Hours As Needed for Mild Pain (1-3). 1 bottle 99   • aspirin 81 MG tablet Take 81 mg by mouth Daily.     • atorvastatin (LIPITOR) 40 MG tablet Take 1 tablet by mouth Daily. 90 tablet 3   • bumetanide (BUMEX) 2 MG tablet Take 0.5 tablets by mouth Daily. 90 tablet 3   • Empagliflozin 25 MG tablet Take 25 mg by mouth Daily. 30 tablet 11   • insulin aspart (novoLOG) 100 UNIT/ML injection Inject 30 Units under the skin 3 (Three) Times a Day Before Meals. CAN CHANGE DEPENDING ON MEAL - 30-34 units     • insulin detemir (LEVEMIR) 100 UNIT/ML injection Inject 34 Units under the skin Every Night. 32-34 unit     • Insulin Lispro (HUMALOG KWIKPEN) 100 UNIT/ML solution pen-injector Inject 30 Units under the skin 3 (Three) Times a Day Before Meals 30 pen 3   • Insulin Pen Needle 31G X 8 MM misc Use to inject insulin 500 each 3   • isosorbide mononitrate (IMDUR) 30 MG 24 hr tablet Take 1 tablet by mouth 2 (Two) Times a Day. 60 tablet 3   • Liraglutide (VICTOZA) 18 MG/3ML solution pen-injector injection Inject 1.8 mg under the skin Daily. 5 pen 3   • Menthol, Topical Analgesic, (BIOFREEZE ROLL-ON) 4 % gel Apply 1 application topically Daily As Needed.     • metoprolol tartrate (LOPRESSOR) 25 MG tablet TAKE 1 TABLET TWICE DAILY 180 tablet 3   • Multiple Vitamins-Minerals (MULTIVITAMIN ADULT PO) Take 1 tablet by mouth Daily.     • potassium chloride (MICRO-K) 10 MEQ CR capsule Take 1 capsule (10 mEq total) by mouth 2 (Two) Times a Day 60 capsule 0   • TRUE METRIX BLOOD GLUCOSE TEST test strip 1 each by Other route.       No current facility-administered medications for this visit.          VITALS:    Visit Vitals  /70   Pulse 80   Temp 97.6 °F (36.4 °C)   Ht 177.8 cm (70\")   Wt 106 kg (233 lb)   SpO2 95%   BMI 33.43 kg/m²       BP Readings from Last 3 Encounters:   06/29/18 114/70   06/28/18 106/62   05/21/18 112/72     Wt Readings from Last 3 Encounters:   06/29/18 106 kg (233 lb) "   06/28/18 106 kg (233 lb)   05/21/18 105 kg (231 lb)      Body mass index is 33.43 kg/m².    CC:  Main reason(s) for today's visit: Diabetes      HPI:     Sees endocrinologist for diabetes and with med adjustment A1c improved to 7.7.     CAD stable without angina. Takes isosorbide as needed only now.     Chronic hyperlipidemia:  Current therapy include atorvastatin, denies problems with medication.    Most recent labs:   Lab Results   Component Value Date    LDL 53 04/24/2018    LDL 41 09/25/2017    LDL 57 07/24/2017    HDL 49 04/24/2018    HDL 50 09/25/2017    HDL 44 07/24/2017    TRIG 98 04/24/2018     Pulmonary nodule: follow-up CT 12/2017 no change, recommended follow-up study 6 months. No change in pulmonary symptmos.     Patient Care Team:  Hilario Rahman MD as PCP - General (Internal Medicine)  Noah Montejo MD as Consulting Physician (Cardiology)  Alden Poole MD as Surgeon (Cardiothoracic Surgery)  Samira Recinos MD as Consulting Physician (Endocrinology)    ____________________________________________________________________    REVIEW OF SYSTEMS    Review of Systems  Constitutional neg  Resp neg  CV neg  Neuro peripheral neuropathy of feet      PHYSICAL EXAMINATION    Physical Exam  Constitutional  No distress  Cardiovascular Rate  normal . Rhythm: regular . Heart sounds:  Normal  Pulmonary/Chest  Effort normal. Breath sounds:  Normal  Psychiatric  Alert. Judgment and thought content normal. Mood normal    REVIEWED DATA:    Labs:     Lab Results   Component Value Date     04/24/2018    K 4.9 04/24/2018    AST 23 07/24/2017    ALT 21 07/24/2017    BUN 18 04/24/2018    CREATININE 1.23 04/24/2018    CREATININE 1.28 (H) 09/25/2017    CREATININE 1.02 07/24/2017    EGFRIFNONA 58 (L) 04/24/2018    EGFRIFAFRI 70 04/24/2018       Lab Results   Component Value Date    HGBA1C 7.70 (H) 04/24/2018    HGBA1C 8.40 (H) 09/25/2017    HGBA1C 8.30 (H) 07/24/2017    GLUCOSE 101 (H) 05/31/2017    GLUCOSE 123  (H) 05/22/2017    GLUCOSE 216 (H) 05/21/2017    MICROALBUR <3.0 04/24/2018       Lab Results   Component Value Date    LDL 53 04/24/2018    LDL 41 09/25/2017    LDL 57 07/24/2017    HDL 49 04/24/2018    HDL 50 09/25/2017    HDL 44 07/24/2017    TRIG 98 04/24/2018    TRIG 125 09/25/2017    TRIG 63 07/24/2017       Lab Results   Component Value Date    TSH 1.530 04/24/2018       Lab Results   Component Value Date    WBC 17.96 (H) 05/21/2017    HGB 12.4 (L) 05/21/2017    HGB 11.5 (L) 05/20/2017    HGB 11.4 (L) 05/19/2017     (H) 05/21/2017       Imaging:   CT CHEST WO CONTRAST-     HISTORY:   Noncalcified 5 mm lingular segment pulmonary nodule followup.     COMPARISON:  07/21/2017.     FINDINGS: Demonstrated on image #60 within the lingular segment there is  a stable 5 mm noncalcified pulmonary nodule. Calcified benign granuloma  within the left and right upper lobes. Bleb and bullae formation within  upper lobe predominance reidentified. Within the right upper lobe on  image #41, there is a stable 5 mm noncalcified pulmonary nodule. Located  directly on the minor fissure, on image #47 there is a tiny stable  noncalcified 3-4 mm pulmonary nodule. No new suspicious pulmonary nodule  has developed within the interim. No pleural effusion or acute airspace  disease.     There are small stable calcified and noncalcified mediastinal and hilar  lymph nodes. Cardiac size within normal limits, no pericardial or  esophageal abnormality.     Limited images through the upper abdomen demonstrate a porcelain  gallbladder.     CONCLUSION: There is a combination of tiny calcified and noncalcified  pulmonary nodules seen throughout both lungs, all remain stable within  the interim. Specifically no change in the 5 mm lingular segment  noncalcified nodule. No acute pulmonary process has developed within the  interim. Incidentally noted porcelain gallbladder. Continue conservative  CT surveillance with 6-month followup.      Radiation dose reduction techniques were utilized, including automated  exposure control and exposure modulation based on body size.     This report was finalized on 1/24/2018       Medical Tests:         Summary of old records / correspondence / consultant report:         Request outside records:         ALLERGIES  Allergies   Allergen Reactions   • Adhesive Tape Rash        PFSH:     The following portions of the patient's history were reviewed and updated as appropriate: Allergies / Current Medications / Past Medical History / Surgical History / Social History / Family History    PROBLEM LIST   Patient Active Problem List   Diagnosis   • Type II diabetes mellitus with neurological manifestations, uncontrolled   • Coronary artery disease of native artery of native heart with stable angina pectoris   • SVT (supraventricular tachycardia)   • Acute diastolic CHF (congestive heart failure)   • S/P CABG x 2   • Hyperlipidemia   • Chronic pain of both knees   • Obesity (BMI 30-39.9)   • Pulmonary nodule       PAST MEDICAL HISTORY  Past Medical History:   Diagnosis Date   • Arthritis     OSTEO   • Coronary artery disease     Status post 2 vessel CABG 5/10/17 by Dr. Poole (LIMA-LAD, SVG-PDA)   • Diastolic dysfunction    • Hyperlipidemia    • Hypertension    • LAFB (left anterior fascicular block)    • Lung nodule    • Pneumonia 02/2017   • Squamous cell carcinoma     Left cheek s/p resection   • SVT (supraventricular tachycardia)     Diagnosed following CABG       SURGICAL HISTORY  Past Surgical History:   Procedure Laterality Date   • CARDIAC CATHETERIZATION N/A 10/18/2016    Procedure: Coronary angiography;  Surgeon: Jesus Guzman MD;  Location: [facility] CATH INVASIVE LOCATION;  Service:    • CARDIAC CATHETERIZATION N/A 10/18/2016    Procedure: Left heart cath;  Surgeon: Jesus Guzman MD;  Location: [facility] CATH INVASIVE LOCATION;  Service:    • CARDIAC CATHETERIZATION N/A 10/18/2016    Procedure: Left  ventriculography;  Surgeon: Jesus Guzman MD;  Location: [facility] CATH INVASIVE LOCATION;  Service:    • CARDIAC CATHETERIZATION N/A 4/4/2017    Procedure: Coronary angiography;  Surgeon: Jesus Guzman MD;  Location: [facility] CATH INVASIVE LOCATION;  Service:    • CARDIAC CATHETERIZATION N/A 4/4/2017    Procedure: Left heart cath;  Surgeon: Jesus Guzman MD;  Location: [facility] CATH INVASIVE LOCATION;  Service:    • CARDIAC CATHETERIZATION N/A 4/4/2017    Procedure: Left ventriculography;  Surgeon: Jesus Guzman MD;  Location: [facility] CATH INVASIVE LOCATION;  Service:    • CARDIAC CATHETERIZATION  4/4/2017    Procedure: Functional Flow [place];  Surgeon: Jesus Guzman MD;  Location: [facility] CATH INVASIVE LOCATION;  Service:    • CARDIAC SURGERY     • CATARACT EXTRACTION W/ INTRAOCULAR LENS IMPLANT Left    • COLONOSCOPY     • CORONARY ARTERY BYPASS GRAFT N/A 5/10/2017    Procedure: CORONARY ARTERY BYPASS TIMES TWO UTILIZING THE LEFT GREATER SAPHENOUS VEIN WITH INTERNAL MAMMARY ARTERY GRAFT;  TRANSESOPHAGEAL ECHOCARDIOGRAM;  Surgeon: Alden Poole MD;  Location: [facility] OR;  Service:    • EYE SURGERY     • KNEE SURGERY Left 2005    Dr. Gallegos   • MOHS SURGERY Left     CHEEK   • SKIN BIOPSY         SOCIAL HISTORY  Social History     Social History   • Marital status:      Spouse name: Mendy Vargas   • Number of children: 3     Occupational History   • Retired (from [employer])      Social History Main Topics   • Smoking status: Former Smoker     Packs/day: 1.50     Years: 40.00     Types: Cigarettes     Quit date: 2009   • Smokeless tobacco: Never Used      Comment: Smoked up to 1.5 ppd.  Smoked for 50 years.  Quit 2009.   • Alcohol use Yes      Comment: 2-3 BEERS YEARLY    • Drug use: No      Comment: caffeine use   • Sexual activity: Not Currently     Other Topics Concern   • Not on file       FAMILY HISTORY  Family History   Problem Relation Age of Onset   •  Cancer Mother         uterine   • Heart disease Father         Father with fatal MI in 80's   • Diabetes Sister    • Esophageal cancer Brother    • Alzheimer's disease Maternal Grandfather    • Stroke Maternal Grandfather          **Daryl Disclaimer:   Much of this encounter note is an electronic transcription/translation of spoken language to printed text. The electronic translation of spoken language may permit erroneous, or at times, nonsensical words or phrases to be inadvertently transcribed. Although I have reviewed the note for such errors, some may still exist.

## 2018-07-06 ENCOUNTER — HOSPITAL ENCOUNTER (OUTPATIENT)
Dept: CT IMAGING | Facility: HOSPITAL | Age: 73
Discharge: HOME | End: 2018-07-06
Admitting: INTERNAL MEDICINE

## 2018-07-06 PROCEDURE — 71250 CT THORAX DX C-: CPT

## 2018-07-26 RX ORDER — ATORVASTATIN CALCIUM 40 MG/1
40 TABLET, FILM COATED ORAL DAILY
Qty: 90 TABLET | Refills: 3 | Status: SHIPPED | OUTPATIENT
Start: 2018-07-26 | End: 2018-07-30 | Stop reason: SDUPTHER

## 2018-07-27 ENCOUNTER — OFFICE VISIT (OUTPATIENT)
Dept: ENDOCRINOLOGY | Age: 73
End: 2018-07-27

## 2018-07-27 VITALS
HEIGHT: 71 IN | HEART RATE: 80 BPM | SYSTOLIC BLOOD PRESSURE: 118 MMHG | OXYGEN SATURATION: 93 % | DIASTOLIC BLOOD PRESSURE: 64 MMHG | WEIGHT: 232 LBS | BODY MASS INDEX: 32.48 KG/M2

## 2018-07-27 DIAGNOSIS — E11.9 TYPE 2 DIABETES MELLITUS WITHOUT COMPLICATION, WITH LONG-TERM CURRENT USE OF INSULIN (HCC): Primary | ICD-10-CM

## 2018-07-27 DIAGNOSIS — E55.9 VITAMIN D DEFICIENCY: ICD-10-CM

## 2018-07-27 DIAGNOSIS — Z79.4 TYPE 2 DIABETES MELLITUS WITHOUT COMPLICATION, WITH LONG-TERM CURRENT USE OF INSULIN (HCC): Primary | ICD-10-CM

## 2018-07-27 DIAGNOSIS — E78.49 OTHER HYPERLIPIDEMIA: ICD-10-CM

## 2018-07-27 DIAGNOSIS — E78.2 MIXED HYPERLIPIDEMIA: ICD-10-CM

## 2018-07-27 PROCEDURE — 99214 OFFICE O/P EST MOD 30 MIN: CPT | Performed by: INTERNAL MEDICINE

## 2018-07-27 NOTE — PROGRESS NOTES
72 y.o.    Patient Care Team:  Hilario Rahman MD as PCP - General (Internal Medicine)  Noah Montejo MD as Consulting Physician (Cardiology)  Alden Poole MD as Surgeon (Cardiothoracic Surgery)  Samira Recinos MD as Consulting Physician (Endocrinology)    Chief Complaint:    3 FOLLOW UP/ TYPE 2 DIABETES MELLITUS  Subjective     HPI    Felix Olmedo,72 y.o. WM is here as a follow up for the management of Type 2 dm. Pt underwent CABG on May 10th 2017, at [facility]. He is currently in the cardiac rehab.       Type 2 dm - Pt has been a diabetic since 2000, He has been on insulin since 2009.    Today in clinic pt is on levemir 32 at bed time, Novolog 6 - 10 units with each meal changes it based on what he eats, Jardiance 25 mg po daily, Victoza 1.2 mg subq daily.   No UTI, no yeast infection, no nausea and vomiting episodes on 1.8 mg subq daily.   Checks 3 - 4 times a day. FBG - 100 - 140 mg/dl, Lunch - 130 - 150 mg/dl,  Dinner time - 140 - 160 mg/dl.   Did have one low BG around 57 mg/dl in the last 1 month, does have hypoglycemic awareness.   Highest BG was around 205 mg/dl.   No nausea or vomiting. No increased urination and thirst.   Last eye exam was in April 2018, mild non proliferative dm retinopathy which is stable.    C/o tingling or numbness in his b/l feet, no ulcers and amputations of his feet. Not on lyrica or gabapentin. Feels the symptoms are same. No ulcers or wounds on feet.  Hx of CAD s/p CABG in may 2017, no hx of CKD, no CVA.   Pt is physically active. Weight has been stable.   Pt tries to follow DM diet for most part.   Not on Ace inb or ARB.       Hyperlipidemia -  on Lipitor 40 mg oral daily.        CAD - s/p CABG in May 2017.        Reviewed primary care physician's/consulting physician documentation and lab results :     Interval History      The following portions of the patient's history were reviewed and updated as appropriate: allergies, current medications, past family  history, past medical history, past social history, past surgical history and problem list.    Past Medical History:   Diagnosis Date   • Arthritis     OSTEO   • Coronary artery disease     Status post 2 vessel CABG 5/10/17 by Dr. Poole (LIMA-LAD, SVG-PDA)   • Diastolic dysfunction    • Hyperlipidemia    • Hypertension    • LAFB (left anterior fascicular block)    • Lung nodule    • Pneumonia 02/2017   • Squamous cell carcinoma     Left cheek s/p resection   • SVT (supraventricular tachycardia) (CMS/HCC)     Diagnosed following CABG     Family History   Problem Relation Age of Onset   • Cancer Mother         uterine   • Heart disease Father         Father with fatal MI in 80's   • Diabetes Sister    • Esophageal cancer Brother    • Alzheimer's disease Maternal Grandfather    • Stroke Maternal Grandfather      Social History     Social History   • Marital status:      Spouse name: Mendy Vargas   • Number of children: 3   • Years of education: N/A     Occupational History   • Retired (from [employer])      Social History Main Topics   • Smoking status: Former Smoker     Packs/day: 1.50     Years: 40.00     Types: Cigarettes     Quit date: 2009   • Smokeless tobacco: Never Used      Comment: Smoked up to 1.5 ppd.  Smoked for 50 years.  Quit 2009.   • Alcohol use Yes      Comment: 2-3 BEERS YEARLY    • Drug use: No      Comment: caffeine use   • Sexual activity: Not Currently     Other Topics Concern   • Not on file     Social History Narrative   • No narrative on file     Allergies   Allergen Reactions   • Adhesive Tape Rash       Current Outpatient Prescriptions:   •  acetaminophen (TYLENOL) 325 MG tablet, Take 2 tablets by mouth Every 4 (Four) Hours As Needed for Mild Pain (1-3)., Disp: 1 bottle, Rfl: 99  •  aspirin 81 MG tablet, Take 81 mg by mouth Daily., Disp: , Rfl:   •  atorvastatin (LIPITOR) 40 MG tablet, Take 1 tablet by mouth Daily., Disp: 90 tablet, Rfl: 3  •  bumetanide (BUMEX) 2 MG  tablet, Take 0.5 tablets by mouth Daily., Disp: 90 tablet, Rfl: 3  •  Empagliflozin 25 MG tablet, Take 25 mg by mouth Daily., Disp: 30 tablet, Rfl: 11  •  insulin aspart (novoLOG) 100 UNIT/ML injection, Inject 30 Units under the skin 3 (Three) Times a Day Before Meals. CAN CHANGE DEPENDING ON MEAL - 30-34 units, Disp: , Rfl:   •  insulin detemir (LEVEMIR) 100 UNIT/ML injection, Inject 34 Units under the skin Every Night. 32-34 unit, Disp: , Rfl:   •  Insulin Pen Needle 31G X 8 MM misc, Use to inject insulin, Disp: 500 each, Rfl: 3  •  isosorbide mononitrate (IMDUR) 30 MG 24 hr tablet, Take 1 tablet by mouth 2 (Two) Times a Day., Disp: 60 tablet, Rfl: 3  •  Liraglutide (VICTOZA) 18 MG/3ML solution pen-injector injection, Inject 1.8 mg under the skin Daily. (Patient taking differently: Inject 1.2 mg under the skin Daily.), Disp: 5 pen, Rfl: 3  •  Menthol, Topical Analgesic, (BIOFREEZE ROLL-ON) 4 % gel, Apply 1 application topically Daily As Needed., Disp: , Rfl:   •  metoprolol tartrate (LOPRESSOR) 25 MG tablet, TAKE 1 TABLET TWICE DAILY, Disp: 180 tablet, Rfl: 3  •  Multiple Vitamins-Minerals (MULTIVITAMIN ADULT PO), Take 1 tablet by mouth Daily., Disp: , Rfl:   •  potassium chloride (MICRO-K) 10 MEQ CR capsule, Take 1 capsule (10 mEq total) by mouth 2 (Two) Times a Day, Disp: 60 capsule, Rfl: 0  •  TRUE METRIX BLOOD GLUCOSE TEST test strip, 1 each by Other route., Disp: , Rfl:         Review of Systems   Constitutional: Negative for appetite change, fatigue and fever.   Eyes: Positive for visual disturbance.   Respiratory: Negative for shortness of breath.    Cardiovascular: Negative for palpitations and leg swelling.   Gastrointestinal: Negative for abdominal pain and vomiting.   Endocrine: Negative for polydipsia and polyuria.   Musculoskeletal: Negative for joint swelling and neck pain.   Skin: Negative for rash.   Neurological: Positive for numbness. Negative for weakness.   Psychiatric/Behavioral: Negative for  "behavioral problems.       Objective       Vitals:    07/27/18 1453   BP: 118/64   Pulse: 80   SpO2: 93%   Weight: 105 kg (232 lb)   Height: 179.1 cm (70.5\")     Body mass index is 32.82 kg/m².      Physical Exam   Constitutional: He is oriented to person, place, and time. He appears well-nourished.   obese   HENT:   Head: Normocephalic and atraumatic.   Wide neck   Eyes: Conjunctivae and EOM are normal.   Neck: Normal range of motion. Neck supple. Carotid bruit is not present. No thyromegaly present.   Acanthosis nigricans   Cardiovascular: Normal rate and normal heart sounds.    Pulmonary/Chest: Effort normal and breath sounds normal. No stridor. No respiratory distress.   Abdominal: Soft. Bowel sounds are normal. He exhibits no distension. There is no tenderness.   Central obesity   Musculoskeletal: He exhibits no edema or tenderness.   Neurological: He is alert and oriented to person, place, and time.   Skin: Skin is warm and dry.   Psychiatric: He has a normal mood and affect. His behavior is normal.   Vitals reviewed.    Results Review:     I reviewed the patient's new clinical results and mentioned them above in HPI and in plan as well.    Medical records reviewed  Summary: done      Lab on 04/24/2018   Component Date Value Ref Range Status   • Hemoglobin A1C 04/24/2018 7.70* 4.80 - 5.60 % Final    Comment: Hemoglobin A1C Ranges:  Increased Risk for Diabetes  5.7% to 6.4%  Diabetes                     >= 6.5%  Diabetic Goal                < 7.0%     • Glucose 04/24/2018 187* 65 - 99 mg/dL Final   • BUN 04/24/2018 18  8 - 23 mg/dL Final   • Creatinine 04/24/2018 1.23  0.76 - 1.27 mg/dL Final   • eGFR Non  Am 04/24/2018 58* >60 mL/min/1.73 Final    Comment: The MDRD GFR formula is only valid for adults with stable  renal function between ages 18 and 70.     • eGFR  Am 04/24/2018 70  >60 mL/min/1.73 Final   • BUN/Creatinine Ratio 04/24/2018 14.6  7.0 - 25.0 Final   • Sodium 04/24/2018 142  136 - " 145 mmol/L Final   • Potassium 04/24/2018 4.9  3.5 - 5.2 mmol/L Final   • Chloride 04/24/2018 101  98 - 107 mmol/L Final   • Total CO2 04/24/2018 25.5  22.0 - 29.0 mmol/L Final   • Calcium 04/24/2018 9.3  8.6 - 10.5 mg/dL Final   • Total Cholesterol 04/24/2018 122  0 - 200 mg/dL Final   • Triglycerides 04/24/2018 98  0 - 150 mg/dL Final   • HDL Cholesterol 04/24/2018 49  40 - 60 mg/dL Final   • VLDL Cholesterol 04/24/2018 19.6  5 - 40 mg/dL Final   • LDL Cholesterol  04/24/2018 53  0 - 100 mg/dL Final   • Creatinine, Urine 04/24/2018 68.5  Not Estab. mg/dL Final   • Microalbumin, Urine 04/24/2018 <3.0  Not Estab. ug/mL Final   • Microalbumin/Creatinine Ratio 04/24/2018 <4.4  0.0 - 30.0 mg/g creat Final   • TSH 04/24/2018 1.530  0.270 - 4.200 mIU/mL Final   • Vitamin B-12 04/24/2018 602  211 - 946 pg/mL Final   • Folate 04/24/2018 >20.00  4.78 - 24.20 ng/mL Final   • 25 Hydroxy, Vitamin D 04/24/2018 40.6  30.0 - 100.0 ng/ml Final    Comment: Reference Range for Total Vitamin D 25(OH)  Deficiency    <20.0 ng/mL  Insufficiency 21-29 ng/mL  Sufficiency    ng/mL  Toxicity      >100 ng/ml        • Interpretation 04/24/2018 Note   Final    Supplemental report is available.   • PDF Image 04/24/2018 Not applicable   Final     Lab Results   Component Value Date    HGBA1C 7.70 (H) 04/24/2018    HGBA1C 8.40 (H) 09/25/2017    HGBA1C 8.30 (H) 07/24/2017     Lab Results   Component Value Date    MICROALBUR <3.0 04/24/2018    CREATININE 1.23 04/24/2018     Imaging Results (most recent)     None                Assessment and Plan:    Felix was seen today for diabetes.    Diagnoses and all orders for this visit:    Type 2 diabetes mellitus without complication, with long-term current use of insulin (CMS/[facility])  -     Hemoglobin A1c; Future  -     Basic Metabolic Panel; Future  -     Lipid Panel; Future  -     TSH; Future  -     Vitamin B12 & Folate; Future  -     Vitamin D 25 Hydroxy; Future  -     Microalbumin / Creatinine Urine  "Ratio - Urine, Clean Catch; Future    Mixed hyperlipidemia  -     Hemoglobin A1c; Future  -     Basic Metabolic Panel; Future  -     Lipid Panel; Future  -     TSH; Future  -     Vitamin B12 & Folate; Future  -     Vitamin D 25 Hydroxy; Future  -     Microalbumin / Creatinine Urine Ratio - Urine, Clean Catch; Future    Vitamin D deficiency   -     Hemoglobin A1c; Future  -     Basic Metabolic Panel; Future  -     Lipid Panel; Future  -     TSH; Future  -     Vitamin B12 & Folate; Future  -     Vitamin D 25 Hydroxy; Future  -     Microalbumin / Creatinine Urine Ratio - Urine, Clean Catch; Future      Type 2 diabetes mellitus-uncontrolled  Continue the current insulin regimen, oral diabetic medications  Will check HbA1c  Based on the HbA1c Will make further adjustments.  Discussed with the patient if his blood sugar is less than 1 20 mg/dL range advised him to take around 6 units of NovoLog instead of 8-10 units.    Hyperlipidemia  Continue Lipitor 40 mg oral daily.    Discussed with the patient about the side effects of Jardiance and Victoza.    Reviewed Lab results with the patient.             Samira Recinos MD  07/27/18    EMR Dragon / transcription disclaimer:     \"Dictated utilizing Dragon dictation\".  "

## 2018-07-28 LAB
BUN SERPL-MCNC: 16 MG/DL (ref 8–23)
BUN/CREAT SERPL: 13.2 (ref 7–25)
CALCIUM SERPL-MCNC: 9.4 MG/DL (ref 8.6–10.5)
CHLORIDE SERPL-SCNC: 101 MMOL/L (ref 98–107)
CHOLEST SERPL-MCNC: 131 MG/DL (ref 0–200)
CO2 SERPL-SCNC: 26.3 MMOL/L (ref 22–29)
CREAT SERPL-MCNC: 1.21 MG/DL (ref 0.76–1.27)
FOLATE SERPL-MCNC: >20 NG/ML (ref 4.78–24.2)
GLUCOSE SERPL-MCNC: 126 MG/DL (ref 65–99)
HBA1C MFR BLD: 7.6 % (ref 4.8–5.6)
HDLC SERPL-MCNC: 61 MG/DL (ref 40–60)
INTERPRETATION: NORMAL
LDLC SERPL CALC-MCNC: 56 MG/DL (ref 0–100)
Lab: NORMAL
POTASSIUM SERPL-SCNC: 5 MMOL/L (ref 3.5–5.2)
SODIUM SERPL-SCNC: 141 MMOL/L (ref 136–145)
TRIGL SERPL-MCNC: 71 MG/DL (ref 0–150)
TSH SERPL DL<=0.005 MIU/L-ACNC: 1.47 MIU/ML (ref 0.27–4.2)
VIT B12 SERPL-MCNC: 508 PG/ML (ref 211–946)
VLDLC SERPL CALC-MCNC: 14.2 MG/DL (ref 5–40)

## 2018-07-30 RX ORDER — ATORVASTATIN CALCIUM 40 MG/1
40 TABLET, FILM COATED ORAL DAILY
Qty: 90 TABLET | Refills: 3 | Status: SHIPPED | OUTPATIENT
Start: 2018-07-30 | End: 2019-08-23 | Stop reason: SDUPTHER

## 2018-09-04 NOTE — TELEPHONE ENCOUNTER
RX HAS BEEN SENT TO [facility]            ----- Message from Arun Dill sent at 9/4/2018 10:22 AM EDT -----  Contact: JAVIER WITH [facility]   JAVIER WITH [facility] REQUEST SCRIPT OF     insulin aspart (novoLOG) 100 UNIT/ML injection    SCRIPT WAS SENT OVER ON 8/28/2018,     [phone] BEST # FOR HUMAN

## 2018-09-25 ENCOUNTER — OFFICE VISIT (OUTPATIENT)
Dept: INTERNAL MEDICINE | Age: 73
End: 2018-09-25

## 2018-09-25 VITALS
HEART RATE: 58 BPM | HEIGHT: 71 IN | TEMPERATURE: 97.6 F | OXYGEN SATURATION: 98 % | BODY MASS INDEX: 33.04 KG/M2 | DIASTOLIC BLOOD PRESSURE: 72 MMHG | SYSTOLIC BLOOD PRESSURE: 114 MMHG | WEIGHT: 236 LBS

## 2018-09-25 DIAGNOSIS — S80.812A ABRASION OF LEFT LOWER LEG, INITIAL ENCOUNTER: ICD-10-CM

## 2018-09-25 DIAGNOSIS — S81.802A OPEN LEG WOUND, LEFT, INITIAL ENCOUNTER: Primary | ICD-10-CM

## 2018-09-25 DIAGNOSIS — IMO0002 UNCONTROLLED TYPE 2 DIABETES MELLITUS WITH OTHER NEUROLOGIC COMPLICATION, WITH LONG-TERM CURRENT USE OF INSULIN: Chronic | ICD-10-CM

## 2018-09-25 PROCEDURE — 90715 TDAP VACCINE 7 YRS/> IM: CPT | Performed by: INTERNAL MEDICINE

## 2018-09-25 PROCEDURE — 90471 IMMUNIZATION ADMIN: CPT | Performed by: INTERNAL MEDICINE

## 2018-09-25 PROCEDURE — 99214 OFFICE O/P EST MOD 30 MIN: CPT | Performed by: INTERNAL MEDICINE

## 2018-09-25 RX ORDER — CEPHALEXIN 500 MG/1
500 CAPSULE ORAL 3 TIMES DAILY
Qty: 21 CAPSULE | Refills: 0 | Status: SHIPPED | OUTPATIENT
Start: 2018-09-25 | End: 2018-09-26 | Stop reason: SDUPTHER

## 2018-09-25 NOTE — PROGRESS NOTES
[facility] INTERNAL MEDICINE  PEDRO STILES M.D.      Feilx Olmedo / 72 y.o. / male  09/25/2018      ASSESSMENT & PLAN:    1. Open leg wound, left, initial encounter  Increased redness, warmth on Bactrim.   Discontinue bactrim and start keflex   Elevate leg  He was instructed to call or return if the condition is not improving or worsening. He expressed understanding and agreed to follow above instructions.      - cephalexin (KEFLEX) 500 MG capsule; Take 1 capsule by mouth 3 (Three) Times a Day for 7 days.  Dispense: 21 capsule; Refill: 0  - Td Vaccine Greater Than or Equal To 6yo Preservative Free IM    2. Uncontrolled type 2 diabetes mellitus with other neurologic complication, with long-term current use of insulin (CMS/[facility])  Monitor sugar levels closely. Managed by endocrinologist.    3. Abrasion of left lower leg, initial encounter   - Td Vaccine Greater Than or Equal To 6yo Preservative Free IM     Orders Placed This Encounter   Procedures   • Td Vaccine Greater Than or Equal To 6yo Preservative Free IM     New Medications Ordered This Visit   Medications   • cephalexin (KEFLEX) 500 MG capsule     Sig: Take 1 capsule by mouth 3 (Three) Times a Day for 7 days.     Dispense:  21 capsule     Refill:  0       Summary/Discussion:      Return for worsening problem or if not improving.    ____________________________________________________________________    MEDICATIONS  Current Outpatient Prescriptions   Medication Sig Dispense Refill   • acetaminophen (TYLENOL) 325 MG tablet Take 2 tablets by mouth Every 4 (Four) Hours As Needed for Mild Pain (1-3). 1 bottle 99   • aspirin 81 MG tablet Take 81 mg by mouth Daily.     • atorvastatin (LIPITOR) 40 MG tablet Take 1 tablet by mouth Daily. 90 tablet 3   • bumetanide (BUMEX) 2 MG tablet Take 0.5 tablets by mouth Daily. 90 tablet 3   • Empagliflozin 25 MG tablet Take 25 mg by mouth Daily. 30 tablet 11   • insulin aspart (NOVOLOG FLEXPEN) 100 UNIT/ML solution pen-injector sc  "pen Inject 12 Units under the skin into the appropriate area as directed 3 (Three) Times a Day With Meals. CAN CHANGE DEPENDING ON MEAL 15 pen 3   • insulin detemir (LEVEMIR) 100 UNIT/ML injection Inject 34 Units under the skin Every Night. 32-34 unit     • Insulin Pen Needle 31G X 8 MM misc Use to inject insulin 500 each 3   • isosorbide mononitrate (IMDUR) 30 MG 24 hr tablet Take 1 tablet by mouth 2 (Two) Times a Day. 60 tablet 3   • Menthol, Topical Analgesic, (BIOFREEZE ROLL-ON) 4 % gel Apply 1 application topically Daily As Needed.     • metoprolol tartrate (LOPRESSOR) 25 MG tablet TAKE 1 TABLET TWICE DAILY 180 tablet 3   • Multiple Vitamins-Minerals (MULTIVITAMIN ADULT PO) Take 1 tablet by mouth Daily.     • TRUE METRIX BLOOD GLUCOSE TEST test strip 1 each by Other route.     • Bactrim DS  21 capsule 0   • Liraglutide (VICTOZA) 18 MG/3ML solution pen-injector injection Inject 1.8 mg under the skin Daily. (Patient taking differently: Inject 1.2 mg under the skin Daily.) 5 pen 3   • potassium chloride (MICRO-K) 10 MEQ CR capsule Take 1 capsule (10 mEq total) by mouth 2 (Two) Times a Day 60 capsule 0     No current facility-administered medications for this visit.          VITALS    Visit Vitals  /72   Pulse 58   Temp 97.6 °F (36.4 °C)   Ht 179.1 cm (70.51\")   Wt 107 kg (236 lb)   SpO2 98%   BMI 33.37 kg/m²       BP Readings from Last 3 Encounters:   09/25/18 114/72   07/27/18 118/64   06/29/18 114/70     Wt Readings from Last 3 Encounters:   09/25/18 107 kg (236 lb)   07/27/18 105 kg (232 lb)   06/29/18 106 kg (233 lb)      Body mass index is 33.37 kg/m².    CC:  Main reason(s) for today's visit: F/U ICC ankle abrasion (Arsh 9/23/2018)      HPI:     Date of [facility] encounter: 9/23/18  Principle Dx: left lower leg   Secondary Dx: diabetes type 2  History prior to [facility] visit: fell onto a  and scraped left lower leg, sprained left ankle  Evaluation/Treatment: Xray was reportedly negative for " fracture. Started on Bactrim.   Course: No fever, worsening pain but has increased redness, warmth and swelling. Home BS overall stable on insulin.     Patient Care Team:  Hilario Rahman MD as PCP - General (Internal Medicine)  Noah Montejo MD as Consulting Physician (Cardiology)  Alden Poole MD as Surgeon (Cardiothoracic Surgery)  Samira Recinos MD as Consulting Physician (Endocrinology)  ____________________________________________________________________    REVIEW OF SYSTEMS    Review of Systems   Constitutional: Negative for chills and fever.   Respiratory: Negative.    Cardiovascular: Positive for leg swelling (left lower leg).   Musculoskeletal: Joint swelling: left ankle.   Skin: Positive for wound (left lower leg ulceration).       PHYSICAL EXAMINATION    Physical Exam   Constitutional: No distress.   Musculoskeletal:        Legs:    Vascular Status -  His left foot exhibits normal foot vasculature  and no edema.           REVIEWED DATA:    Labs:   Lab Results   Component Value Date    HGBA1C 7.60 (H) 07/27/2018        Imaging:      XR ANKLE MIN 3 VWS LT    DATE: 09/23/2018    INDICATION: Left ankle pain. Initial encounter    COMPARISON: None available    FINDINGS: 3 views of the left ankle demonstrates no fracture, subluxation, or dislocation. The ankle mortise is intact. There are small inferior calcaneal spurs. Mild degenerative ossification is seen at the Achilles insertion site on the calcaneus.   There is moderate atherosclerosis.    IMPRESSION:  1. No acute fracture.  2. Atherosclerosis    Medical Tests:         Summary of old records / correspondence / consultant report:   [facility] encounter note re: issues addressed on HPI    Request outside records:       ALLERGIES  Allergies   Allergen Reactions   • Adhesive Tape Rash        PFSH:     The following portions of the patient's history were reviewed and updated as appropriate: Allergies / Current Medications / Past Medical History /  Surgical History / Social History / Family History    PROBLEM LIST   Patient Active Problem List   Diagnosis   • Type II diabetes mellitus with neurological manifestations, uncontrolled (CMS/[facility])   • Coronary artery disease of native artery of native heart with stable angina pectoris (CMS/[facility])   • SVT (supraventricular tachycardia) (CMS/HCC)   • Acute diastolic CHF (congestive heart failure) (CMS/[facility])   • S/P CABG x 2   • Hyperlipidemia   • Chronic pain of both knees   • Obesity (BMI 30-39.9)   • Pulmonary nodule       PAST MEDICAL HISTORY  Past Medical History:   Diagnosis Date   • Arthritis     OSTEO   • Coronary artery disease     Status post 2 vessel CABG 5/10/17 by Dr. Poole (LIMA-LAD, SVG-PDA)   • Diastolic dysfunction    • Hyperlipidemia    • Hypertension    • LAFB (left anterior fascicular block)    • Lung nodule    • Pneumonia 02/2017   • Squamous cell carcinoma     Left cheek s/p resection   • SVT (supraventricular tachycardia) (CMS/[facility])     Diagnosed following CABG       SURGICAL HISTORY  Past Surgical History:   Procedure Laterality Date   • CARDIAC CATHETERIZATION N/A 10/18/2016    Procedure: Coronary angiography;  Surgeon: Jesus Guzman MD;  Location: [facility] CATH INVASIVE LOCATION;  Service:    • CARDIAC CATHETERIZATION N/A 10/18/2016    Procedure: Left heart cath;  Surgeon: Jesus Guzman MD;  Location: [facility] CATH INVASIVE LOCATION;  Service:    • CARDIAC CATHETERIZATION N/A 10/18/2016    Procedure: Left ventriculography;  Surgeon: Jesus Guzman MD;  Location: [facility] CATH INVASIVE LOCATION;  Service:    • CARDIAC CATHETERIZATION N/A 4/4/2017    Procedure: Coronary angiography;  Surgeon: Jesus Guzman MD;  Location: [facility] CATH INVASIVE LOCATION;  Service:    • CARDIAC CATHETERIZATION N/A 4/4/2017    Procedure: Left heart cath;  Surgeon: Jesus Guzman MD;  Location: [facility] CATH INVASIVE LOCATION;  Service:    • CARDIAC CATHETERIZATION N/A 4/4/2017    Procedure:  Left ventriculography;  Surgeon: Jesus Guzman MD;  Location: [facility] CATH INVASIVE LOCATION;  Service:    • CARDIAC CATHETERIZATION  4/4/2017    Procedure: Functional Flow [place];  Surgeon: Jesus Guzman MD;  Location: [facility] CATH INVASIVE LOCATION;  Service:    • CARDIAC SURGERY     • CATARACT EXTRACTION W/ INTRAOCULAR LENS IMPLANT Left    • COLONOSCOPY     • CORONARY ARTERY BYPASS GRAFT N/A 5/10/2017    Procedure: CORONARY ARTERY BYPASS TIMES TWO UTILIZING THE LEFT GREATER SAPHENOUS VEIN WITH INTERNAL MAMMARY ARTERY GRAFT;  TRANSESOPHAGEAL ECHOCARDIOGRAM;  Surgeon: Alden Poole MD;  Location: [facility] MAIN OR;  Service:    • EYE SURGERY     • KNEE SURGERY Left 2005    Dr. Gallegos   • MOHS SURGERY Left     CHEEK   • SKIN BIOPSY         SOCIAL HISTORY  Social History     Social History   • Marital status:      Spouse name: Mendy Vargas   • Number of children: 3     Occupational History   • Retired (from [employer])      Social History Main Topics   • Smoking status: Former Smoker     Packs/day: 1.50     Years: 40.00     Types: Cigarettes     Quit date: 2009   • Smokeless tobacco: Never Used      Comment: Smoked up to 1.5 ppd.  Smoked for 50 years.  Quit 2009.   • Alcohol use Yes      Comment: 2-3 BEERS YEARLY    • Drug use: No      Comment: caffeine use   • Sexual activity: Not Currently     Other Topics Concern   • Not on file       FAMILY HISTORY  Family History   Problem Relation Age of Onset   • Cancer Mother         uterine   • Heart disease Father         Father with fatal MI in 80's   • Diabetes Sister    • Esophageal cancer Brother    • Alzheimer's disease Maternal Grandfather    • Stroke Maternal Grandfather          **Daryl Disclaimer:   Much of this encounter note is an electronic transcription/translation of spoken language to printed text. The electronic translation of spoken language may permit erroneous, or at times, nonsensical words or phrases to be  inadvertently transcribed. Although I have reviewed the note for such errors, some may still exist.

## 2018-09-26 ENCOUNTER — TELEPHONE (OUTPATIENT)
Dept: INTERNAL MEDICINE | Age: 73
End: 2018-09-26

## 2018-09-26 DIAGNOSIS — S81.802A OPEN LEG WOUND, LEFT, INITIAL ENCOUNTER: ICD-10-CM

## 2018-09-26 RX ORDER — CEPHALEXIN 500 MG/1
500 CAPSULE ORAL 3 TIMES DAILY
Qty: 21 CAPSULE | Refills: 0 | Status: SHIPPED | OUTPATIENT
Start: 2018-09-26 | End: 2018-10-03

## 2018-09-26 NOTE — TELEPHONE ENCOUNTER
RX for Keflex was sent to mail order yesterday, and it needs to go to local [facility] (Listed in Pharmacy in chart). Please send today so patient can start medication.

## 2018-10-25 ENCOUNTER — RESULTS ENCOUNTER (OUTPATIENT)
Dept: ENDOCRINOLOGY | Age: 73
End: 2018-10-25

## 2018-10-25 DIAGNOSIS — E11.9 TYPE 2 DIABETES MELLITUS WITHOUT COMPLICATION, WITH LONG-TERM CURRENT USE OF INSULIN (HCC): ICD-10-CM

## 2018-10-25 DIAGNOSIS — E55.9 VITAMIN D DEFICIENCY: ICD-10-CM

## 2018-10-25 DIAGNOSIS — E78.2 MIXED HYPERLIPIDEMIA: ICD-10-CM

## 2018-10-25 DIAGNOSIS — Z79.4 TYPE 2 DIABETES MELLITUS WITHOUT COMPLICATION, WITH LONG-TERM CURRENT USE OF INSULIN (HCC): ICD-10-CM

## 2018-11-28 LAB
25(OH)D3+25(OH)D2 SERPL-MCNC: 39.7 NG/ML (ref 30–100)
ALBUMIN/CREAT UR: 5.3 MG/G CREAT (ref 0–30)
BUN SERPL-MCNC: 15 MG/DL (ref 8–23)
BUN/CREAT SERPL: 14.4 (ref 7–25)
CALCIUM SERPL-MCNC: 9.4 MG/DL (ref 8.6–10.5)
CHLORIDE SERPL-SCNC: 104 MMOL/L (ref 98–107)
CHOLEST SERPL-MCNC: 115 MG/DL (ref 0–200)
CO2 SERPL-SCNC: 26 MMOL/L (ref 22–29)
CREAT SERPL-MCNC: 1.04 MG/DL (ref 0.76–1.27)
CREAT UR-MCNC: 68 MG/DL
FOLATE SERPL-MCNC: >20 NG/ML (ref 4.78–24.2)
GLUCOSE SERPL-MCNC: 117 MG/DL (ref 65–99)
HBA1C MFR BLD: 7.55 % (ref 4.8–5.6)
HDLC SERPL-MCNC: 56 MG/DL (ref 40–60)
INTERPRETATION: NORMAL
LDLC SERPL CALC-MCNC: 45 MG/DL (ref 0–100)
Lab: NORMAL
MICROALBUMIN UR-MCNC: 3.6 UG/ML
POTASSIUM SERPL-SCNC: 4.4 MMOL/L (ref 3.5–5.2)
SODIUM SERPL-SCNC: 141 MMOL/L (ref 136–145)
TRIGL SERPL-MCNC: 69 MG/DL (ref 0–150)
TSH SERPL DL<=0.005 MIU/L-ACNC: 1.85 MIU/ML (ref 0.27–4.2)
VIT B12 SERPL-MCNC: 514 PG/ML (ref 211–946)
VLDLC SERPL CALC-MCNC: 13.8 MG/DL (ref 5–40)

## 2018-12-11 ENCOUNTER — OFFICE VISIT (OUTPATIENT)
Dept: ENDOCRINOLOGY | Age: 73
End: 2018-12-11

## 2018-12-11 VITALS
HEART RATE: 67 BPM | BODY MASS INDEX: 33.64 KG/M2 | SYSTOLIC BLOOD PRESSURE: 130 MMHG | HEIGHT: 70 IN | WEIGHT: 235 LBS | OXYGEN SATURATION: 95 % | DIASTOLIC BLOOD PRESSURE: 68 MMHG

## 2018-12-11 DIAGNOSIS — E11.9 TYPE 2 DIABETES MELLITUS WITHOUT COMPLICATION, WITH LONG-TERM CURRENT USE OF INSULIN (HCC): Primary | ICD-10-CM

## 2018-12-11 DIAGNOSIS — E55.9 VITAMIN D DEFICIENCY: ICD-10-CM

## 2018-12-11 DIAGNOSIS — Z79.4 TYPE 2 DIABETES MELLITUS WITHOUT COMPLICATION, WITH LONG-TERM CURRENT USE OF INSULIN (HCC): Primary | ICD-10-CM

## 2018-12-11 DIAGNOSIS — E78.2 MIXED HYPERLIPIDEMIA: ICD-10-CM

## 2018-12-11 PROCEDURE — 99214 OFFICE O/P EST MOD 30 MIN: CPT | Performed by: INTERNAL MEDICINE

## 2018-12-11 NOTE — PROGRESS NOTES
73 y.o.    Patient Care Team:  Hilario Rahman MD as PCP - General (Internal Medicine)  Noah Montejo MD as Consulting Physician (Cardiology)  Alden Poole MD as Surgeon (Cardiothoracic Surgery)  Samira Recinos MD as Consulting Physician (Endocrinology)    Chief Complaint:    4 MONTH FOLLOW UP/ TYPE 2 DIABETES MELLITUS  Subjective     HPI      Felix Olmedo,72 y.o. WM is here as a follow up for the management of Type 2 dm. Pt underwent CABG on May 10th 2017, at [facility]. He is currently in the cardiac rehab.       Type 2 dm - Pt has been a diabetic since 2000, He has been on insulin since 2009.    In the clinic patient reports he is on levemir 32-34 units at bedtime, NovoLog 6 units-10 units with each meal, Jardiance 25 mg oral daily.  Stopped taking Victoza due to cost issues.  He changes the dosages of his levemir and NovoLog based on his blood sugar readings.  Checks his blood sugars 3-4 times a day.  Fasting blood ethmdc-372-8 30 mg/dL, lunch time-70-1 40 mg/dL, dinner time-140-1 80 mg/dL range.  He did have one to 2 blood sugars at around 60 mg/dL range within the last one month, does have hypoglycemic awareness.  Highest blood sugar was in 200s range, he has been having more and more of these blood sugars in the last 1-2 weeks.  He has been fighting with an upper respiratory tract infection.  No nausea or vomiting.  No increased urination or thirst.  Last eye exam was in April 2018, mild non proliferative dm retinopathy which is stable.    C/o tingling or numbness in his b/l feet, no ulcers and amputations of his feet. Not on lyrica or gabapentin. Feels the symptoms are same. No ulcers or wounds on feet.  Hx of CAD s/p CABG in may 2017, no hx of CKD, no CVA.   Pt is physically active. Weight has been stable.   Pt tries to follow DM diet for most part.   Not on Ace inb or ARB.       Hyperlipidemia -  on Lipitor 40 mg oral daily.        CAD - s/p CABG in May 2017.        Reviewed primary  care physician's/consulting physician documentation and lab results :     Interval History      The following portions of the patient's history were reviewed and updated as appropriate: allergies, current medications, past family history, past medical history, past social history, past surgical history and problem list.    Past Medical History:   Diagnosis Date   • Arthritis     OSTEO   • Coronary artery disease     Status post 2 vessel CABG 5/10/17 by Dr. Poole (LIMA-LAD, SVG-PDA)   • Diastolic dysfunction    • Hyperlipidemia    • Hypertension    • LAFB (left anterior fascicular block)    • Lung nodule    • Pneumonia 2017   • Squamous cell carcinoma     Left cheek s/p resection   • SVT (supraventricular tachycardia) (CMS/HCC)     Diagnosed following CABG     Family History   Problem Relation Age of Onset   • Cancer Mother         uterine   • Heart disease Father         Father with fatal MI in 's   • Diabetes Sister    • Esophageal cancer Brother    • Alzheimer's disease Maternal Grandfather    • Stroke Maternal Grandfather      Social History     Socioeconomic History   • Marital status:      Spouse name: Mendy Vargas   • Number of children: 3   • Years of education: Not on file   • Highest education level: Not on file   Social Needs   • Financial resource strain: Not on file   • Food insecurity - worry: Not on file   • Food insecurity - inability: Not on file   • Transportation needs - medical: Not on file   • Transportation needs - non-medical: Not on file   Occupational History   • Occupation: Retired (from [email or web address])   Tobacco Use   • Smoking status: Former Smoker     Packs/day: 1.50     Years: 40.00     Pack years: 60.00     Types: Cigarettes     Last attempt to quit: 2009     Years since quittin.9   • Smokeless tobacco: Never Used   • Tobacco comment: Smoked up to 1.5 ppd.  Smoked for 50 years.  Quit .   Substance and Sexual Activity   • Alcohol use: Yes     Comment: 2-3  BEERS YEARLY    • Drug use: No     Comment: caffeine use   • Sexual activity: Not Currently   Other Topics Concern   • Not on file   Social History Narrative   • Not on file     Allergies   Allergen Reactions   • Adhesive Tape Rash       Current Outpatient Medications:   •  acetaminophen (TYLENOL) 325 MG tablet, Take 2 tablets by mouth Every 4 (Four) Hours As Needed for Mild Pain (1-3)., Disp: 1 bottle, Rfl: 99  •  aspirin 81 MG tablet, Take 81 mg by mouth Daily., Disp: , Rfl:   •  atorvastatin (LIPITOR) 40 MG tablet, Take 1 tablet by mouth Daily., Disp: 90 tablet, Rfl: 3  •  bumetanide (BUMEX) 2 MG tablet, Take 0.5 tablets by mouth Daily., Disp: 90 tablet, Rfl: 3  •  Empagliflozin 25 MG tablet, Take 25 mg by mouth Daily., Disp: 30 tablet, Rfl: 11  •  insulin aspart (NOVOLOG FLEXPEN) 100 UNIT/ML solution pen-injector sc pen, Inject 12 Units under the skin into the appropriate area as directed 3 (Three) Times a Day With Meals. CAN CHANGE DEPENDING ON MEAL, Disp: 15 pen, Rfl: 3  •  insulin detemir (LEVEMIR) 100 UNIT/ML injection, Inject 34 Units under the skin into the appropriate area as directed Every Night. 32-34 unit, Disp: 10 mL, Rfl: 3  •  Insulin Pen Needle 31G X 8 MM misc, Use to inject insulin, Disp: 500 each, Rfl: 3  •  isosorbide mononitrate (IMDUR) 30 MG 24 hr tablet, Take 1 tablet by mouth 2 (Two) Times a Day., Disp: 60 tablet, Rfl: 3  •  Menthol, Topical Analgesic, (BIOFREEZE ROLL-ON) 4 % gel, Apply 1 application topically Daily As Needed., Disp: , Rfl:   •  metoprolol tartrate (LOPRESSOR) 25 MG tablet, TAKE 1 TABLET TWICE DAILY, Disp: 180 tablet, Rfl: 3  •  Multiple Vitamins-Minerals (MULTIVITAMIN ADULT PO), Take 1 tablet by mouth Daily., Disp: , Rfl:   •  potassium chloride (MICRO-K) 10 MEQ CR capsule, Take 1 capsule (10 mEq total) by mouth 2 (Two) Times a Day, Disp: 60 capsule, Rfl: 0  •  TRUE METRIX BLOOD GLUCOSE TEST test strip, 1 each by Other route., Disp: , Rfl:         Review of Systems  "  Constitutional: Negative for appetite change, fatigue and fever.   Eyes: Negative for visual disturbance.   Respiratory: Negative for shortness of breath.    Cardiovascular: Negative for palpitations and leg swelling.   Gastrointestinal: Negative for abdominal pain and vomiting.   Endocrine: Negative for polydipsia and polyuria.   Musculoskeletal: Negative for joint swelling and neck pain.   Skin: Negative for rash.   Neurological: Negative for weakness and numbness.   Psychiatric/Behavioral: Negative for behavioral problems.       Objective       Vitals:    12/11/18 1402   BP: 130/68   Pulse: 67   SpO2: 95%   Weight: 107 kg (235 lb)   Height: 177.8 cm (70\")     Body mass index is 33.72 kg/m².      Physical Exam   Constitutional: He is oriented to person, place, and time. He appears well-nourished.   obese   HENT:   Head: Normocephalic and atraumatic.   Wide neck   Eyes: Conjunctivae and EOM are normal.   Neck: Normal range of motion. Neck supple. Carotid bruit is not present. No thyromegaly present.   Acanthosis nigricans   Cardiovascular: Normal rate and normal heart sounds.   Pulmonary/Chest: Effort normal and breath sounds normal. No stridor. No respiratory distress.   Abdominal: Soft. Bowel sounds are normal. He exhibits no distension. There is no tenderness.   Central obesity   Musculoskeletal: He exhibits no edema or tenderness.   Neurological: He is alert and oriented to person, place, and time.   Skin: Skin is warm and dry.   Psychiatric: He has a normal mood and affect. His behavior is normal.   Vitals reviewed.    Results Review:     I reviewed the patient's new clinical results and mentioned them above in HPI and in plan as well.    Medical records reviewed  Summary: done      Results Encounter on 10/25/2018   Component Date Value Ref Range Status   • Hemoglobin A1C 11/27/2018 7.55* 4.80 - 5.60 % Final    Comment: Hemoglobin A1C Ranges:  Increased Risk for Diabetes  5.7% to 6.4%  Diabetes               "       >= 6.5%  Diabetic Goal                < 7.0%     • Glucose 11/27/2018 117* 65 - 99 mg/dL Final   • BUN 11/27/2018 15  8 - 23 mg/dL Final   • Creatinine 11/27/2018 1.04  0.76 - 1.27 mg/dL Final   • eGFR Non  Am 11/27/2018 70  >60 mL/min/1.73 Final    Comment: The MDRD GFR formula is only valid for adults with stable  renal function between ages 18 and 70.     • eGFR  Am 11/27/2018 84  >60 mL/min/1.73 Final   • BUN/Creatinine Ratio 11/27/2018 14.4  7.0 - 25.0 Final   • Sodium 11/27/2018 141  136 - 145 mmol/L Final   • Potassium 11/27/2018 4.4  3.5 - 5.2 mmol/L Final   • Chloride 11/27/2018 104  98 - 107 mmol/L Final   • Total CO2 11/27/2018 26.0  22.0 - 29.0 mmol/L Final   • Calcium 11/27/2018 9.4  8.6 - 10.5 mg/dL Final   • Total Cholesterol 11/27/2018 115  0 - 200 mg/dL Final   • Triglycerides 11/27/2018 69  0 - 150 mg/dL Final   • HDL Cholesterol 11/27/2018 56  40 - 60 mg/dL Final   • VLDL Cholesterol 11/27/2018 13.8  5 - 40 mg/dL Final   • LDL Cholesterol  11/27/2018 45  0 - 100 mg/dL Final   • TSH 11/27/2018 1.850  0.270 - 4.200 mIU/mL Final   • Vitamin B-12 11/27/2018 514  211 - 946 pg/mL Final   • Folate 11/27/2018 >20.00  4.78 - 24.20 ng/mL Final   • 25 Hydroxy, Vitamin D 11/27/2018 39.7  30.0 - 100.0 ng/ml Final    Comment: Reference Range for Total Vitamin D 25(OH)  Deficiency    <20.0 ng/mL  Insufficiency 21-29 ng/mL  Sufficiency    ng/mL  Toxicity      >100 ng/ml        • Creatinine, Urine 11/27/2018 68.0  Not Estab. mg/dL Final   • Microalbumin, Urine 11/27/2018 3.6  Not Estab. ug/mL Final   • Microalbumin/Creatinine Ratio 11/27/2018 5.3  0.0 - 30.0 mg/g creat Final    Comment:                      Normal:                0.0 -  30.0                       Albuminuria:          31.0 - 300.0                       Clinical albuminuria:       >300.0     • Interpretation 11/27/2018 Note   Final    Supplemental report is available.   • PDF Image 11/27/2018 Not applicable   Final     Lab  Results   Component Value Date    HGBA1C 7.55 (H) 11/27/2018    HGBA1C 7.60 (H) 07/27/2018    HGBA1C 7.70 (H) 04/24/2018     Lab Results   Component Value Date    MICROALBUR 3.6 11/27/2018    CREATININE 1.04 11/27/2018     Imaging Results (most recent)     None                Assessment and Plan:    Felix was seen today for diabetes.    Diagnoses and all orders for this visit:    Type 2 diabetes mellitus without complication, with long-term current use of insulin (CMS/[facility])  -     Basic Metabolic Panel; Future  -     Hemoglobin A1c; Future  -     Lipid Panel; Future  -     TSH; Future  -     Vitamin B12 & Folate; Future  -     Vitamin D 25 Hydroxy; Future    Mixed hyperlipidemia  -     Basic Metabolic Panel; Future  -     Hemoglobin A1c; Future  -     Lipid Panel; Future  -     TSH; Future  -     Vitamin B12 & Folate; Future  -     Vitamin D 25 Hydroxy; Future    Vitamin D deficiency   -     Basic Metabolic Panel; Future  -     Hemoglobin A1c; Future  -     Lipid Panel; Future  -     TSH; Future  -     Vitamin B12 & Folate; Future  -     Vitamin D 25 Hydroxy; Future    Other orders  -     insulin detemir (LEVEMIR) 100 UNIT/ML injection; Inject 34 Units under the skin into the appropriate area as directed Every Night. 32-34 unit        Type 2 diabetes mellitus-uncontrolled  Complicated with elevated blood sugars due to the recent upper respiratory tract infection  Will continue the current insulin regimen for now  Continue Jardiance  Advised the patient to alert others if he continues to have such elevated blood sugars even after the infection resolves.  Might consider adjusting the dosage of his NovoLog at that point.    Discussed the benefits of the continuous glucose monitoring for the patient especially to alert with his low blood sugar issues.  Discussed the benefits of dexcom.    Hyperlipidemia  Continue Lipitor 40 mg oral daily.    Reviewed Lab results with the patient.             Samira Recinos  "MD  12/11/18    YIN Dragon / transcription disclaimer:     \"Dictated utilizing Dragon dictation\".  "

## 2018-12-17 ENCOUNTER — OFFICE VISIT (OUTPATIENT)
Dept: INTERNAL MEDICINE | Age: 73
End: 2018-12-17

## 2018-12-17 VITALS
BODY MASS INDEX: 34.22 KG/M2 | DIASTOLIC BLOOD PRESSURE: 64 MMHG | SYSTOLIC BLOOD PRESSURE: 124 MMHG | OXYGEN SATURATION: 95 % | HEART RATE: 64 BPM | WEIGHT: 239 LBS | TEMPERATURE: 98.3 F | HEIGHT: 70 IN

## 2018-12-17 DIAGNOSIS — E78.2 MIXED HYPERLIPIDEMIA: Chronic | ICD-10-CM

## 2018-12-17 DIAGNOSIS — I25.118 CORONARY ARTERY DISEASE OF NATIVE ARTERY OF NATIVE HEART WITH STABLE ANGINA PECTORIS (HCC): Chronic | ICD-10-CM

## 2018-12-17 DIAGNOSIS — IMO0002 TYPE II DIABETES MELLITUS WITH NEUROLOGICAL MANIFESTATIONS, UNCONTROLLED: Primary | Chronic | ICD-10-CM

## 2018-12-17 DIAGNOSIS — R91.1 PULMONARY NODULE: Chronic | ICD-10-CM

## 2018-12-17 PROCEDURE — 99214 OFFICE O/P EST MOD 30 MIN: CPT | Performed by: INTERNAL MEDICINE

## 2018-12-17 NOTE — PROGRESS NOTES
[facility] INTERNAL MEDICINE  PEDRO STILES M.D.      Felix David Olmedo / 73 y.o. / male  12/17/2018      ASSESSMENT & PLAN:    Problem List Items Addressed This Visit        High    Type II diabetes mellitus with neurological manifestations, uncontrolled (CMS/HCC) - Primary (Chronic)    Overview     Dx'ed 2000  *Sees endocrine, started insulin 2009  +retinopathy and peripheral neuropathy         Relevant Medications    Empagliflozin 25 MG tablet    insulin aspart (NOVOLOG FLEXPEN) 100 UNIT/ML solution pen-injector sc pen    insulin detemir (LEVEMIR) 100 UNIT/ML injection    Coronary artery disease of native artery of native heart with stable angina pectoris (CMS/HCC) (Chronic)    Overview     S/p 2V CABG ([place]) (5/2017)  *[place] (cards)    Stable. Continue ASA, atorvastatin, metoprolol and Imdur.          Relevant Medications    isosorbide mononitrate (IMDUR) 30 MG 24 hr tablet    metoprolol tartrate (LOPRESSOR) 25 MG tablet       Medium    Hyperlipidemia (Chronic)    Overview     Stable. Continue atorvastatin 40 mg daily         Relevant Medications    atorvastatin (LIPITOR) 40 MG tablet    Pulmonary nodule (Chronic)    Current Assessment & Plan     No significant change on CT 6/29/18. Recheck in 1 year. Discussed with him.              No orders of the defined types were placed in this encounter.    No orders of the defined types were placed in this encounter.      Summary/Discussion:  ·     Return in about 6 months (around 6/17/2019) for Reassess chronic medical problems.    ____________________________________________________________________    MEDICATIONS  Current Outpatient Medications on File Prior to Visit   Medication Sig   • acetaminophen (TYLENOL) 325 MG tablet Take 2 tablets by mouth Every 4 (Four) Hours As Needed for Mild Pain (1-3).   • aspirin 81 MG tablet Take 81 mg by mouth Daily.   • atorvastatin (LIPITOR) 40 MG tablet Take 1 tablet by mouth Daily.   • bumetanide (BUMEX) 2 MG tablet Take 0.5  "tablets by mouth Daily.   • Empagliflozin 25 MG tablet Take 25 mg by mouth Daily.   • insulin aspart (NOVOLOG FLEXPEN) 100 UNIT/ML solution pen-injector sc pen Inject 12 Units under the skin into the appropriate area as directed 3 (Three) Times a Day With Meals. CAN CHANGE DEPENDING ON MEAL   • insulin detemir (LEVEMIR) 100 UNIT/ML injection Inject 34 Units under the skin into the appropriate area as directed Every Night. 32-34 unit   • Insulin Pen Needle 31G X 8 MM misc Use to inject insulin   • isosorbide mononitrate (IMDUR) 30 MG 24 hr tablet Take 1 tablet by mouth 2 (Two) Times a Day.   • Menthol, Topical Analgesic, (BIOFREEZE ROLL-ON) 4 % gel Apply 1 application topically Daily As Needed.   • metoprolol tartrate (LOPRESSOR) 25 MG tablet TAKE 1 TABLET TWICE DAILY   • Multiple Vitamins-Minerals (MULTIVITAMIN ADULT PO) Take 1 tablet by mouth Daily.   • potassium chloride (MICRO-K) 10 MEQ CR capsule Take 1 capsule (10 mEq total) by mouth 2 (Two) Times a Day   • TRUE METRIX BLOOD GLUCOSE TEST test strip 1 each by Other route.     No current facility-administered medications on file prior to visit.           VITALS:    Visit Vitals  /64   Pulse 64   Temp 98.3 °F (36.8 °C)   Ht 177.8 cm (70\")   Wt 108 kg (239 lb)   SpO2 95%   BMI 34.29 kg/m²       BP Readings from Last 3 Encounters:   12/17/18 124/64   12/11/18 130/68   09/25/18 114/72     Wt Readings from Last 3 Encounters:   12/17/18 108 kg (239 lb)   12/11/18 107 kg (235 lb)   09/25/18 107 kg (236 lb)      Body mass index is 34.29 kg/m².    CC:  Main reason(s) for today's visit: Diabetes and URI (x 1 week)      HPI:     Type 2 diabetes is managed by endocrinologist.   Lab Results   Component Value Date    HGBA1C 7.55 (H) 11/27/2018    HGBA1C 7.60 (H) 07/27/2018    HGBA1C 7.70 (H) 04/24/2018      CAD s/p CABG in 2017 without angina. Compliant with medications.     Chronic hyperlipidemia:  Current therapy include atorvastatin, denies problems with medication.  "   Most recent labs:   Lab Results   Component Value Date    LDL 45 11/27/2018    LDL 56 07/27/2018    LDL 53 04/24/2018    HDL 56 11/27/2018    HDL 61 (H) 07/27/2018    HDL 49 04/24/2018    TRIG 69 11/27/2018     Pulmonary nodule/copd: stable on latest CT 6/2018. Quit smoking 2009. Denies increased shortness of breath.     1 week of URI symptoms with productive cough, subjective fever. No purulent sputum. Taken Nyquil.     Patient Care Team:  Hilario Rahman MD as PCP - General (Internal Medicine)  Noah Montejo MD as Consulting Physician (Cardiology)  Alden Poole MD as Surgeon (Cardiothoracic Surgery)  Samira Recinos MD as Consulting Physician (Endocrinology)    ____________________________________________________________________    REVIEW OF SYSTEMS    Review of Systems  Constitutional neg  Resp cough  CV neg  GI neg      PHYSICAL EXAMINATION    Physical Exam  Constitutional  No distress  Cardiovascular Rate  normal . Rhythm: regular . Heart sounds:  Normal  Pulmonary/Chest  Effort normal. Breath sounds:  Coarse without crackles/rales/wheezing  Psychiatric  Alert. Judgment and thought content normal. Mood normal    REVIEWED DATA:    Labs:     Lab Results   Component Value Date     11/27/2018    K 4.4 11/27/2018    AST 23 07/24/2017    ALT 21 07/24/2017    BUN 15 11/27/2018    CREATININE 1.04 11/27/2018    CREATININE 1.21 07/27/2018    CREATININE 1.23 04/24/2018    EGFRIFNONA 70 11/27/2018    EGFRIFAFRI 84 11/27/2018       Lab Results   Component Value Date    HGBA1C 7.55 (H) 11/27/2018    HGBA1C 7.60 (H) 07/27/2018    HGBA1C 7.70 (H) 04/24/2018    GLUCOSE 101 (H) 05/31/2017    GLUCOSE 123 (H) 05/22/2017    GLUCOSE 216 (H) 05/21/2017    MICROALBUR 3.6 11/27/2018       Lab Results   Component Value Date    LDL 45 11/27/2018    LDL 56 07/27/2018    LDL 53 04/24/2018    HDL 56 11/27/2018    HDL 61 (H) 07/27/2018    HDL 49 04/24/2018    TRIG 69 11/27/2018    TRIG 71 07/27/2018    TRIG 98 04/24/2018        Lab Results   Component Value Date    TSH 1.850 11/27/2018       Lab Results   Component Value Date    WBC 17.96 (H) 05/21/2017    HGB 12.4 (L) 05/21/2017    HGB 11.5 (L) 05/20/2017    HGB 11.4 (L) 05/19/2017     (H) 05/21/2017       Imaging:     CT CHEST WO CONTRAST-     CLINICAL HISTORY: Follow-up pulmonary nodules.     TECHNIQUE: Spiral CT images were obtained through the chest without IV  contrast and were reconstructed in 3 mm thick axial slices.     Radiation dose reduction techniques were utilized, including automated  exposure control and exposure modulation based on body size.     COMPARISON: CT scans of the chest dated 1/22/2018 and 7/21/2017.     FINDINGS: Again seen are multiple predominantly densely calcified  pulmonary nodules scattered throughout both lungs consistent with benign  granulomas. A few tiny noncalcified nodules are also present  bilaterally, and remain stable when compared to the CT imaging dating  back to 7/21/2017. These are therefore most likely noncalcified  granulomas. No new nodules are identified. There are no acute  infiltrates. There are mild to moderate emphysematous changes are most  prominent in the upper lung zones. There is no mediastinal or hilar or  axillary lymphadenopathy. There are no pleural effusions. 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     IMPRESSION:  Multiple tiny calcified and noncalcified bilateral pulmonary  nodules showing stability since CT imaging dating back to 7/21/2017.  Suggest a follow-up CT scan of the chest in 6-8 months.     This report was finalized on 7/9/2018     Medical Tests:         Summary of old records / correspondence / consultant report:         Request outside records:         ALLERGIES  Allergies   Allergen Reactions   • Adhesive Tape Rash        PFSH:     The following portions of the patient's history were reviewed and updated as appropriate: Allergies /  Current Medications / Past Medical History / Surgical History / Social History / Family History    PROBLEM LIST   Patient Active Problem List   Diagnosis   • Type II diabetes mellitus with neurological manifestations, uncontrolled (CMS/HCC)   • Coronary artery disease of native artery of native heart with stable angina pectoris (CMS/HCC)   • SVT (supraventricular tachycardia) (CMS/HCC)   • Acute diastolic CHF (congestive heart failure) (CMS/HCC)   • S/P CABG x 2   • Hyperlipidemia   • Chronic pain of both knees   • Obesity (BMI 30-39.9)   • Pulmonary nodule       PAST MEDICAL HISTORY  Past Medical History:   Diagnosis Date   • Arthritis     OSTEO   • Coronary artery disease     Status post 2 vessel CABG 5/10/17 by Dr. Poole (LIMA-LAD, SVG-PDA)   • Diastolic dysfunction    • Hyperlipidemia    • Hypertension    • LAFB (left anterior fascicular block)    • Lung nodule    • Pneumonia 2017   • Squamous cell carcinoma     Left cheek s/p resection   • SVT (supraventricular tachycardia) (CMS/HCC)     Diagnosed following CABG       SURGICAL HISTORY  Past Surgical History:   Procedure Laterality Date   • CARDIAC SURGERY     • CATARACT EXTRACTION W/ INTRAOCULAR LENS IMPLANT Left    • COLONOSCOPY     • EYE SURGERY     • KNEE SURGERY Left     Dr. Gallegos   • MOHS SURGERY Left     CHEEK   • SKIN BIOPSY         SOCIAL HISTORY  Social History     Socioeconomic History   • Marital status:      Spouse name: Mendy Vargas   • Number of children: 3   • Years of education: Not on file   • Highest education level: Not on file   Occupational History   • Occupation: Retired (from [employer])   Tobacco Use   • Smoking status: Former Smoker     Packs/day: 1.50     Years: 40.00     Pack years: 60.00     Types: Cigarettes     Last attempt to quit: 2009     Years since quittin.9   • Smokeless tobacco: Never Used   • Tobacco comment: Smoked up to 1.5 ppd.  Smoked for 50 years.  Quit .   Substance and Sexual  Activity   • Alcohol use: Yes     Comment: 2-3 BEERS YEARLY    • Drug use: No     Comment: caffeine use   • Sexual activity: Not Currently       FAMILY HISTORY  Family History   Problem Relation Age of Onset   • Cancer Mother         uterine   • Heart disease Father         Father with fatal MI in 80's   • Diabetes Sister    • Esophageal cancer Brother    • Alzheimer's disease Maternal Grandfather    • Stroke Maternal Grandfather          **Daryl Disclaimer:   Much of this encounter note is an electronic transcription/translation of spoken language to printed text. The electronic translation of spoken language may permit erroneous, or at times, nonsensical words or phrases to be inadvertently transcribed. Although I have reviewed the note for such errors, some may still exist.

## 2018-12-21 RX ORDER — OSELTAMIVIR PHOSPHATE 75 MG/1
75 CAPSULE ORAL 2 TIMES DAILY
Qty: 10 CAPSULE | Refills: 0 | Status: SHIPPED | OUTPATIENT
Start: 2018-12-21 | End: 2018-12-26

## 2018-12-21 NOTE — PROGRESS NOTES
"Wife diagnosed with Flu A at this time and he had similar symptoms, seen by Dr. Rahman on Monday for \"cold\"  "

## 2019-01-29 RX ORDER — ISOSORBIDE MONONITRATE 30 MG/1
TABLET, EXTENDED RELEASE ORAL
Qty: 180 TABLET | Refills: 3 | Status: SHIPPED | OUTPATIENT
Start: 2019-01-29 | End: 2020-03-20

## 2019-02-11 ENCOUNTER — OFFICE VISIT (OUTPATIENT)
Dept: CARDIOLOGY | Facility: CLINIC | Age: 74
End: 2019-02-11

## 2019-02-11 VITALS
HEIGHT: 70 IN | DIASTOLIC BLOOD PRESSURE: 68 MMHG | HEART RATE: 65 BPM | BODY MASS INDEX: 34.22 KG/M2 | WEIGHT: 239 LBS | OXYGEN SATURATION: 96 % | SYSTOLIC BLOOD PRESSURE: 122 MMHG

## 2019-02-11 DIAGNOSIS — I25.810 CORONARY ARTERY DISEASE INVOLVING CORONARY BYPASS GRAFT OF NATIVE HEART WITHOUT ANGINA PECTORIS: Primary | ICD-10-CM

## 2019-02-11 DIAGNOSIS — I51.89 DIASTOLIC DYSFUNCTION: ICD-10-CM

## 2019-02-11 PROCEDURE — 99213 OFFICE O/P EST LOW 20 MIN: CPT | Performed by: INTERNAL MEDICINE

## 2019-02-11 RX ORDER — INSULIN DETEMIR 100 [IU]/ML
INJECTION, SOLUTION SUBCUTANEOUS
COMMUNITY
Start: 2018-12-17 | End: 2019-06-18

## 2019-02-28 NOTE — PROGRESS NOTES
Date of Office Visit: 2019  Encounter Provider: Noah Montejo MD  Place of Service: [facility] CARDIOLOGY  Patient Name: Felix Olmedo  :1945    Chief complaint: Follow-up for coronary artery disease, diastolic dysfunction.    History of Present Illness:    Dear Dr. Rahman:      I again had the pleasure of seeing your patient in cardiology office on 2019. As you   well know, he is a very pleasant 73 year-old white male with a past medical history   significant for coronary artery disease status post coronary artery bypass grafting,   diabetes, and postoperative SVT who presents for follow-up. I initially saw the patient   on 10/3/2016. He had been experiencing 5-6 weeks of a vague chest discomfort   intermittently which she described as an aching and burning sensation in the center of   his chest and over the left precordium. He underwent a Lexiscan Myoview stress test   on 10/12/2016 which showed a small infarct and inferior wall with a moderate amount   of evon-infarct ischemia. He continued to have symptoms, and underwent a left heart   catheterization on 10/18/2016 which showed the right coronary artery to be chronically   occluded the ostium, although left to right collaterals filled the distal right coronary   artery and PDA. The LAD also had up to 60% disease in the mid to distal section.   Medical management was recommended at that time, although the patient was placed   on beta blockers and Imdur and still continued to have exertional angina which actually   worsened. He underwent a repeat left heart catheterization on 2017 by Dr. Guzman, which again showed the right coronary artery to be occluded at the ostium   chronically, and a 60-70% stenosis in the mid LAD. FFR of the mid LAD was   borderline significant at 0.80. These lesions were not felt to be amenable to   percutaneous intervention optimally, and he was referred for coronary artery  bypass   surgery. He ultimately underwent a two-vessel coronary artery bypass grafting   surgery on 5/10/2017 by Dr. Poole (BEAN to LAD, SVG to PDA). He did have some   brief postoperative SVT, but no atrial fibrillation. He also had postoperative leukocytosis   without evidence of infection.      The patient presents today for follow-up.  Overall, he seems to be doing well.  He has   lost 5-6 pounds of weight.  His blood pressure is good today at 122/68.  He only has   chest pressure with heavy exertion, and this is rare.  For the most part, he is   asymptomatic.  He has baseline shortness of breath which occurs with moderate   exertion, and has not changed.      Past Medical History:   Diagnosis Date   • Coronary artery disease     Status post 2 vessel CABG 5/10/17 by Dr. Poole (LIMA-LAD, SVG-PDA)   • Diastolic dysfunction    • Hyperlipidemia    • Hypertension    • LAFB (left anterior fascicular block)    • Lung nodule    • Osteoarthritis    • Pneumonia 02/2017   • Squamous cell carcinoma     Left cheek s/p resection   • SVT (supraventricular tachycardia) (CMS/HCC)     Diagnosed following CABG       Past Surgical History:   Procedure Laterality Date   • CARDIAC CATHETERIZATION N/A 10/18/2016    Procedure: Coronary angiography;  Surgeon: Jesus Guzman MD;  Location: [facility] CATH INVASIVE LOCATION;  Service:    • CARDIAC CATHETERIZATION N/A 10/18/2016    Procedure: Left heart cath;  Surgeon: Jesus Guzman MD;  Location: [facility] CATH INVASIVE LOCATION;  Service:    • CARDIAC CATHETERIZATION N/A 10/18/2016    Procedure: Left ventriculography;  Surgeon: Jesus Guzman MD;  Location: [facility] CATH INVASIVE LOCATION;  Service:    • CARDIAC CATHETERIZATION N/A 4/4/2017    Procedure: Coronary angiography;  Surgeon: Jesus Guzman MD;  Location: [facility] CATH INVASIVE LOCATION;  Service:    • CARDIAC CATHETERIZATION N/A 4/4/2017    Procedure: Left heart cath;  Surgeon: Jesus Guzman MD;   Location: [facility] CATH INVASIVE LOCATION;  Service:    • CARDIAC CATHETERIZATION N/A 4/4/2017    Procedure: Left ventriculography;  Surgeon: Jesus Guzman MD;  Location: [facility] CATH INVASIVE LOCATION;  Service:    • CARDIAC CATHETERIZATION  4/4/2017    Procedure: Functional Flow [place];  Surgeon: Jesus Guzman MD;  Location: [facility] CATH INVASIVE LOCATION;  Service:    • CARDIAC SURGERY     • CATARACT EXTRACTION W/ INTRAOCULAR LENS IMPLANT Left    • COLONOSCOPY     • CORONARY ARTERY BYPASS GRAFT N/A 5/10/2017    Procedure: CORONARY ARTERY BYPASS TIMES TWO UTILIZING THE LEFT GREATER SAPHENOUS VEIN WITH INTERNAL MAMMARY ARTERY GRAFT;  TRANSESOPHAGEAL ECHOCARDIOGRAM;  Surgeon: Alden Poole MD;  Location: [facility] MAIN OR;  Service:    • EYE SURGERY     • KNEE SURGERY Left 2005    Dr. Gallegos   • MOHS SURGERY Left     CHEEK   • SKIN BIOPSY         Current Outpatient Medications on File Prior to Visit   Medication Sig Dispense Refill   • acetaminophen (TYLENOL) 325 MG tablet Take 2 tablets by mouth Every 4 (Four) Hours As Needed for Mild Pain (1-3). 1 bottle 99   • aspirin 81 MG tablet Take 81 mg by mouth Daily.     • atorvastatin (LIPITOR) 40 MG tablet Take 1 tablet by mouth Daily. 90 tablet 3   • bumetanide (BUMEX) 2 MG tablet Take 0.5 tablets by mouth Daily. 90 tablet 3   • Empagliflozin 25 MG tablet Take 25 mg by mouth Daily. 30 tablet 11   • insulin aspart (NOVOLOG FLEXPEN) 100 UNIT/ML solution pen-injector sc pen Inject 12 Units under the skin into the appropriate area as directed 3 (Three) Times a Day With Meals. CAN CHANGE DEPENDING ON MEAL 15 pen 3   • insulin detemir (LEVEMIR) 100 UNIT/ML injection Inject 34 Units under the skin into the appropriate area as directed Every Night. 32-34 unit 10 mL 3   • Insulin Pen Needle 31G X 8 MM misc Use to inject insulin 500 each 3   • isosorbide mononitrate (IMDUR) 30 MG 24 hr tablet TAKE 1 TABLET TWICE DAILY 180 tablet 3   • LEVEMIR FLEXTOUCH 100 UNIT/ML  injection      • Menthol, Topical Analgesic, (BIOFREEZE ROLL-ON) 4 % gel Apply 1 application topically Daily As Needed.     • metoprolol tartrate (LOPRESSOR) 25 MG tablet TAKE 1 TABLET TWICE DAILY 180 tablet 3   • Multiple Vitamins-Minerals (MULTIVITAMIN ADULT PO) Take 1 tablet by mouth Daily.     • potassium chloride (MICRO-K) 10 MEQ CR capsule Take 1 capsule (10 mEq total) by mouth 2 (Two) Times a Day 60 capsule 0   • TRUE METRIX BLOOD GLUCOSE TEST test strip 1 each by Other route.       No current facility-administered medications on file prior to visit.      Allergies as of 02/11/2019 - Reviewed 12/17/2018   Allergen Reaction Noted   • Adhesive tape Rash 05/19/2017     Social History     Socioeconomic History   • Marital status:      Spouse name: Mendy Vargas   • Number of children: 3   • Years of education: Not on file   • Highest education level: Not on file   Social Needs   • Financial resource strain: Not on file   • Food insecurity - worry: Not on file   • Food insecurity - inability: Not on file   • Transportation needs - medical: Not on file   • Transportation needs - non-medical: Not on file   Occupational History   • Occupation: Retired (from [employer])   Tobacco Use   • Smoking status: Former Smoker     Packs/day: 1.50     Years: 40.00     Pack years: 60.00     Types: Cigarettes     Last attempt to quit: 2009     Years since quitting: 10.1   • Smokeless tobacco: Never Used   • Tobacco comment: Smoked up to 1.5 ppd.  Smoked for 50 years.  Quit 2009.   Substance and Sexual Activity   • Alcohol use: Yes     Comment: 2-3 BEERS YEARLY    • Drug use: No     Comment: caffeine use   • Sexual activity: Not Currently   Other Topics Concern   • Not on file   Social History Narrative   • Not on file     Family History   Problem Relation Age of Onset   • Cancer Mother         uterine   • Heart disease Father         Father with fatal MI in 80's   • Diabetes Sister    • Esophageal cancer Brother   "  • Alzheimer's disease Maternal Grandfather    • Stroke Maternal Grandfather        Review of Systems   Constitution: Positive for malaise/fatigue and weight loss.   Cardiovascular: Positive for dyspnea on exertion and leg swelling.   All other systems reviewed and are negative.     Objective:     Vitals:    02/11/19 1440   BP: 122/68   Pulse: 65   SpO2: 96%   Weight: 108 kg (239 lb)   Height: 177.8 cm (70\")     Body mass index is 34.29 kg/m².    Physical Exam   Constitutional: He is oriented to person, place, and time. He appears well-developed and well-nourished.   HENT:   Head: Normocephalic and atraumatic.   Eyes: Conjunctivae are normal.   Neck: Neck supple.   Cardiovascular: Normal rate and regular rhythm. Exam reveals no gallop and no friction rub.   No murmur heard.  Pulmonary/Chest: Effort normal and breath sounds normal.   Abdominal: Soft. There is no tenderness.   Musculoskeletal: He exhibits no edema.   Neurological: He is alert and oriented to person, place, and time.   Skin: Skin is warm.   Psychiatric: He has a normal mood and affect. His behavior is normal.     Lab Review:   Procedures    Cardiac Procedures:  1. Echocardiogram on 10/12/2016: Ejection fraction was 62%. There was grade 1   diastolic dysfunction. There was moderate to severe thickening of the noncoronary   cusp of the aortic valve, but no aortic stenosis.  2. Left heart catheterization on 10/18/2016 by Dr. Guzman: The left main had 20%   distal disease. The LAD had a 40-50% proximal stenosis. The LAD had diffuse 60%   mid to distal disease. The left circumflex coronary artery had a 20% mid stenosis. The   right coronary artery was chronically occluded at the ostium, left to right collaterals   filled the distal vessel and PDA.  3.  Left heart catheterization on 4/4/2017: The left main was normal. The LAD had   30-40% proximal disease and 60-70% mid disease. FFR the mid LAD was borderline   significant at 0.80. The left circumflex had a " 20% mid stenosis. The right coronary   artery was chronically occluded at the ostium. Coronary artery bypass grafting was   recommended at that time.  4. Carotid Doppler ultrasound on 4/4/2017: The right internal carotid artery had   50-59% disease. The left internal carotid artery had 16-49% disease.  5. Status post two-vessel coronary artery bypass grafting on 5/10/2017 by Dr. Poole   (LIMA to LAD, SVG to PDA).  6.  Echocardiogram on 5/20/2017: Ejection fraction was 54%.  There was mild LVH.    There was grade 1a diastolic dysfunction.      Assessment:       Diagnosis Plan   1. Coronary artery disease involving coronary bypass graft of native heart without angina pectoris     2. Diastolic dysfunction       Plan:       The patient seems to be doing well.  Again, he only has chest pressure with heavy exertion.    He is taking the aspirin every other day because of significant bruising when taking it every   day.  He has lost 5-6 pounds, and his blood pressure is excellent today at 122/68.  He will   remain on the metoprolol at 25 mg twice a day, along with the Lipitor at 40 mg per day.  He   did have 50-59% disease in his right internal carotid artery in April 2017 prior to his bypass.    This likely should be reimaged later this year, and I can address this at his next appointment.    For now, I made no changes.  I will see him back in 6 months.    Coronary Artery Disease  Assessment  • The patient has CCS class I - angina only during strenuous or prolonged physical activity    Plan  • Lifestyle modifications discussed include adhering to a heart healthy diet, avoidance of tobacco products, maintenance of a healthy weight, medication compliance, regular exercise and regular monitoring of cholesterol and blood pressure    Subjective - Objective  • There is a history of previous coronary artery bypass graft on or around 5/10/2017  • Current antiplatelet therapy includes aspirin 81 mg

## 2019-03-11 ENCOUNTER — RESULTS ENCOUNTER (OUTPATIENT)
Dept: ENDOCRINOLOGY | Age: 74
End: 2019-03-11

## 2019-03-11 DIAGNOSIS — E78.2 MIXED HYPERLIPIDEMIA: ICD-10-CM

## 2019-03-11 DIAGNOSIS — E11.9 TYPE 2 DIABETES MELLITUS WITHOUT COMPLICATION, WITH LONG-TERM CURRENT USE OF INSULIN (HCC): ICD-10-CM

## 2019-03-11 DIAGNOSIS — E55.9 VITAMIN D DEFICIENCY: ICD-10-CM

## 2019-03-11 DIAGNOSIS — Z79.4 TYPE 2 DIABETES MELLITUS WITHOUT COMPLICATION, WITH LONG-TERM CURRENT USE OF INSULIN (HCC): ICD-10-CM

## 2019-04-11 LAB
25(OH)D3+25(OH)D2 SERPL-MCNC: 43.9 NG/ML (ref 30–100)
BUN SERPL-MCNC: 17 MG/DL (ref 8–23)
BUN/CREAT SERPL: 14.9 (ref 7–25)
CALCIUM SERPL-MCNC: 9.4 MG/DL (ref 8.6–10.5)
CHLORIDE SERPL-SCNC: 104 MMOL/L (ref 98–107)
CHOLEST SERPL-MCNC: 116 MG/DL (ref 0–200)
CO2 SERPL-SCNC: 25.7 MMOL/L (ref 22–29)
CREAT SERPL-MCNC: 1.14 MG/DL (ref 0.76–1.27)
FOLATE SERPL-MCNC: >20 NG/ML (ref 4.78–24.2)
GLUCOSE SERPL-MCNC: 200 MG/DL (ref 65–99)
HBA1C MFR BLD: 7.8 % (ref 4.8–5.6)
HDLC SERPL-MCNC: 55 MG/DL (ref 40–60)
INTERPRETATION: NORMAL
LDLC SERPL CALC-MCNC: 47 MG/DL (ref 0–100)
Lab: NORMAL
POTASSIUM SERPL-SCNC: 4.7 MMOL/L (ref 3.5–5.2)
SODIUM SERPL-SCNC: 141 MMOL/L (ref 136–145)
TRIGL SERPL-MCNC: 68 MG/DL (ref 0–150)
TSH SERPL DL<=0.005 MIU/L-ACNC: 1.72 MIU/ML (ref 0.27–4.2)
VIT B12 SERPL-MCNC: 637 PG/ML (ref 211–946)
VLDLC SERPL CALC-MCNC: 13.6 MG/DL

## 2019-04-24 ENCOUNTER — OFFICE VISIT (OUTPATIENT)
Dept: ENDOCRINOLOGY | Age: 74
End: 2019-04-24

## 2019-04-24 VITALS
BODY MASS INDEX: 34.22 KG/M2 | WEIGHT: 239 LBS | HEART RATE: 62 BPM | HEIGHT: 70 IN | OXYGEN SATURATION: 97 % | DIASTOLIC BLOOD PRESSURE: 68 MMHG | SYSTOLIC BLOOD PRESSURE: 122 MMHG

## 2019-04-24 DIAGNOSIS — E11.9 TYPE 2 DIABETES MELLITUS WITHOUT COMPLICATION, WITH LONG-TERM CURRENT USE OF INSULIN (HCC): Primary | ICD-10-CM

## 2019-04-24 DIAGNOSIS — Z95.1 S/P CABG X 2: ICD-10-CM

## 2019-04-24 DIAGNOSIS — Z79.4 TYPE 2 DIABETES MELLITUS WITHOUT COMPLICATION, WITH LONG-TERM CURRENT USE OF INSULIN (HCC): Primary | ICD-10-CM

## 2019-04-24 DIAGNOSIS — E78.2 MIXED HYPERLIPIDEMIA: ICD-10-CM

## 2019-04-24 PROCEDURE — 99214 OFFICE O/P EST MOD 30 MIN: CPT | Performed by: INTERNAL MEDICINE

## 2019-04-24 NOTE — PROGRESS NOTES
73 y.o.    Patient Care Team:  Hilario Rahman MD as PCP - General (Internal Medicine)  Noah Montejo MD as Consulting Physician (Cardiology)  Alden Poole MD as Surgeon (Cardiothoracic Surgery)  Samira Recinos MD as Consulting Physician (Endocrinology)    Chief Complaint:    FOLLOW UP /TYPE 2 DIABETES MELLITUS  Subjective     HPI    Felix Olmedo,72 y.o. WM is here as a follow up for the management of Type 2 dm. Pt underwent CABG on May 10th 2017, at [facility]. He is currently in the cardiac rehab.       Type 2 dm - Pt has been a diabetic since 2000, He has been on insulin since 2009.    Today in clinic pt reports that 32 - 34 units at bed time, Novolog  8 - 10 units with each meal, Jardiance 25 mg oral daily.   Patient has not been on Victoza due to financial issues.  Has been checking his blood sugars 3-4 times a day.  Average blood sugars are around 182.  He did have 1-2 blood sugars at around 74.  Did not see low blood sugar symptoms with the 74.  No nausea or vomiting.  No increased urination or thirst.  Examination was in April 2018, does have mild history of diabetic retinopathy which has been stable.  Stable tingling and numbness in his bilateral feet, lesions of his feet.  Not on Lyrica or gabapentin.  Feels the symptoms are same. No ulcers or wounds on feet.  Hx of CAD s/p CABG in may 2017, no hx of CKD, no CVA.   Pt is physically active. Weight has been stable.   Pt tries to follow DM diet for most part.   Not on Ace inb or ARB.       Hyperlipidemia -  on Lipitor 40 mg oral daily.        CAD - s/p CABG in May 2017.            Reviewed primary care physician's/consulting physician documentation and lab results :     Interval History      The following portions of the patient's history were reviewed and updated as appropriate: allergies, current medications, past family history, past medical history, past social history, past surgical history and problem list.    Past Medical History:    Diagnosis Date   • Coronary artery disease     Status post 2 vessel CABG 5/10/17 by Dr. Poole (LIMA-LAD, SVG-PDA)   • Diastolic dysfunction    • Hyperlipidemia    • Hypertension    • LAFB (left anterior fascicular block)    • Lung nodule    • Osteoarthritis    • Pneumonia 02/2017   • Squamous cell carcinoma     Left cheek s/p resection   • SVT (supraventricular tachycardia) (CMS/HCC)     Diagnosed following CABG     Family History   Problem Relation Age of Onset   • Cancer Mother         uterine   • Heart disease Father         Father with fatal MI in 80's   • Diabetes Sister    • Esophageal cancer Brother    • Alzheimer's disease Maternal Grandfather    • Stroke Maternal Grandfather      Social History     Socioeconomic History   • Marital status:      Spouse name: Mendy Vargas   • Number of children: 3   • Years of education: Not on file   • Highest education level: Not on file   Occupational History   • Occupation: Retired (from [employer])   Tobacco Use   • Smoking status: Former Smoker     Packs/day: 1.50     Years: 40.00     Pack years: 60.00     Types: Cigarettes     Last attempt to quit: 2009     Years since quitting: 10.3   • Smokeless tobacco: Never Used   • Tobacco comment: Smoked up to 1.5 ppd.  Smoked for 50 years.  Quit 2009.   Substance and Sexual Activity   • Alcohol use: Yes     Comment: 2-3 BEERS YEARLY    • Drug use: No     Comment: caffeine use   • Sexual activity: Not Currently     Allergies   Allergen Reactions   • Adhesive Tape Rash       Current Outpatient Medications:   •  acetaminophen (TYLENOL) 325 MG tablet, Take 2 tablets by mouth Every 4 (Four) Hours As Needed for Mild Pain (1-3)., Disp: 1 bottle, Rfl: 99  •  aspirin 81 MG tablet, Take 81 mg by mouth Daily., Disp: , Rfl:   •  atorvastatin (LIPITOR) 40 MG tablet, Take 1 tablet by mouth Daily., Disp: 90 tablet, Rfl: 3  •  Empagliflozin 25 MG tablet, Take 25 mg by mouth Daily., Disp: 30 tablet, Rfl: 11  •  insulin  aspart (NOVOLOG FLEXPEN) 100 UNIT/ML solution pen-injector sc pen, Inject 12 Units under the skin into the appropriate area as directed 3 (Three) Times a Day With Meals. CAN CHANGE DEPENDING ON MEAL, Disp: 15 pen, Rfl: 3  •  insulin detemir (LEVEMIR) 100 UNIT/ML injection, Inject 34 Units under the skin into the appropriate area as directed Every Night. 32-34 unit, Disp: 10 mL, Rfl: 3  •  Insulin Pen Needle 31G X 8 MM misc, Use to inject insulin, Disp: 500 each, Rfl: 3  •  isosorbide mononitrate (IMDUR) 30 MG 24 hr tablet, TAKE 1 TABLET TWICE DAILY, Disp: 180 tablet, Rfl: 3  •  LEVEMIR FLEXTOUCH 100 UNIT/ML injection, , Disp: , Rfl:   •  Menthol, Topical Analgesic, (BIOFREEZE ROLL-ON) 4 % gel, Apply 1 application topically Daily As Needed., Disp: , Rfl:   •  metoprolol tartrate (LOPRESSOR) 25 MG tablet, TAKE 1 TABLET TWICE DAILY, Disp: 180 tablet, Rfl: 3  •  Multiple Vitamins-Minerals (MULTIVITAMIN ADULT PO), Take 1 tablet by mouth Daily., Disp: , Rfl:   •  potassium chloride (MICRO-K) 10 MEQ CR capsule, Take 1 capsule (10 mEq total) by mouth 2 (Two) Times a Day, Disp: 60 capsule, Rfl: 0  •  TRUE METRIX BLOOD GLUCOSE TEST test strip, 1 each by Other route., Disp: , Rfl:   •  bumetanide (BUMEX) 2 MG tablet, Take 0.5 tablets by mouth Daily., Disp: 90 tablet, Rfl: 3  •  Semaglutide (OZEMPIC) 1 MG/DOSE solution pen-injector, Inject 1 mg under the skin into the appropriate area as directed 1 (One) Time Per Week., Disp: 5 pen, Rfl: 3        Review of Systems   Constitutional: Negative for appetite change, fatigue and fever.   Eyes: Negative for visual disturbance.   Respiratory: Positive for shortness of breath.    Cardiovascular: Positive for leg swelling. Negative for palpitations.   Gastrointestinal: Negative for abdominal pain and vomiting.   Endocrine: Negative for polydipsia and polyuria.   Musculoskeletal: Negative for joint swelling and neck pain.   Skin: Negative for rash.   Neurological: Positive for numbness.  "Negative for weakness.   Psychiatric/Behavioral: Negative for behavioral problems.       Objective       Vitals:    04/24/19 1056   BP: 122/68   Pulse: 62   SpO2: 97%   Weight: 108 kg (239 lb)   Height: 177.8 cm (70\")     Body mass index is 34.29 kg/m².      Physical Exam   Constitutional: He is oriented to person, place, and time. He appears well-nourished.   obese   HENT:   Head: Normocephalic and atraumatic.   Wide neck   Eyes: Conjunctivae and EOM are normal.   Neck: Normal range of motion. Neck supple. Carotid bruit is not present. No thyromegaly present.   Acanthosis nigricans   Cardiovascular: Normal rate and normal heart sounds.   Pulmonary/Chest: Effort normal and breath sounds normal. No stridor. No respiratory distress.   Abdominal: Soft. Bowel sounds are normal. He exhibits no distension. There is no tenderness.   Central obesity   Musculoskeletal: He exhibits no edema or tenderness.   Neurological: He is alert and oriented to person, place, and time.   Skin: Skin is warm and dry.   Psychiatric: He has a normal mood and affect. His behavior is normal.   Vitals reviewed.    Results Review:     I reviewed the patient's new clinical results and mentioned them above in HPI and in plan as well.    Medical records reviewed  Summary: done      Results Encounter on 03/11/2019   Component Date Value Ref Range Status   • Glucose 04/10/2019 200* 65 - 99 mg/dL Final   • BUN 04/10/2019 17  8 - 23 mg/dL Final   • Creatinine 04/10/2019 1.14  0.76 - 1.27 mg/dL Final   • eGFR Non African Am 04/10/2019 63  >60 mL/min/1.73 Final    Comment: The MDRD GFR formula is only valid for adults with stable  renal function between ages 18 and 70.     • eGFR  Am 04/10/2019 76  >60 mL/min/1.73 Final   • BUN/Creatinine Ratio 04/10/2019 14.9  7.0 - 25.0 Final   • Sodium 04/10/2019 141  136 - 145 mmol/L Final   • Potassium 04/10/2019 4.7  3.5 - 5.2 mmol/L Final   • Chloride 04/10/2019 104  98 - 107 mmol/L Final   • Total CO2 " 04/10/2019 25.7  22.0 - 29.0 mmol/L Final   • Calcium 04/10/2019 9.4  8.6 - 10.5 mg/dL Final   • Hemoglobin A1C 04/10/2019 7.80* 4.80 - 5.60 % Final    Comment: Hemoglobin A1C Ranges:  Increased Risk for Diabetes  5.7% to 6.4%  Diabetes                     >= 6.5%  Diabetic Goal                < 7.0%     • Total Cholesterol 04/10/2019 116  0 - 200 mg/dL Final   • Triglycerides 04/10/2019 68  0 - 150 mg/dL Final   • HDL Cholesterol 04/10/2019 55  40 - 60 mg/dL Final   • VLDL Cholesterol 04/10/2019 13.6  mg/dL Final   • LDL Cholesterol  04/10/2019 47  0 - 100 mg/dL Final   • TSH 04/10/2019 1.720  0.270 - 4.200 mIU/mL Final   • Vitamin B-12 04/10/2019 637  211 - 946 pg/mL Final   • Folate 04/10/2019 >20.00  4.78 - 24.20 ng/mL Final   • 25 Hydroxy, Vitamin D 04/10/2019 43.9  30.0 - 100.0 ng/ml Final    Comment: Reference Range for Total Vitamin D 25(OH)  Deficiency <20.0 ng/mL  Insufficiency 21-29 ng/mL  Sufficiency  ng/mL  Toxicity >100 ng/ml     • Interpretation 04/10/2019 Note   Final    Supplemental report is available.   • PDF Image 04/10/2019 Not applicable   Final     Lab Results   Component Value Date    HGBA1C 7.80 (H) 04/10/2019    HGBA1C 7.55 (H) 11/27/2018    HGBA1C 7.60 (H) 07/27/2018     Lab Results   Component Value Date    MICROALBUR 3.6 11/27/2018    CREATININE 1.14 04/10/2019     Imaging Results (most recent)     None                Assessment and Plan:    Felix was seen today for diabetes.    Diagnoses and all orders for this visit:    Type 2 diabetes mellitus without complication, with long-term current use of insulin (CMS/formerly [facility])  -     Hemoglobin A1c; Future  -     Basic Metabolic Panel; Future  -     Microalbumin / Creatinine Urine Ratio - Urine, Clean Catch; Future  -     Lipid Panel; Future  -     TSH; Future    Mixed hyperlipidemia  -     Hemoglobin A1c; Future  -     Basic Metabolic Panel; Future  -     Microalbumin / Creatinine Urine Ratio - Urine, Clean Catch; Future  -     Lipid Panel;  "Future  -     TSH; Future    S/P CABG x 2  -     Hemoglobin A1c; Future  -     Basic Metabolic Panel; Future  -     Microalbumin / Creatinine Urine Ratio - Urine, Clean Catch; Future  -     Lipid Panel; Future  -     TSH; Future    Other orders  -     Semaglutide (OZEMPIC) 1 MG/DOSE solution pen-injector; Inject 1 mg under the skin into the appropriate area as directed 1 (One) Time Per Week.      Type 2 diabetes mellitus-uncontrolled  HbA1c was since last visit  We will start Ozempic 1 mg subcutaneous once a week  Discussed the benefits and the side effects of the medication  Continue Levemir and NovoLog at the current dosage  Continue Jardiance    Hyperlipidemia  LDL at goal  Continue Lipitor 40 mg oral daily.    Reviewed Lab results with the patient.             Samira Recinos MD  04/24/19    EMR Dragon / transcription disclaimer:     \"Dictated utilizing Dragon dictation\".  "

## 2019-05-06 RX ORDER — EMPAGLIFLOZIN 25 MG/1
TABLET, FILM COATED ORAL
Qty: 90 TABLET | Refills: 3 | Status: SHIPPED | OUTPATIENT
Start: 2019-05-06 | End: 2020-05-18 | Stop reason: SDUPTHER

## 2019-06-18 ENCOUNTER — OFFICE VISIT (OUTPATIENT)
Dept: INTERNAL MEDICINE | Age: 74
End: 2019-06-18

## 2019-06-18 VITALS
SYSTOLIC BLOOD PRESSURE: 110 MMHG | HEART RATE: 68 BPM | BODY MASS INDEX: 33.21 KG/M2 | TEMPERATURE: 97.8 F | OXYGEN SATURATION: 95 % | HEIGHT: 70 IN | WEIGHT: 232 LBS | DIASTOLIC BLOOD PRESSURE: 64 MMHG

## 2019-06-18 DIAGNOSIS — E78.2 MIXED HYPERLIPIDEMIA: Chronic | ICD-10-CM

## 2019-06-18 DIAGNOSIS — R26.9 GAIT DIFFICULTY: ICD-10-CM

## 2019-06-18 DIAGNOSIS — R91.8 MULTIPLE PULMONARY NODULES: Chronic | ICD-10-CM

## 2019-06-18 DIAGNOSIS — IMO0002 TYPE II DIABETES MELLITUS WITH NEUROLOGICAL MANIFESTATIONS, UNCONTROLLED: Primary | Chronic | ICD-10-CM

## 2019-06-18 DIAGNOSIS — I25.118 CORONARY ARTERY DISEASE OF NATIVE ARTERY OF NATIVE HEART WITH STABLE ANGINA PECTORIS (HCC): Chronic | ICD-10-CM

## 2019-06-18 PROCEDURE — 99214 OFFICE O/P EST MOD 30 MIN: CPT | Performed by: INTERNAL MEDICINE

## 2019-06-18 NOTE — PATIENT INSTRUCTIONS
** IMPORTANT MESSAGE FROM DR. STILES **    In our office, your satisfaction is VERY important to us.     You may receive a survey from [facility] by mail or E-mail for you to provide feedback about your visit. This information is invaluable for me to know what we can do to improve our services.     I ask that you please take a few minutes to complete the survey and let us know how we are doing in serving your needs. (You may receive the survey more than once for multiple visits)    Thank You !    Dr. Stiles & Staff    _____________________________________________________________________

## 2019-06-18 NOTE — PROGRESS NOTES
[facility] [place] INTERNAL MEDICINE  PEDRO STILES M.D.      Felix David Olmedo / 73 y.o. / male  06/18/2019    ASSESSMENT & PLAN     Problem List Items Addressed This Visit        Medium    Type II diabetes mellitus with neurological manifestations, uncontrolled (CMS/HCC) - Primary (Chronic)    Overview     Dx'ed 2000  *Sees endocrine, started insulin 2009  +retinopathy and peripheral neuropathy         Current Assessment & Plan     Could not tolerate Ozempic. Continue follow-up with endocrinologist.   Worsening diabetic peripheral neuropathy but declines medication at this time.  Advised to maintain tighter control of diabetes with A1c level minimally of 7.0.         Relevant Medications    insulin aspart (NOVOLOG FLEXPEN) 100 UNIT/ML solution pen-injector sc pen    insulin detemir (LEVEMIR) 100 UNIT/ML injection    JARDIANCE 25 MG tablet    Coronary artery disease of native artery of native heart with stable angina pectoris (CMS/HCC) (Chronic)    Overview     S/p 2V CABG ([place]) (5/2017)  *[place] (cards)    Stable. Continue ASA, atorvastatin, metoprolol and Imdur.          Relevant Medications    metoprolol tartrate (LOPRESSOR) 25 MG tablet    isosorbide mononitrate (IMDUR) 30 MG 24 hr tablet    Hyperlipidemia (Chronic)    Overview     Stable. Continue atorvastatin 40 mg daily         Relevant Medications    atorvastatin (LIPITOR) 40 MG tablet    Multiple pulmonary nodules (Chronic)    Current Assessment & Plan     Ordered follow-up CT of chest.          Relevant Orders    CT Chest Without Contrast    Gait difficulty (Chronic)    Current Assessment & Plan     Physical therapy for evaluation and treatment for gait problems and fall risk reduction         Relevant Orders    Ambulatory Referral to Physical Therapy Evaluate and treat        Orders Placed This Encounter   Procedures   • CT Chest Without Contrast   • Ambulatory Referral to Physical Therapy Evaluate and treat     No orders of the defined types were placed in this  "encounter.      Summary/Discussion:  ·     Next Appointment with me: 10/16/2019    Return in about 4 months (around 10/18/2019) for Reassess chronic medical problems, Schedule AWV with MILADYS in AUGUST.      MEDICATIONS  Current Outpatient Medications   Medication Sig Dispense Refill   • acetaminophen (TYLENOL) 325 MG tablet Take 2 tablets by mouth Every 4 (Four) Hours As Needed for Mild Pain (1-3). 1 bottle 99   • aspirin 81 MG tablet Take 81 mg by mouth Daily.     • atorvastatin (LIPITOR) 40 MG tablet Take 1 tablet by mouth Daily. 90 tablet 3   • bumetanide (BUMEX) 2 MG tablet Take 0.5 tablets by mouth Daily. 90 tablet 3   • insulin aspart (NOVOLOG FLEXPEN) 100 UNIT/ML solution pen-injector sc pen Inject 12 Units under the skin into the appropriate area as directed 3 (Three) Times a Day With Meals. CAN CHANGE DEPENDING ON MEAL 15 pen 3   • insulin detemir (LEVEMIR) 100 UNIT/ML injection Inject 34 Units under the skin into the appropriate area as directed Every Night. 32-34 unit 10 mL 3   • Insulin Pen Needle 31G X 8 MM misc Use to inject insulin 500 each 3   • isosorbide mononitrate (IMDUR) 30 MG 24 hr tablet TAKE 1 TABLET TWICE DAILY 180 tablet 3   • JARDIANCE 25 MG tablet TAKE 1 TABLET EVERY DAY 90 tablet 3   • Menthol, Topical Analgesic, (BIOFREEZE ROLL-ON) 4 % gel Apply 1 application topically Daily As Needed.     • metoprolol tartrate (LOPRESSOR) 25 MG tablet TAKE 1 TABLET TWICE DAILY 180 tablet 3   • Multiple Vitamins-Minerals (MULTIVITAMIN ADULT PO) Take 1 tablet by mouth Daily.     • potassium chloride (MICRO-K) 10 MEQ CR capsule Take 1 capsule (10 mEq total) by mouth 2 (Two) Times a Day 60 capsule 0   • TRUE METRIX BLOOD GLUCOSE TEST test strip 1 each by Other route.       No current facility-administered medications for this visit.           VITALS:    Visit Vitals  /64   Pulse 68   Temp 97.8 °F (36.6 °C)   Ht 177.8 cm (70\")   Wt 105 kg (232 lb)   SpO2 95%   BMI 33.29 kg/m²       BP Readings from " Last 3 Encounters:   06/18/19 110/64   04/24/19 122/68   02/11/19 122/68     Wt Readings from Last 3 Encounters:   06/18/19 105 kg (232 lb)   04/24/19 108 kg (239 lb)   02/11/19 108 kg (239 lb)      Body mass index is 33.29 kg/m².        CC:  Main reason(s) for today's visit: Diabetes      HPI:     Complains with difficulty with gait.  Feels unsteady when he stands up from sitting position but also unsteady when walking.  He has known history of osteoarthritis of the left knee and diabetic peripheral neuropathy.    Type 2 diabetes: Endocrinology started him on Ozempic but he discontinued it due to poor tolerance.  Latest A1c level was 7.8 which was higher than previous A1c levels in the last 1 year.  He has diabetic peripheral neuropathy but declines medication at this time.    Hyperlipidemia on atorvastatin 40 mg.    Coronary artery disease status post CABG in 2017, denies chest pain or dyspnea on exertion.    History of multiple pulmonary nodules due for follow-up CT scan of the chest.    Patient Care Team:  Hilario Rahman MD as PCP - General (Internal Medicine)  Noah Montejo MD as Consulting Physician (Cardiology)  Alden Poole MD as Surgeon (Cardiothoracic Surgery)  Samira Recinos MD as Consulting Physician (Endocrinology)      REVIEW OF SYSTEMS     Review of Systems  Constitutional neg  Resp neg  CV neg  GI neg  Neuro paresthesia of feet, cramps       PHYSICAL EXAMINATION     Physical Exam  Constitutional  No distress  Cardiovascular Rate  normal . Rhythm: regular . Heart sounds:  Normal  Pulmonary/Chest: Effort normal and breath sounds normal.    Psychiatric  Alert. Judgment and thought content normal. Mood normal.       REVIEWED DATA     Labs:     Lab Results   Component Value Date     04/10/2019    K 4.7 04/10/2019    CALCIUM 9.4 04/10/2019    AST 23 07/24/2017    ALT 21 07/24/2017    BUN 17 04/10/2019    CREATININE 1.14 04/10/2019    CREATININE 1.04 11/27/2018    CREATININE 1.21  07/27/2018    EGFRIFNONA 63 04/10/2019    EGFRIFAFRI 76 04/10/2019       Lab Results   Component Value Date    HGBA1C 7.80 (H) 04/10/2019    HGBA1C 7.55 (H) 11/27/2018    HGBA1C 7.60 (H) 07/27/2018    GLUCOSE 101 (H) 05/31/2017    GLUCOSE 123 (H) 05/22/2017    GLUCOSE 216 (H) 05/21/2017    MICROALBUR 3.6 11/27/2018       Lab Results   Component Value Date    LDL 47 04/10/2019    LDL 45 11/27/2018    LDL 56 07/27/2018    HDL 55 04/10/2019    HDL 56 11/27/2018    HDL 61 (H) 07/27/2018    TRIG 68 04/10/2019    TRIG 69 11/27/2018    TRIG 71 07/27/2018       Lab Results   Component Value Date    TSH 1.720 04/10/2019       Lab Results   Component Value Date    WBC 17.96 (H) 05/21/2017    HGB 12.4 (L) 05/21/2017    HGB 11.5 (L) 05/20/2017    HGB 11.4 (L) 05/19/2017     (H) 05/21/2017       Lab Results   Component Value Date    GLUCOSEU 500 mg/dL (2+) (A) 05/12/2017    BLOODU Large (3+) (A) 05/12/2017    NITRITEU Negative 05/12/2017    LEUKOCYTESUR Small (1+) (A) 05/12/2017       Imaging:         Medical Tests:         Summary of old records / correspondence / consultant report:         Request outside records:         ALLERGIES  Allergies   Allergen Reactions   • Adhesive Tape Rash        PFSH:     The following portions of the patient's history were reviewed and updated as appropriate: Allergies / Current Medications / Past Medical History / Surgical History / Social History / Family History    PROBLEM LIST   Patient Active Problem List   Diagnosis   • Type II diabetes mellitus with neurological manifestations, uncontrolled (CMS/[facility])   • Coronary artery disease of native artery of native heart with stable angina pectoris (CMS/[facility])   • SVT (supraventricular tachycardia) (CMS/[facility])   • Acute diastolic CHF (congestive heart failure) (CMS/[facility])   • S/P CABG x 2   • Hyperlipidemia   • Chronic pain of both knees   • Obesity (BMI 30-39.9)   • Multiple pulmonary nodules   • Gait difficulty       PAST MEDICAL HISTORY  Past  Medical History:   Diagnosis Date   • Coronary artery disease     Status post 2 vessel CABG 5/10/17 by Dr. Poole (LIMA-LAD, SVG-PDA)   • Diastolic dysfunction    • Hyperlipidemia    • Hypertension    • LAFB (left anterior fascicular block)    • Lung nodule    • Osteoarthritis    • Pneumonia 02/2017   • Squamous cell carcinoma     Left cheek s/p resection   • SVT (supraventricular tachycardia) (CMS/HCC)     Diagnosed following CABG       SURGICAL HISTORY  Past Surgical History:   Procedure Laterality Date   • CARDIAC CATHETERIZATION N/A 10/18/2016    Procedure: Coronary angiography;  Surgeon: Jesus Guzman MD;  [facility];  Service:    • CARDIAC CATHETERIZATION N/A 10/18/2016    Procedure: Left heart cath;  Surgeon: Jesus Guzman MD;  [facility];  Service:    • CARDIAC CATHETERIZATION N/A 10/18/2016    Procedure: Left ventriculography;  Surgeon: Jesus Guzman MD;  Location: [facility] CATH INVASIVE LOCATION;  Service:    • CARDIAC CATHETERIZATION N/A 4/4/2017    Procedure: Coronary angiography;  Surgeon: Jesus Guzman MD;  Location: [facility] CATH INVASIVE LOCATION;  Service:    • CARDIAC CATHETERIZATION N/A 4/4/2017    Procedure: Left heart cath;  Surgeon: Jesus Guzman MD;  Location: [facility] CATH INVASIVE LOCATION;  Service:    • CARDIAC CATHETERIZATION N/A 4/4/2017    Procedure: Left ventriculography;  Surgeon: Jesus Guzman MD;  Location: [facility] CATH INVASIVE LOCATION;  Service:    • CARDIAC CATHETERIZATION  4/4/2017    Procedure: Functional Flow [place];  Surgeon: Jesus Guzman MD;  Location: [facility] CATH INVASIVE LOCATION;  Service:    • CARDIAC SURGERY     • CATARACT EXTRACTION W/ INTRAOCULAR LENS IMPLANT Left    • COLONOSCOPY     • CORONARY ARTERY BYPASS GRAFT N/A 5/10/2017    Procedure: CORONARY ARTERY BYPASS TIMES TWO UTILIZING THE LEFT GREATER SAPHENOUS VEIN WITH INTERNAL MAMMARY ARTERY GRAFT;   TRANSESOPHAGEAL ECHOCARDIOGRAM;  Surgeon: Alden Poole MD;  Location: [facility];  Service:    • EYE SURGERY     • KNEE SURGERY Left 2005    Dr. Gallegos   • MOHS SURGERY Left     CHEEK   • SKIN BIOPSY         SOCIAL HISTORY  Social History     Socioeconomic History   • Marital status:      Spouse name: Mendy aVrgas   • Number of children: 3   • Years of education: Not on file   • Highest education level: Not on file   Occupational History   • Occupation: Retired (from [employer])   Tobacco Use   • Smoking status: Former Smoker     Packs/day: 1.50     Years: 40.00     Pack years: 60.00     Types: Cigarettes     Last attempt to quit: 2009     Years since quitting: 10.4   • Smokeless tobacco: Never Used   • Tobacco comment: Smoked up to 1.5 ppd.  Smoked for 50 years.  Quit 2009.   Substance and Sexual Activity   • Alcohol use: Yes     Comment: 2-3 BEERS YEARLY    • Drug use: No     Comment: caffeine use   • Sexual activity: Not Currently       FAMILY HISTORY  Family History   Problem Relation Age of Onset   • Cancer Mother         uterine   • Heart disease Father         Father with fatal MI in 80's   • Diabetes Sister    • Esophageal cancer Brother    • Alzheimer's disease Maternal Grandfather    • Stroke Maternal Grandfather          **Dragon Disclaimer:   Much of this encounter note is an electronic transcription/translation of spoken language to printed text. The electronic translation of spoken language may permit erroneous, or at times, nonsensical words or phrases to be inadvertently transcribed. Although I have reviewed the note for such errors, some may still exist.       Template created by Helen Rahman MD

## 2019-06-18 NOTE — ASSESSMENT & PLAN NOTE
Could not tolerate Ozempic. Continue follow-up with endocrinologist.   Worsening diabetic peripheral neuropathy but declines medication at this time.  Advised to maintain tighter control of diabetes with A1c level minimally of 7.0.

## 2019-06-24 ENCOUNTER — HOSPITAL ENCOUNTER (OUTPATIENT)
Dept: CT IMAGING | Facility: HOSPITAL | Age: 74
Discharge: HOME | End: 2019-06-24
Admitting: INTERNAL MEDICINE

## 2019-06-24 PROCEDURE — 71250 CT THORAX DX C-: CPT

## 2019-07-16 ENCOUNTER — LAB (OUTPATIENT)
Dept: ENDOCRINOLOGY | Age: 74
End: 2019-07-16

## 2019-07-16 DIAGNOSIS — Z79.4 TYPE 2 DIABETES MELLITUS WITHOUT COMPLICATION, WITH LONG-TERM CURRENT USE OF INSULIN (HCC): ICD-10-CM

## 2019-07-16 DIAGNOSIS — E78.2 MIXED HYPERLIPIDEMIA: ICD-10-CM

## 2019-07-16 DIAGNOSIS — E11.9 TYPE 2 DIABETES MELLITUS WITHOUT COMPLICATION, WITH LONG-TERM CURRENT USE OF INSULIN (HCC): ICD-10-CM

## 2019-07-16 DIAGNOSIS — Z95.1 S/P CABG X 2: ICD-10-CM

## 2019-07-17 LAB
ALBUMIN/CREAT UR: <4.2 MG/G CREAT (ref 0–30)
BUN SERPL-MCNC: 17 MG/DL (ref 8–23)
BUN/CREAT SERPL: 15 (ref 7–25)
CALCIUM SERPL-MCNC: 9 MG/DL (ref 8.6–10.5)
CHLORIDE SERPL-SCNC: 103 MMOL/L (ref 98–107)
CHOLEST SERPL-MCNC: 112 MG/DL (ref 0–200)
CO2 SERPL-SCNC: 26.3 MMOL/L (ref 22–29)
CREAT SERPL-MCNC: 1.13 MG/DL (ref 0.76–1.27)
CREAT UR-MCNC: 70.6 MG/DL
GLUCOSE SERPL-MCNC: 238 MG/DL (ref 65–99)
HBA1C MFR BLD: 8.3 % (ref 4.8–5.6)
HDLC SERPL-MCNC: 51 MG/DL (ref 40–60)
INTERPRETATION: NORMAL
LDLC SERPL CALC-MCNC: 37 MG/DL (ref 0–100)
Lab: NORMAL
MICROALBUMIN UR-MCNC: <3 UG/ML
POTASSIUM SERPL-SCNC: 5 MMOL/L (ref 3.5–5.2)
SODIUM SERPL-SCNC: 140 MMOL/L (ref 136–145)
TRIGL SERPL-MCNC: 121 MG/DL (ref 0–150)
TSH SERPL DL<=0.005 MIU/L-ACNC: 1.93 MIU/ML (ref 0.27–4.2)
VLDLC SERPL CALC-MCNC: 24.2 MG/DL

## 2019-07-30 ENCOUNTER — OFFICE VISIT (OUTPATIENT)
Dept: ENDOCRINOLOGY | Age: 74
End: 2019-07-30

## 2019-07-30 VITALS
OXYGEN SATURATION: 97 % | BODY MASS INDEX: 33.5 KG/M2 | DIASTOLIC BLOOD PRESSURE: 60 MMHG | HEIGHT: 70 IN | SYSTOLIC BLOOD PRESSURE: 128 MMHG | HEART RATE: 57 BPM | WEIGHT: 234 LBS

## 2019-07-30 DIAGNOSIS — E78.2 MIXED HYPERLIPIDEMIA: ICD-10-CM

## 2019-07-30 DIAGNOSIS — E11.9 TYPE 2 DIABETES MELLITUS WITHOUT COMPLICATION, WITH LONG-TERM CURRENT USE OF INSULIN (HCC): Primary | ICD-10-CM

## 2019-07-30 DIAGNOSIS — Z79.4 TYPE 2 DIABETES MELLITUS WITHOUT COMPLICATION, WITH LONG-TERM CURRENT USE OF INSULIN (HCC): Primary | ICD-10-CM

## 2019-07-30 PROCEDURE — 99214 OFFICE O/P EST MOD 30 MIN: CPT | Performed by: INTERNAL MEDICINE

## 2019-07-30 NOTE — PROGRESS NOTES
73 y.o.    Patient Care Team:  Hilario Rahman MD as PCP - General (Internal Medicine)  Noah Montejo MD as Consulting Physician (Cardiology)  Alden Poole MD as Surgeon (Cardiothoracic Surgery)  Samira Recinos MD as Consulting Physician (Endocrinology)    Chief Complaint:    FOLLOW UP/ TYPE 2 DIABETES MELLITUS  Subjective     HPI    Felix Olmedo,73 y.o. WM is here as a follow up for the management of Type 2 dm. Pt underwent CABG on May 10th 2017, at [facility]. He is currently in the cardiac rehab.       Type 2 dm - Pt has been a diabetic since 2000, He has been on insulin since 2009.    Today in clinic patient reports that he is on Levemir 34 units at bedtime, NovoLog 8-12 units with each meal, Jardiance 25 mg oral daily.  He calculates the dosage of his NovoLog based on the number of carbohydrates he is eating and also based on his blood sugars numbers.  Could not tolerate Victoza, metformin, Ozempic either because of the financial issues or because of the side effects of nausea/stomach cramps/diarrhea.  Has been checking his blood sugars 3-4 times a day, average blood sugars are around 190-200.  No significant low blood sugars.  Last eye examination was in April 2019, stable diabetic retinopathy.  Stable tingling and numbness in his bilateral feet, lesions of his feet.  Not on Lyrica or gabapentin.  Feels the symptoms are same. No ulcers or wounds on feet.  Hx of CAD s/p CABG in may 2017, no hx of CKD, no CVA.   Pt is physically active. Weight has been stable.   Pt tries to follow DM diet for most part.   Not on Ace inb or ARB.       Hyperlipidemia -  on Lipitor 40 mg oral daily.        CAD - s/p CABG in May 2017.      Reviewed primary care physician's/consulting physician documentation and lab results :     Interval History      The following portions of the patient's history were reviewed and updated as appropriate: allergies, current medications, past family history, past medical history,  past social history, past surgical history and problem list.    Past Medical History:   Diagnosis Date   • Coronary artery disease     Status post 2 vessel CABG 5/10/17 by Dr. Poole (LIMA-LAD, SVG-PDA)   • Diastolic dysfunction    • Hyperlipidemia    • Hypertension    • LAFB (left anterior fascicular block)    • Lung nodule    • Osteoarthritis    • Pneumonia 02/2017   • Squamous cell carcinoma     Left cheek s/p resection   • SVT (supraventricular tachycardia) (CMS/HCC)     Diagnosed following CABG     Family History   Problem Relation Age of Onset   • Cancer Mother         uterine   • Heart disease Father         Father with fatal MI in 80's   • Diabetes Sister    • Esophageal cancer Brother    • Alzheimer's disease Maternal Grandfather    • Stroke Maternal Grandfather      Social History     Socioeconomic History   • Marital status:      Spouse name: Mendy Vargas   • Number of children: 3   • Years of education: Not on file   • Highest education level: Not on file   Occupational History   • Occupation: Retired (from [employer])   Tobacco Use   • Smoking status: Former Smoker     Packs/day: 1.50     Years: 40.00     Pack years: 60.00     Types: Cigarettes     Last attempt to quit: 2009     Years since quitting: 10.5   • Smokeless tobacco: Never Used   • Tobacco comment: Smoked up to 1.5 ppd.  Smoked for 50 years.  Quit 2009.   Substance and Sexual Activity   • Alcohol use: Yes     Comment: 2-3 BEERS YEARLY    • Drug use: No     Comment: caffeine use   • Sexual activity: Not Currently     Allergies   Allergen Reactions   • Adhesive Tape Rash       Current Outpatient Medications:   •  acetaminophen (TYLENOL) 325 MG tablet, Take 2 tablets by mouth Every 4 (Four) Hours As Needed for Mild Pain (1-3)., Disp: 1 bottle, Rfl: 99  •  aspirin 81 MG tablet, Take 81 mg by mouth Daily., Disp: , Rfl:   •  atorvastatin (LIPITOR) 40 MG tablet, Take 1 tablet by mouth Daily., Disp: 90 tablet, Rfl: 3  •  bumetanide  (BUMEX) 2 MG tablet, Take 0.5 tablets by mouth Daily., Disp: 90 tablet, Rfl: 3  •  insulin aspart (NOVOLOG FLEXPEN) 100 UNIT/ML solution pen-injector sc pen, Inject 12 Units under the skin into the appropriate area as directed 3 (Three) Times a Day With Meals. CAN CHANGE DEPENDING ON MEAL, Disp: 15 pen, Rfl: 3  •  insulin detemir (LEVEMIR) 100 UNIT/ML injection, Inject 34 Units under the skin into the appropriate area as directed Every Night. 32-34 unit, Disp: 10 mL, Rfl: 3  •  Insulin Pen Needle 31G X 8 MM misc, Use to inject insulin, Disp: 500 each, Rfl: 3  •  isosorbide mononitrate (IMDUR) 30 MG 24 hr tablet, TAKE 1 TABLET TWICE DAILY, Disp: 180 tablet, Rfl: 3  •  JARDIANCE 25 MG tablet, TAKE 1 TABLET EVERY DAY, Disp: 90 tablet, Rfl: 3  •  Menthol, Topical Analgesic, (BIOFREEZE ROLL-ON) 4 % gel, Apply 1 application topically Daily As Needed., Disp: , Rfl:   •  metoprolol tartrate (LOPRESSOR) 25 MG tablet, TAKE 1 TABLET TWICE DAILY, Disp: 180 tablet, Rfl: 3  •  Multiple Vitamins-Minerals (MULTIVITAMIN ADULT PO), Take 1 tablet by mouth Daily., Disp: , Rfl:   •  potassium chloride (MICRO-K) 10 MEQ CR capsule, Take 1 capsule (10 mEq total) by mouth 2 (Two) Times a Day, Disp: 60 capsule, Rfl: 0  •  TRUE METRIX BLOOD GLUCOSE TEST test strip, 1 each by Other route., Disp: , Rfl:         Review of Systems   Constitutional: Negative for appetite change, fatigue and fever.   Eyes: Negative for visual disturbance.   Respiratory: Negative for shortness of breath.    Cardiovascular: Negative for palpitations and leg swelling.   Gastrointestinal: Negative for abdominal pain and vomiting.   Endocrine: Negative for polydipsia and polyuria.   Musculoskeletal: Negative for joint swelling and neck pain.   Skin: Negative for rash.   Neurological: Negative for weakness and numbness.   Psychiatric/Behavioral: Negative for behavioral problems.       Objective       Vitals:    07/30/19 1307   BP: 128/60   Pulse: 57   SpO2: 97%   Weight:  "106 kg (234 lb)   Height: 177.8 cm (70\")     Body mass index is 33.58 kg/m².      Physical Exam   Constitutional: He is oriented to person, place, and time. He appears well-nourished.   obese   HENT:   Head: Normocephalic and atraumatic.   Wide neck   Eyes: Conjunctivae and EOM are normal.   Neck: Normal range of motion. Neck supple. Carotid bruit is not present. No thyromegaly present.   Acanthosis nigricans   Cardiovascular: Normal rate and normal heart sounds.   Pulmonary/Chest: Effort normal and breath sounds normal. No stridor. No respiratory distress.   Abdominal: Soft. Bowel sounds are normal. He exhibits no distension. There is no tenderness.   Central obesity   Musculoskeletal: He exhibits no edema or tenderness.   Neurological: He is alert and oriented to person, place, and time.   Skin: Skin is warm and dry.   Psychiatric: He has a normal mood and affect. His behavior is normal.   Vitals reviewed.    Results Review:     I reviewed the patient's new clinical results and mentioned them above in HPI and in plan as well.    Medical records reviewed  Summary: done      Lab on 07/16/2019   Component Date Value Ref Range Status   • TSH 07/16/2019 1.930  0.270 - 4.200 mIU/mL Final   • Total Cholesterol 07/16/2019 112  0 - 200 mg/dL Final   • Triglycerides 07/16/2019 121  0 - 150 mg/dL Final   • HDL Cholesterol 07/16/2019 51  40 - 60 mg/dL Final   • VLDL Cholesterol 07/16/2019 24.2  mg/dL Final   • LDL Cholesterol  07/16/2019 37  0 - 100 mg/dL Final   • Creatinine, Urine 07/16/2019 70.6  Not Estab. mg/dL Final   • Microalbumin, Urine 07/16/2019 <3.0  Not Estab. ug/mL Final   • Microalbumin/Creatinine Ratio 07/16/2019 <4.2  0.0 - 30.0 mg/g creat Final    Comment:                      Normal:                0.0 -  30.0                       Albuminuria:          31.0 - 300.0                       Clinical albuminuria:       >300.0     • Glucose 07/16/2019 238* 65 - 99 mg/dL Final   • BUN 07/16/2019 17  8 - 23 mg/dL " Final   • Creatinine 07/16/2019 1.13  0.76 - 1.27 mg/dL Final   • eGFR Non  Am 07/16/2019 63  >60 mL/min/1.73 Final    Comment: The MDRD GFR formula is only valid for adults with stable  renal function between ages 18 and 70.     • eGFR  Am 07/16/2019 77  >60 mL/min/1.73 Final   • BUN/Creatinine Ratio 07/16/2019 15.0  7.0 - 25.0 Final   • Sodium 07/16/2019 140  136 - 145 mmol/L Final   • Potassium 07/16/2019 5.0  3.5 - 5.2 mmol/L Final   • Chloride 07/16/2019 103  98 - 107 mmol/L Final   • Total CO2 07/16/2019 26.3  22.0 - 29.0 mmol/L Final   • Calcium 07/16/2019 9.0  8.6 - 10.5 mg/dL Final   • Hemoglobin A1C 07/16/2019 8.30* 4.80 - 5.60 % Final    Comment: Hemoglobin A1C Ranges:  Increased Risk for Diabetes  5.7% to 6.4%  Diabetes                     >= 6.5%  Diabetic Goal                < 7.0%     • Interpretation 07/16/2019 Note   Final    Supplemental report is available.   • PDF Image 07/16/2019 Not applicable   Final     Lab Results   Component Value Date    HGBA1C 8.30 (H) 07/16/2019    HGBA1C 7.80 (H) 04/10/2019    HGBA1C 7.55 (H) 11/27/2018     Lab Results   Component Value Date    MICROALBUR <3.0 07/16/2019    CREATININE 1.13 07/16/2019     Imaging Results (most recent)     None                Assessment and Plan:    Felix was seen today for diabetes.    Diagnoses and all orders for this visit:    Type 2 diabetes mellitus without complication, with long-term current use of insulin (CMS/[facility])  -     Hemoglobin A1c; Future  -     Basic Metabolic Panel; Future    Mixed hyperlipidemia  -     Hemoglobin A1c; Future  -     Basic Metabolic Panel; Future      Type 2 diabetes mellitus-uncontrolled  HbA1c is worse  Discussed with the patient about increasing the dosage of his Levemir to 36 units at bedtime  Increasing the NovoLog to 10/14 units with each meal  Continue Jardiance  Add to Janumet.  Gave some samples of Janumet to the patient to see if he would tolerate this  "medication.    Hyperlipidemia  Continue Lipitor 40 mg oral daily    Reviewed Lab results with the patient.             Samira Recinos MD  07/30/19    EMR Dragon / transcription disclaimer:     \"Dictated utilizing Dragon dictation\".  "

## 2019-08-15 ENCOUNTER — OFFICE VISIT (OUTPATIENT)
Dept: CARDIOLOGY | Facility: CLINIC | Age: 74
End: 2019-08-15

## 2019-08-15 VITALS
WEIGHT: 236.6 LBS | HEIGHT: 70 IN | SYSTOLIC BLOOD PRESSURE: 118 MMHG | BODY MASS INDEX: 33.87 KG/M2 | DIASTOLIC BLOOD PRESSURE: 64 MMHG | OXYGEN SATURATION: 93 % | HEART RATE: 62 BPM

## 2019-08-15 DIAGNOSIS — I50.32 CHRONIC DIASTOLIC CONGESTIVE HEART FAILURE (HCC): ICD-10-CM

## 2019-08-15 DIAGNOSIS — I25.810 CORONARY ARTERY DISEASE INVOLVING CORONARY BYPASS GRAFT OF NATIVE HEART WITHOUT ANGINA PECTORIS: Primary | ICD-10-CM

## 2019-08-15 PROCEDURE — 99213 OFFICE O/P EST LOW 20 MIN: CPT | Performed by: INTERNAL MEDICINE

## 2019-08-16 ENCOUNTER — TREATMENT (OUTPATIENT)
Dept: ENDOCRINOLOGY | Age: 74
End: 2019-08-16

## 2019-08-16 DIAGNOSIS — IMO0002 TYPE II DIABETES MELLITUS WITH NEUROLOGICAL MANIFESTATIONS, UNCONTROLLED: Chronic | ICD-10-CM

## 2019-08-16 PROCEDURE — 95251 CONT GLUC MNTR ANALYSIS I&R: CPT | Performed by: INTERNAL MEDICINE

## 2019-08-16 PROCEDURE — 95250 CONT GLUC MNTR PHYS/QHP EQP: CPT | Performed by: INTERNAL MEDICINE

## 2019-08-16 NOTE — PROGRESS NOTES
Continues glucose monitoring data    Continuous glucose monitoring was placed on July 30th    Patient wore continuous glucose monitoring-free style aurea Pro from July 30 - Aug 13th  Average blood glucose reading was 130  Estimated HbA1c 7%   Likelihood of low blood glucose levels was low  Likelihood of high blood glucose levels was moderate  Blood glucose trends showed mildly high BG at night    Current treatment regimen  Levemir 36 units at bedtime  NovoLog 10-14 units with each meal   Jardiance and Janumet    Changes to the treatment regimen  Continue Levemir 36 units at bedtime  Continue NovoLog at 10-14 units with each meal  Cover with NovoLog moderate dose sliding scale.  BG less than 150 - zero  151 - 200 - 2 unit  201 - 250 - 4 units  251 - 300 - 6 units  301 - 350 - 8 units  Above 350 mg/dl - 10 units.   Continue Jardiance and Janumet

## 2019-08-19 NOTE — TELEPHONE ENCOUNTER
Called and spoke with patient about the freestyle aurea pro cgm results and medication change  Patient voice understanding

## 2019-08-26 RX ORDER — ATORVASTATIN CALCIUM 40 MG/1
TABLET, FILM COATED ORAL
Qty: 90 TABLET | Refills: 3 | Status: SHIPPED | OUTPATIENT
Start: 2019-08-26 | End: 2020-09-28

## 2019-09-08 NOTE — PROGRESS NOTES
Date of Office Visit: 08/15/2019  Encounter Provider: oNah Montejo MD  Place of Service: [facility] CARDIOLOGY  Patient Name: Felix Olmedo  :1945    Chief complaint:     History of Present Illness:    Dear Dr. Rahman:      I again had the pleasure of seeing your patient in cardiology office on 8/15/2019. As you   well know, he is a very pleasant 73 year-old white male with a past medical history   significant for coronary artery disease status post coronary artery bypass grafting,   diabetes, and postoperative SVT who presents for follow-up. I initially saw the patient   on 10/3/2016. He had been experiencing 5-6 weeks of a vague chest discomfort   intermittently which she described as an aching and burning sensation in the center of   his chest and over the left precordium. He underwent a Lexiscan Myoview stress test   on 10/12/2016 which showed a small infarct and inferior wall with a moderate amount   of evon-infarct ischemia. He continued to have symptoms, and underwent a left heart   catheterization on 10/18/2016 which showed the right coronary artery to be chronically   occluded the ostium, although left to right collaterals filled the distal right coronary   artery and PDA. The LAD also had up to 60% disease in the mid to distal section.   Medical management was recommended at that time, although the patient was placed   on beta blockers and Imdur and still continued to have exertional angina which actually   worsened. He underwent a repeat left heart catheterization on 2017 by Dr. Guzman, which again showed the right coronary artery to be occluded at the ostium   chronically, and a 60-70% stenosis in the mid LAD. FFR of the mid LAD was   borderline significant at 0.80. These lesions were not felt to be amenable to   percutaneous intervention optimally, and he was referred for coronary artery bypass   surgery. He ultimately underwent a two-vessel coronary  artery bypass grafting   surgery on 5/10/2017 by Dr. Poole (BEAN to LAD, SVG to PDA). He did have some   brief postoperative SVT, but no atrial fibrillation. He also had postoperative leukocytosis   without evidence of infection.      The patient presents today for follow-up.  Overall, he has been doing fairly well.  He   has been stable.  He denied any chest discomfort.  He still has baseline shortness of   breath which occurs with moderate activity, although it is completely unchanged.  He   still intermittently gets lower extremity edema, although this has largely been   controlled.  He denied any issues with his hypertension.  His blood pressure is   excellent today at 118/64.    Past Medical History:   Diagnosis Date   • Coronary artery disease     Status post 2 vessel CABG 5/10/17 by Dr. Poole (LIMA-LAD, SVG-PDA)   • Diastolic dysfunction    • Hyperlipidemia    • Hypertension    • LAFB (left anterior fascicular block)    • Lung nodule    • Osteoarthritis    • Pneumonia 02/2017   • Squamous cell carcinoma     Left cheek s/p resection   • SVT (supraventricular tachycardia) (CMS/HCC)     Diagnosed following CABG       Past Surgical History:   Procedure Laterality Date   • CARDIAC CATHETERIZATION N/A 10/18/2016    Procedure: Coronary angiography;  Surgeon: Jesus Guzman MD;  Location: [facility] CATH INVASIVE LOCATION;  Service:    • CARDIAC CATHETERIZATION N/A 10/18/2016    Procedure: Left heart cath;  Surgeon: Jesus Guzman MD;  Location: [facility] CATH INVASIVE LOCATION;  Service:    • CARDIAC CATHETERIZATION N/A 10/18/2016    Procedure: Left ventriculography;  Surgeon: Jesus Guzman MD;  Location: [facility] CATH INVASIVE LOCATION;  Service:    • CARDIAC CATHETERIZATION N/A 4/4/2017    Procedure: Coronary angiography;  Surgeon: Jesus Guzman MD;  Location: [facility] CATH INVASIVE LOCATION;  Service:    • CARDIAC CATHETERIZATION N/A 4/4/2017    Procedure: Left heart cath;  Surgeon:  Jesus Guzman MD;  Location: [facility] CATH INVASIVE LOCATION;  Service:    • CARDIAC CATHETERIZATION N/A 4/4/2017    Procedure: Left ventriculography;  Surgeon: Jesus Guzman MD;  Location: [facility] CATH INVASIVE LOCATION;  Service:    • CARDIAC CATHETERIZATION  4/4/2017    Procedure: Functional Flow [place];  Surgeon: Jesus Guzman MD;  Location: [facility] CATH INVASIVE LOCATION;  Service:    • CARDIAC SURGERY     • CATARACT EXTRACTION W/ INTRAOCULAR LENS IMPLANT Left    • COLONOSCOPY     • CORONARY ARTERY BYPASS GRAFT N/A 5/10/2017    Procedure: CORONARY ARTERY BYPASS TIMES TWO UTILIZING THE LEFT GREATER SAPHENOUS VEIN WITH INTERNAL MAMMARY ARTERY GRAFT;  TRANSESOPHAGEAL ECHOCARDIOGRAM;  Surgeon: Alden Poole MD;  Location: [facility] MAIN OR;  Service:    • EYE SURGERY     • KNEE SURGERY Left 2005    Dr. Gallegos   • MOHS SURGERY Left     CHEEK   • SKIN BIOPSY         Current Outpatient Medications on File Prior to Visit   Medication Sig Dispense Refill   • acetaminophen (TYLENOL) 325 MG tablet Take 2 tablets by mouth Every 4 (Four) Hours As Needed for Mild Pain (1-3). 1 bottle 99   • aspirin 81 MG tablet Take 81 mg by mouth Daily.     • bumetanide (BUMEX) 2 MG tablet Take 0.5 tablets by mouth Daily. 90 tablet 3   • insulin aspart (NOVOLOG FLEXPEN) 100 UNIT/ML solution pen-injector sc pen Inject 12 Units under the skin into the appropriate area as directed 3 (Three) Times a Day With Meals. CAN CHANGE DEPENDING ON MEAL 15 pen 3   • Insulin Pen Needle 31G X 8 MM misc Use to inject insulin 500 each 3   • isosorbide mononitrate (IMDUR) 30 MG 24 hr tablet TAKE 1 TABLET TWICE DAILY 180 tablet 3   • JARDIANCE 25 MG tablet TAKE 1 TABLET EVERY DAY 90 tablet 3   • Menthol, Topical Analgesic, (BIOFREEZE ROLL-ON) 4 % gel Apply 1 application topically Daily As Needed.     • metoprolol tartrate (LOPRESSOR) 25 MG tablet TAKE 1 TABLET TWICE DAILY 180 tablet 3   • Multiple Vitamins-Minerals (MULTIVITAMIN ADULT  PO) Take 1 tablet by mouth Daily.     • potassium chloride (MICRO-K) 10 MEQ CR capsule Take 1 capsule (10 mEq total) by mouth 2 (Two) Times a Day 60 capsule 0   • TRUE METRIX BLOOD GLUCOSE TEST test strip 1 each by Other route.       No current facility-administered medications on file prior to visit.      Allergies as of 08/15/2019 - Reviewed 08/08/2019   Allergen Reaction Noted   • Adhesive tape Rash 05/19/2017     Social History     Socioeconomic History   • Marital status:      Spouse name: Mendy Vargas   • Number of children: 3   • Years of education: Not on file   • Highest education level: Not on file   Occupational History   • Occupation: Retired (from [employer])   Tobacco Use   • Smoking status: Former Smoker     Packs/day: 1.50     Years: 40.00     Pack years: 60.00     Types: Cigarettes     Last attempt to quit: 2009     Years since quitting: 10.6   • Smokeless tobacco: Never Used   • Tobacco comment: Smoked up to 1.5 ppd.  Smoked for 50 years.  Quit 2009.   Substance and Sexual Activity   • Alcohol use: Yes     Comment: 2-3 BEERS YEARLY    • Drug use: No     Comment: caffeine use   • Sexual activity: Not Currently     Family History   Problem Relation Age of Onset   • Cancer Mother         uterine   • Heart disease Father         Father with fatal MI in 80's   • Diabetes Sister    • Esophageal cancer Brother    • Alzheimer's disease Maternal Grandfather    • Stroke Maternal Grandfather        Review of Systems   Constitution: Positive for malaise/fatigue.   Cardiovascular: Positive for dyspnea on exertion and leg swelling.   Respiratory: Positive for snoring.    Skin: Positive for color change.   Gastrointestinal: Positive for diarrhea.   Genitourinary: Positive for decreased libido and frequency.   Neurological: Positive for excessive daytime sleepiness, numbness and paresthesias.   All other systems reviewed and are negative.     Objective:     Vitals:    08/15/19 1434   BP: 118/64  "  Pulse: 62   SpO2: 93%   Weight: 107 kg (236 lb 9.6 oz)   Height: 177.8 cm (70\")     Body mass index is 33.95 kg/m².    Physical Exam   Constitutional: He is oriented to person, place, and time. He appears well-developed and well-nourished.   HENT:   Head: Normocephalic and atraumatic.   Eyes: Conjunctivae are normal.   Neck: Neck supple.   Cardiovascular: Normal rate and regular rhythm. Exam reveals no friction rub.   No murmur heard.  Pulmonary/Chest: Effort normal and breath sounds normal. He has no rales.   Abdominal: Soft. There is no tenderness.   Musculoskeletal: He exhibits no edema.   Neurological: He is alert and oriented to person, place, and time.   Skin: Skin is warm.   Psychiatric: He has a normal mood and affect. His behavior is normal.     Lab Review:   Procedures    Cardiac Procedures:  1. Echocardiogram on 10/12/2016: Ejection fraction was 62%. There was grade 1   diastolic dysfunction. There was moderate to severe thickening of the noncoronary   cusp of the aortic valve, but no aortic stenosis.  2. Left heart catheterization on 10/18/2016 by Dr. Guzman: The left main had 20%   distal disease. The LAD had a 40-50% proximal stenosis. The LAD had diffuse 60%   mid to distal disease. The left circumflex coronary artery had a 20% mid stenosis. The   right coronary artery was chronically occluded at the ostium, left to right collaterals   filled the distal vessel and PDA.  3.  Left heart catheterization on 4/4/2017: The left main was normal. The LAD had   30-40% proximal disease and 60-70% mid disease. FFR the mid LAD was borderline   significant at 0.80. The left circumflex had a 20% mid stenosis. The right coronary   artery was chronically occluded at the ostium. Coronary artery bypass grafting was   recommended at that time.  4. Carotid Doppler ultrasound on 4/4/2017: The right internal carotid artery had   50-59% disease. The left internal carotid artery had 16-49% disease.  5. Status post " two-vessel coronary artery bypass grafting on 5/10/2017 by Dr. Poole   (LIMA to LAD, SVG to PDA).  6.  Echocardiogram on 5/20/2017: Ejection fraction was 54%.  There was mild LVH.    There was grade 1a diastolic dysfunction.    Assessment:       Diagnosis Plan   1. Coronary artery disease involving coronary bypass graft of native heart without angina pectoris     2. Chronic diastolic congestive heart failure (CMS/HCC)       Plan:       The patient seems to be stable at this point.  He has rare angina when he overexerts himself, but otherwise is doing well.  This has been fairly consistent since his surgery.  His shortness of breath is at baseline, and occurs with moderate activity.  This has been long-standing and is not changed.  He appears to be euvolemic on the Bumex at 1 mg/day.  He will continue on aspirin, Lipitor, Imdur, and metoprolol for the coronary artery disease.  I am going to check a carotid Doppler ultrasound.  His right internal carotid artery had a 50 to 59% stenosis in April 2017 prior to his bypass.  Otherwise, I will plan on seeing him back in the office in 6 months.    Coronary Artery Disease  Assessment  • The patient has CCS class I - angina only during strenuous or prolonged physical activity    Plan  • Lifestyle modifications discussed include adhering to a heart healthy diet, avoidance of tobacco products, maintenance of a healthy weight, medication compliance, regular exercise and regular monitoring of cholesterol and blood pressure    Subjective - Objective  • There is a history of previous coronary artery bypass graft on or around 5/10/2017  • Current antiplatelet therapy includes aspirin 81 mg      Heart Failure  Assessment  • NYHA class II - There is slight limitation of physical activity. The patient is comfortable at rest, but physical activity results in fatigue, palpitations or shortness of breath.  • ACE inhibitor not prescribed for medical reasons  • Beta blocker prescribed  •  Diuretics prescribed  • Left ventricular function is normal by qualitative assessment    Plan  • The patient has received heart failure education on the following topics: dietary sodium restriction, medication instructions, minimizing or avoiding NSAID use, symptom management, physical activity, weight monitoring and minimizing alcohol intake  • The heart failure care plan was discussed with the patient today including: continuing the current program  •  The patient was not counseled about ICD or CRT-D implantation    Subjective/Objective    • Physical exam findings negative for rales and peripheral edema.

## 2019-10-15 ENCOUNTER — TELEPHONE (OUTPATIENT)
Dept: ENDOCRINOLOGY | Age: 74
End: 2019-10-15

## 2019-10-16 ENCOUNTER — OFFICE VISIT (OUTPATIENT)
Dept: INTERNAL MEDICINE | Age: 74
End: 2019-10-16

## 2019-10-16 VITALS
DIASTOLIC BLOOD PRESSURE: 52 MMHG | TEMPERATURE: 97.4 F | BODY MASS INDEX: 33.5 KG/M2 | HEART RATE: 70 BPM | OXYGEN SATURATION: 96 % | HEIGHT: 70 IN | SYSTOLIC BLOOD PRESSURE: 114 MMHG | WEIGHT: 234 LBS

## 2019-10-16 DIAGNOSIS — I25.118 CORONARY ARTERY DISEASE OF NATIVE ARTERY OF NATIVE HEART WITH STABLE ANGINA PECTORIS (HCC): Chronic | ICD-10-CM

## 2019-10-16 DIAGNOSIS — R91.8 MULTIPLE PULMONARY NODULES: Chronic | ICD-10-CM

## 2019-10-16 DIAGNOSIS — E78.2 MIXED HYPERLIPIDEMIA: Chronic | ICD-10-CM

## 2019-10-16 DIAGNOSIS — IMO0002 TYPE II DIABETES MELLITUS WITH NEUROLOGICAL MANIFESTATIONS, UNCONTROLLED: Primary | Chronic | ICD-10-CM

## 2019-10-16 PROCEDURE — 99214 OFFICE O/P EST MOD 30 MIN: CPT | Performed by: INTERNAL MEDICINE

## 2019-10-16 NOTE — ASSESSMENT & PLAN NOTE
Lab Results   Component Value Date    LDL 37 07/16/2019    LDL 47 04/10/2019    LDL 45 11/27/2018    HDL 51 07/16/2019    HDL 55 04/10/2019    HDL 56 11/27/2018    TRIG 121 07/16/2019

## 2019-10-16 NOTE — PROGRESS NOTES
[facility] [place] INTERNAL MEDICINE  PEDRO STILES M.D.      Felix Hernandez Honorio / 74 y.o. / male  10/16/2019      CHIEF COMPLAINT     Diabetes (4 month f/u) and Hyperlipidemia      ASSESSMENT & PLAN     Problem List Items Addressed This Visit        Medium    Type II diabetes mellitus with neurological manifestations, uncontrolled (CMS/[facility]) - Primary (Chronic)    Overview     Dx'ed 2000  *Sees endocrine  Complications: +retinopathy and peripheral neuropathy         Current Assessment & Plan     Lab Results   Component Value Date    HGBA1C 8.30 (H) 07/16/2019    HGBA1C 7.80 (H) 04/10/2019    HGBA1C 7.55 (H) 11/27/2018    CREATININE 1.13 07/16/2019    LDL 37 07/16/2019    MICROALBUR <3.0 07/16/2019      Continue follow-up with endocrinologist. Advised no ways to avoid hypoglycemia.          Relevant Medications    JARDIANCE 25 MG tablet    sitaGLIPtin-metFORMIN (JANUMET)  MG per tablet    insulin detemir (LEVEMIR FLEXTOUCH) 100 UNIT/ML injection    insulin aspart (NOVOLOG FLEXPEN) 100 UNIT/ML solution pen-injector sc pen    Coronary artery disease of native artery of native heart with stable angina pectoris (CMS/HCC) (Chronic)    Overview     S/p 2V CABG ([place]) (5/2017)  *[place] (cards)    Stable. Continue ASA, atorvastatin, metoprolol and Imdur.          Relevant Medications    isosorbide mononitrate (IMDUR) 30 MG 24 hr tablet    metoprolol tartrate (LOPRESSOR) 25 MG tablet    Hyperlipidemia (Chronic)    Overview     Stable. Continue atorvastatin 40 mg daily         Current Assessment & Plan     Lab Results   Component Value Date    LDL 37 07/16/2019    LDL 47 04/10/2019    LDL 45 11/27/2018    HDL 51 07/16/2019    HDL 55 04/10/2019    HDL 56 11/27/2018    TRIG 121 07/16/2019             Relevant Medications    atorvastatin (LIPITOR) 40 MG tablet       Low    Multiple pulmonary nodules (Chronic)    Overview     Stable over 2 years. No routine follow-up for this needed.   Continue annual lung cancer screening CT.    Discussed with patient.              No orders of the defined types were placed in this encounter.    No orders of the defined types were placed in this encounter.      Summary/Discussion:  ·       Next Appointment with me: Visit date not found    Return in about 6 months (around 4/16/2020) for Reassess chronic medical problems.      MEDICATIONS     Current Outpatient Medications   Medication Sig Dispense Refill   • acetaminophen (TYLENOL) 325 MG tablet Take 2 tablets by mouth Every 4 (Four) Hours As Needed for Mild Pain (1-3). 1 bottle 99   • aspirin 81 MG tablet Take 81 mg by mouth Every Other Day.     • atorvastatin (LIPITOR) 40 MG tablet TAKE 1 TABLET EVERY DAY 90 tablet 3   • bumetanide (BUMEX) 2 MG tablet Take 0.5 tablets by mouth Daily. 90 tablet 3   • insulin aspart (NOVOLOG FLEXPEN) 100 UNIT/ML solution pen-injector sc pen 10-14 units with each meal 15 pen 3   • insulin detemir (LEVEMIR FLEXTOUCH) 100 UNIT/ML injection Inject 36 Units under the skin into the appropriate area as directed Daily. 15 pen 3   • Insulin Pen Needle 31G X 8 MM misc Use to inject insulin 500 each 3   • isosorbide mononitrate (IMDUR) 30 MG 24 hr tablet TAKE 1 TABLET TWICE DAILY 180 tablet 3   • JARDIANCE 25 MG tablet TAKE 1 TABLET EVERY DAY 90 tablet 3   • Menthol, Topical Analgesic, (BIOFREEZE ROLL-ON) 4 % gel Apply 1 application topically Daily As Needed.     • metoprolol tartrate (LOPRESSOR) 25 MG tablet TAKE 1 TABLET TWICE DAILY 180 tablet 3   • Multiple Vitamins-Minerals (MULTIVITAMIN ADULT PO) Take 1 tablet by mouth Daily.     • potassium chloride (MICRO-K) 10 MEQ CR capsule Take 1 capsule (10 mEq total) by mouth 2 (Two) Times a Day 60 capsule 0   • TRUE METRIX BLOOD GLUCOSE TEST test strip 1 each by Other route.     • sitaGLIPtin-metFORMIN (JANUMET)  MG per tablet Take 1 tablet by mouth 2 (Two) Times a Day With Meals. 180 tablet 3     No current facility-administered medications for this visit.           VITAL SIGNS  "    Visit Vitals  /52   Pulse 70   Temp 97.4 °F (36.3 °C)   Ht 177.8 cm (70\")   Wt 106 kg (234 lb)   SpO2 96%   BMI 33.58 kg/m²       BP Readings from Last 3 Encounters:   10/16/19 114/52   08/15/19 118/64   07/30/19 128/60     Wt Readings from Last 3 Encounters:   10/16/19 106 kg (234 lb)   08/15/19 107 kg (236 lb 9.6 oz)   07/30/19 106 kg (234 lb)      Body mass index is 33.58 kg/m².      HISTORY OF PRESENT ILLNESS     Reviewed chief complaint and details of complaint as documented by staff.     Prior office note reviewed and there has been no significant interval change in history.    Sees endocrinologist for diabetes mgmt. Has intermittent episodes of hypoglycemia.   Lab Results   Component Value Date    HGBA1C 8.30 (H) 07/16/2019    HGBA1C 7.80 (H) 04/10/2019    HGBA1C 7.55 (H) 11/27/2018        Patient Care Team:  Hilario Rahman MD as PCP - General (Internal Medicine)  Noah Montejo MD as Consulting Physician (Cardiology)  Alden Poole MD as Surgeon (Cardiothoracic Surgery)  Samira Recinos MD as Consulting Physician (Endocrinology)      REVIEW OF SYSTEMS     Review of Systems  Constitutional neg  Resp neg  CV neg      PHYSICAL EXAMINATION     Physical Exam  Constitutional  No distress, obese   Cardiovascular Rate  normal . Rhythm: regular . Heart sounds:  Normal  Pulmonary/Chest: Effort normal. Breath sounds normal.    Psychiatric  Alert. Judgment intact. Thought content normal. Mood normal      REVIEWED DATA     Labs:     Lab Results   Component Value Date     07/16/2019    K 5.0 07/16/2019    CALCIUM 9.0 07/16/2019    AST 23 07/24/2017    ALT 21 07/24/2017    BUN 17 07/16/2019    CREATININE 1.13 07/16/2019    CREATININE 1.14 04/10/2019    CREATININE 1.04 11/27/2018    EGFRIFNONA 63 07/16/2019    EGFRIFAFRI 77 07/16/2019       Lab Results   Component Value Date    HGBA1C 8.30 (H) 07/16/2019    HGBA1C 7.80 (H) 04/10/2019    HGBA1C 7.55 (H) 11/27/2018     (H) 07/16/2019    GLU " 200 (H) 04/10/2019     (H) 11/27/2018    MICROALBUR <3.0 07/16/2019       Lab Results   Component Value Date    LDL 37 07/16/2019    LDL 47 04/10/2019    LDL 45 11/27/2018    HDL 51 07/16/2019    HDL 55 04/10/2019    HDL 56 11/27/2018    TRIG 121 07/16/2019    TRIG 68 04/10/2019    TRIG 69 11/27/2018       Lab Results   Component Value Date    TSH 1.930 07/16/2019       Lab Results   Component Value Date    WBC 17.96 (H) 05/21/2017    HGB 12.4 (L) 05/21/2017    HGB 11.5 (L) 05/20/2017    HGB 11.4 (L) 05/19/2017     (H) 05/21/2017       Lab Results   Component Value Date    GLUCOSEU 500 mg/dL (2+) (A) 05/12/2017    BLOODU Large (3+) (A) 05/12/2017    NITRITEU Negative 05/12/2017    LEUKOCYTESUR Small (1+) (A) 05/12/2017       Imaging:         Medical Tests:         Summary of old records / correspondence / consultant report:         Request outside records:         ALLERGIES  Allergies   Allergen Reactions   • Adhesive Tape Rash        PFSH:     The following portions of the patient's history were reviewed and updated as appropriate: Allergies / Current Medications / Past Medical History / Surgical History / Social History / Family History    PROBLEM LIST   Patient Active Problem List   Diagnosis   • Type II diabetes mellitus with neurological manifestations, uncontrolled (CMS/[facility])   • Coronary artery disease of native artery of native heart with stable angina pectoris (CMS/[facility])   • SVT (supraventricular tachycardia) (CMS/[facility])   • Acute diastolic CHF (congestive heart failure) (CMS/[facility])   • S/P CABG x 2   • Hyperlipidemia   • Chronic pain of both knees   • Obesity (BMI 30-39.9)   • Multiple pulmonary nodules   • Gait difficulty       PAST MEDICAL HISTORY  Past Medical History:   Diagnosis Date   • Coronary artery disease     Status post 2 vessel CABG 5/10/17 by Dr. Poole (LIMA-LAD, SVG-PDA)   • Diabetes (CMS/[facility])    • Diastolic dysfunction    • Hyperlipidemia    • Hypertension    • LAFB (left anterior  fascicular block)    • Lung nodule    • Osteoarthritis    • Pneumonia 02/2017   • Squamous cell carcinoma     Left cheek s/p resection   • SVT (supraventricular tachycardia) (CMS/HCC)     Diagnosed following CABG       SURGICAL HISTORY  Past Surgical History:   Procedure Laterality Date   • CARDIAC CATHETERIZATION N/A 10/18/2016    Procedure: Coronary angiography;  Surgeon: Jesus Guzman MD;  Location: [facility] CATH INVASIVE LOCATION;  Service:    • CARDIAC CATHETERIZATION N/A 10/18/2016    Procedure: Left heart cath;  Surgeon: Jesus Guzman MD;  Location: [facility] CATH INVASIVE LOCATION;  Service:    • CARDIAC CATHETERIZATION N/A 10/18/2016    Procedure: Left ventriculography;  Surgeon: Jesus Guzman MD;  Location: [facility] CATH INVASIVE LOCATION;  Service:    • CARDIAC CATHETERIZATION N/A 4/4/2017    Procedure: Coronary angiography;  Surgeon: Jesus Guzman MD;  Location: [facility] CATH INVASIVE LOCATION;  Service:    • CARDIAC CATHETERIZATION N/A 4/4/2017    Procedure: Left heart cath;  Surgeon: Jesus Guzman MD;  Location: [facility] CATH INVASIVE LOCATION;  Service:    • CARDIAC CATHETERIZATION N/A 4/4/2017    Procedure: Left ventriculography;  Surgeon: Jesus Guzman MD;  Location: [facility] CATH INVASIVE LOCATION;  Service:    • CARDIAC CATHETERIZATION  4/4/2017    Procedure: Functional Flow [place];  Surgeon: Jesus Guzman MD;  Location: [facility] CATH INVASIVE LOCATION;  Service:    • CARDIAC SURGERY     • CATARACT EXTRACTION W/ INTRAOCULAR LENS IMPLANT Left    • COLONOSCOPY     • CORONARY ARTERY BYPASS GRAFT N/A 5/10/2017    Procedure: CORONARY ARTERY BYPASS TIMES TWO UTILIZING THE LEFT GREATER SAPHENOUS VEIN WITH INTERNAL MAMMARY ARTERY GRAFT;  TRANSESOPHAGEAL ECHOCARDIOGRAM;  Surgeon: Alden Poole MD;  [facility];  Service:    • EYE SURGERY     • KNEE SURGERY Left 2005    Dr. Gallegos   • MOHS SURGERY Left     CHEEK   • SKIN BIOPSY         SOCIAL  HISTORY  Social History     Socioeconomic History   • Marital status:      Spouse name: Mendy Vargas   • Number of children: 3   • Years of education: Not on file   • Highest education level: Not on file   Occupational History   • Occupation: Retired (from [employer])   Tobacco Use   • Smoking status: Former Smoker     Packs/day: 1.50     Years: 40.00     Pack years: 60.00     Types: Cigarettes     Last attempt to quit: 2009     Years since quitting: 10.7   • Smokeless tobacco: Never Used   • Tobacco comment: Smoked up to 1.5 ppd.  Smoked for 50 years.  Quit 2009.   Substance and Sexual Activity   • Alcohol use: Yes     Comment: 2-3 BEERS YEARLY    • Drug use: No     Comment: caffeine use   • Sexual activity: Not Currently       FAMILY HISTORY  Family History   Problem Relation Age of Onset   • Cancer Mother         uterine   • Heart disease Father         Father with fatal MI in 80's   • Diabetes Sister    • Esophageal cancer Brother    • Alzheimer's disease Maternal Grandfather    • Stroke Maternal Grandfather          **Dragon Disclaimer:   Much of this encounter note is an electronic transcription/translation of spoken language to printed text. The electronic translation of spoken language may permit erroneous, or at times, nonsensical words or phrases to be inadvertently transcribed. Although I have reviewed the note for such errors, some may still exist.       Template created by Helen Rahman MD

## 2019-10-16 NOTE — ASSESSMENT & PLAN NOTE
Lab Results   Component Value Date    HGBA1C 8.30 (H) 07/16/2019    HGBA1C 7.80 (H) 04/10/2019    HGBA1C 7.55 (H) 11/27/2018    CREATININE 1.13 07/16/2019    LDL 37 07/16/2019    MICROALBUR <3.0 07/16/2019      Continue follow-up with endocrinologist. Advised no ways to avoid hypoglycemia.

## 2019-10-23 ENCOUNTER — LAB (OUTPATIENT)
Dept: ENDOCRINOLOGY | Age: 74
End: 2019-10-23

## 2019-10-23 DIAGNOSIS — E11.9 TYPE 2 DIABETES MELLITUS WITHOUT COMPLICATION, WITH LONG-TERM CURRENT USE OF INSULIN (HCC): ICD-10-CM

## 2019-10-23 DIAGNOSIS — Z79.4 TYPE 2 DIABETES MELLITUS WITHOUT COMPLICATION, WITH LONG-TERM CURRENT USE OF INSULIN (HCC): ICD-10-CM

## 2019-10-23 DIAGNOSIS — IMO0002 TYPE II DIABETES MELLITUS WITH NEUROLOGICAL MANIFESTATIONS, UNCONTROLLED: Primary | ICD-10-CM

## 2019-10-23 DIAGNOSIS — IMO0002 TYPE II DIABETES MELLITUS WITH NEUROLOGICAL MANIFESTATIONS, UNCONTROLLED: ICD-10-CM

## 2019-10-23 DIAGNOSIS — E78.2 MIXED HYPERLIPIDEMIA: ICD-10-CM

## 2019-10-24 ENCOUNTER — TELEPHONE (OUTPATIENT)
Dept: CASE MANAGEMENT | Facility: OTHER | Age: 74
End: 2019-10-24

## 2019-10-24 LAB
ALBUMIN/CREAT UR: 5.6 MG/G CREAT (ref 0–30)
BUN SERPL-MCNC: 16 MG/DL (ref 8–23)
BUN/CREAT SERPL: 13.7 (ref 7–25)
CALCIUM SERPL-MCNC: 9.3 MG/DL (ref 8.6–10.5)
CHLORIDE SERPL-SCNC: 102 MMOL/L (ref 98–107)
CHOLEST SERPL-MCNC: 126 MG/DL (ref 0–200)
CO2 SERPL-SCNC: 27.7 MMOL/L (ref 22–29)
CREAT SERPL-MCNC: 1.17 MG/DL (ref 0.76–1.27)
CREAT UR-MCNC: 83.8 MG/DL
GLUCOSE SERPL-MCNC: 146 MG/DL (ref 65–99)
HBA1C MFR BLD: 7.9 % (ref 4.8–5.6)
HDLC SERPL-MCNC: 51 MG/DL (ref 40–60)
INTERPRETATION: NORMAL
LDLC SERPL CALC-MCNC: 57 MG/DL (ref 0–100)
Lab: NORMAL
MICROALBUMIN UR-MCNC: 4.7 UG/ML
POTASSIUM SERPL-SCNC: 5.1 MMOL/L (ref 3.5–5.2)
SODIUM SERPL-SCNC: 141 MMOL/L (ref 136–145)
TRIGL SERPL-MCNC: 91 MG/DL (ref 0–150)
TSH SERPL DL<=0.005 MIU/L-ACNC: 2.76 UIU/ML (ref 0.27–4.2)
VLDLC SERPL CALC-MCNC: 18.2 MG/DL

## 2019-10-24 NOTE — TELEPHONE ENCOUNTER
Left message for patient to return Care Advisor's call to schedule AWV. Phone number provided [phone].

## 2019-11-07 ENCOUNTER — OFFICE VISIT (OUTPATIENT)
Dept: ENDOCRINOLOGY | Age: 74
End: 2019-11-07

## 2019-11-07 VITALS
BODY MASS INDEX: 33.93 KG/M2 | DIASTOLIC BLOOD PRESSURE: 68 MMHG | HEIGHT: 70 IN | HEART RATE: 62 BPM | OXYGEN SATURATION: 96 % | WEIGHT: 237 LBS | SYSTOLIC BLOOD PRESSURE: 116 MMHG

## 2019-11-07 DIAGNOSIS — IMO0002 TYPE II DIABETES MELLITUS WITH NEUROLOGICAL MANIFESTATIONS, UNCONTROLLED: Primary | ICD-10-CM

## 2019-11-07 DIAGNOSIS — E78.2 MIXED HYPERLIPIDEMIA: ICD-10-CM

## 2019-11-07 DIAGNOSIS — Z95.1 S/P CABG X 2: ICD-10-CM

## 2019-11-07 PROCEDURE — 99214 OFFICE O/P EST MOD 30 MIN: CPT | Performed by: INTERNAL MEDICINE

## 2019-11-07 NOTE — PROGRESS NOTES
74 y.o.    Patient Care Team:  Hilario Rahman MD as PCP - General (Internal Medicine)  Noah Montejo MD as Consulting Physician (Cardiology)  Alden Poole MD as Surgeon (Cardiothoracic Surgery)  Samira Recinos MD as Consulting Physician (Endocrinology)    Chief Complaint:    FOLLOW UP/ TYPE 2 DIABETES MELLITUS  Subjective     HPI    Felix Olmedo,74 y.o. WM is here as a follow up for the management of Type 2 dm. Pt underwent CABG on May 10th 2017, at [facility]. He is currently in the cardiac rehab.       Type 2 dm - Pt has been a diabetic since 2000, He has been on insulin since 2009.    Today in clinic patient reports that he is on Levemir 34 units at bedtime, NovoLog 8-12 units with each meal, Jardiance 25 mg oral daily.  Supposed to be on Janumet but discontinued the medication due to significant diarrhea.  He even could not tolerate Victoza, metformin add Ozempic in the past.  Checks his blood sugars 2-3 times a day.  Extremely variable blood sugars between .  Still has some low blood sugars but refused the CGM.  Last eye examination was in April 2019, stable diabetic retinopathy.  Stable tingling and numbness in his bilateral feet, lesions of his feet.  Not on Lyrica or gabapentin.  Feels the symptoms are same. No ulcers or wounds on feet.  Hx of CAD s/p CABG in may 2017, no hx of CKD, no CVA.   Pt is physically active. Weight has been stable.   Pt tries to follow DM diet for most part.   Not on Ace inb or ARB.       Hyperlipidemia -  on Lipitor 40 mg oral daily.        CAD - s/p CABG in May 2017.        Reviewed primary care physician's/consulting physician documentation and lab results :     Interval History      The following portions of the patient's history were reviewed and updated as appropriate: allergies, current medications, past family history, past medical history, past social history, past surgical history and problem list.    Past Medical History:   Diagnosis Date   •  Coronary artery disease     Status post 2 vessel CABG 5/10/17 by Dr. Poole (LIMA-LAD, SVG-PDA)   • Diabetes (CMS/HCC)    • Diastolic dysfunction    • Hyperlipidemia    • Hypertension    • LAFB (left anterior fascicular block)    • Lung nodule    • Osteoarthritis    • Pneumonia 02/2017   • Squamous cell carcinoma     Left cheek s/p resection   • SVT (supraventricular tachycardia) (CMS/HCC)     Diagnosed following CABG     Family History   Problem Relation Age of Onset   • Cancer Mother         uterine   • Heart disease Father         Father with fatal MI in 80's   • Diabetes Sister    • Esophageal cancer Brother    • Alzheimer's disease Maternal Grandfather    • Stroke Maternal Grandfather      Social History     Socioeconomic History   • Marital status:      Spouse name: Mendy Vargas   • Number of children: 3   • Years of education: Not on file   • Highest education level: Not on file   Occupational History   • Occupation: Retired (from [employer])   Tobacco Use   • Smoking status: Former Smoker     Packs/day: 1.50     Years: 40.00     Pack years: 60.00     Types: Cigarettes     Last attempt to quit: 2009     Years since quitting: 10.8   • Smokeless tobacco: Never Used   • Tobacco comment: Smoked up to 1.5 ppd.  Smoked for 50 years.  Quit 2009.   Substance and Sexual Activity   • Alcohol use: Yes     Comment: 2-3 BEERS YEARLY    • Drug use: No     Comment: caffeine use   • Sexual activity: Not Currently     Allergies   Allergen Reactions   • Adhesive Tape Rash       Current Outpatient Medications:   •  acetaminophen (TYLENOL) 325 MG tablet, Take 2 tablets by mouth Every 4 (Four) Hours As Needed for Mild Pain (1-3)., Disp: 1 bottle, Rfl: 99  •  aspirin 81 MG tablet, Take 81 mg by mouth Every Other Day., Disp: , Rfl:   •  atorvastatin (LIPITOR) 40 MG tablet, TAKE 1 TABLET EVERY DAY, Disp: 90 tablet, Rfl: 3  •  bumetanide (BUMEX) 2 MG tablet, Take 0.5 tablets by mouth Daily., Disp: 90 tablet, Rfl:  "3  •  insulin aspart (NOVOLOG FLEXPEN) 100 UNIT/ML solution pen-injector sc pen, 10-14 units  3 times daily with meal, Disp: 15 pen, Rfl: 3  •  insulin detemir (LEVEMIR FLEXTOUCH) 100 UNIT/ML injection, Inject 36 Units under the skin into the appropriate area as directed Daily., Disp: 15 pen, Rfl: 3  •  Insulin Pen Needle 31G X 8 MM misc, Use to inject insulin, Disp: 500 each, Rfl: 3  •  isosorbide mononitrate (IMDUR) 30 MG 24 hr tablet, TAKE 1 TABLET TWICE DAILY, Disp: 180 tablet, Rfl: 3  •  JARDIANCE 25 MG tablet, TAKE 1 TABLET EVERY DAY, Disp: 90 tablet, Rfl: 3  •  Menthol, Topical Analgesic, (BIOFREEZE ROLL-ON) 4 % gel, Apply 1 application topically Daily As Needed., Disp: , Rfl:   •  metoprolol tartrate (LOPRESSOR) 25 MG tablet, TAKE 1 TABLET TWICE DAILY, Disp: 180 tablet, Rfl: 3  •  Multiple Vitamins-Minerals (MULTIVITAMIN ADULT PO), Take 1 tablet by mouth Daily., Disp: , Rfl:   •  potassium chloride (MICRO-K) 10 MEQ CR capsule, Take 1 capsule (10 mEq total) by mouth 2 (Two) Times a Day, Disp: 60 capsule, Rfl: 0  •  SITagliptin (JANUVIA) 100 MG tablet, Take 1 tablet by mouth Daily., Disp: 90 tablet, Rfl: 3  •  TRUE METRIX BLOOD GLUCOSE TEST test strip, 1 each by Other route., Disp: , Rfl:         Review of Systems   Constitutional: Positive for fatigue. Negative for appetite change and fever.   Eyes: Negative for visual disturbance.   Respiratory: Negative for shortness of breath.    Cardiovascular: Negative for palpitations and leg swelling.   Gastrointestinal: Negative for abdominal pain and vomiting.   Endocrine: Negative for polydipsia and polyuria.   Musculoskeletal: Positive for arthralgias. Negative for joint swelling and neck pain.   Skin: Negative for rash.   Neurological: Positive for weakness and numbness.   Psychiatric/Behavioral: Negative for behavioral problems.       Objective       Vitals:    11/07/19 1452   BP: 116/68   Pulse: 62   SpO2: 96%   Weight: 108 kg (237 lb)   Height: 177.8 cm (70\") "     Body mass index is 34.01 kg/m².      Physical Exam   Constitutional: He is oriented to person, place, and time. He appears well-nourished.   obese   HENT:   Head: Normocephalic and atraumatic.   Wide neck   Eyes: Conjunctivae and EOM are normal.   Neck: Normal range of motion. Neck supple. Carotid bruit is not present. No thyromegaly present.   Acanthosis nigricans   Cardiovascular: Normal rate and normal heart sounds.   Pulmonary/Chest: Effort normal and breath sounds normal. No stridor. No respiratory distress.   Abdominal: Soft. Bowel sounds are normal. He exhibits no distension. There is no tenderness.   Central obesity   Musculoskeletal: He exhibits no edema or tenderness.   Neurological: He is alert and oriented to person, place, and time.   Skin: Skin is warm and dry.   Psychiatric: He has a normal mood and affect. His behavior is normal.   Vitals reviewed.    Results Review:     I reviewed the patient's new clinical results and mentioned them above in HPI and in plan as well.    Medical records reviewed  Summary:Done      Lab on 10/23/2019   Component Date Value Ref Range Status   • Hemoglobin A1C 10/23/2019 7.90* 4.80 - 5.60 % Final    Comment: Hemoglobin A1C Ranges:  Increased Risk for Diabetes  5.7% to 6.4%  Diabetes                     >= 6.5%  Diabetic Goal                < 7.0%     • Glucose 10/23/2019 146* 65 - 99 mg/dL Final   • BUN 10/23/2019 16  8 - 23 mg/dL Final   • Creatinine 10/23/2019 1.17  0.76 - 1.27 mg/dL Final   • eGFR Non  Am 10/23/2019 61  >60 mL/min/1.73 Final    Comment: The MDRD GFR formula is only valid for adults with stable  renal function between ages 18 and 70.     • eGFR  Am 10/23/2019 73  >60 mL/min/1.73 Final   • BUN/Creatinine Ratio 10/23/2019 13.7  7.0 - 25.0 Final   • Sodium 10/23/2019 141  136 - 145 mmol/L Final   • Potassium 10/23/2019 5.1  3.5 - 5.2 mmol/L Final   • Chloride 10/23/2019 102  98 - 107 mmol/L Final   • Total CO2 10/23/2019 27.7  22.0 -  29.0 mmol/L Final   • Calcium 10/23/2019 9.3  8.6 - 10.5 mg/dL Final   • Total Cholesterol 10/23/2019 126  0 - 200 mg/dL Final   • Triglycerides 10/23/2019 91  0 - 150 mg/dL Final   • HDL Cholesterol 10/23/2019 51  40 - 60 mg/dL Final   • VLDL Cholesterol 10/23/2019 18.2  mg/dL Final   • LDL Cholesterol  10/23/2019 57  0 - 100 mg/dL Final   • Creatinine, Urine 10/23/2019 83.8  Not Estab. mg/dL Final   • Microalbumin, Urine 10/23/2019 4.7  Not Estab. ug/mL Final   • Microalbumin/Creatinine Ratio 10/23/2019 5.6  0.0 - 30.0 mg/g creat Final    Comment:                      Normal:                0.0 -  30.0                       Albuminuria:          31.0 - 300.0                       Clinical albuminuria:       >300.0     • TSH 10/23/2019 2.760  0.270 - 4.200 uIU/mL Final   • Interpretation 10/23/2019 Note   Final    Supplemental report is available.   • PDF Image 10/23/2019 Not applicable   Final     Lab Results   Component Value Date    HGBA1C 7.90 (H) 10/23/2019    HGBA1C 8.30 (H) 07/16/2019    HGBA1C 7.80 (H) 04/10/2019     Lab Results   Component Value Date    MICROALBUR 4.7 10/23/2019    CREATININE 1.17 10/23/2019     Imaging Results (Most Recent)     None                Assessment and Plan:    Felix was seen today for diabetes.    Diagnoses and all orders for this visit:    Type II diabetes mellitus with neurological manifestations, uncontrolled (CMS/[facility])    Mixed hyperlipidemia    S/P CABG x 2    Other orders  -     Discontinue: SITagliptin (JANUVIA) 100 MG tablet; Take 1 tablet by mouth Daily.  -     SITagliptin (JANUVIA) 100 MG tablet; Take 1 tablet by mouth Daily.      Type 2 diabetes mellitus-uncontrolled  HbA1c still greater than 7%  Add Januvia 100 mg oral daily  Continue Jardiance  Continue the current insulin regimen  Discussed about CGM benefits again with the patient which he does not want to proceed with at this time.    Hyperlipidemia  Continue Lipitor 40 mg oral daily    Reviewed Lab results with  "the patient.             Samira Recinos MD  11/07/19    EMR Dragon / transcription disclaimer:     \"Dictated utilizing Dragon dictation\".  "

## 2020-03-13 ENCOUNTER — OFFICE VISIT (OUTPATIENT)
Dept: CARDIOLOGY | Facility: CLINIC | Age: 75
End: 2020-03-13

## 2020-03-13 VITALS
HEART RATE: 60 BPM | OXYGEN SATURATION: 95 % | DIASTOLIC BLOOD PRESSURE: 64 MMHG | HEIGHT: 70 IN | WEIGHT: 239 LBS | BODY MASS INDEX: 34.22 KG/M2 | SYSTOLIC BLOOD PRESSURE: 106 MMHG

## 2020-03-13 DIAGNOSIS — I25.810 CORONARY ARTERY DISEASE INVOLVING CORONARY BYPASS GRAFT OF NATIVE HEART WITHOUT ANGINA PECTORIS: Primary | ICD-10-CM

## 2020-03-13 DIAGNOSIS — I10 ESSENTIAL HYPERTENSION: ICD-10-CM

## 2020-03-13 DIAGNOSIS — I50.32 CHRONIC DIASTOLIC CONGESTIVE HEART FAILURE (HCC): ICD-10-CM

## 2020-03-13 PROCEDURE — 99214 OFFICE O/P EST MOD 30 MIN: CPT | Performed by: NURSE PRACTITIONER

## 2020-03-13 PROCEDURE — 93000 ELECTROCARDIOGRAM COMPLETE: CPT | Performed by: NURSE PRACTITIONER

## 2020-03-13 NOTE — PROGRESS NOTES
"      [facility] CARDIOLOGY  [address]  [facility] [address]  Phone: [phone]      Patient Name: Felix Olmedo  :1945  Age: 74 y.o.  Primary Cardiologist: Denys Montejo MD  Encounter Provider:  MILADYS Scales      Chief Complaint:   Chief Complaint   Patient presents with   • Follow-up     6 month         HPI  Felix Olmedo is a 74 y.o. male who presents today for semiannual follow-up.     Pt has a  history significant for diastolic heart failure, CAD, hyperlipidemia, obesity, type 2 diabetes.    Patient reports that he has done well over the past 6 months.  He reports that he had 1 episode of CP that was several months ago and described as mild, he had not had any further episodes.  He does report some mild shortness of breath with exertion after walking the dog.  He has gained some weight, but states that he thinks this is from his diet and not fluid weight.  He denies pedal edema.  His energy level is stable.  He is tolerating all of his medications without complication.     The following portions of the patient's history were reviewed and updated as appropriate: allergies, current medications, past family history, past medical history, past social history, past surgical history and problem list.      Review of Systems   Constitution: Positive for malaise/fatigue and weight gain.   Cardiovascular: Positive for claudication and dyspnea on exertion.   Respiratory: Positive for shortness of breath and snoring.    Endocrine: Positive for polyuria.   Musculoskeletal: Positive for joint pain.   Genitourinary: Positive for decreased libido.   Neurological: Positive for numbness and paresthesias.       OBJECTIVE:     Vitals:    20 1344   BP: 106/64   BP Location: Right arm   Patient Position: Sitting   Pulse: 60   SpO2: 95%   Weight: 108 kg (239 lb)   Height: 177.8 cm (70\")     Body mass index is 34.29 kg/m².    Physical Exam   "   Constitutional: He is oriented to person, place, and time. Vital signs are normal. He appears well-developed and well-nourished.   Eyes: Conjunctivae are normal.   Neck: Carotid bruit is not present.   Cardiovascular: Normal rate, regular rhythm and normal heart sounds.   No murmur heard.  Pulmonary/Chest: Effort normal and breath sounds normal.   Abdominal: Normal appearance.   Musculoskeletal: Normal range of motion.   No pedal edema   Neurological: He is alert and oriented to person, place, and time. GCS eye subscore is 4. GCS verbal subscore is 5. GCS motor subscore is 6.   Skin: Skin is warm and dry.   Psychiatric: He has a normal mood and affect. His speech is normal and behavior is normal. Judgment and thought content normal. Cognition and memory are normal.         ECG 12 Lead  Date/Time: 3/13/2020 12:57 PM  Performed by: Janet Rutherford APRN  Authorized by: Janet Rutherford APRN   Comparison: compared with previous ECG from 6/28/2018  Similar to previous ECG  Rhythm: sinus rhythm  Rate: normal  BPM: 60  Conduction: conduction normal  ST Segments: ST segments normal  T Waves: T waves normal  QRS axis: normal    Clinical impression: normal ECG            Cardiac Procedures:  1. Echocardiogram on 10/12/2016: Ejection fraction was 62%. There was grade 1 diastolic dysfunction. There was moderate to severe thickening of the noncoronary cusp of the aortic valve, but no aortic stenosis.  2. Left heart catheterization on 10/18/2016 by Dr. Guzman: The left main had 20% distal disease. The LAD had a 40-50% proximal stenosis. The LAD had diffuse 60% mid to distal disease. The left circumflex coronary artery had a 20% mid stenosis. The right coronary artery was chronically occluded at the ostium, left to right collaterals filled the distal vessel and PDA.  3. Left heart catheterization on 4/4/2017: The left main was normal. The LAD had 30-40% proximal disease and 60-70% mid disease. FFR the mid LAD was borderline  significant at 0.80. The left circumflex had a 20% mid stenosis. The right coronary artery was chronically occluded at the ostium. Coronary artery bypass grafting was recommended at that time.  4. Carotid Doppler ultrasound on 4/4/2017: The right internal carotid artery had 50-59% disease. The left internal carotid artery had 16-49% disease.  5. Status post two-vessel coronary artery bypass grafting on 5/10/2017 by Dr. Poole (BEAN to LAD, SVG to PDA).  6. Echocardiogram on 5/20/2017: Ejection fraction was 54%.  There was mild LVH.  There was grade 1a diastolic dysfunction.        ASSESSMENT:      Diagnosis Plan   1. Coronary artery disease involving coronary bypass graft of native heart without angina pectoris     2. Chronic diastolic congestive heart failure (CMS/HCC)     3. Essential hypertension           PLAN OF CARE:     1. CAD.  Patient states he is doing very well.  He exercises on a routine basis.  He denies any angina or shortness of breath.  Patient with history of CABG in 2017.  He will continue guideline directed therapy with aspirin, atorvastatin, isosorbide mononitrate, metoprolol tartrate.  ECG is unchanged today.  2. Chronic diastolic failure.  Patient appears euvolemic on exam today.  Denies any increased shortness of breath or pedal edema.  Continue with Bumex.  3. HTN.  Blood pressure controlled at 106/64.  Continue current regimen.  4. Follow-up with Dr. Montejo in 6 months.  Sooner for problems or complications.             Discharge Medications           Accurate as of March 13, 2020  3:36 PM. If you have any questions, ask your nurse or doctor.               Continue These Medications      Instructions Start Date   acetaminophen 325 MG tablet  Commonly known as:  TYLENOL   650 mg, Oral, Every 4 Hours PRN      aspirin 81 MG tablet   81 mg, Oral, Every Other Day      atorvastatin 40 MG tablet  Commonly known as:  LIPITOR   TAKE 1 TABLET EVERY DAY      BIOFREEZE ROLL-ON 4 % gel  Generic  drug:  Menthol (Topical Analgesic)   1 application, Apply externally, Daily PRN      bumetanide 2 MG tablet  Commonly known as:  BUMEX   1 mg, Oral, Daily      insulin aspart 100 UNIT/ML solution pen-injector sc pen  Commonly known as:  NOVOLOG FLEXPEN   10-14 units  3 times daily with meal      insulin detemir 100 UNIT/ML injection  Commonly known as:  LEVEMIR FLEXTOUCH   36 Units, Subcutaneous, Daily      Insulin Pen Needle 31G X 8 MM misc   Use to inject insulin      isosorbide mononitrate 30 MG 24 hr tablet  Commonly known as:  IMDUR   TAKE 1 TABLET TWICE DAILY      JARDIANCE 25 MG tablet  Generic drug:  Empagliflozin   TAKE 1 TABLET EVERY DAY      metoprolol tartrate 25 MG tablet  Commonly known as:  LOPRESSOR   TAKE 1 TABLET TWICE DAILY      MULTIVITAMIN ADULT PO   1 tablet, Oral, Daily      potassium chloride 10 MEQ CR capsule  Commonly known as:  MICRO-K   10 mEq, Oral, 2 Times Daily      SITagliptin 100 MG tablet  Commonly known as:  JANUVIA   100 mg, Oral, Daily      TRUE METRIX BLOOD GLUCOSE TEST test strip  Generic drug:  glucose blood   1 each, Other             Thank you for allowing me to participate in the care of your patient,         MILADYS Scales  [facility]  03/13/20  2:05 PM      **Daryl Disclaimer:**  Much of this encounter note is an electronic transcription/translation of spoken language to printed text. The electronic translation of spoken language may permit erroneous, or at times, nonsensical words or phrases to be inadvertently transcribed. Although I have reviewed the note for such errors, some may still exist.

## 2020-03-14 LAB
BUN SERPL-MCNC: 17 MG/DL (ref 8–27)
BUN/CREAT SERPL: 13 (ref 10–24)
CALCIUM SERPL-MCNC: 9.5 MG/DL (ref 8.6–10.2)
CHLORIDE SERPL-SCNC: 102 MMOL/L (ref 96–106)
CO2 SERPL-SCNC: 23 MMOL/L (ref 20–29)
CREAT SERPL-MCNC: 1.28 MG/DL (ref 0.76–1.27)
GLUCOSE SERPL-MCNC: 85 MG/DL (ref 65–99)
HBA1C MFR BLD: 7.8 % (ref 4.8–5.6)
INTERPRETATION: NORMAL
Lab: NORMAL
POTASSIUM SERPL-SCNC: 5.4 MMOL/L (ref 3.5–5.2)
SODIUM SERPL-SCNC: 141 MMOL/L (ref 134–144)

## 2020-03-20 RX ORDER — ISOSORBIDE MONONITRATE 30 MG/1
TABLET, EXTENDED RELEASE ORAL
Qty: 180 TABLET | Refills: 3 | Status: SHIPPED | OUTPATIENT
Start: 2020-03-20 | End: 2021-01-01 | Stop reason: SDUPTHER

## 2020-03-26 NOTE — PROGRESS NOTES
This visit has been rescheduled as a phone visit to comply with patient safety concerns in accordance with CDC recommendations.    Patient's HbA1c has definitely improved from his last visit but is not at goal which is supposed to be less than 7.5% given his age of being over 73.    Results for LANG BROWN (MRN [id number]) as of 3/26/2020 11:52   Ref. Range 7/16/2019 12:04 10/23/2019 10:36 3/13/2020 00:00 3/13/2020 13:20 3/13/2020 15:45   Hemoglobin A1C Latest Ref Range: 4.8 - 5.6 % 8.30 (H) 7.90 (H)   7.8 (H)     At home he is currently on Levemir 34 units at bedtime, NovoLog 8-12 units with each meal, Jardiance 25 mg oral daily and Januvia 100 mg oral daily.    Would recommend the following changes  Levemir 36 units at bedtime  NovoLog 10-14 units with each meal.  Continue the Jardiance and Januvia.    With these changes we are hoping for his A1c to be below 7.5% by his next visit.  Would recommend follow-up with me in 3 months from now.    Total time of discussion was 11-20 minutes.

## 2020-04-17 ENCOUNTER — TELEPHONE (OUTPATIENT)
Dept: INTERNAL MEDICINE | Age: 75
End: 2020-04-17

## 2020-04-17 NOTE — TELEPHONE ENCOUNTER
----- Message from Hilario Rahman MD sent at 4/17/2020  1:15 PM EDT -----  Telehealth visit completed.   Schedule F/U 6 months for chronic medical follow-up

## 2020-06-18 ENCOUNTER — OFFICE VISIT (OUTPATIENT)
Dept: INTERNAL MEDICINE | Age: 75
End: 2020-06-18

## 2020-06-18 VITALS
SYSTOLIC BLOOD PRESSURE: 122 MMHG | HEART RATE: 78 BPM | TEMPERATURE: 97.8 F | HEIGHT: 70 IN | OXYGEN SATURATION: 95 % | BODY MASS INDEX: 34.5 KG/M2 | WEIGHT: 241 LBS | DIASTOLIC BLOOD PRESSURE: 62 MMHG

## 2020-06-18 DIAGNOSIS — R25.2 MUSCLE CRAMPS: Primary | ICD-10-CM
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
## 2020-06-18 PROCEDURE — 99214 OFFICE O/P EST MOD 30 MIN: CPT | Performed by: INTERNAL MEDICINE

## 2020-06-18 NOTE — PROGRESS NOTES
"[facility]  PEDRO RAHMAN M.D.      Felix Hernandez Honorio / 74 y.o. / male  06/18/2020      CHIEF COMPLAINT     Leg Pain (1 month , )       ASSESSMENT & PLAN     1. Muscle cramps  - Hamstring stretches , heat PRN   - B. Burgdorferi Antibodies, WB Reflex  - Basic Metabolic Panel  - CK      2. Tick bite, initial encounter    - B. Burgdorferi Antibodies, WB Reflex        Summary/Discussion:  Advance Care Planning   ACP discussion was held with the patient during this visit. Patient has an advance directive (not in EMR), copy requested.      Next Appointment with me: Visit date not found    Return for worsening or lack of improvement.      VITAL SIGNS     Visit Vitals  /62   Pulse 78   Temp 97.8 °F (36.6 °C)   Ht 177.8 cm (70\")   Wt 109 kg (241 lb)   SpO2 95%   BMI 34.58 kg/m²       BP Readings from Last 3 Encounters:   06/18/20 122/62   03/13/20 106/64   11/07/19 116/68     Wt Readings from Last 3 Encounters:   06/18/20 109 kg (241 lb)   03/13/20 108 kg (239 lb)   11/07/19 108 kg (237 lb)      Body mass index is 34.58 kg/m².      HISTORY OF PRESENT ILLNESS      1 month bilateral hamstring muscle cramps, occurs when standing from prolonged sitting position.  Lasts about 2 to 3 minutes.  Denies injury or trauma.  Reports 2 recent tick bites in April with associated rash at the site of tick bite.  Denies arthralgia, fever.  Takes atorvastatin for hyperlipidemia with no prior myalgia problems.  Takes bumetanide diuretic.  Last potassium level was 5.4.    Patient Care Team:  Hilario Rahman MD as PCP - General (Internal Medicine)  Noah Montejo MD as Consulting Physician (Cardiology)  Jr Alden Poole MD as Surgeon (Cardiothoracic Surgery)  Samira Recinos MD as Consulting Physician (Endocrinology)      REVIEW OF SYSTEMS     Review of Systems  No fever, no muscle weakness, heart palpitations or shortness of breath.  No nausea vomiting diarrhea.  No swelling of joints.    PHYSICAL EXAMINATION "     Physical Exam   Constitutional: No distress.   Musculoskeletal:        Right upper leg: He exhibits no tenderness.        Left upper leg: He exhibits no tenderness.        Right lower leg: He exhibits no tenderness.        Left lower leg: He exhibits no tenderness.         REVIEWED DATA     Labs:     Lab Results   Component Value Date     03/13/2020    K 5.4 (H) 03/13/2020    CALCIUM 9.5 03/13/2020    AST 23 07/24/2017    ALT 21 07/24/2017    BUN 17 03/13/2020    CREATININE 1.28 (H) 03/13/2020    CREATININE 1.17 10/23/2019    CREATININE 1.13 07/16/2019    EGFRIFNONA 54 (L) 03/13/2020    EGFRIFAFRI 62 03/13/2020       Lab Results   Component Value Date    HGBA1C 7.8 (H) 03/13/2020    HGBA1C 7.90 (H) 10/23/2019    HGBA1C 8.30 (H) 07/16/2019    GLU 85 03/13/2020     (H) 10/23/2019     (H) 07/16/2019    MICROALBUR 4.7 10/23/2019       Lab Results   Component Value Date    LDL 57 10/23/2019    LDL 37 07/16/2019    LDL 47 04/10/2019    HDL 51 10/23/2019    HDL 51 07/16/2019    HDL 55 04/10/2019    TRIG 91 10/23/2019    TRIG 121 07/16/2019    TRIG 68 04/10/2019       Lab Results   Component Value Date    TSH 2.760 10/23/2019       Lab Results   Component Value Date    WBC 17.96 (H) 05/21/2017    HGB 12.4 (L) 05/21/2017    HGB 11.5 (L) 05/20/2017    HGB 11.4 (L) 05/19/2017     (H) 05/21/2017        Lab Results   Component Value Date    GLUCOSEU 500 mg/dL (2+) (A) 05/12/2017    BLOODU Large (3+) (A) 05/12/2017    NITRITEU Negative 05/12/2017    LEUKOCYTESUR Small (1+) (A) 05/12/2017       Imaging:         Medical Tests:         Summary of old records / correspondence / consultant report:         Request outside records:         ALLERGIES  Allergies   Allergen Reactions   • Adhesive Tape Rash        MEDICATIONS  Current Outpatient Medications   Medication Sig Dispense Refill   • acetaminophen (TYLENOL) 325 MG tablet Take 2 tablets by mouth Every 4 (Four) Hours As Needed for Mild Pain (1-3). 1  bottle 99   • aspirin 81 MG tablet Take 81 mg by mouth Every Other Day.     • atorvastatin (LIPITOR) 40 MG tablet TAKE 1 TABLET EVERY DAY 90 tablet 3   • bumetanide (BUMEX) 2 MG tablet Take 0.5 tablets by mouth Daily. 90 tablet 3   • insulin aspart (NOVOLOG FLEXPEN) 100 UNIT/ML solution pen-injector sc pen 10-14 units  3 times daily with meal 15 pen 3   • insulin detemir (LEVEMIR FLEXTOUCH) 100 UNIT/ML injection Inject 36 Units under the skin into the appropriate area as directed Daily. 15 pen 3   • Insulin Pen Needle 31G X 8 MM misc Use to inject insulin 500 each 3   • isosorbide mononitrate (IMDUR) 30 MG 24 hr tablet TAKE 1 TABLET TWICE DAILY (Patient taking differently: 30 mg Daily.) 180 tablet 3   • Menthol, Topical Analgesic, (BIOFREEZE ROLL-ON) 4 % gel Apply 1 application topically Daily As Needed.     • metoprolol tartrate (LOPRESSOR) 25 MG tablet TAKE 1 TABLET TWICE DAILY (Patient taking differently: 25 mg Daily.) 180 tablet 3   • Multiple Vitamins-Minerals (MULTIVITAMIN ADULT PO) Take 1 tablet by mouth Daily.     • potassium chloride (MICRO-K) 10 MEQ CR capsule Take 1 capsule (10 mEq total) by mouth 2 (Two) Times a Day 60 capsule 0   • SITagliptin (JANUVIA) 100 MG tablet Take 1 tablet by mouth Daily. 90 tablet 3   • TRUE METRIX BLOOD GLUCOSE TEST test strip 1 each by Other route.     • Empagliflozin (Jardiance) 25 MG tablet Take 25 mg by mouth Daily. 90 tablet 3     No current facility-administered medications for this visit.        PFSH:     The following portions of the patient's history were reviewed and updated as appropriate: Allergies / Current Medications / Past Medical History / Surgical History / Social History / Family History    PROBLEM LIST   Patient Active Problem List   Diagnosis   • Type II diabetes mellitus with neurological manifestations, uncontrolled (CMS/HCC)   • Coronary artery disease of native artery of native heart with stable angina pectoris (CMS/HCC)   • SVT (supraventricular  tachycardia) (CMS/HCC)   • Acute diastolic CHF (congestive heart failure) (CMS/HCC)   • S/P CABG x 2   • Hyperlipidemia   • Chronic pain of both knees   • Obesity (BMI 30-39.9)   • Multiple pulmonary nodules   • Gait difficulty       PAST MEDICAL HISTORY  Past Medical History:   Diagnosis Date   • Coronary artery disease     Status post 2 vessel CABG 5/10/17 by Dr. Poole (LIMA-LAD, SVG-PDA)   • Diabetes (CMS/HCC)    • Diastolic dysfunction    • Hyperlipidemia    • Hypertension    • LAFB (left anterior fascicular block)    • Lung nodule    • Osteoarthritis    • Pneumonia 02/2017   • Squamous cell carcinoma     Left cheek s/p resection   • SVT (supraventricular tachycardia) (CMS/[facility])     Diagnosed following CABG       SURGICAL HISTORY  Past Surgical History:   Procedure Laterality Date   • CARDIAC CATHETERIZATION N/A 10/18/2016    Procedure: Coronary angiography;  Surgeon: Jesus Guzman MD;  Location: [facility] INVASIVE LOCATION;  Service:    • CARDIAC CATHETERIZATION N/A 10/18/2016    Procedure: Left heart cath;  Surgeon: Jesus Guzman MD;  Location: [facility] INVASIVE LOCATION;  Service:    • CARDIAC CATHETERIZATION N/A 10/18/2016    Procedure: Left ventriculography;  Surgeon: Jesus Guzman MD;  Location: [facility] CATH INVASIVE LOCATION;  Service:    • CARDIAC CATHETERIZATION N/A 4/4/2017    Procedure: Coronary angiography;  Surgeon: Jesus Guzman MD;  Location: [facility] CATH INVASIVE LOCATION;  Service:    • CARDIAC CATHETERIZATION N/A 4/4/2017    Procedure: Left heart cath;  Surgeon: Jesus Guzman MD;  Location: [facility] CATH INVASIVE LOCATION;  Service:    • CARDIAC CATHETERIZATION N/A 4/4/2017    Procedure: Left ventriculography;  Surgeon: Jesus Guzman MD;  Location: [facility] CATH INVASIVE LOCATION;  Service:    • CARDIAC CATHETERIZATION  4/4/2017    Procedure: Functional Flow [place];  Surgeon: Jesus Guzman MD;  Location: [facility] CATH INVASIVE LOCATION;   Service:    • CARDIAC SURGERY     • CATARACT EXTRACTION W/ INTRAOCULAR LENS IMPLANT Left    • COLONOSCOPY     • CORONARY ARTERY BYPASS GRAFT N/A 5/10/2017    Procedure: CORONARY ARTERY BYPASS TIMES TWO UTILIZING THE LEFT GREATER SAPHENOUS VEIN WITH INTERNAL MAMMARY ARTERY GRAFT;  TRANSESOPHAGEAL ECHOCARDIOGRAM;  Surgeon: Alden Poole MD;  Location: [facility];  Service:    • EYE SURGERY     • KNEE SURGERY Left     Dr. Gallegos   • MOHS SURGERY Left     CHEEK   • SKIN BIOPSY         SOCIAL HISTORY  Social History     Socioeconomic History   • Marital status:      Spouse name: Mendy Vargas   • Number of children: 3   • Years of education: Not on file   • Highest education level: Not on file   Occupational History   • Occupation: Retired (from [employer])   Tobacco Use   • Smoking status: Former Smoker     Packs/day: 1.50     Years: 40.00     Pack years: 60.00     Types: Cigarettes     Last attempt to quit: 2009     Years since quittin.4   • Smokeless tobacco: Never Used   • Tobacco comment: Smoked up to 1.5 ppd.  Smoked for 50 years.  Quit .   Substance and Sexual Activity   • Alcohol use: Yes     Comment: 2-3 BEERS YEARLY    • Drug use: No     Comment: caffeine use   • Sexual activity: Not Currently       FAMILY HISTORY  Family History   Problem Relation Age of Onset   • Cancer Mother         uterine   • Heart disease Father         Father with fatal MI in 80's   • Diabetes Sister    • Esophageal cancer Brother    • Alzheimer's disease Maternal Grandfather    • Stroke Maternal Grandfather            **Dragon Disclaimer:   Much of this encounter note is an electronic transcription/translation of spoken language to printed text. The electronic translation of spoken language may permit erroneous, or at times, nonsensical words or phrases to be inadvertently transcribed. Although I have reviewed the note for such errors, some may still exist.     Template created by Helen Rahman MD

## 2020-06-19 LAB
B BURGDOR IGG+IGM SER-ACNC: <0.91 ISR (ref 0–0.9)
B BURGDOR IGM SER IA-ACNC: <0.8 INDEX (ref 0–0.79)
BUN SERPL-MCNC: 13 MG/DL (ref 8–23)
BUN/CREAT SERPL: 13.5 (ref 7–25)
CALCIUM SERPL-MCNC: 9.3 MG/DL (ref 8.6–10.5)
CHLORIDE SERPL-SCNC: 105 MMOL/L (ref 98–107)
CK SERPL-CCNC: 167 U/L (ref 20–200)
CO2 SERPL-SCNC: 24.1 MMOL/L (ref 22–29)
CREAT SERPL-MCNC: 0.96 MG/DL (ref 0.76–1.27)
GLUCOSE SERPL-MCNC: 80 MG/DL (ref 65–99)
POTASSIUM SERPL-SCNC: 4.2 MMOL/L (ref 3.5–5.2)
SODIUM SERPL-SCNC: 140 MMOL/L (ref 136–145)

## 2020-06-22 RX ORDER — CALCIUM CITRATE/VITAMIN D3 200MG-6.25
TABLET ORAL
Qty: 400 EACH | Refills: 3 | Status: SHIPPED | OUTPATIENT
Start: 2020-06-22 | End: 2021-01-01

## 2020-06-23 ENCOUNTER — TELEPHONE (OUTPATIENT)
Dept: INTERNAL MEDICINE | Age: 75
End: 2020-06-23

## 2020-06-23 NOTE — TELEPHONE ENCOUNTER
I informed pt result     Pt said he did not get any script for antibiotic . He said if he need one send it to [facility]

## 2020-06-23 NOTE — TELEPHONE ENCOUNTER
PATIENT CALLED AND STATED HE HAD LAB WORK COMPLETED LAST THURSDAY (06/18/2020) HE HAS NOT RECEIVED A CALL ON HIS RESULTS SO HE WOULD LIKE TO KNOW IF HIS RESULTS ARE BACK AND IF SO WHAT HIS RESULTS SAY. PLEASE ADVISE.     PATIENT CALL BACK [phone]

## 2020-06-24 NOTE — TELEPHONE ENCOUNTER
If he does not improve over next 2 weeks (of if worsening) have him return for further evaluation.

## 2020-06-24 NOTE — TELEPHONE ENCOUNTER
Informed wife   . Wife is concerned on his muscle ache and cramping .stating he is unsteady sometime when he wake up .it is been going on for more than month.    Asking what is the next step to do . Do they need more test or see specialist ?

## 2020-07-06 ENCOUNTER — TELEPHONE (OUTPATIENT)
Dept: INTERNAL MEDICINE | Age: 75
End: 2020-07-06

## 2020-07-06 NOTE — TELEPHONE ENCOUNTER
PATIENT CALLED BECAUSE HE IS HAVING PAIN IN HIS LEGS AND HIPS. THEY ARE CRAMPING AND THE DOCTOR TOLD TO CONTACT IF HE GETS IN TO ANYMORE PAIN.     PLEASE CALL PATIENT BACK WITH DIRECTIONS FROM .    BEST CONTACT: [phone]

## 2020-07-07 ENCOUNTER — OFFICE VISIT (OUTPATIENT)
Dept: INTERNAL MEDICINE | Age: 75
End: 2020-07-07

## 2020-07-07 VITALS
OXYGEN SATURATION: 94 % | WEIGHT: 249 LBS | TEMPERATURE: 96.9 F | HEART RATE: 67 BPM | BODY MASS INDEX: 35.65 KG/M2 | DIASTOLIC BLOOD PRESSURE: 62 MMHG | HEIGHT: 70 IN | SYSTOLIC BLOOD PRESSURE: 130 MMHG

## 2020-07-07 DIAGNOSIS — M63.862: ICD-10-CM

## 2020-07-07 DIAGNOSIS — M62.838 MUSCLE SPASTICITY: Primary | ICD-10-CM

## 2020-07-07 PROCEDURE — 99214 OFFICE O/P EST MOD 30 MIN: CPT | Performed by: INTERNAL MEDICINE

## 2020-07-07 NOTE — PATIENT INSTRUCTIONS
** IMPORTANT MESSAGE FROM DR. STILES **    In our office, your satisfaction is VERY important to us.     You may receive a survey from [employer] by mail or E-mail for you to provide feedback about your visit. This information is invaluable for me to know what we can do to improve our services.     I ask that you please take a few minutes to complete the survey and let us know how we are doing in serving your needs. (You may receive the survey more than once for multiple visits)    Thank You !    Dr. Stiles & Staff    _________________________________________________________________________________________________________________________      ** ADDITIONAL INSTRUCTION / REMINDERS FROM DR. STILES **

## 2020-07-07 NOTE — PROGRESS NOTES
"[facility]  PEDRO RAHMAN M.D.      Felix David Olmedo / 74 y.o. / male  07/07/2020      CHIEF COMPLAINT     Leg spasms/cramps       ASSESSMENT & PLAN     1. Muscle spasticity  Ongoing intermittent severe spasticity of bilateral lower extremities   - previously normal K and Ca and CPK   - Magnesium  - EMG & Nerve Conduction Test      Summary/Discussion:  •     Advance Care Planning        Next Appointment with me: 10/12/2020    Return in about 3 months (around 10/7/2020) for Reassess chronic medical problems.      VITAL SIGNS     Visit Vitals  /62   Pulse 67   Temp 96.9 °F (36.1 °C)   Ht 177.8 cm (70\")   Wt 113 kg (249 lb)   SpO2 94%   BMI 35.73 kg/m²       BP Readings from Last 3 Encounters:   07/07/20 130/62   06/18/20 122/62   03/13/20 106/64     Wt Readings from Last 3 Encounters:   07/07/20 113 kg (249 lb)   06/18/20 109 kg (241 lb)   03/13/20 108 kg (239 lb)      Body mass index is 35.73 kg/m².      HISTORY OF PRESENT ILLNESS      Ongoing intermittent \"cramps\" (spasms) of bilateral lower extremities druation of > 6 weeks without improvement  Typically occurs after standing/walking after lying or sitting for extended time.   Having to use a cane recently due to concerns of falling.   Denies chronic low back pain or hip pain  Previous/recent labs for K, Ca, CPK were normal.   He takes statins but has not had problems previously.     Patient Care Team:  Hilario Rahman MD as PCP - General (Internal Medicine)  Noah Montejo MD as Consulting Physician (Cardiology)  Jr Alden Poole MD as Surgeon (Cardiothoracic Surgery)  Samira Rceinos MD as Consulting Physician (Endocrinology)      REVIEW OF SYSTEMS     Review of Systems  No fever  GI neg   neg  MuSk per HPI; no muscle weakness or paresthesia  Neuro denies tremors, rigidity, bradykinesia but reports increased concern of balance/gait   Skin no rash     PHYSICAL EXAMINATION     Physical Exam   Constitutional: No distress. "   Musculoskeletal:        Right hip: He exhibits normal strength and no tenderness. Decreased range of motion: mildly decreased ROM.        Left hip: He exhibits decreased range of motion (mildly decreased). He exhibits normal strength and no tenderness.        Right knee: Normal.        Left knee: Normal.   No muscle atrophy or fasciculations   Strength is 5/5 bilateral lower extremities     Walks with cane and cautiously    Neurological:   Reflex Scores:       Patellar reflexes are 1+ on the right side and 1+ on the left side.        REVIEWED DATA     Labs:     Lab Results   Component Value Date     06/18/2020    K 4.2 06/18/2020    CALCIUM 9.3 06/18/2020    AST 23 07/24/2017    ALT 21 07/24/2017    BUN 13 06/18/2020    CREATININE 0.96 06/18/2020    CREATININE 1.28 (H) 03/13/2020    CREATININE 1.17 10/23/2019    EGFRIFNONA 76 06/18/2020    EGFRIFAFRI 92 06/18/2020       Lab Results   Component Value Date    HGBA1C 7.8 (H) 03/13/2020    HGBA1C 7.90 (H) 10/23/2019    HGBA1C 8.30 (H) 07/16/2019    GLU 80 06/18/2020    GLU 85 03/13/2020     (H) 10/23/2019    MICROALBUR 4.7 10/23/2019       Lab Results   Component Value Date    LDL 57 10/23/2019    LDL 37 07/16/2019    LDL 47 04/10/2019    HDL 51 10/23/2019    HDL 51 07/16/2019    HDL 55 04/10/2019    TRIG 91 10/23/2019    TRIG 121 07/16/2019    TRIG 68 04/10/2019       Lab Results   Component Value Date    TSH 2.760 10/23/2019       Lab Results   Component Value Date    WBC 17.96 (H) 05/21/2017    HGB 12.4 (L) 05/21/2017    HGB 11.5 (L) 05/20/2017    HGB 11.4 (L) 05/19/2017     (H) 05/21/2017        Lab Results   Component Value Date    GLUCOSEU 500 mg/dL (2+) (A) 05/12/2017    BLOODU Large (3+) (A) 05/12/2017    NITRITEU Negative 05/12/2017    LEUKOCYTESUR Small (1+) (A) 05/12/2017       Imaging:         Medical Tests:         Summary of old records / correspondence / consultant report:         Request outside records:         ALLERGIES  Allergies    Allergen Reactions   • Adhesive Tape Rash        MEDICATIONS  Current Outpatient Medications   Medication Sig Dispense Refill   • acetaminophen (TYLENOL) 325 MG tablet Take 2 tablets by mouth Every 4 (Four) Hours As Needed for Mild Pain (1-3). 1 bottle 99   • aspirin 81 MG tablet Take 81 mg by mouth Every Other Day.     • atorvastatin (LIPITOR) 40 MG tablet TAKE 1 TABLET EVERY DAY 90 tablet 3   • bumetanide (BUMEX) 2 MG tablet Take 0.5 tablets by mouth Daily. (Patient taking differently: Take 1 mg by mouth Daily. prn) 90 tablet 3   • Empagliflozin (Jardiance) 25 MG tablet Take 25 mg by mouth Daily. 90 tablet 3   • insulin aspart (NOVOLOG FLEXPEN) 100 UNIT/ML solution pen-injector sc pen 10-14 units  3 times daily with meal 15 pen 3   • insulin detemir (LEVEMIR FLEXTOUCH) 100 UNIT/ML injection Inject 36 Units under the skin into the appropriate area as directed Daily. 15 pen 3   • Insulin Pen Needle 31G X 8 MM misc Use to inject insulin 500 each 3   • isosorbide mononitrate (IMDUR) 30 MG 24 hr tablet TAKE 1 TABLET TWICE DAILY (Patient taking differently: 30 mg Daily.) 180 tablet 3   • Menthol, Topical Analgesic, (BIOFREEZE ROLL-ON) 4 % gel Apply 1 application topically Daily As Needed.     • metoprolol tartrate (LOPRESSOR) 25 MG tablet TAKE 1 TABLET TWICE DAILY (Patient taking differently: 25 mg Daily.) 180 tablet 3   • Multiple Vitamins-Minerals (MULTIVITAMIN ADULT PO) Take 1 tablet by mouth Daily.     • potassium chloride (MICRO-K) 10 MEQ CR capsule Take 1 capsule (10 mEq total) by mouth 2 (Two) Times a Day (Patient taking differently: Take 10 mEq by mouth Daily.) 60 capsule 0   • TRUE METRIX BLOOD GLUCOSE TEST test strip USE TO TEST BLOOD SUGAR  4 TIMES DAILY  E 11.8 400 each 3   • SITagliptin (JANUVIA) 100 MG tablet Take 1 tablet by mouth Daily. 90 tablet 3     No current facility-administered medications for this visit.        PFSH:     The following portions of the patient's history were reviewed and updated  as appropriate: Allergies / Current Medications / Past Medical History / Surgical History / Social History / Family History    PROBLEM LIST   Patient Active Problem List   Diagnosis   • Type II diabetes mellitus with neurological manifestations, uncontrolled (CMS/HCC)   • Coronary artery disease of native artery of native heart with stable angina pectoris (CMS/HCC)   • SVT (supraventricular tachycardia) (CMS/HCC)   • Acute diastolic CHF (congestive heart failure) (CMS/HCC)   • S/P CABG x 2   • Hyperlipidemia   • Chronic pain of both knees   • Obesity (BMI 30-39.9)   • Multiple pulmonary nodules   • Gait difficulty       PAST MEDICAL HISTORY  Past Medical History:   Diagnosis Date   • Coronary artery disease     Status post 2 vessel CABG 5/10/17 by Dr. Poole (LIMA-LAD, SVG-PDA)   • Diabetes (CMS/HCC)    • Diastolic dysfunction    • Hyperlipidemia    • Hypertension    • LAFB (left anterior fascicular block)    • Lung nodule    • Osteoarthritis    • Pneumonia 02/2017   • Squamous cell carcinoma     Left cheek s/p resection   • SVT (supraventricular tachycardia) (CMS/HCC)     Diagnosed following CABG       SURGICAL HISTORY  Past Surgical History:   Procedure Laterality Date   • CARDIAC CATHETERIZATION N/A 10/18/2016    Procedure: Coronary angiography;  Surgeon: Jesus Guzman MD;  Location: [facility] CATH INVASIVE LOCATION;  Service:    • CARDIAC CATHETERIZATION N/A 10/18/2016    Procedure: Left heart cath;  Surgeon: Jesus Guzman MD;  Location: [facility] CATH INVASIVE LOCATION;  Service:    • CARDIAC CATHETERIZATION N/A 10/18/2016    Procedure: Left ventriculography;  Surgeon: Jesus Guzman MD;  Location: [facility] CATH INVASIVE LOCATION;  Service:    • CARDIAC CATHETERIZATION N/A 4/4/2017    Procedure: Coronary angiography;  Surgeon: Jesus Guzman MD;  Location: [facility] CATH INVASIVE LOCATION;  Service:    • CARDIAC CATHETERIZATION N/A 4/4/2017    Procedure: Left heart cath;  Surgeon:  Jesus Guzman MD;  Location: [facility] CATH INVASIVE LOCATION;  Service:    • CARDIAC CATHETERIZATION N/A 2017    Procedure: Left ventriculography;  Surgeon: Jesus Guzman MD;  Location: [facility] CATH INVASIVE LOCATION;  Service:    • CARDIAC CATHETERIZATION  2017    Procedure: Functional Flow Colwell;  Surgeon: Jesus Guzman MD;  Location: [facility] CATH INVASIVE LOCATION;  Service:    • CARDIAC SURGERY     • CATARACT EXTRACTION W/ INTRAOCULAR LENS IMPLANT Left    • COLONOSCOPY     • CORONARY ARTERY BYPASS GRAFT N/A 5/10/2017    Procedure: CORONARY ARTERY BYPASS TIMES TWO UTILIZING THE LEFT GREATER SAPHENOUS VEIN WITH INTERNAL MAMMARY ARTERY GRAFT;  TRANSESOPHAGEAL ECHOCARDIOGRAM;  Surgeon: Alden Poole MD;  Location: [facility] OR;  Service:    • EYE SURGERY     • KNEE SURGERY Left     Dr. Gallegos   • MOHS SURGERY Left     CHEEK   • SKIN BIOPSY         SOCIAL HISTORY  Social History     Socioeconomic History   • Marital status:      Spouse name: Mendy Vargas   • Number of children: 3   • Years of education: Not on file   • Highest education level: Not on file   Occupational History   • Occupation: Retired (from [employer])   Tobacco Use   • Smoking status: Former Smoker     Packs/day: 1.50     Years: 40.00     Pack years: 60.00     Types: Cigarettes     Last attempt to quit: 2009     Years since quittin.5   • Smokeless tobacco: Never Used   • Tobacco comment: Smoked up to 1.5 ppd.  Smoked for 50 years.  Quit .   Substance and Sexual Activity   • Alcohol use: Yes     Comment: 2-3 BEERS YEARLY    • Drug use: No     Comment: caffeine use   • Sexual activity: Not Currently       FAMILY HISTORY  Family History   Problem Relation Age of Onset   • Cancer Mother         uterine   • Heart disease Father         Father with fatal MI in 80's   • Diabetes Sister    • Esophageal cancer Brother    • Alzheimer's disease Maternal Grandfather    • Stroke Maternal  Grandfather            **Daryl Disclaimer:   Much of this encounter note is an electronic transcription/translation of spoken language to printed text. The electronic translation of spoken language may permit erroneous, or at times, nonsensical words or phrases to be inadvertently transcribed. Although I have reviewed the note for such errors, some may still exist.     Template created by Helen Rahman MD      no

## 2020-07-08 LAB — MAGNESIUM SERPL-MCNC: 1.9 MG/DL (ref 1.6–2.4)

## 2020-07-08 NOTE — PROGRESS NOTES
Send result letter to patient.    Felix, these are your recent lab results:    Magnesium level is within normal     Please call my office if you have any questions. Otherwise, we can discuss the results in further detail on your next visit.

## 2020-07-10 ENCOUNTER — TELEPHONE (OUTPATIENT)
Dept: INTERNAL MEDICINE | Age: 75
End: 2020-07-10

## 2020-07-10 NOTE — TELEPHONE ENCOUNTER
"Patient's wife (Mendy Vargas on HAMZAH) called and stated that she and the patient were looking at the Office Visit notes given to them, and they had questions regarding the \"EMG & Nerve Conduction Test.\" Specifically, the patient was wanting to know if that is something that they should wait to have the office schedule, or do they need to find a facility that carries that out? The patient is also concerned about not being seen for another three months due to the amount of pain that he is in and wants to know if the EMG & Nerve Conduction Test will then result in further treatment for the pain.    Please call and advise.  [phone]   "

## 2020-07-10 NOTE — TELEPHONE ENCOUNTER
Verify the scheduling for NCV/EMG study.   He's welcome to follow-up sooner if needed. I would first await result of the nerve study.

## 2020-07-15 ENCOUNTER — TELEPHONE (OUTPATIENT)
Dept: ENDOCRINOLOGY | Age: 75
End: 2020-07-15

## 2020-07-15 NOTE — TELEPHONE ENCOUNTER
Left voice mail for patient to call the office back in regards to his appt on 7/16/20 at 2:15  Due to Dr Recinos being out of the office we need to change his appt to either a video or telephone  7/15/20 :503pm MP

## 2020-07-16 ENCOUNTER — TELEPHONE (OUTPATIENT)
Dept: ENDOCRINOLOGY | Age: 75
End: 2020-07-16

## 2020-07-16 NOTE — TELEPHONE ENCOUNTER
Left voice to let pt know that Dr Recinos will not be in office today but she is offering a telephone or video appt  Please call the office back 9:39 am 7/16/20

## 2020-07-24 ENCOUNTER — HOSPITAL ENCOUNTER (OUTPATIENT)
Dept: INFUSION THERAPY | Facility: HOSPITAL | Age: 75
Discharge: HOME | End: 2020-07-24
Admitting: PSYCHIATRY & NEUROLOGY

## 2020-07-24 DIAGNOSIS — R26.2 DIFFICULTY WALKING: ICD-10-CM

## 2020-07-24 DIAGNOSIS — G62.9 PERIPHERAL POLYNEUROPATHY: Primary | ICD-10-CM

## 2020-07-24 DIAGNOSIS — G62.89 OTHER POLYNEUROPATHY: Primary | ICD-10-CM

## 2020-07-24 PROCEDURE — 95909 NRV CNDJ TST 5-6 STUDIES: CPT

## 2020-07-24 PROCEDURE — 95886 MUSC TEST DONE W/N TEST COMP: CPT

## 2020-07-24 PROCEDURE — 95909 NRV CNDJ TST 5-6 STUDIES: CPT | Performed by: PSYCHIATRY & NEUROLOGY

## 2020-07-24 PROCEDURE — 95886 MUSC TEST DONE W/N TEST COMP: CPT | Performed by: PSYCHIATRY & NEUROLOGY

## 2020-07-24 NOTE — PROCEDURES
EMG and Nerve Conduction Studies    I.      Instrument used: Neuromax 1002  II.     Please see data sheets for tabular summary of NCS and details on methods, temperatures and lab standards.   III.    EMG muscles tested for upper extremity studies include the deltoid, biceps, triceps, pronator teres, extensor digitorum communis, first dorsal interosseous and abductor pollicis brevis.    IV.   EMG muscles tested for lower extremity studies include the vastus lateralis, tibialis anterior, peroneus longus, medial gastrocnemius and extensor digitorum brevis.    V.    Additional muscles tested as needed.  Paraspinal muscles tested as needed.   VI.   Please see data sheets for tabular summary of EMG findings.   VII. The complete report includes the data sheets.      Indication: Numbness in the feet and legs from the hips down with tingling  History: 74-year-old male with 20-year history of diabetes who has had progressive a sending numbness and tingling from the feet up to the hips.  He has only occasional mild back pain usually related to excessive work or twisting      Ht: 177.8 cm  Wt: 113 kg; BMI 35.73  HbA1C:   Lab Results   Component Value Date    HGBA1C 7.8 (H) 03/13/2020     TSH:   Lab Results   Component Value Date    TSH 2.760 10/23/2019       Technical summary:  Nerve conduction studies were obtained in the left leg with 1 comparison on the right.  Skin temperatures were a bit cool at times but the distal latencies were normal where it measured and so temperature correction was not needed.  Needle examination was obtained on selected muscles in both legs.    Results:  1.  Absent left sural sensory potential.  2.  Absent superficial peroneal sensory potentials bilaterally.  3.  Slow left peroneal motor velocity below the knee at 30.5 m/s with normal velocity in the short segment across the fibular head.  Normal distal latency with very low amplitude of 0.142 mV from ankle stimulation.  4.  Slow left tibial motor  velocity at 33.6 m/s with normal distal latency but very low amplitude of 0.233 mV from ankle stimulation.  5.  Needle examination of selected muscles in both legs showed multiple abnormalities.  There were positive sharp waves in both tibialis anterior muscles, the right peroneus longus and the left medial gastrocnemius.  There were an increased number of large motor units in both tibialis anterior, peroneus longus and medial gastrocnemius muscles with increased firing rates and reduced interference patterns.  The extensor digitorum brevis muscles showed normal insertional activities but rare motor units with very rapid firing rates and severely reduced recruitment.  The vastus lateralis muscles showed normal insertional activities.  The motor units were normal on the right and there was a question of an increased number of large motor units on the left.  Firing patterns were relatively normal.  Lumbar paraspinals at L5 showed fibrillations and positive sharp waves on the left.  None were seen on the right.    Impression:  Abnormal study showing fairly severe peripheral neuropathy.  There is significant axonal loss.  There is evidence of root level involvement.  I cannot exclude superimposed multiple radiculopathies bilaterally predominantly L5 or S1.  Study results were discussed with the patient    Denys Colindres M.D.              Dictated utilizing Dragon dictation.

## 2020-07-24 NOTE — PROGRESS NOTES
Call patient with his test result(s) and mail the results to him if MyChart is NOT active.    EMG/NCV study (nerve study) shows significant abnormalities.   Will place referral to neurologist for further evaluation.

## 2020-08-11 ENCOUNTER — TELEPHONE (OUTPATIENT)
Dept: NEUROLOGY | Facility: CLINIC | Age: 75
End: 2020-08-11

## 2020-08-11 NOTE — TELEPHONE ENCOUNTER
Received call from patients wife who states that her  is having a lot of issues and cannot hardly walk and is in a lot of pain as well. He is on the waiting list and is scheduled on 11/23 at 3:00.     They are wondering if anything can be done to try to get him in sooner.     Can someone please look at this and see if this can be done?     Jannette Olmedo   [phone]    Thank you

## 2020-08-11 NOTE — TELEPHONE ENCOUNTER
Spoke with patient's wife rescheduled pt's appt on 10/30. Told her that he's still on the waitlist so if an appt open up the office will give them a call.

## 2020-08-25 ENCOUNTER — OFFICE VISIT (OUTPATIENT)
Dept: NEUROLOGY | Facility: CLINIC | Age: 75
End: 2020-08-25

## 2020-08-25 VITALS
OXYGEN SATURATION: 94 % | SYSTOLIC BLOOD PRESSURE: 120 MMHG | WEIGHT: 248 LBS | HEIGHT: 70 IN | BODY MASS INDEX: 35.5 KG/M2 | HEART RATE: 73 BPM | DIASTOLIC BLOOD PRESSURE: 58 MMHG

## 2020-08-25 DIAGNOSIS — E11.42 DIABETIC PERIPHERAL NEUROPATHY (HCC): Primary | ICD-10-CM

## 2020-08-25 DIAGNOSIS — M54.50 MIDLINE LOW BACK PAIN WITHOUT SCIATICA, UNSPECIFIED CHRONICITY: ICD-10-CM

## 2020-08-25 PROCEDURE — 99215 OFFICE O/P EST HI 40 MIN: CPT | Performed by: PSYCHIATRY & NEUROLOGY

## 2020-08-25 NOTE — PROGRESS NOTES
CC: Peripheral neuropathy    HPI:  Felix Olmedo is a  74 y.o.  right-handed white male who I am seeing as a follow-up for the first time regarding peripheral neuropathy.  The patient was sent by Dr. Rahman.  He was sent for an electromyogram which I performed 7/24/2020.  This showed moderate to severe polyneuropathy with absent sensory potentials in the foot and slow conduction velocities for the peroneal motor study across the fibular head at 30.5 m/s with normal velocity below the knee and very low amplitude of 0.142 mV.  The tibial motor study velocity showed 33.6 m/s and amplitude 0.233 mV respectively.  There were notable needle exam changes in the more distal muscles.  The patient states he has had diabetes since 2000 and is not sure when he first learned of his hyperlipidemia.  He had been placed on atorvastatin which drives down his LDL to target values most recently 57 on 10/23/2019.  The patient describes his symptoms of primarily numbness and tingling with very little pain in the feet.  He has only intermittent mild numbness in the hands.  He has notable leg cramps particularly in the hamstrings and calves and also in the hands.    The patient denies a history of significant head or spine trauma meningitis seizures stroke or syncope.  There is no one in the family with neuropathy that he knows of.  There is positive family history of stroke in his maternal grandfather.  No one with a brain tumor, brain hemorrhage or aneurysm of a brain artery.        Past Medical History:   Diagnosis Date   • Coronary artery disease     Status post 2 vessel CABG 5/10/17 by Dr. Poole (LIMA-LAD, SVG-PDA)   • Diabetes (CMS/HCC)    • Diastolic dysfunction    • Hyperlipidemia    • Hypertension    • LAFB (left anterior fascicular block)    • Lung nodule    • Osteoarthritis    • Pneumonia 02/2017   • Squamous cell carcinoma     Left cheek s/p resection   • SVT (supraventricular tachycardia) (CMS/HCC)     Diagnosed following  CABG         Past Surgical History:   Procedure Laterality Date   • CARDIAC CATHETERIZATION N/A 10/18/2016    Procedure: Coronary angiography;  Surgeon: Jesus Guzman MD;  Location: [facility] CATH INVASIVE LOCATION;  Service:    • CARDIAC CATHETERIZATION N/A 10/18/2016    Procedure: Left heart cath;  Surgeon: Jesus Guzman MD;  Location: [facility] CATH INVASIVE LOCATION;  Service:    • CARDIAC CATHETERIZATION N/A 10/18/2016    Procedure: Left ventriculography;  Surgeon: Jesus Guzman MD;  Location: [facility] CATH INVASIVE LOCATION;  Service:    • CARDIAC CATHETERIZATION N/A 4/4/2017    Procedure: Coronary angiography;  Surgeon: Jesus Guzman MD;  Location: [facility] CATH INVASIVE LOCATION;  Service:    • CARDIAC CATHETERIZATION N/A 4/4/2017    Procedure: Left heart cath;  Surgeon: Jesus Guzman MD;  Location: [facility] CATH INVASIVE LOCATION;  Service:    • CARDIAC CATHETERIZATION N/A 4/4/2017    Procedure: Left ventriculography;  Surgeon: Jesus Guzman MD;  Location: [facility] CATH INVASIVE LOCATION;  Service:    • CARDIAC CATHETERIZATION  4/4/2017    Procedure: Functional Flow [place];  Surgeon: Jesus Guzman MD;  Location: [facility] CATH INVASIVE LOCATION;  Service:    • CARDIAC SURGERY     • CATARACT EXTRACTION W/ INTRAOCULAR LENS IMPLANT Left    • COLONOSCOPY     • CORONARY ARTERY BYPASS GRAFT N/A 5/10/2017    Procedure: CORONARY ARTERY BYPASS TIMES TWO UTILIZING THE LEFT GREATER SAPHENOUS VEIN WITH INTERNAL MAMMARY ARTERY GRAFT;  TRANSESOPHAGEAL ECHOCARDIOGRAM;  Surgeon: Alden Poole MD;  Location: [facility];  Service:    • EYE SURGERY     • KNEE SURGERY Left 2005    Dr. Gallegos   • MOHS SURGERY Left     CHEEK   • SKIN BIOPSY             Current Outpatient Medications:   •  aspirin 81 MG tablet, Take 81 mg by mouth Every Other Day., Disp: , Rfl:   •  atorvastatin (LIPITOR) 40 MG tablet, TAKE 1 TABLET EVERY DAY, Disp: 90 tablet, Rfl: 3  •  Empagliflozin (Jardiance)  25 MG tablet, Take 25 mg by mouth Daily., Disp: 90 tablet, Rfl: 3  •  insulin aspart (NOVOLOG FLEXPEN) 100 UNIT/ML solution pen-injector sc pen, 10-14 units  3 times daily with meal, Disp: 15 pen, Rfl: 3  •  insulin detemir (LEVEMIR FLEXTOUCH) 100 UNIT/ML injection, Inject 36 Units under the skin into the appropriate area as directed Daily., Disp: 15 pen, Rfl: 3  •  isosorbide mononitrate (IMDUR) 30 MG 24 hr tablet, TAKE 1 TABLET TWICE DAILY (Patient taking differently: 30 mg Daily.), Disp: 180 tablet, Rfl: 3  •  metoprolol tartrate (LOPRESSOR) 25 MG tablet, TAKE 1 TABLET TWICE DAILY (Patient taking differently: 25 mg Daily.), Disp: 180 tablet, Rfl: 3  •  Multiple Vitamins-Minerals (MULTIVITAMIN ADULT PO), Take 1 tablet by mouth Daily., Disp: , Rfl:   •  acetaminophen (TYLENOL) 325 MG tablet, Take 2 tablets by mouth Every 4 (Four) Hours As Needed for Mild Pain (1-3)., Disp: 1 bottle, Rfl: 99  •  bumetanide (BUMEX) 2 MG tablet, Take 0.5 tablets by mouth Daily. (Patient taking differently: Take 1 mg by mouth Daily. prn), Disp: 90 tablet, Rfl: 3  •  Insulin Pen Needle 31G X 8 MM misc, Use to inject insulin, Disp: 500 each, Rfl: 3  •  Menthol, Topical Analgesic, (BIOFREEZE ROLL-ON) 4 % gel, Apply 1 application topically Daily As Needed., Disp: , Rfl:   •  potassium chloride (MICRO-K) 10 MEQ CR capsule, Take 1 capsule (10 mEq total) by mouth 2 (Two) Times a Day (Patient taking differently: Take 10 mEq by mouth Daily.), Disp: 60 capsule, Rfl: 0  •  SITagliptin (JANUVIA) 100 MG tablet, Take 1 tablet by mouth Daily., Disp: 90 tablet, Rfl: 3  •  TRUE METRIX BLOOD GLUCOSE TEST test strip, USE TO TEST BLOOD SUGAR  4 TIMES DAILY  E 11.8, Disp: 400 each, Rfl: 3      Family History   Problem Relation Age of Onset   • Cancer Mother         uterine   • Heart disease Father         Father with fatal MI in 80's   • Diabetes Sister    • Esophageal cancer Brother    • Alzheimer's disease Maternal Grandfather    • Stroke Maternal  Grandfather          Social History     Socioeconomic History   • Marital status:      Spouse name: Mendy Vargas   • Number of children: 3   • Years of education: Not on file   • Highest education level: Not on file   Occupational History   • Occupation: Retired (from [employer])   Tobacco Use   • Smoking status: Former Smoker     Packs/day: 1.50     Years: 40.00     Pack years: 60.00     Types: Cigarettes     Last attempt to quit: 2009     Years since quittin.6   • Smokeless tobacco: Never Used   • Tobacco comment: Smoked up to 1.5 ppd.  Smoked for 50 years.  Quit .   Substance and Sexual Activity   • Alcohol use: Yes     Comment: 2-3 BEERS YEARLY    • Drug use: No     Comment: caffeine use   • Sexual activity: Not Currently         Allergies   Allergen Reactions   • Adhesive Tape Rash         Pain Scale: 6/10 in joints and low back        ROS:  Review of Systems   Constitutional: Positive for fatigue. Negative for activity change and appetite change.   HENT: Negative for ear pain, facial swelling and hearing loss.    Eyes: Negative for pain, redness and itching.   Respiratory: Positive for shortness of breath. Negative for cough and choking.    Cardiovascular: Positive for leg swelling. Negative for chest pain.   Gastrointestinal: Negative for abdominal pain, nausea and vomiting.   Endocrine: Negative for cold intolerance and heat intolerance.   Musculoskeletal: Positive for arthralgias and back pain. Negative for joint swelling.   Skin: Negative for color change, pallor and rash.   Allergic/Immunologic: Negative for environmental allergies and food allergies.   Neurological: Positive for numbness. Negative for dizziness, tremors, seizures, syncope, facial asymmetry, speech difficulty, weakness, light-headedness and headaches.   Psychiatric/Behavioral: Negative for agitation, behavioral problems, confusion, decreased concentration, dysphoric mood, hallucinations, self-injury, sleep  "disturbance and suicidal ideas. The patient is not nervous/anxious and is not hyperactive.          I have reviewed and agree with the above ROS completed by the medical assistant.      Physical Exam:  Vitals:    08/25/20 1510   BP: 120/58   Pulse: 73   SpO2: 94%   Weight: 112 kg (248 lb)   Height: 177.8 cm (70\")     Orthostatic BP:    Body mass index is 35.58 kg/m².    Physical Exam  General: Obese white male no acute distress  HEENT: Normocephalic no evidence of trauma.  Discs flat.  No AV nicking.  Throat negative.  Neck: Supple.  No thyromegaly.  No cervical bruits.  Radial pulses are strong and simultaneous  Heart: Regular rate and rhythm no murmurs  Extremities: Moderate pedal edema bilaterally a little more on the right      Neurological Exam:   Mental Status: Awake, alert, oriented to person, place and time.  Conversant without evidence of an affective disorder, thought disorder, delusions or hallucinations.  Attention span and concentration are normal.  HCF: No aphasia, apraxia or dysarthria.  Recent and remote memory intact.  Knowledge of recent events intact.  CN: I:   II: Visual fields full without left inattention   III, IV, VI: Eye movements intact without nystagmus or ptosis.  Pupils equal round and reactive to light.   V,VII: Light touch and pinprick intact all 3 divisions of V.  Facial muscles symmetrical.   VIII: Hearing intact to finger rub   IX,X: Soft palate elevates symmetrically   XI: Sternomastoid and trapezius are strong.   XII: Tongue midline without atrophy or fasciculations  Motor: Normal tone and bulk in the upper and lower extremities   Power testing: Mild to moderate weakness of the interossei and abductor pollicis brevis muscles bilaterally.  Other muscles in both arms are normal.  In the lower extremities there is mild weakness of ankle dorsiflexion as well as toe dorsiflexion and plantar flexion.  All other muscles tested in the legs were strong.  Reflexes: Upper extremities: " Areflexia        Lower extremities: Areflexia        Toe signs: Downgoing bilaterally  Sensory: Light touch: Reduced from the knees down bilaterally        Pinprick: Reduced from the knees down bilaterally        Vibration: Reduced at the right ankle absent at the left        Position: Probably normal on the right but abnormal on the left    Cerebellar: Finger-to-nose: Intact           Rapid movement:           Heel-to-shin: Intact  Gait and Station: Mildly broad-based.  Mildly unsteady.  Able to stand on toes but has more trouble standing on heels.  Titubates on Romberg testing but did not fall.  No drift    Results:      Lab Results   Component Value Date    GLUCOSE 101 (H) 05/31/2017    BUN 13 06/18/2020    CREATININE 0.96 06/18/2020    EGFRIFNONA 76 06/18/2020    EGFRIFAFRI 92 06/18/2020    BCR 13.5 06/18/2020    CO2 24.1 06/18/2020    CALCIUM 9.3 06/18/2020    PROTENTOTREF 7.4 07/24/2017    ALBUMIN 3.70 07/24/2017    LABIL2 1.0 07/24/2017    AST 23 07/24/2017    ALT 21 07/24/2017       Lab Results   Component Value Date    WBC 17.96 (H) 05/21/2017    HGB 12.4 (L) 05/21/2017    HCT 37.4 (L) 05/21/2017    MCV 91.9 05/21/2017     (H) 05/21/2017         .No results found for: RPR      Lab Results   Component Value Date    TSH 2.760 10/23/2019         Lab Results   Component Value Date    YGQFAMAC80 637 04/10/2019         Lab Results   Component Value Date    FOLATE >20.00 04/10/2019         Lab Results   Component Value Date    HGBA1C 7.8 (H) 03/13/2020         Lab Results   Component Value Date    GLUCOSE 101 (H) 05/31/2017    BUN 13 06/18/2020    CREATININE 0.96 06/18/2020    EGFRIFNONA 76 06/18/2020    EGFRIFAFRI 92 06/18/2020    BCR 13.5 06/18/2020    K 4.2 06/18/2020    CO2 24.1 06/18/2020    CALCIUM 9.3 06/18/2020    PROTENTOTREF 7.4 07/24/2017    ALBUMIN 3.70 07/24/2017    LABIL2 1.0 07/24/2017    AST 23 07/24/2017    ALT 21 07/24/2017         Lab Results   Component Value Date    WBC 17.96 (H)  05/21/2017    HGB 12.4 (L) 05/21/2017    HCT 37.4 (L) 05/21/2017    MCV 91.9 05/21/2017     (H) 05/21/2017       EMG report and data sheets reviewed.      Assessment:   1.  Moderate to severe peripheral neuropathy most likely due to diabetes, worsened by hyperlipidemia.  With very low amplitude motor nerve conduction findings and absence sensory's I believe it is primarily an axonal neuropathy as opposed to a primarily demyelinative neuropathy.  He indicates that painful symptoms are not significant enough to want to take medication for such as gabapentin  2.  Cannot entirely exclude superimposed lumbar stenosis but pattern of painful symptoms is not typical          Plan:  1.  Completion of remainder of labs for treatable causes to include sed rate, RPR, heavy metal screen, copper level, SPE, IEP, cryoglobulins  2.  Follow-up in about a month with 1 of our nurse practitioners.        >50% of this 40-minute follow-up was spent counseling the patient on the work-up of peripheral neuropathy and medication management should he have end of painful symptoms.                  Dictated utilizing Dragon dictation.

## 2020-09-16 ENCOUNTER — APPOINTMENT (OUTPATIENT)
Dept: INFUSION THERAPY | Facility: HOSPITAL | Age: 75
End: 2020-09-16

## 2020-09-28 RX ORDER — ATORVASTATIN CALCIUM 40 MG/1
TABLET, FILM COATED ORAL
Qty: 90 TABLET | Refills: 0 | Status: SHIPPED | OUTPATIENT
Start: 2020-09-28 | End: 2021-01-06

## 2020-09-29 ENCOUNTER — TELEPHONE (OUTPATIENT)
Dept: INTERNAL MEDICINE | Age: 75
End: 2020-09-29

## 2020-09-29 RX ORDER — INSULIN DETEMIR 100 [IU]/ML
INJECTION, SOLUTION SUBCUTANEOUS
Qty: 45 ML | Refills: 3 | Status: SHIPPED | OUTPATIENT
Start: 2020-09-29 | End: 2022-01-01

## 2020-09-29 RX ORDER — INSULIN DETEMIR 100 [IU]/ML
INJECTION, SOLUTION SUBCUTANEOUS
Qty: 45 ML | Refills: 3 | Status: CANCELLED | OUTPATIENT
Start: 2020-09-29

## 2020-09-29 NOTE — TELEPHONE ENCOUNTER
Patient called in and stated he cannot get hold of doctor DAVID ESPINOZA   and no one in the office is picking up . Patient needs a refill of Levemir FlexTouch 100 UNIT/ML injection   Patient is out of medication   90 day supply     Sent to [facility] - [place], [place] - [address] - [phone]  - [phone]   [phone]          Patient call back   [phone]

## 2020-10-01 ENCOUNTER — LAB (OUTPATIENT)
Dept: LAB | Facility: HOSPITAL | Age: 75
End: 2020-10-01

## 2020-10-01 DIAGNOSIS — IMO0002 TYPE II DIABETES MELLITUS WITH NEUROLOGICAL MANIFESTATIONS, UNCONTROLLED: ICD-10-CM

## 2020-10-01 DIAGNOSIS — E11.42 DIABETIC PERIPHERAL NEUROPATHY (HCC): ICD-10-CM

## 2020-10-01 DIAGNOSIS — Z95.1 S/P CABG X 2: ICD-10-CM

## 2020-10-01 LAB
ERYTHROCYTE [SEDIMENTATION RATE] IN BLOOD: 3 MM/HR (ref 0–20)
HBA1C MFR BLD: 7.4 % (ref 4.8–5.6)
RPR SER QL: NORMAL

## 2020-10-01 PROCEDURE — 84165 PROTEIN E-PHORESIS SERUM: CPT | Performed by: PSYCHIATRY & NEUROLOGY

## 2020-10-01 PROCEDURE — 85652 RBC SED RATE AUTOMATED: CPT | Performed by: PSYCHIATRY & NEUROLOGY

## 2020-10-01 PROCEDURE — 36415 COLL VENOUS BLD VENIPUNCTURE: CPT | Performed by: PSYCHIATRY & NEUROLOGY

## 2020-10-01 PROCEDURE — 86592 SYPHILIS TEST NON-TREP QUAL: CPT | Performed by: PSYCHIATRY & NEUROLOGY

## 2020-10-01 PROCEDURE — 84155 ASSAY OF PROTEIN SERUM: CPT | Performed by: PSYCHIATRY & NEUROLOGY

## 2020-10-01 PROCEDURE — 82175 ASSAY OF ARSENIC: CPT | Performed by: PSYCHIATRY & NEUROLOGY

## 2020-10-01 PROCEDURE — 86334 IMMUNOFIX E-PHORESIS SERUM: CPT

## 2020-10-01 PROCEDURE — 83655 ASSAY OF LEAD: CPT | Performed by: PSYCHIATRY & NEUROLOGY

## 2020-10-01 PROCEDURE — 82595 ASSAY OF CRYOGLOBULIN: CPT

## 2020-10-01 PROCEDURE — 82784 ASSAY IGA/IGD/IGG/IGM EACH: CPT

## 2020-10-01 PROCEDURE — 82525 ASSAY OF COPPER: CPT

## 2020-10-01 PROCEDURE — 83825 ASSAY OF MERCURY: CPT | Performed by: PSYCHIATRY & NEUROLOGY

## 2020-10-01 PROCEDURE — 83036 HEMOGLOBIN GLYCOSYLATED A1C: CPT

## 2020-10-02 LAB
ALBUMIN SERPL ELPH-MCNC: 3.4 G/DL (ref 2.9–4.4)
ALBUMIN/GLOB SERPL: 0.9 {RATIO} (ref 0.7–1.7)
ALPHA1 GLOB SERPL ELPH-MCNC: 0.2 G/DL (ref 0–0.4)
ALPHA2 GLOB SERPL ELPH-MCNC: 0.8 G/DL (ref 0.4–1)
B-GLOBULIN SERPL ELPH-MCNC: 1.1 G/DL (ref 0.7–1.3)
GAMMA GLOB SERPL ELPH-MCNC: 1.6 G/DL (ref 0.4–1.8)
GLOBULIN SER CALC-MCNC: 3.7 G/DL (ref 2.2–3.9)
IGA SERPL-MCNC: 520 MG/DL (ref 61–437)
IGG SERPL-MCNC: 1361 MG/DL (ref 603–1613)
IGM SERPL-MCNC: 148 MG/DL (ref 15–143)
LABORATORY COMMENT REPORT: NORMAL
M PROTEIN SERPL ELPH-MCNC: NORMAL G/DL
PROT PATTERN SERPL ELPH-IMP: NORMAL
PROT PATTERN SERPL IFE-IMP: ABNORMAL
PROT SERPL-MCNC: 7.1 G/DL (ref 6–8.5)

## 2020-10-03 LAB — COPPER SERPL-MCNC: 114 UG/DL (ref 72–166)

## 2020-10-05 ENCOUNTER — TELEPHONE (OUTPATIENT)
Dept: CARDIOLOGY | Facility: CLINIC | Age: 75
End: 2020-10-05

## 2020-10-05 LAB — CRYOGLOB SER QL 1D COLD INC: NORMAL

## 2020-10-05 RX ORDER — BUMETANIDE 2 MG/1
1 TABLET ORAL DAILY
Qty: 90 TABLET | Refills: 0 | Status: ON HOLD | OUTPATIENT
Start: 2020-10-05 | End: 2022-01-01 | Stop reason: SDUPTHER

## 2020-10-05 NOTE — TELEPHONE ENCOUNTER
BRUNO from [facility] requesting refill on bumex 2mg  Thanks  Brittani Estrada RN  Triage nurse

## 2020-10-05 NOTE — TELEPHONE ENCOUNTER
10/05/20  12:53 EDT    I sent in a 90-day supply.  He is due for yearly follow-up, he already has an appointment scheduled with Dr. Montejo in December.  This should be enough to get him to that appointment where Dr. Montejo can readdress.      MILADYS Mckinley  [facility]

## 2020-10-06 LAB
ARSENIC BLD-MCNC: 10 UG/L (ref 2–23)
LEAD BLDV-MCNC: <1 UG/DL (ref 0–4)
MERCURY BLD-MCNC: <1 UG/L (ref 0–14.9)

## 2020-10-12 ENCOUNTER — OFFICE VISIT (OUTPATIENT)
Dept: INTERNAL MEDICINE | Age: 75
End: 2020-10-12

## 2020-10-12 VITALS
WEIGHT: 244 LBS | SYSTOLIC BLOOD PRESSURE: 122 MMHG | BODY MASS INDEX: 34.93 KG/M2 | OXYGEN SATURATION: 95 % | HEART RATE: 77 BPM | HEIGHT: 70 IN | DIASTOLIC BLOOD PRESSURE: 54 MMHG | TEMPERATURE: 96.9 F

## 2020-10-12 DIAGNOSIS — I25.118 CORONARY ARTERY DISEASE OF NATIVE ARTERY OF NATIVE HEART WITH STABLE ANGINA PECTORIS (HCC): Chronic | ICD-10-CM

## 2020-10-12 DIAGNOSIS — I50.32 CHRONIC DIASTOLIC CONGESTIVE HEART FAILURE (HCC): Chronic | ICD-10-CM

## 2020-10-12 DIAGNOSIS — IMO0002 TYPE II DIABETES MELLITUS WITH NEUROLOGICAL MANIFESTATIONS, UNCONTROLLED: Primary | Chronic | ICD-10-CM

## 2020-10-12 DIAGNOSIS — E78.2 MIXED HYPERLIPIDEMIA: Chronic | ICD-10-CM

## 2020-10-12 PROCEDURE — 90694 VACC AIIV4 NO PRSRV 0.5ML IM: CPT | Performed by: INTERNAL MEDICINE

## 2020-10-12 PROCEDURE — 99214 OFFICE O/P EST MOD 30 MIN: CPT | Performed by: INTERNAL MEDICINE

## 2020-10-12 PROCEDURE — G0008 ADMIN INFLUENZA VIRUS VAC: HCPCS | Performed by: INTERNAL MEDICINE

## 2020-10-12 NOTE — PROGRESS NOTES
"[facility] [place] [facility]  PEDRO STILES M.D.      Felix Olmedo / 75 y.o. / male  10/12/2020      CHIEF COMPLAINT     Diabetes, Hyperlipidemia, and Congestive Heart Failure       ASSESSMENT & PLAN     1. Type II diabetes mellitus with neurological manifestations, uncontrolled (CMS/[facility])    2. Coronary artery disease of native artery of native heart with stable angina pectoris (CMS/[facility])    3. Mixed hyperlipidemia    4. Chronic diastolic congestive heart failure (CMS/[facility])      Orders Placed This Encounter   Procedures   • Fluad Quad 65+ yrs (8073-8303)     No orders of the defined types were placed in this encounter.      Summary/Discussion:  • He wishes to change endocrine office. Suggested name of external endocrinologist.   • He stopped either Januvia or Jardiance due to cost. He will verify which one he stopped.   • Continue all other medications.       Next Appointment with me: 3/16/2021    Return in about 5 months (around 3/12/2021) for Reassess chronic medical problems.      VITAL SIGNS     Visit Vitals  /54   Pulse 77   Temp 96.9 °F (36.1 °C)   Ht 177.8 cm (70\")   Wt 111 kg (244 lb)   SpO2 95%   BMI 35.01 kg/m²       BP Readings from Last 3 Encounters:   10/12/20 122/54   08/25/20 120/58   07/07/20 130/62     Wt Readings from Last 3 Encounters:   10/12/20 111 kg (244 lb)   08/25/20 112 kg (248 lb)   07/07/20 113 kg (249 lb)      Body mass index is 35.01 kg/m².      HISTORY OF PRESENT ILLNESS      Type 2 diabetes: Patient reports to having stopped either Januvia or Jardiance 3 months ago due to cost factors.  Most recent A1c level was 7.4 earlier this month.  Patient expresses desire to change endocrinology office because he has not been happy with their unresponsiveness.  He sees Dr. Colindres for peripheral neuropathy.  Hypertension remains stable on current medications.  Coronary artery disease remains stable without angina, takes isosorbide.  Hyperlipidemia on statin therapy without significant " problems.    Patient Care Team:  Hilario Rahman MD as PCP - General (Internal Medicine)  Noah Montejo MD as Consulting Physician (Cardiology)  Jr Alden Poole MD as Surgeon (Cardiothoracic Surgery)  Samira Recinos MD as Consulting Physician (Endocrinology)      REVIEW OF SYSTEMS     Review of Systems  No fever  No chest pain or worsening ZELAYA   neg  Endo neg for low sugars   Neuro peripheral neuropathy     PHYSICAL EXAMINATION     Physical Exam  Cardiovascular Rate: normal. Rhythm: regular. Heart sounds: normal   Pulmonary/Chest: Effort normal and breath sounds normal.      REVIEWED DATA       Labs:     Lab Results   Component Value Date     06/18/2020    K 4.2 06/18/2020    CALCIUM 9.3 06/18/2020    AST 23 07/24/2017    ALT 21 07/24/2017    BUN 13 06/18/2020    CREATININE 0.96 06/18/2020    CREATININE 1.28 (H) 03/13/2020    CREATININE 1.17 10/23/2019    EGFRIFNONA 76 06/18/2020    EGFRIFAFRI 92 06/18/2020       Lab Results   Component Value Date    HGBA1C 7.40 (H) 10/01/2020    HGBA1C 7.8 (H) 03/13/2020    HGBA1C 7.90 (H) 10/23/2019    GLU 80 06/18/2020    GLU 85 03/13/2020     (H) 10/23/2019    MICROALBUR 4.7 10/23/2019       Lab Results   Component Value Date    LDL 57 10/23/2019    LDL 37 07/16/2019    LDL 47 04/10/2019    HDL 51 10/23/2019    TRIG 91 10/23/2019       Lab Results   Component Value Date    TSH 2.760 10/23/2019       Lab Results   Component Value Date    WBC 17.96 (H) 05/21/2017    HGB 12.4 (L) 05/21/2017    HGB 11.5 (L) 05/20/2017    HGB 11.4 (L) 05/19/2017     (H) 05/21/2017       Lab Results   Component Value Date    GLUCOSEU 500 mg/dL (2+) (A) 05/12/2017    BLOODU Large (3+) (A) 05/12/2017    NITRITEU Negative 05/12/2017    LEUKOCYTESUR Small (1+) (A) 05/12/2017       Imaging:         Medical Tests:         Summary of old records / correspondence / consultant report:         Request outside records:         ALLERGIES  Allergies   Allergen Reactions   •  Adhesive Tape Rash        MEDICATIONS  Current Outpatient Medications   Medication Sig Dispense Refill   • acetaminophen (TYLENOL) 325 MG tablet Take 2 tablets by mouth Every 4 (Four) Hours As Needed for Mild Pain (1-3). 1 bottle 99   • aspirin 81 MG tablet Take 81 mg by mouth Every Other Day.     • atorvastatin (LIPITOR) 40 MG tablet TAKE 1 TABLET EVERY DAY 90 tablet 0   • bumetanide (BUMEX) 2 MG tablet Take 0.5 tablets by mouth Daily. 90 tablet 0   • Empagliflozin (Jardiance) 25 MG tablet Take 25 mg by mouth Daily. 90 tablet 3   • insulin aspart (NOVOLOG FLEXPEN) 100 UNIT/ML solution pen-injector sc pen 10-14 units  3 times daily with meal 15 pen 3   • Insulin Pen Needle 31G X 8 MM misc Use to inject insulin 500 each 3   • isosorbide mononitrate (IMDUR) 30 MG 24 hr tablet TAKE 1 TABLET TWICE DAILY (Patient taking differently: 30 mg Daily.) 180 tablet 3   • Levemir FlexTouch 100 UNIT/ML injection INJECT 36 UNITS UNDER THE SKIN INTO THE APPROPRIATE AREA EVERY DAY, AS DIRECTED. 45 mL 3   • Menthol, Topical Analgesic, (BIOFREEZE ROLL-ON) 4 % gel Apply 1 application topically Daily As Needed.     • metoprolol tartrate (LOPRESSOR) 25 MG tablet TAKE 1 TABLET TWICE DAILY (Patient taking differently: 25 mg Daily.) 180 tablet 3   • Multiple Vitamins-Minerals (MULTIVITAMIN ADULT PO) Take 1 tablet by mouth Daily.     • potassium chloride (MICRO-K) 10 MEQ CR capsule Take 1 capsule (10 mEq total) by mouth 2 (Two) Times a Day (Patient taking differently: Take 10 mEq by mouth Daily.) 60 capsule 0   • TRUE METRIX BLOOD GLUCOSE TEST test strip USE TO TEST BLOOD SUGAR  4 TIMES DAILY  E 11.8 400 each 3   • SITagliptin (JANUVIA) 100 MG tablet Take 1 tablet by mouth Daily. 90 tablet 3     No current facility-administered medications for this visit.        PFSH:     The following portions of the patient's history were reviewed and updated as appropriate: Allergies / Current Medications / Past Medical History / Surgical History / Social  History / Family History    PROBLEM LIST   Patient Active Problem List   Diagnosis   • Type II diabetes mellitus with neurological manifestations, uncontrolled (CMS/HCC)   • Coronary artery disease of native artery of native heart with stable angina pectoris (CMS/HCC)   • SVT (supraventricular tachycardia) (CMS/HCC)   • Chronic diastolic congestive heart failure (CMS/HCC)   • S/P CABG x 2   • Hyperlipidemia   • Chronic pain of both knees   • Obesity (BMI 30-39.9)   • Multiple pulmonary nodules   • Gait difficulty       PAST MEDICAL HISTORY  Past Medical History:   Diagnosis Date   • Coronary artery disease     Status post 2 vessel CABG 5/10/17 by Dr. Poole (LIMA-LAD, SVG-PDA)   • Diabetes (CMS/HCC)    • Diastolic dysfunction    • Hyperlipidemia    • Hypertension    • LAFB (left anterior fascicular block)    • Lung nodule    • Osteoarthritis    • Pneumonia 02/2017   • Squamous cell carcinoma     Left cheek s/p resection   • SVT (supraventricular tachycardia) (CMS/HCC)     Diagnosed following CABG       SURGICAL HISTORY  Past Surgical History:   Procedure Laterality Date   • CARDIAC CATHETERIZATION N/A 10/18/2016    Procedure: Coronary angiography;  Surgeon: Jesus Guzman MD;  Location: [facility] CATH INVASIVE LOCATION;  Service:    • CARDIAC CATHETERIZATION N/A 10/18/2016    Procedure: Left heart cath;  Surgeon: Jesus Guzman MD;  Location: [facility] CATH INVASIVE LOCATION;  Service:    • CARDIAC CATHETERIZATION N/A 10/18/2016    Procedure: Left ventriculography;  Surgeon: Jesus Guzman MD;  Location: [facility] CATH INVASIVE LOCATION;  Service:    • CARDIAC CATHETERIZATION N/A 4/4/2017    Procedure: Coronary angiography;  Surgeon: Jesus Guzman MD;  Location: [facility] CATH INVASIVE LOCATION;  Service:    • CARDIAC CATHETERIZATION N/A 4/4/2017    Procedure: Left heart cath;  Surgeon: Jesus Guzman MD;  Location: [facility] CATH INVASIVE LOCATION;  Service:    • CARDIAC CATHETERIZATION N/A  2017    Procedure: Left ventriculography;  Surgeon: Jesus Guzman MD;  Location: [facility] CATH INVASIVE LOCATION;  Service:    • CARDIAC CATHETERIZATION  2017    Procedure: Functional Flow [place];  Surgeon: Jesus Guzman MD;  Location: [facility] CATH INVASIVE LOCATION;  Service:    • CARDIAC SURGERY     • CATARACT EXTRACTION W/ INTRAOCULAR LENS IMPLANT Left    • COLONOSCOPY     • CORONARY ARTERY BYPASS GRAFT N/A 5/10/2017    Procedure: CORONARY ARTERY BYPASS TIMES TWO UTILIZING THE LEFT GREATER SAPHENOUS VEIN WITH INTERNAL MAMMARY ARTERY GRAFT;  TRANSESOPHAGEAL ECHOCARDIOGRAM;  Surgeon: Alden Poole MD;  Location: [facility] MAIN OR;  Service:    • EYE SURGERY     • KNEE SURGERY Left     Dr. Gallegos   • MOHS SURGERY Left     CHEEK   • SKIN BIOPSY         SOCIAL HISTORY  Social History     Socioeconomic History   • Marital status:      Spouse name: Mendy Vargas   • Number of children: 3   • Years of education: Not on file   • Highest education level: Not on file   Occupational History   • Occupation: Retired (from [employer])   Tobacco Use   • Smoking status: Former Smoker     Packs/day: 1.50     Years: 40.00     Pack years: 60.00     Types: Cigarettes     Quit date:      Years since quittin.7   • Smokeless tobacco: Never Used   • Tobacco comment: Smoked up to 1.5 ppd.  Smoked for 50 years.  Quit .   Substance and Sexual Activity   • Alcohol use: Yes     Comment: 2-3 BEERS YEARLY    • Drug use: No     Comment: caffeine use   • Sexual activity: Not Currently       FAMILY HISTORY  Family History   Problem Relation Age of Onset   • Cancer Mother         uterine   • Heart disease Father         Father with fatal MI in 80's   • Diabetes Sister    • Esophageal cancer Brother    • Alzheimer's disease Maternal Grandfather    • Stroke Maternal Grandfather          Examiner was wearing KN95 mask, face shield and exam gloves during the entire duration of the visit. Patient  was masked the entire time.   Minimum social distance of 6 ft maintained entire visit except if physical contact was necessary as documented.     **Dragon Disclaimer:   Much of this encounter note is an electronic transcription/translation of spoken language to printed text. The electronic translation of spoken language may permit erroneous, or at times, nonsensical words or phrases to be inadvertently transcribed. Although I have reviewed the note for such errors, some may still exist.     Template created by Helen Rahman MD

## 2020-10-12 NOTE — ASSESSMENT & PLAN NOTE
Lab Results   Component Value Date    HGBA1C 7.40 (H) 10/01/2020    HGBA1C 7.8 (H) 03/13/2020    HGBA1C 7.90 (H) 10/23/2019

## 2020-10-13 NOTE — PROGRESS NOTES
75 y.o.    Patient Care Team:  Hilario Rahman MD as PCP - General (Internal Medicine)  Noah Montejo MD as Consulting Physician (Cardiology)  Jr Alden Poole MD as Surgeon (Cardiothoracic Surgery)  Samira Recinos MD as Consulting Physician (Endocrinology)    Chief Complaint:    FOLLOW UP/ TYPE 2 DM  Subjective     HPI  75-year-old white female is here for the follow-up of type 2 diabetes mellitus.    Type 2 dm - Diagnosed about 20 years ago.   Today in clinic pt reports being on Levemir 36 units at bedtime, NovoLog 10-14 units with each meal.  Supposed to be on Jardiance and Januvia.  But he believes he ran out of 1 of medications and has not been taking them since March 2020.    Apparently try to call our office multiple times and it was difficult to get through through the line.  FBG -100-180  Pre meals -100-180  Checks BG -3-4 times  Dm retinopathy -no,Last eye exam -March 2020  Dm nephropathy -no  Dm neuropathy -yes,Dm neuropathy meds -sees a neurologist  CAD -yes  CVA -no  Episodes of hypoglycemia -lowest blood sugar was 59, does have hypoglycemic awareness  Pt is physically active. weight has been stable.   Pt tries to follow DM diet for most part.   On Ace inb.          Reviewed primary care physician's/consulting physician documentation and lab results       Interval History      The following portions of the patient's history were reviewed and updated as appropriate: allergies, current medications, past family history, past medical history, past social history, past surgical history and problem list.    Past Medical History:   Diagnosis Date   • Coronary artery disease     Status post 2 vessel CABG 5/10/17 by Dr. Poole (LIMA-LAD, SVG-PDA)   • Diabetes (CMS/HCC)    • Diastolic dysfunction    • Hyperlipidemia    • Hypertension    • LAFB (left anterior fascicular block)    • Lung nodule    • Osteoarthritis    • Pneumonia 02/2017   • Squamous cell carcinoma     Left cheek s/p resection   • SVT  (supraventricular tachycardia) (CMS/HCC)     Diagnosed following CABG     Family History   Problem Relation Age of Onset   • Cancer Mother         uterine   • Heart disease Father         Father with fatal MI in 80's   • Diabetes Sister    • Esophageal cancer Brother    • Alzheimer's disease Maternal Grandfather    • Stroke Maternal Grandfather      Social History     Socioeconomic History   • Marital status:      Spouse name: Mendy Vargas   • Number of children: 3   • Years of education: Not on file   • Highest education level: Not on file   Occupational History   • Occupation: Retired (from [employer])   Tobacco Use   • Smoking status: Former Smoker     Packs/day: 1.50     Years: 40.00     Pack years: 60.00     Types: Cigarettes     Quit date:      Years since quittin.8   • Smokeless tobacco: Never Used   • Tobacco comment: Smoked up to 1.5 ppd.  Smoked for 50 years.  Quit .   Substance and Sexual Activity   • Alcohol use: Yes     Comment: 2-3 BEERS YEARLY    • Drug use: No     Comment: caffeine use   • Sexual activity: Not Currently     Allergies   Allergen Reactions   • Adhesive Tape Rash       Current Outpatient Medications:   •  acetaminophen (TYLENOL) 325 MG tablet, Take 2 tablets by mouth Every 4 (Four) Hours As Needed for Mild Pain (1-3). (Patient taking differently: Take 500 mg by mouth Every 4 (Four) Hours As Needed for Mild Pain .), Disp: 1 bottle, Rfl: 99  •  aspirin 81 MG tablet, Take 81 mg by mouth Every Other Day., Disp: , Rfl:   •  atorvastatin (LIPITOR) 40 MG tablet, TAKE 1 TABLET EVERY DAY, Disp: 90 tablet, Rfl: 0  •  bumetanide (BUMEX) 2 MG tablet, Take 0.5 tablets by mouth Daily., Disp: 90 tablet, Rfl: 0  •  Empagliflozin (Jardiance) 25 MG tablet, Take 25 mg by mouth Daily., Disp: 90 tablet, Rfl: 3  •  insulin aspart (NOVOLOG FLEXPEN) 100 UNIT/ML solution pen-injector sc pen, 10-14 units  3 times daily with meal, Disp: 15 pen, Rfl: 3  •  Insulin Pen Needle 31G X 8  MM misc, Use to inject insulin, Disp: 500 each, Rfl: 3  •  isosorbide mononitrate (IMDUR) 30 MG 24 hr tablet, TAKE 1 TABLET TWICE DAILY (Patient taking differently: 30 mg Daily.), Disp: 180 tablet, Rfl: 3  •  Levemir FlexTouch 100 UNIT/ML injection, INJECT 36 UNITS UNDER THE SKIN INTO THE APPROPRIATE AREA EVERY DAY, AS DIRECTED., Disp: 45 mL, Rfl: 3  •  Menthol, Topical Analgesic, (BIOFREEZE ROLL-ON) 4 % gel, Apply 1 application topically Daily As Needed., Disp: , Rfl:   •  metoprolol tartrate (LOPRESSOR) 25 MG tablet, TAKE 1 TABLET TWICE DAILY (Patient taking differently: 25 mg Daily.), Disp: 180 tablet, Rfl: 3  •  Multiple Vitamins-Minerals (MULTIVITAMIN ADULT PO), Take 1 tablet by mouth Daily., Disp: , Rfl:   •  potassium chloride (MICRO-K) 10 MEQ CR capsule, Take 1 capsule (10 mEq total) by mouth 2 (Two) Times a Day (Patient taking differently: Take 10 mEq by mouth Daily.), Disp: 60 capsule, Rfl: 0  •  SITagliptin (Januvia) 100 MG tablet, Take 1 tablet by mouth Daily., Disp: 90 tablet, Rfl: 3  •  TRUE METRIX BLOOD GLUCOSE TEST test strip, USE TO TEST BLOOD SUGAR  4 TIMES DAILY  E 11.8, Disp: 400 each, Rfl: 3        Review of Systems   Constitutional: Negative for appetite change, fatigue and fever.   Eyes: Negative for visual disturbance.   Respiratory: Negative for shortness of breath.    Cardiovascular: Negative for palpitations and leg swelling.   Gastrointestinal: Negative for abdominal pain and vomiting.   Endocrine: Negative for polydipsia and polyuria.   Musculoskeletal: Negative for joint swelling and neck pain.   Skin: Negative for rash.   Neurological: Negative for weakness and numbness.   Psychiatric/Behavioral: Negative for behavioral problems.     I have reviewed and confirmed the accuracy of the ROS as documented by the MA/LPN/RN Samira Recinos MD          Objective       Vitals:    10/26/20 1424   BP: 138/72   Pulse: 72   Resp: 16   SpO2: 98%   Weight: 114 kg (250 lb 12.8 oz)   Height: 177.8 cm  "(70\")     Body mass index is 35.99 kg/m².      Physical Exam  Vitals signs reviewed.   Constitutional:       Appearance: Normal appearance. He is not diaphoretic.   HENT:      Head: Normocephalic and atraumatic.   Eyes:      General: No scleral icterus.     Conjunctiva/sclera: Conjunctivae normal.   Neck:      Musculoskeletal: Normal range of motion and neck supple.      Thyroid: No thyromegaly.      Comments: Wide neck  Cardiovascular:      Rate and Rhythm: Normal rate.   Pulmonary:      Effort: Pulmonary effort is normal. No respiratory distress.   Abdominal:      General: There is no distension.      Palpations: Abdomen is soft.      Tenderness: There is no abdominal tenderness.      Comments: Central obesity   Musculoskeletal:         General: No tenderness or deformity.   Skin:     General: Skin is warm and dry.   Neurological:      Mental Status: He is alert and oriented to person, place, and time. Mental status is at baseline.      Gait: Gait normal.      Comments: Decreased sensations   Psychiatric:         Mood and Affect: Mood normal.         Behavior: Behavior normal.         Results Review:     I reviewed the patient's new clinical results and mentioned them above in HPI and in plan as well.    Medical records reviewed  Summary:Done      Lab on 10/01/2020   Component Date Value Ref Range Status   • Hemoglobin A1C 10/01/2020 7.40* 4.80 - 5.60 % Final   • Cryoglobulin, Ql, Serum, Rflx 10/01/2020 Comment  None detected Final    None Detected at 72 hours  This test was developed and its performance characteristics  determined by [facility]. It has not been cleared or approved  by the Food and Drug Administration.   • Immunofixation Result, Serum 10/01/2020 Comment   Final    No monoclonality detected.   • IgG 10/01/2020 1361  603 - 1613 mg/dL Final   • IgA 10/01/2020 520* 61 - 437 mg/dL Final   • IgM 10/01/2020 148* 15 - 143 mg/dL Final   • Copper 10/01/2020 114  72 - 166 ug/dL Final                             "        Detection Limit = 5     Lab Results   Component Value Date    HGBA1C 7.40 (H) 10/01/2020    HGBA1C 7.8 (H) 03/13/2020    HGBA1C 7.90 (H) 10/23/2019     Lab Results   Component Value Date    MICROALBUR 4.7 10/23/2019    CREATININE 0.96 06/18/2020     Imaging Results (Most Recent)     None                Assessment and Plan:    Diagnoses and all orders for this visit:    1. Long-term insulin use (CMS/[facility]) (Primary)  -     Hemoglobin A1c; Future  -     Basic Metabolic Panel; Future  -     Lipid Panel; Future  -     Microalbumin / Creatinine Urine Ratio - Urine, Clean Catch; Future  -     TSH; Future  -     T4, Free; Future    2. DM (diabetes mellitus), type 2, uncontrolled w/neurologic complication (CMS/[facility])  -     Hemoglobin A1c; Future  -     Basic Metabolic Panel; Future  -     Lipid Panel; Future  -     Microalbumin / Creatinine Urine Ratio - Urine, Clean Catch; Future  -     TSH; Future  -     T4, Free; Future    3. Hyperlipemia, mixed  -     Hemoglobin A1c; Future  -     Basic Metabolic Panel; Future  -     Lipid Panel; Future  -     Microalbumin / Creatinine Urine Ratio - Urine, Clean Catch; Future  -     TSH; Future  -     T4, Free; Future    Other orders  -     SITagliptin (Januvia) 100 MG tablet; Take 1 tablet by mouth Daily.  Dispense: 90 tablet; Refill: 3  -     Empagliflozin (Jardiance) 25 MG tablet; Take 25 mg by mouth Daily.  Dispense: 90 tablet; Refill: 3        Type 2 diabetes mellitus-uncontrolled  HbA1c not at goal.  However based on patient's age will be conservative with a tight glycemic control.  Resume Januvia and Jardiance  Gave these medications a sample to the patient  Continue the above insulin regimen    Hyperlipidemia  Continue statin     diabetic neuropathy  Followed by neurologist, pending work-up for other causes for the severe cramping that he has been experiencing in his bilateral legs.    Also concerned if statin is contributing for this pain, patient is going to discuss with  "his cardiologist if he can come off of the statin for a few weeks in order to see if there would be an improvement in his cramps.    Reviewed Lab results with the patient.       F/u 3 - 4 months with Ray Larson. MILADYS  F/u 6 - 7 months with me.   Labs 2 weeks prior to the next visit          Samira Recinos MD  10/26/20    EMR Dragon / transcription disclaimer:     \"Dictated utilizing Dragon dictation\".      "

## 2020-10-26 ENCOUNTER — OFFICE VISIT (OUTPATIENT)
Dept: ENDOCRINOLOGY | Age: 75
End: 2020-10-26

## 2020-10-26 VITALS
HEART RATE: 72 BPM | HEIGHT: 70 IN | DIASTOLIC BLOOD PRESSURE: 72 MMHG | RESPIRATION RATE: 16 BRPM | SYSTOLIC BLOOD PRESSURE: 138 MMHG | WEIGHT: 250.8 LBS | BODY MASS INDEX: 35.9 KG/M2 | OXYGEN SATURATION: 98 %

## 2020-10-26 DIAGNOSIS — E78.2 HYPERLIPEMIA, MIXED: ICD-10-CM

## 2020-10-26 DIAGNOSIS — Z79.4 LONG-TERM INSULIN USE (HCC): Primary | ICD-10-CM

## 2020-10-26 DIAGNOSIS — IMO0002 DM (DIABETES MELLITUS), TYPE 2, UNCONTROLLED W/NEUROLOGIC COMPLICATION: ICD-10-CM

## 2020-10-26 PROCEDURE — 99214 OFFICE O/P EST MOD 30 MIN: CPT | Performed by: INTERNAL MEDICINE

## 2020-10-26 RX ORDER — EMPAGLIFLOZIN 25 MG/1
1 TABLET, FILM COATED ORAL DAILY
Qty: 90 TABLET | Refills: 3 | Status: SHIPPED | OUTPATIENT
Start: 2020-10-26 | End: 2021-01-01 | Stop reason: SDUPTHER

## 2020-10-30 ENCOUNTER — OFFICE VISIT (OUTPATIENT)
Dept: NEUROLOGY | Facility: CLINIC | Age: 75
End: 2020-10-30

## 2020-10-30 ENCOUNTER — LAB (OUTPATIENT)
Dept: LAB | Facility: HOSPITAL | Age: 75
End: 2020-10-30

## 2020-10-30 VITALS
HEIGHT: 70 IN | HEART RATE: 74 BPM | DIASTOLIC BLOOD PRESSURE: 66 MMHG | SYSTOLIC BLOOD PRESSURE: 124 MMHG | OXYGEN SATURATION: 96 % | WEIGHT: 249.8 LBS | BODY MASS INDEX: 35.76 KG/M2

## 2020-10-30 DIAGNOSIS — E11.42 DIABETIC POLYNEUROPATHY ASSOCIATED WITH TYPE 2 DIABETES MELLITUS (HCC): ICD-10-CM

## 2020-10-30 DIAGNOSIS — Z79.899 HIGH RISK MEDICATION USE: Primary | ICD-10-CM

## 2020-10-30 LAB
ALBUMIN SERPL-MCNC: 3.9 G/DL (ref 3.5–5.2)
ALBUMIN/GLOB SERPL: 1.1 G/DL
ALP SERPL-CCNC: 114 U/L (ref 39–117)
ALT SERPL W P-5'-P-CCNC: 24 U/L (ref 1–41)
ANION GAP SERPL CALCULATED.3IONS-SCNC: 10.7 MMOL/L (ref 5–15)
AST SERPL-CCNC: 26 U/L (ref 1–40)
BILIRUB SERPL-MCNC: 0.4 MG/DL (ref 0–1.2)
BUN SERPL-MCNC: 20 MG/DL (ref 8–23)
BUN/CREAT SERPL: 16.5 (ref 7–25)
CALCIUM SPEC-SCNC: 9.2 MG/DL (ref 8.6–10.5)
CHLORIDE SERPL-SCNC: 104 MMOL/L (ref 98–107)
CO2 SERPL-SCNC: 24.3 MMOL/L (ref 22–29)
CREAT SERPL-MCNC: 1.21 MG/DL (ref 0.76–1.27)
GFR SERPL CREATININE-BSD FRML MDRD: 58 ML/MIN/1.73
GLOBULIN UR ELPH-MCNC: 3.4 GM/DL
GLUCOSE SERPL-MCNC: 95 MG/DL (ref 65–99)
POTASSIUM SERPL-SCNC: 4.2 MMOL/L (ref 3.5–5.2)
PROT SERPL-MCNC: 7.3 G/DL (ref 6–8.5)
SODIUM SERPL-SCNC: 139 MMOL/L (ref 136–145)

## 2020-10-30 PROCEDURE — 36415 COLL VENOUS BLD VENIPUNCTURE: CPT | Performed by: NURSE PRACTITIONER

## 2020-10-30 PROCEDURE — 99213 OFFICE O/P EST LOW 20 MIN: CPT | Performed by: NURSE PRACTITIONER

## 2020-10-30 PROCEDURE — 80053 COMPREHEN METABOLIC PANEL: CPT | Performed by: NURSE PRACTITIONER

## 2020-10-30 RX ORDER — GABAPENTIN 300 MG/1
300 CAPSULE ORAL 2 TIMES DAILY
Qty: 60 CAPSULE | Refills: 2 | Status: SHIPPED | OUTPATIENT
Start: 2020-10-30 | End: 2021-05-10

## 2020-10-30 NOTE — PROGRESS NOTES
Subjective:     Patient ID: Felix Olemdo is a 75 y.o. male presenting for follow up for Diabetic Peripheral Neuropathy. He saw Dr. Colindres for initial evaluation 2 months ago. He has numbness and tingling of his lower extremities but it is not overly painful to him. He mostly complains of cramping to his bilateral lower extremities. At his last visit he denied a trial of gabapentin but today is interested. Neuropathy labs were reviewed today. Hemoglobin a1c was 7.4, IgA was elevated at 520. He has not had liver function tested in quite a while. He is taking atorvastatin 40 mg for hyperlipidemia. He has been on this for several years without leg cramps so he is hesitant to come off of it to see if this could be causing his symptoms.     History of Present Illness  The following portions of the patient's history were reviewed and updated as appropriate: allergies, current medications, past family history, past medical history, past social history, past surgical history and problem list.    Review of Systems   Constitutional: Positive for fatigue. Negative for chills and fever.   HENT: Negative for hearing loss, tinnitus and trouble swallowing.    Eyes: Negative for pain, redness and visual disturbance.   Respiratory: Negative for cough, shortness of breath and wheezing.    Cardiovascular: Negative for chest pain, palpitations and leg swelling.   Gastrointestinal: Negative for diarrhea, nausea and vomiting.   Endocrine: Negative for cold intolerance, heat intolerance and polydipsia.   Genitourinary: Negative for decreased urine volume, difficulty urinating and urgency.   Musculoskeletal: Positive for gait problem. Negative for back pain, neck pain and neck stiffness.   Skin: Negative for color change, rash and wound.   Allergic/Immunologic: Negative for environmental allergies, food allergies and immunocompromised state.   Neurological: Positive for weakness and numbness. Negative for dizziness, tremors, seizures,  syncope, facial asymmetry, speech difficulty, light-headedness and headaches.   Hematological: Negative for adenopathy. Does not bruise/bleed easily.   Psychiatric/Behavioral: Negative for confusion and sleep disturbance. The patient is not nervous/anxious.         Objective:    Neurologic Exam  Mental status: Awake, alert, oriented to person place and time. Attention and concentration normal.  HCF: No aphasia, apraxia, or dysarthria. Recent and remote memory intact.  CN II-XII: Visual fields full without left inattention, eye movements intact without nystagmus or ptosis. Pupils equal, round, and reactive to light.  Light touch and pinprick intact over all 3 divisions of cranial nerve V. Facial muscles symmetric. Hearing intact to finger rub. Soft palate elevates symmetrically. Sternomastoid and trapezius strong. Tongue midline.   Motor: Normal tone and bulk in the upper and lower extremities. Mild weakness of the intrinsic muscles of the hand. Otherwise normal strength in the upper extremities. Mild weakness of ankle dorsiflexion as well as toe dorsiflexion and plantar flexion. Otherwise normal strength in BLE.   Reflexes: Areflexic in upper and lower extremities.   Sensory: Light touch reduced from the knees down bilaterally as well as pinprick. Vibration reduced at the right ankle and absent at the left ankle. Abnormal position sense in the left great toe.     Physical Exam  General: Obese white male in no acute distress.  HEENT: Normocephalic, no evidence of trauma.  Neck: Supple.  Heart: RRR, no murmurs.  Extremities: Moderate pedal edema bilaterally.     Assessment/Plan:     Diagnoses and all orders for this visit:    1. High risk medication use (Primary)  -     Comprehensive Metabolic Panel  -     gabapentin (NEURONTIN) 300 MG capsule; Take 1 capsule by mouth 2 (two) times a day.  Dispense: 60 capsule; Refill: 2    2. Diabetic polyneuropathy associated with type 2 diabetes mellitus (CMS/HCC)  -     gabapentin  (NEURONTIN) 300 MG capsule; Take 1 capsule by mouth 2 (two) times a day.  Dispense: 60 capsule; Refill: 2          Will start him on a trial of gabapentin. He is to take one capsule nightly for one week and then increase to one capsule twice a day. We can increase this to 3 times daily if needed and/or titrate his dose. I am going to check a CMP today to check his liver function. If this is normal we will recheck the IgA in 3 months. They will call with any problems, otherwise he is to follow up in 3 months.

## 2020-12-01 ENCOUNTER — OFFICE VISIT (OUTPATIENT)
Dept: CARDIOLOGY | Facility: CLINIC | Age: 75
End: 2020-12-01

## 2020-12-01 VITALS
BODY MASS INDEX: 35.56 KG/M2 | HEIGHT: 70 IN | WEIGHT: 248.4 LBS | HEART RATE: 66 BPM | OXYGEN SATURATION: 98 % | SYSTOLIC BLOOD PRESSURE: 98 MMHG | DIASTOLIC BLOOD PRESSURE: 58 MMHG

## 2020-12-01 DIAGNOSIS — I25.810 CORONARY ARTERY DISEASE INVOLVING CORONARY BYPASS GRAFT OF NATIVE HEART, ANGINA PRESENCE UNSPECIFIED: Primary | ICD-10-CM

## 2020-12-01 DIAGNOSIS — R06.02 SHORTNESS OF BREATH: ICD-10-CM

## 2020-12-01 DIAGNOSIS — I65.21 STENOSIS OF RIGHT CAROTID ARTERY: ICD-10-CM

## 2020-12-01 PROCEDURE — 99214 OFFICE O/P EST MOD 30 MIN: CPT | Performed by: INTERNAL MEDICINE

## 2020-12-02 NOTE — PROGRESS NOTES
Date of Office Visit:  2020  Encounter Provider: Noah Montejo MD  Place of Service: [facility] CARDIOLOGY  Patient Name: Felix Olmedo  :1945    Chief complaint: Follow-up for coronary artery disease.    History of Present Illness:    Dear Dr. Rahman:      I again had the pleasure of seeing your patient in cardiology office on 2020.  As you   well know, he is a very pleasant 75 year-old white male with a past medical history   significant for coronary artery disease status post coronary artery bypass grafting,   diabetes, and postoperative SVT who presents for follow-up. I initially saw the patient   on 10/3/2016. He had been experiencing 5-6 weeks of a vague chest discomfort   intermittently which she described as an aching and burning sensation in the center of   his chest and over the left precordium. He underwent a Lexiscan Myoview stress test   on 10/12/2016 which showed a small infarct and inferior wall with a moderate amount   of evon-infarct ischemia. He continued to have symptoms, and underwent a left heart   catheterization on 10/18/2016 which showed the right coronary artery to be chronically   occluded the ostium, although left to right collaterals filled the distal right coronary   artery and PDA. The LAD also had up to 60% disease in the mid to distal section.   Medical management was recommended at that time, although the patient was placed   on beta blockers and Imdur and still continued to have exertional angina which actually   worsened. He underwent a repeat left heart catheterization on 2017 by Dr. Guzman, which again showed the right coronary artery to be occluded at the ostium   chronically, and a 60-70% stenosis in the mid LAD. FFR of the mid LAD was   borderline significant at 0.80. These lesions were not felt to be amenable to   percutaneous intervention optimally, and he was referred for coronary artery bypass   surgery. He  "ultimately underwent a two-vessel coronary artery bypass grafting   surgery on 5/10/2017 by Dr. Poole (BEAN to LAD, SVG to PDA). He did have some   brief postoperative SVT, but no atrial fibrillation. He also had postoperative leukocytosis   without evidence of infection.      The patient presents today for follow-up.  He tells me that his shortness of breath has been worse over the last 6 months.  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  He has to stop and rest once he is finished, he states that he is breathing \"very hard\".  He denied any chest pain or chest pressure.  His wife also states that he has been wheezing intermittently, although his lungs are clear today.    Past Medical History:   Diagnosis Date   • Coronary artery disease     Status post 2 vessel CABG 5/10/17 by Dr. Poole (LIMA-LAD, SVG-PDA)   • Diabetes (CMS/HCC)    • Diastolic dysfunction    • Hyperlipidemia    • Hypertension    • LAFB (left anterior fascicular block)    • Lung nodule    • Osteoarthritis    • Pneumonia 02/2017   • Squamous cell carcinoma     Left cheek s/p resection   • SVT (supraventricular tachycardia) (CMS/HCC)     Diagnosed following CABG       Past Surgical History:   Procedure Laterality Date   • CARDIAC CATHETERIZATION N/A 10/18/2016    Procedure: Coronary angiography;  Surgeon: Jesus Guzman MD;  Location: [facility] INVASIVE LOCATION;  Service:    • CARDIAC CATHETERIZATION N/A 10/18/2016    Procedure: Left heart cath;  Surgeon: Jesus Guzman MD;  Location: [facility] INVASIVE LOCATION;  Service:    • CARDIAC CATHETERIZATION N/A 10/18/2016    Procedure: Left ventriculography;  Surgeon: Jesus Guzman MD;  Location: [facility] INVASIVE LOCATION;  Service:    • CARDIAC CATHETERIZATION N/A 4/4/2017    Procedure: Coronary angiography;  Surgeon: Jesus Guzman MD;  Location: [facility]" [facility] C[facility] INVASIVE LOCATION;  Service:    • CARDIAC CATHETERIZATION N/A 4/4/2017    Procedure: Left heart cath;  Surgeon: Jesus Guzman MD;  Location: [facility] CATH INVASIVE LOCATION;  Service:    • CARDIAC CATHETERIZATION N/A 4/4/2017    Procedure: Left ventriculography;  Surgeon: Jesus Guzman MD;  Location: [facility] CATH INVASIVE LOCATION;  Service:    • CARDIAC CATHETERIZATION  4/4/2017    Procedure: Functional Flow [place];  Surgeon: Jesus Guzman MD;  Location: [facility] CATH INVASIVE LOCATION;  Service:    • CARDIAC SURGERY     • CATARACT EXTRACTION W/ INTRAOCULAR LENS IMPLANT Left    • COLONOSCOPY     • CORONARY ARTERY BYPASS GRAFT N/A 5/10/2017    Procedure: CORONARY ARTERY BYPASS TIMES TWO UTILIZING THE LEFT GREATER SAPHENOUS VEIN WITH INTERNAL MAMMARY ARTERY GRAFT;  TRANSESOPHAGEAL ECHOCARDIOGRAM;  Surgeon: Alden Poole MD;  Location: [facility] OR;  Service:    • EYE SURGERY     • KNEE SURGERY Left 2005    Dr. Gallegos   • MOHS SURGERY Left     CHEEK   • SKIN BIOPSY         Current Outpatient Medications on File Prior to Visit   Medication Sig Dispense Refill   • acetaminophen (TYLENOL) 325 MG tablet Take 2 tablets by mouth Every 4 (Four) Hours As Needed for Mild Pain (1-3). (Patient taking differently: Take 500 mg by mouth Every 4 (Four) Hours As Needed for Mild Pain .) 1 bottle 99   • aspirin 81 MG tablet Take 81 mg by mouth Every Other Day.     • atorvastatin (LIPITOR) 40 MG tablet TAKE 1 TABLET EVERY DAY 90 tablet 0   • bumetanide (BUMEX) 2 MG tablet Take 0.5 tablets by mouth Daily. 90 tablet 0   • Empagliflozin (Jardiance) 25 MG tablet Take 25 mg by mouth Daily. 90 tablet 3   • gabapentin (NEURONTIN) 300 MG capsule Take 1 capsule by mouth 2 (two) times a day. 60 capsule 2   • insulin aspart (NOVOLOG FLEXPEN) 100 UNIT/ML solution pen-injector sc pen 10-14 units  3 times daily with meal 15 pen 3   • Insulin Pen Needle 31G X 8 MM misc Use to inject insulin 500 each 3   •  isosorbide mononitrate (IMDUR) 30 MG 24 hr tablet TAKE 1 TABLET TWICE DAILY (Patient taking differently: 30 mg Daily.) 180 tablet 3   • Levemir FlexTouch 100 UNIT/ML injection INJECT 36 UNITS UNDER THE SKIN INTO THE APPROPRIATE AREA EVERY DAY, AS DIRECTED. 45 mL 3   • Menthol, Topical Analgesic, (BIOFREEZE ROLL-ON) 4 % gel Apply 1 application topically Daily As Needed.     • metoprolol tartrate (LOPRESSOR) 25 MG tablet TAKE 1 TABLET TWICE DAILY (Patient taking differently: 25 mg Daily.) 180 tablet 3   • Multiple Vitamins-Minerals (MULTIVITAMIN ADULT PO) Take 1 tablet by mouth Daily.     • potassium chloride (MICRO-K) 10 MEQ CR capsule Take 1 capsule (10 mEq total) by mouth 2 (Two) Times a Day (Patient taking differently: Take 10 mEq by mouth Daily.) 60 capsule 0   • SITagliptin (Januvia) 100 MG tablet Take 1 tablet by mouth Daily. 90 tablet 3   • TRUE METRIX BLOOD GLUCOSE TEST test strip USE TO TEST BLOOD SUGAR  4 TIMES DAILY  E 11.8 400 each 3     No current facility-administered medications on file prior to visit.      Allergies as of 2020 - Reviewed 2020   Allergen Reaction Noted   • Adhesive tape Rash 2017     Social History     Socioeconomic History   • Marital status:      Spouse name: Mendy Vargas   • Number of children: 3   • Years of education: Not on file   • Highest education level: Not on file   Occupational History   • Occupation: Retired (from [employer])   Tobacco Use   • Smoking status: Former Smoker     Packs/day: 1.50     Years: 40.00     Pack years: 60.00     Types: Cigarettes     Quit date:      Years since quittin.9   • Smokeless tobacco: Never Used   • Tobacco comment: Smoked up to 1.5 ppd.  Smoked for 50 years.  Quit .   Substance and Sexual Activity   • Alcohol use: Yes     Comment: 2-3 BEERS YEARLY    • Drug use: No     Comment: caffeine use   • Sexual activity: Not Currently     Family History   Problem Relation Age of Onset   • Cancer Mother   "       uterine   • Heart disease Father         Father with fatal MI in 80's   • Diabetes Sister    • Esophageal cancer Brother    • Alzheimer's disease Maternal Grandfather    • Stroke Maternal Grandfather        Review of Systems   Constitution: Positive for malaise/fatigue.   Eyes: Positive for blurred vision.   Cardiovascular: Positive for dyspnea on exertion and leg swelling.   Respiratory: Positive for shortness of breath.    Skin: Positive for color change and itching.   Musculoskeletal: Positive for joint pain.   Genitourinary: Positive for decreased libido and frequency.   Neurological: Positive for excessive daytime sleepiness, numbness and paresthesias.   All other systems reviewed and are negative.     Objective:     Vitals:    12/01/20 1426   BP: 98/58   Pulse: 66   SpO2: 98%   Weight: 113 kg (248 lb 6.4 oz)   Height: 177.8 cm (70\")     Body mass index is 35.64 kg/m².    Physical Exam   Constitutional: He is oriented to person, place, and time. He appears well-developed and well-nourished.   HENT:   Head: Normocephalic and atraumatic.   Eyes: Conjunctivae are normal.   Neck: Neck supple.   Cardiovascular: Normal rate and regular rhythm. Exam reveals no friction rub.   No murmur heard.  Pulmonary/Chest: Effort normal and breath sounds normal. He has no rales.   Abdominal: Soft. There is no abdominal tenderness.   Musculoskeletal:         General: No edema.   Neurological: He is alert and oriented to person, place, and time.   Skin: Skin is warm.   Psychiatric: He has a normal mood and affect. His behavior is normal.     Lab Review:   Procedures    Cardiac Procedures:  1. Echocardiogram on 10/12/2016: Ejection fraction was 62%. There was grade 1   diastolic dysfunction. There was moderate to severe thickening of the noncoronary   cusp of the aortic valve, but no aortic stenosis.  2. Left heart catheterization on 10/18/2016 by Dr. Guzman: The left main had 20%   distal disease. The LAD had a 40-50% " proximal stenosis. The LAD had diffuse 60%   mid to distal disease. The left circumflex coronary artery had a 20% mid stenosis. The   right coronary artery was chronically occluded at the ostium, left to right collaterals   filled the distal vessel and PDA.  3.  Left heart catheterization on 4/4/2017: The left main was normal. The LAD had   30-40% proximal disease and 60-70% mid disease. FFR the mid LAD was borderline   significant at 0.80. The left circumflex had a 20% mid stenosis. The right coronary   artery was chronically occluded at the ostium. Coronary artery bypass grafting was   recommended at that time.  4. Carotid Doppler ultrasound on 4/4/2017: The right internal carotid artery had   50-59% disease. The left internal carotid artery had 16-49% disease.  5. Status post two-vessel coronary artery bypass grafting on 5/10/2017 by Dr. Poole   (LIMA to LAD, SVG to PDA).  6.  Echocardiogram on 5/20/2017: Ejection fraction was 54%.  There was mild LVH.    There was grade 1a diastolic dysfunction.    Assessment:       Diagnosis Plan   1. Coronary artery disease involving coronary bypass graft of native heart, angina presence unspecified  Stress Test With Pet Myocardial Perfusion (MULTI STUDY, REST AND STRESS)    Adult Transthoracic Echo Complete W/ Cont if Necessary Per Protocol   2. Shortness of breath  Stress Test With Pet Myocardial Perfusion (MULTI STUDY, REST AND STRESS)    Adult Transthoracic Echo Complete W/ Cont if Necessary Per Protocol     Plan:       The patient has been having worsening shortness of breath with exertion for the last 6 months.  He states that it is fairly noticeable at this point, and he often has to stop and rest.  He has not had any chest discomfort.  He has not had cardiac testing in quite some time.  I am going to recommend checking an echocardiogram and Lexiscan Myoview stress test at this point.  In the meantime, he will continue on his medical therapy.  He appears to be  euvolemic on the Bumex at 1 mg/day.  He will continue on aspirin, Lipitor, Imdur, and metoprolol for the coronary artery disease.

## 2020-12-07 ENCOUNTER — OFFICE VISIT (OUTPATIENT)
Dept: CARDIOLOGY | Facility: CLINIC | Age: 75
End: 2020-12-07

## 2020-12-07 DIAGNOSIS — Z95.1 S/P CABG X 2: ICD-10-CM

## 2020-12-07 DIAGNOSIS — IMO0002 TYPE II DIABETES MELLITUS WITH NEUROLOGICAL MANIFESTATIONS, UNCONTROLLED: Primary | ICD-10-CM

## 2020-12-08 ENCOUNTER — HOSPITAL ENCOUNTER (OUTPATIENT)
Dept: CARDIOLOGY | Facility: HOSPITAL | Age: 75
Discharge: HOME | End: 2020-12-08

## 2020-12-08 VITALS — HEIGHT: 70 IN | WEIGHT: 248 LBS | BODY MASS INDEX: 35.5 KG/M2

## 2020-12-08 VITALS
DIASTOLIC BLOOD PRESSURE: 58 MMHG | WEIGHT: 248 LBS | HEIGHT: 70 IN | SYSTOLIC BLOOD PRESSURE: 98 MMHG | BODY MASS INDEX: 35.5 KG/M2

## 2020-12-08 DIAGNOSIS — I65.21 STENOSIS OF RIGHT CAROTID ARTERY: ICD-10-CM

## 2020-12-08 DIAGNOSIS — I25.810 CORONARY ARTERY DISEASE INVOLVING CORONARY BYPASS GRAFT OF NATIVE HEART, ANGINA PRESENCE UNSPECIFIED: ICD-10-CM

## 2020-12-08 DIAGNOSIS — R06.02 SHORTNESS OF BREATH: ICD-10-CM

## 2020-12-08 DIAGNOSIS — I25.810 CORONARY ARTERY DISEASE INVOLVING CORONARY BYPASS GRAFT OF NATIVE HEART, ANGINA PRESENCE UNSPECIFIED: Primary | ICD-10-CM

## 2020-12-08 DIAGNOSIS — Z95.1 S/P CABG X 2: ICD-10-CM

## 2020-12-08 LAB
AORTIC ARCH: 2.5 CM
ASCENDING AORTA: 3.8 CM
BH CV ECHO MEAS - ACS: 1.7 CM
BH CV ECHO MEAS - AO MAX PG (FULL): 2 MMHG
BH CV ECHO MEAS - AO MAX PG: 6.4 MMHG
BH CV ECHO MEAS - AO MEAN PG (FULL): 0.69 MMHG
BH CV ECHO MEAS - AO MEAN PG: 3.2 MMHG
BH CV ECHO MEAS - AO V2 MAX: 126.2 CM/SEC
BH CV ECHO MEAS - AO V2 MEAN: 81.5 CM/SEC
BH CV ECHO MEAS - AO V2 VTI: 34.4 CM
BH CV ECHO MEAS - ASC AORTA: 3.8 CM
BH CV ECHO MEAS - AVA(I,A): 3.6 CM^2
BH CV ECHO MEAS - AVA(I,D): 3.6 CM^2
BH CV ECHO MEAS - AVA(V,A): 3.2 CM^2
BH CV ECHO MEAS - AVA(V,D): 3.2 CM^2
BH CV ECHO MEAS - BSA(HAYCOCK): 2.4 M^2
BH CV ECHO MEAS - BSA: 2.3 M^2
BH CV ECHO MEAS - BZI_BMI: 35.6 KILOGRAMS/M^2
BH CV ECHO MEAS - BZI_METRIC_HEIGHT: 177.8 CM
BH CV ECHO MEAS - BZI_METRIC_WEIGHT: 112.5 KG
BH CV ECHO MEAS - EDV(MOD-SP2): 67 ML
BH CV ECHO MEAS - EDV(MOD-SP4): 65 ML
BH CV ECHO MEAS - EDV(TEICH): 123.9 ML
BH CV ECHO MEAS - EF(CUBED): 86.7 %
BH CV ECHO MEAS - EF(MOD-BP): 57.7 %
BH CV ECHO MEAS - EF(MOD-SP2): 58.2 %
BH CV ECHO MEAS - EF(MOD-SP4): 56.9 %
BH CV ECHO MEAS - EF(TEICH): 80.1 %
BH CV ECHO MEAS - ESV(MOD-SP2): 28 ML
BH CV ECHO MEAS - ESV(MOD-SP4): 28 ML
BH CV ECHO MEAS - ESV(TEICH): 24.7 ML
BH CV ECHO MEAS - FS: 49 %
BH CV ECHO MEAS - IVS/LVPW: 1.1
BH CV ECHO MEAS - IVSD: 1.1 CM
BH CV ECHO MEAS - LAT PEAK E' VEL: 7.8 CM/SEC
BH CV ECHO MEAS - LV DIASTOLIC VOL/BSA (35-75): 28.4 ML/M^2
BH CV ECHO MEAS - LV MASS(C)D: 211.6 GRAMS
BH CV ECHO MEAS - LV MASS(C)DI: 92.5 GRAMS/M^2
BH CV ECHO MEAS - LV MAX PG: 4.3 MMHG
BH CV ECHO MEAS - LV MEAN PG: 2.5 MMHG
BH CV ECHO MEAS - LV SYSTOLIC VOL/BSA (12-30): 12.2 ML/M^2
BH CV ECHO MEAS - LV V1 MAX: 104.1 CM/SEC
BH CV ECHO MEAS - LV V1 MEAN: 75.1 CM/SEC
BH CV ECHO MEAS - LV V1 VTI: 31.7 CM
BH CV ECHO MEAS - LVIDD: 5.1 CM
BH CV ECHO MEAS - LVIDS: 2.6 CM
BH CV ECHO MEAS - LVLD AP2: 7.4 CM
BH CV ECHO MEAS - LVLD AP4: 6.6 CM
BH CV ECHO MEAS - LVLS AP2: 6.8 CM
BH CV ECHO MEAS - LVLS AP4: 6.2 CM
BH CV ECHO MEAS - LVOT AREA (M): 3.8 CM^2
BH CV ECHO MEAS - LVOT AREA: 3.9 CM^2
BH CV ECHO MEAS - LVOT DIAM: 2.2 CM
BH CV ECHO MEAS - LVPWD: 1.1 CM
BH CV ECHO MEAS - MED PEAK E' VEL: 7.7 CM/SEC
BH CV ECHO MEAS - MV A DUR: 0.13 SEC
BH CV ECHO MEAS - MV A MAX VEL: 122.4 CM/SEC
BH CV ECHO MEAS - MV DEC SLOPE: 379.9 CM/SEC^2
BH CV ECHO MEAS - MV DEC TIME: 0.27 SEC
BH CV ECHO MEAS - MV E MAX VEL: 109 CM/SEC
BH CV ECHO MEAS - MV E/A: 0.89
BH CV ECHO MEAS - MV MAX PG: 6.3 MMHG
BH CV ECHO MEAS - MV MEAN PG: 2.4 MMHG
BH CV ECHO MEAS - MV P1/2T MAX VEL: 109.6 CM/SEC
BH CV ECHO MEAS - MV P1/2T: 84.5 MSEC
BH CV ECHO MEAS - MV V2 MAX: 126 CM/SEC
BH CV ECHO MEAS - MV V2 MEAN: 72.8 CM/SEC
BH CV ECHO MEAS - MV V2 VTI: 42 CM
BH CV ECHO MEAS - MVA P1/2T LCG: 2 CM^2
BH CV ECHO MEAS - MVA(P1/2T): 2.6 CM^2
BH CV ECHO MEAS - MVA(VTI): 3 CM^2
BH CV ECHO MEAS - PA MAX PG (FULL): 1.5 MMHG
BH CV ECHO MEAS - PA MAX PG: 2.4 MMHG
BH CV ECHO MEAS - PA V2 MAX: 77.3 CM/SEC
BH CV ECHO MEAS - PULM A REVS DUR: 0.12 SEC
BH CV ECHO MEAS - PULM A REVS VEL: 26.6 CM/SEC
BH CV ECHO MEAS - PULM DIAS VEL: 34.7 CM/SEC
BH CV ECHO MEAS - PULM S/D: 1.4
BH CV ECHO MEAS - PULM SYS VEL: 49.3 CM/SEC
BH CV ECHO MEAS - PVA(V,A): 2.3 CM^2
BH CV ECHO MEAS - PVA(V,D): 2.3 CM^2
BH CV ECHO MEAS - QP/QS: 0.34
BH CV ECHO MEAS - RV MAX PG: 0.94 MMHG
BH CV ECHO MEAS - RV MEAN PG: 0.52 MMHG
BH CV ECHO MEAS - RV V1 MAX: 48.4 CM/SEC
BH CV ECHO MEAS - RV V1 MEAN: 33.9 CM/SEC
BH CV ECHO MEAS - RV V1 VTI: 11.4 CM
BH CV ECHO MEAS - RVOT AREA: 3.7 CM^2
BH CV ECHO MEAS - RVOT DIAM: 2.2 CM
BH CV ECHO MEAS - SI(CUBED): 50.3 ML/M^2
BH CV ECHO MEAS - SI(LVOT): 54.6 ML/M^2
BH CV ECHO MEAS - SI(MOD-SP2): 17.1 ML/M^2
BH CV ECHO MEAS - SI(MOD-SP4): 16.2 ML/M^2
BH CV ECHO MEAS - SI(TEICH): 43.4 ML/M^2
BH CV ECHO MEAS - SV(CUBED): 115.1 ML
BH CV ECHO MEAS - SV(LVOT): 124.9 ML
BH CV ECHO MEAS - SV(MOD-SP2): 39 ML
BH CV ECHO MEAS - SV(MOD-SP4): 37 ML
BH CV ECHO MEAS - SV(RVOT): 42.2 ML
BH CV ECHO MEAS - SV(TEICH): 99.2 ML
BH CV ECHO MEAS - TAPSE (>1.6): 2.2 CM
BH CV ECHO MEASUREMENTS AVERAGE E/E' RATIO: 14.06
BH CV NUCLEAR PRIOR STUDY: 3
BH CV STRESS BP STAGE 1: NORMAL
BH CV STRESS COMMENTS STAGE 1: NORMAL
BH CV STRESS DOSE REGADENOSON STAGE 1: 0.4
BH CV STRESS DURATION MIN STAGE 1: 0
BH CV STRESS DURATION SEC STAGE 1: 10
BH CV STRESS HR STAGE 1: 71
BH CV STRESS PROTOCOL 1: NORMAL
BH CV STRESS RECOVERY BP: NORMAL MMHG
BH CV STRESS RECOVERY HR: 73 BPM
BH CV STRESS STAGE 1: 1
BH CV XLRA - RV BASE: 3.7 CM
BH CV XLRA - RV LENGTH: 7.1 CM
BH CV XLRA - RV MID: 2.5 CM
BH CV XLRA - TDI S': 7.9 CM/SEC
BH CV XLRA MEAS LEFT DIST CCA EDV: -20.9 CM/SEC
BH CV XLRA MEAS LEFT DIST CCA PSV: -77.4 CM/SEC
BH CV XLRA MEAS LEFT DIST ICA EDV: -26.6 CM/SEC
BH CV XLRA MEAS LEFT DIST ICA PSV: -90.4 CM/SEC
BH CV XLRA MEAS LEFT ICA/CCA RATIO: 1.2
BH CV XLRA MEAS LEFT MID CCA EDV: 22 CM/SEC
BH CV XLRA MEAS LEFT MID CCA PSV: 97.7 CM/SEC
BH CV XLRA MEAS LEFT MID ICA EDV: -26.6 CM/SEC
BH CV XLRA MEAS LEFT MID ICA PSV: -79.9 CM/SEC
BH CV XLRA MEAS LEFT PROX ECA PSV: -87.6 CM/SEC
BH CV XLRA MEAS LEFT PROX ICA EDV: -16.1 CM/SEC
BH CV XLRA MEAS LEFT PROX ICA PSV: -56.8 CM/SEC
BH CV XLRA MEAS LEFT PROX SCLA PSV: 112.9 CM/SEC
BH CV XLRA MEAS LEFT VERTEBRAL A PSV: -37.9 CM/SEC
BH CV XLRA MEAS RIGHT DIST CCA EDV: -21.6 CM/SEC
BH CV XLRA MEAS RIGHT DIST CCA PSV: -103.9 CM/SEC
BH CV XLRA MEAS RIGHT DIST ICA EDV: -20.9 CM/SEC
BH CV XLRA MEAS RIGHT DIST ICA PSV: -110.9 CM/SEC
BH CV XLRA MEAS RIGHT ICA/CCA RATIO: 1.2
BH CV XLRA MEAS RIGHT MID CCA EDV: 22.4 CM/SEC
BH CV XLRA MEAS RIGHT MID CCA PSV: 124.7 CM/SEC
BH CV XLRA MEAS RIGHT MID ICA EDV: -22.3 CM/SEC
BH CV XLRA MEAS RIGHT MID ICA PSV: -118.6 CM/SEC
BH CV XLRA MEAS RIGHT PROX ECA PSV: -95.3 CM/SEC
BH CV XLRA MEAS RIGHT PROX ICA EDV: -35.1 CM/SEC
BH CV XLRA MEAS RIGHT PROX ICA PSV: -126.2 CM/SEC
BH CV XLRA MEAS RIGHT PROX SCLA PSV: 118.5 CM/SEC
BH CV XLRA MEAS RIGHT VERTEBRAL A PSV: -49.4 CM/SEC
LEFT ATRIUM VOLUME INDEX: 19 ML/M2
LV EF NUC BP: 73 %
MAXIMAL PREDICTED HEART RATE: 143 BPM
PERCENT MAX PREDICTED HR: 49.65 %
SINUS: 3.9 CM
STJ: 3.4 CM
STRESS BASELINE BP: NORMAL MMHG
STRESS BASELINE HR: 64 BPM
STRESS PERCENT HR: 58 %
STRESS POST EXERCISE DUR SEC: 10 SEC
STRESS POST PEAK BP: NORMAL MMHG
STRESS POST PEAK HR: 71 BPM
STRESS TARGET HR: 122 BPM

## 2020-12-08 PROCEDURE — 93016 CV STRESS TEST SUPVJ ONLY: CPT | Performed by: INTERNAL MEDICINE

## 2020-12-08 PROCEDURE — 25010000002 REGADENOSON 0.4 MG/5ML SOLUTION: Performed by: INTERNAL MEDICINE

## 2020-12-08 PROCEDURE — 0 RUBIDIUM CHLORIDE: Performed by: INTERNAL MEDICINE

## 2020-12-08 PROCEDURE — 93306 TTE W/DOPPLER COMPLETE: CPT

## 2020-12-08 PROCEDURE — A9555 RB82 RUBIDIUM: HCPCS | Performed by: INTERNAL MEDICINE

## 2020-12-08 PROCEDURE — 78492 MYOCRD IMG PET MLT RST&STRS: CPT | Performed by: INTERNAL MEDICINE

## 2020-12-08 PROCEDURE — 78492 MYOCRD IMG PET MLT RST&STRS: CPT

## 2020-12-08 PROCEDURE — 93018 CV STRESS TEST I&R ONLY: CPT | Performed by: INTERNAL MEDICINE

## 2020-12-08 PROCEDURE — 93880 EXTRACRANIAL BILAT STUDY: CPT

## 2020-12-08 PROCEDURE — 93880 EXTRACRANIAL BILAT STUDY: CPT | Performed by: INTERNAL MEDICINE

## 2020-12-08 PROCEDURE — 93017 CV STRESS TEST TRACING ONLY: CPT

## 2020-12-08 PROCEDURE — 93306 TTE W/DOPPLER COMPLETE: CPT | Performed by: INTERNAL MEDICINE

## 2020-12-08 RX ADMIN — REGADENOSON 0.4 MG: 0.08 INJECTION, SOLUTION INTRAVENOUS at 11:00

## 2020-12-08 NOTE — PROGRESS NOTES
Patient was advised of abnormal nuclear stress test results.  He does have dyspnea with exertion as well as history of coronary artery disease with bypass grafting.  I advised him of Dr. Montejo's recommendation to proceed with cardiac catheterization.  Patient states that he would be willing to proceed with this test but would prefer to wait until January or February after the holiday season.  He reports that his symptoms are currently mild at this time.  I advised him that if he has any symptoms that develop at rest that he should proceed with this procedure sooner.  Risks and benefits of the procedure were reviewed with the patient.

## 2021-01-01 ENCOUNTER — OFFICE VISIT (OUTPATIENT)
Dept: INTERNAL MEDICINE | Age: 76
End: 2021-01-01

## 2021-01-01 ENCOUNTER — LAB (OUTPATIENT)
Dept: LAB | Facility: HOSPITAL | Age: 76
End: 2021-01-01

## 2021-01-01 ENCOUNTER — TELEPHONE (OUTPATIENT)
Dept: GASTROENTEROLOGY | Facility: CLINIC | Age: 76
End: 2021-01-01

## 2021-01-01 ENCOUNTER — TELEPHONE (OUTPATIENT)
Dept: INTERNAL MEDICINE | Facility: CLINIC | Age: 76
End: 2021-01-01

## 2021-01-01 ENCOUNTER — DOCUMENTATION (OUTPATIENT)
Dept: FAMILY MEDICINE CLINIC | Facility: CLINIC | Age: 76
End: 2021-01-01

## 2021-01-01 ENCOUNTER — OFFICE VISIT (OUTPATIENT)
Dept: CARDIOLOGY | Facility: CLINIC | Age: 76
End: 2021-01-01

## 2021-01-01 ENCOUNTER — HOSPITAL ENCOUNTER (OUTPATIENT)
Dept: CT IMAGING | Facility: HOSPITAL | Age: 76
Discharge: HOME | End: 2021-08-19
Admitting: INTERNAL MEDICINE

## 2021-01-01 ENCOUNTER — TELEPHONE (OUTPATIENT)
Dept: ENDOCRINOLOGY | Age: 76
End: 2021-01-01

## 2021-01-01 ENCOUNTER — TELEPHONE (OUTPATIENT)
Dept: INTERNAL MEDICINE | Age: 76
End: 2021-01-01

## 2021-01-01 ENCOUNTER — HOSPITAL ENCOUNTER (OUTPATIENT)
Dept: GENERAL RADIOLOGY | Facility: HOSPITAL | Age: 76
Discharge: HOME | End: 2021-12-07
Admitting: NURSE PRACTITIONER

## 2021-01-01 ENCOUNTER — CONSULT (OUTPATIENT)
Dept: ONCOLOGY | Facility: CLINIC | Age: 76
End: 2021-01-01

## 2021-01-01 ENCOUNTER — HOSPITAL ENCOUNTER (OUTPATIENT)
Dept: INFUSION THERAPY | Facility: HOSPITAL | Age: 76
Discharge: HOME | End: 2021-12-01
Admitting: NURSE PRACTITIONER

## 2021-01-01 ENCOUNTER — TELEMEDICINE (OUTPATIENT)
Dept: INTERNAL MEDICINE | Age: 76
End: 2021-01-01

## 2021-01-01 ENCOUNTER — OFFICE VISIT (OUTPATIENT)
Dept: ENDOCRINOLOGY | Age: 76
End: 2021-01-01

## 2021-01-01 VITALS
OXYGEN SATURATION: 92 % | BODY MASS INDEX: 34.5 KG/M2 | SYSTOLIC BLOOD PRESSURE: 108 MMHG | HEART RATE: 68 BPM | DIASTOLIC BLOOD PRESSURE: 54 MMHG | WEIGHT: 241 LBS | HEIGHT: 70 IN

## 2021-01-01 VITALS
SYSTOLIC BLOOD PRESSURE: 120 MMHG | WEIGHT: 237 LBS | DIASTOLIC BLOOD PRESSURE: 58 MMHG | BODY MASS INDEX: 33.93 KG/M2 | TEMPERATURE: 97.1 F | HEIGHT: 70 IN | HEART RATE: 70 BPM | OXYGEN SATURATION: 93 %

## 2021-01-01 VITALS
BODY MASS INDEX: 33.96 KG/M2 | HEIGHT: 70 IN | SYSTOLIC BLOOD PRESSURE: 110 MMHG | WEIGHT: 237.2 LBS | OXYGEN SATURATION: 95 % | TEMPERATURE: 97.7 F | RESPIRATION RATE: 18 BRPM | HEART RATE: 64 BPM | DIASTOLIC BLOOD PRESSURE: 69 MMHG

## 2021-01-01 VITALS
DIASTOLIC BLOOD PRESSURE: 60 MMHG | TEMPERATURE: 97.5 F | SYSTOLIC BLOOD PRESSURE: 124 MMHG | HEART RATE: 83 BPM | BODY MASS INDEX: 33.5 KG/M2 | HEIGHT: 70 IN | OXYGEN SATURATION: 94 % | WEIGHT: 234 LBS

## 2021-01-01 VITALS
SYSTOLIC BLOOD PRESSURE: 110 MMHG | BODY MASS INDEX: 35.22 KG/M2 | HEART RATE: 70 BPM | DIASTOLIC BLOOD PRESSURE: 62 MMHG | WEIGHT: 246 LBS | HEIGHT: 70 IN | OXYGEN SATURATION: 96 %

## 2021-01-01 VITALS
HEIGHT: 70 IN | DIASTOLIC BLOOD PRESSURE: 54 MMHG | TEMPERATURE: 97.7 F | OXYGEN SATURATION: 95 % | BODY MASS INDEX: 34.22 KG/M2 | HEART RATE: 72 BPM | WEIGHT: 239 LBS | SYSTOLIC BLOOD PRESSURE: 120 MMHG

## 2021-01-01 VITALS
HEART RATE: 65 BPM | DIASTOLIC BLOOD PRESSURE: 68 MMHG | OXYGEN SATURATION: 94 % | TEMPERATURE: 96.9 F | SYSTOLIC BLOOD PRESSURE: 133 MMHG

## 2021-01-01 DIAGNOSIS — D72.829 LEUKOCYTOSIS, UNSPECIFIED TYPE: ICD-10-CM

## 2021-01-01 DIAGNOSIS — U07.1 CLINICAL DIAGNOSIS OF COVID-19: Primary | ICD-10-CM

## 2021-01-01 DIAGNOSIS — R35.0 INCREASED URINARY FREQUENCY: Primary | ICD-10-CM

## 2021-01-01 DIAGNOSIS — R91.8 MULTIPLE PULMONARY NODULES: Chronic | ICD-10-CM

## 2021-01-01 DIAGNOSIS — Z87.891 HISTORY OF SMOKING 30 OR MORE PACK YEARS: ICD-10-CM

## 2021-01-01 DIAGNOSIS — I25.118 CORONARY ARTERY DISEASE OF NATIVE ARTERY OF NATIVE HEART WITH STABLE ANGINA PECTORIS (HCC): Chronic | ICD-10-CM

## 2021-01-01 DIAGNOSIS — D72.829 LEUKOCYTOSIS, UNSPECIFIED TYPE: Primary | ICD-10-CM

## 2021-01-01 DIAGNOSIS — Z95.1 S/P CABG X 2: ICD-10-CM

## 2021-01-01 DIAGNOSIS — Z79.4 TYPE 2 DIABETES MELLITUS WITH HYPERGLYCEMIA, WITH LONG-TERM CURRENT USE OF INSULIN (HCC): Primary | ICD-10-CM

## 2021-01-01 DIAGNOSIS — R26.9 GAIT DIFFICULTY: Chronic | ICD-10-CM

## 2021-01-01 DIAGNOSIS — Z12.2 ENCOUNTER FOR SCREENING FOR MALIGNANT NEOPLASM OF RESPIRATORY ORGANS: ICD-10-CM

## 2021-01-01 DIAGNOSIS — R19.5 CHANGE IN STOOL CALIBER: ICD-10-CM

## 2021-01-01 DIAGNOSIS — E11.65 TYPE 2 DIABETES MELLITUS WITH HYPERGLYCEMIA, WITH LONG-TERM CURRENT USE OF INSULIN (HCC): Primary | ICD-10-CM

## 2021-01-01 DIAGNOSIS — E78.2 MIXED HYPERLIPIDEMIA: ICD-10-CM

## 2021-01-01 DIAGNOSIS — Z12.11 SCREENING FOR COLON CANCER: ICD-10-CM

## 2021-01-01 DIAGNOSIS — IMO0002 TYPE II DIABETES MELLITUS WITH NEUROLOGICAL MANIFESTATIONS, UNCONTROLLED: Chronic | ICD-10-CM

## 2021-01-01 DIAGNOSIS — D72.829 LEUKOCYTOSIS, UNSPECIFIED TYPE: Chronic | ICD-10-CM

## 2021-01-01 DIAGNOSIS — J43.9 PULMONARY EMPHYSEMA, UNSPECIFIED EMPHYSEMA TYPE (HCC): ICD-10-CM

## 2021-01-01 DIAGNOSIS — I25.810 CORONARY ARTERY DISEASE INVOLVING CORONARY BYPASS GRAFT OF NATIVE HEART WITHOUT ANGINA PECTORIS: Primary | ICD-10-CM

## 2021-01-01 DIAGNOSIS — R91.8 MULTIPLE PULMONARY NODULES: ICD-10-CM

## 2021-01-01 DIAGNOSIS — R19.5 CHANGE IN STOOL CALIBER: Primary | ICD-10-CM

## 2021-01-01 DIAGNOSIS — R93.89 ABNORMAL CT OF THE CHEST: Primary | ICD-10-CM

## 2021-01-01 DIAGNOSIS — Z00.00 MEDICARE ANNUAL WELLNESS VISIT, INITIAL: ICD-10-CM

## 2021-01-01 LAB
ALBUMIN SERPL-MCNC: 4 G/DL (ref 3.5–5.2)
ALBUMIN/GLOB SERPL: 1.1 G/DL (ref 1.1–2.4)
ALP SERPL-CCNC: 119 U/L (ref 38–116)
ALT SERPL W P-5'-P-CCNC: 24 U/L (ref 0–41)
ANION GAP SERPL CALCULATED.3IONS-SCNC: 8.6 MMOL/L (ref 5–15)
AST SERPL-CCNC: 36 U/L (ref 0–40)
BASOPHILS # BLD AUTO: 0.17 10*3/MM3 (ref 0–0.2)
BASOPHILS # BLD AUTO: 0.17 10*3/MM3 (ref 0–0.2)
BASOPHILS NFR BLD AUTO: 1.3 % (ref 0–1.5)
BASOPHILS NFR BLD AUTO: 1.3 % (ref 0–1.5)
BILIRUB SERPL-MCNC: 0.7 MG/DL (ref 0.2–1.2)
BUN SERPL-MCNC: 17 MG/DL (ref 6–20)
BUN/CREAT SERPL: 16.3 (ref 7.3–30)
CALCIUM SPEC-SCNC: 9.7 MG/DL (ref 8.5–10.2)
CHLORIDE SERPL-SCNC: 104 MMOL/L (ref 98–107)
CO2 SERPL-SCNC: 27.4 MMOL/L (ref 22–29)
CREAT SERPL-MCNC: 1.04 MG/DL (ref 0.7–1.3)
DEPRECATED RDW RBC AUTO: 45.9 FL (ref 37–54)
DEPRECATED RDW RBC AUTO: 50.1 FL (ref 37–54)
EOSINOPHIL # BLD AUTO: 0.43 10*3/MM3 (ref 0–0.4)
EOSINOPHIL # BLD AUTO: 0.61 10*3/MM3 (ref 0–0.4)
EOSINOPHIL NFR BLD AUTO: 3.4 % (ref 0.3–6.2)
EOSINOPHIL NFR BLD AUTO: 4.8 % (ref 0.3–6.2)
ERYTHROCYTE [DISTWIDTH] IN BLOOD BY AUTOMATED COUNT: 13.7 % (ref 12.3–15.4)
ERYTHROCYTE [DISTWIDTH] IN BLOOD BY AUTOMATED COUNT: 15.1 % (ref 12.3–15.4)
GFR SERPL CREATININE-BSD FRML MDRD: 69 ML/MIN/1.73
GLOBULIN UR ELPH-MCNC: 3.7 GM/DL (ref 1.8–3.5)
GLUCOSE SERPL-MCNC: 115 MG/DL (ref 74–124)
HCT VFR BLD AUTO: 49.4 % (ref 37.5–51)
HCT VFR BLD AUTO: 51.5 % (ref 37.5–51)
HGB BLD-MCNC: 16.5 G/DL (ref 13–17.7)
HGB BLD-MCNC: 17 G/DL (ref 13–17.7)
IMM GRANULOCYTES # BLD AUTO: 0.06 10*3/MM3 (ref 0–0.05)
IMM GRANULOCYTES # BLD AUTO: 0.07 10*3/MM3 (ref 0–0.05)
IMM GRANULOCYTES NFR BLD AUTO: 0.5 % (ref 0–0.5)
IMM GRANULOCYTES NFR BLD AUTO: 0.5 % (ref 0–0.5)
LYMPHOCYTES # BLD AUTO: 3.49 10*3/MM3 (ref 0.7–3.1)
LYMPHOCYTES # BLD AUTO: 3.56 10*3/MM3 (ref 0.7–3.1)
LYMPHOCYTES NFR BLD AUTO: 27.4 % (ref 19.6–45.3)
LYMPHOCYTES NFR BLD AUTO: 27.9 % (ref 19.6–45.3)
MCH RBC QN AUTO: 30.2 PG (ref 26.6–33)
MCH RBC QN AUTO: 30.3 PG (ref 26.6–33)
MCHC RBC AUTO-ENTMCNC: 33 G/DL (ref 31.5–35.7)
MCHC RBC AUTO-ENTMCNC: 33.4 G/DL (ref 31.5–35.7)
MCV RBC AUTO: 90.8 FL (ref 79–97)
MCV RBC AUTO: 91.5 FL (ref 79–97)
MONOCYTES # BLD AUTO: 1.51 10*3/MM3 (ref 0.1–0.9)
MONOCYTES # BLD AUTO: 1.52 10*3/MM3 (ref 0.1–0.9)
MONOCYTES NFR BLD AUTO: 11.8 % (ref 5–12)
MONOCYTES NFR BLD AUTO: 11.9 % (ref 5–12)
NEUTROPHILS NFR BLD AUTO: 53.7 % (ref 42.7–76)
NEUTROPHILS NFR BLD AUTO: 55.5 % (ref 42.7–76)
NEUTROPHILS NFR BLD AUTO: 6.87 10*3/MM3 (ref 1.7–7)
NEUTROPHILS NFR BLD AUTO: 7.08 10*3/MM3 (ref 1.7–7)
NRBC BLD AUTO-RTO: 0 /100 WBC (ref 0–0.2)
NRBC BLD AUTO-RTO: 0 /100 WBC (ref 0–0.2)
PLATELET # BLD AUTO: 237 10*3/MM3 (ref 140–450)
PLATELET # BLD AUTO: 241 10*3/MM3 (ref 140–450)
PMV BLD AUTO: 10.1 FL (ref 6–12)
PMV BLD AUTO: 10.2 FL (ref 6–12)
POTASSIUM SERPL-SCNC: 4.5 MMOL/L (ref 3.5–4.7)
PROT SERPL-MCNC: 7.7 G/DL (ref 6.3–8)
PSA SERPL-MCNC: 0.56 NG/ML (ref 0–4)
RBC # BLD AUTO: 5.44 10*6/MM3 (ref 4.14–5.8)
RBC # BLD AUTO: 5.63 10*6/MM3 (ref 4.14–5.8)
SODIUM SERPL-SCNC: 140 MMOL/L (ref 134–145)
WBC # BLD AUTO: 12.76 10*3/MM3 (ref 3.4–10.8)
WBC # BLD AUTO: 12.78 10*3/MM3 (ref 3.4–10.8)

## 2021-01-01 PROCEDURE — 25010000002 INJECTION, CASIRIVIMAB AND IMDEVIMAB, 1200 MG

## 2021-01-01 PROCEDURE — 99213 OFFICE O/P EST LOW 20 MIN: CPT | Performed by: NURSE PRACTITIONER

## 2021-01-01 PROCEDURE — 99214 OFFICE O/P EST MOD 30 MIN: CPT | Performed by: INTERNAL MEDICINE

## 2021-01-01 PROCEDURE — 1170F FXNL STATUS ASSESSED: CPT | Performed by: INTERNAL MEDICINE

## 2021-01-01 PROCEDURE — 96160 PT-FOCUSED HLTH RISK ASSMT: CPT | Performed by: INTERNAL MEDICINE

## 2021-01-01 PROCEDURE — 84153 ASSAY OF PSA TOTAL: CPT | Performed by: INTERNAL MEDICINE

## 2021-01-01 PROCEDURE — 71271 CT THORAX LUNG CANCER SCR C-: CPT

## 2021-01-01 PROCEDURE — 85025 COMPLETE CBC W/AUTO DIFF WBC: CPT | Performed by: INTERNAL MEDICINE

## 2021-01-01 PROCEDURE — 36415 COLL VENOUS BLD VENIPUNCTURE: CPT | Performed by: INTERNAL MEDICINE

## 2021-01-01 PROCEDURE — 99214 OFFICE O/P EST MOD 30 MIN: CPT | Performed by: NURSE PRACTITIONER

## 2021-01-01 PROCEDURE — 99204 OFFICE O/P NEW MOD 45 MIN: CPT | Performed by: INTERNAL MEDICINE

## 2021-01-01 PROCEDURE — 80053 COMPREHEN METABOLIC PANEL: CPT | Performed by: INTERNAL MEDICINE

## 2021-01-01 PROCEDURE — 1160F RVW MEDS BY RX/DR IN RCRD: CPT | Performed by: INTERNAL MEDICINE

## 2021-01-01 PROCEDURE — 96365 THER/PROPH/DIAG IV INF INIT: CPT

## 2021-01-01 PROCEDURE — 71046 X-RAY EXAM CHEST 2 VIEWS: CPT

## 2021-01-01 PROCEDURE — 85025 COMPLETE CBC W/AUTO DIFF WBC: CPT

## 2021-01-01 PROCEDURE — 36415 COLL VENOUS BLD VENIPUNCTURE: CPT

## 2021-01-01 PROCEDURE — 99213 OFFICE O/P EST LOW 20 MIN: CPT | Performed by: INTERNAL MEDICINE

## 2021-01-01 PROCEDURE — M0243 CASIRIVI AND IMDEVI INFUSION: HCPCS

## 2021-01-01 PROCEDURE — G0438 PPPS, INITIAL VISIT: HCPCS | Performed by: INTERNAL MEDICINE

## 2021-01-01 RX ORDER — METHYLPREDNISOLONE SODIUM SUCCINATE 125 MG/2ML
125 INJECTION, POWDER, LYOPHILIZED, FOR SOLUTION INTRAMUSCULAR; INTRAVENOUS AS NEEDED
Status: DISCONTINUED | OUTPATIENT
Start: 2021-01-01 | End: 2021-01-01 | Stop reason: HOSPADM

## 2021-01-01 RX ORDER — DIPHENHYDRAMINE HCL 25 MG
50 CAPSULE ORAL ONCE AS NEEDED
Status: CANCELLED | OUTPATIENT
Start: 2021-01-01

## 2021-01-01 RX ORDER — EPINEPHRINE 1 MG/ML
0.3 INJECTION, SOLUTION, CONCENTRATE INTRAVENOUS AS NEEDED
Status: CANCELLED | OUTPATIENT
Start: 2021-01-01

## 2021-01-01 RX ORDER — DIPHENHYDRAMINE HYDROCHLORIDE 50 MG/ML
50 INJECTION INTRAMUSCULAR; INTRAVENOUS ONCE AS NEEDED
Status: CANCELLED | OUTPATIENT
Start: 2021-01-01

## 2021-01-01 RX ORDER — SODIUM CHLORIDE 9 MG/ML
30 INJECTION, SOLUTION INTRAVENOUS ONCE
Status: CANCELLED | OUTPATIENT
Start: 2021-01-01

## 2021-01-01 RX ORDER — METHYLPREDNISOLONE SODIUM SUCCINATE 125 MG/2ML
125 INJECTION, POWDER, LYOPHILIZED, FOR SOLUTION INTRAMUSCULAR; INTRAVENOUS AS NEEDED
Status: CANCELLED | OUTPATIENT
Start: 2021-01-01

## 2021-01-01 RX ORDER — DIPHENHYDRAMINE HCL 25 MG
50 CAPSULE ORAL ONCE AS NEEDED
Status: DISCONTINUED | OUTPATIENT
Start: 2021-01-01 | End: 2021-01-01 | Stop reason: HOSPADM

## 2021-01-01 RX ORDER — ISOSORBIDE MONONITRATE 30 MG/1
30 TABLET, EXTENDED RELEASE ORAL 2 TIMES DAILY
Qty: 180 TABLET | Refills: 3 | Status: ON HOLD | OUTPATIENT
Start: 2021-01-01 | End: 2022-01-01 | Stop reason: SDUPTHER

## 2021-01-01 RX ORDER — CALCIUM CITRATE/VITAMIN D3 200MG-6.25
TABLET ORAL
Qty: 120 EACH | Refills: 6 | Status: SHIPPED | OUTPATIENT
Start: 2021-01-01

## 2021-01-01 RX ORDER — DIPHENHYDRAMINE HYDROCHLORIDE 50 MG/ML
50 INJECTION INTRAMUSCULAR; INTRAVENOUS ONCE AS NEEDED
Status: DISCONTINUED | OUTPATIENT
Start: 2021-01-01 | End: 2021-01-01 | Stop reason: HOSPADM

## 2021-01-01 RX ORDER — ATORVASTATIN CALCIUM 40 MG/1
TABLET, FILM COATED ORAL
Qty: 90 TABLET | Refills: 2 | Status: SHIPPED | OUTPATIENT
Start: 2021-01-01

## 2021-01-01 RX ORDER — DIPHENHYDRAMINE HCL 25 MG
50 TABLET ORAL ONCE AS NEEDED
Status: CANCELLED | OUTPATIENT
Start: 2021-01-01

## 2021-01-01 RX ORDER — SODIUM CHLORIDE 9 MG/ML
30 INJECTION, SOLUTION INTRAVENOUS ONCE
Status: COMPLETED | OUTPATIENT
Start: 2021-01-01 | End: 2021-01-01

## 2021-01-01 RX ADMIN — SODIUM CHLORIDE 30 ML: 9 INJECTION, SOLUTION INTRAVENOUS at 19:10

## 2021-01-07 RX ORDER — ATORVASTATIN CALCIUM 40 MG/1
TABLET, FILM COATED ORAL
Qty: 90 TABLET | Refills: 2 | Status: SHIPPED | OUTPATIENT
Start: 2021-01-07 | End: 2021-01-01

## 2021-01-26 LAB
ALBUMIN/CREAT UR: <6 MG/G CREAT (ref 0–29)
BUN SERPL-MCNC: 20 MG/DL (ref 8–27)
BUN/CREAT SERPL: 15 (ref 10–24)
CALCIUM SERPL-MCNC: 9.3 MG/DL (ref 8.6–10.2)
CHLORIDE SERPL-SCNC: 102 MMOL/L (ref 96–106)
CHOLEST SERPL-MCNC: 115 MG/DL (ref 100–199)
CO2 SERPL-SCNC: 27 MMOL/L (ref 20–29)
CREAT SERPL-MCNC: 1.32 MG/DL (ref 0.76–1.27)
CREAT UR-MCNC: 51.8 MG/DL
GLUCOSE SERPL-MCNC: 75 MG/DL (ref 65–99)
HBA1C MFR BLD: 8 % (ref 4.8–5.6)
HDLC SERPL-MCNC: 49 MG/DL
INTERPRETATION: NORMAL
INTERPRETATION: NORMAL
LDLC SERPL CALC-MCNC: 44 MG/DL (ref 0–99)
Lab: NORMAL
Lab: NORMAL
MICROALBUMIN UR-MCNC: <3 UG/ML
POTASSIUM SERPL-SCNC: 4.8 MMOL/L (ref 3.5–5.2)
SODIUM SERPL-SCNC: 141 MMOL/L (ref 134–144)
T4 FREE SERPL-MCNC: 1.21 NG/DL (ref 0.82–1.77)
TRIGL SERPL-MCNC: 124 MG/DL (ref 0–149)
TSH SERPL DL<=0.005 MIU/L-ACNC: 2.49 UIU/ML (ref 0.45–4.5)
VLDLC SERPL CALC-MCNC: 22 MG/DL (ref 5–40)

## 2021-02-02 ENCOUNTER — OFFICE VISIT (OUTPATIENT)
Dept: NEUROLOGY | Facility: CLINIC | Age: 76
End: 2021-02-02

## 2021-02-02 ENCOUNTER — LAB (OUTPATIENT)
Dept: LAB | Facility: HOSPITAL | Age: 76
End: 2021-02-02

## 2021-02-02 VITALS
SYSTOLIC BLOOD PRESSURE: 128 MMHG | WEIGHT: 247 LBS | HEIGHT: 70 IN | BODY MASS INDEX: 35.36 KG/M2 | DIASTOLIC BLOOD PRESSURE: 64 MMHG | HEART RATE: 81 BPM | OXYGEN SATURATION: 92 %

## 2021-02-02 DIAGNOSIS — Z79.899 HIGH RISK MEDICATION USE: ICD-10-CM

## 2021-02-02 DIAGNOSIS — E11.42 DIABETIC POLYNEUROPATHY ASSOCIATED WITH TYPE 2 DIABETES MELLITUS (HCC): ICD-10-CM

## 2021-02-02 DIAGNOSIS — D47.2 IGA GAMMOPATHY: Primary | ICD-10-CM

## 2021-02-02 DIAGNOSIS — E11.42 DIABETIC PERIPHERAL NEUROPATHY (HCC): ICD-10-CM

## 2021-02-02 DIAGNOSIS — D47.2 IGA GAMMOPATHY: ICD-10-CM

## 2021-02-02 PROCEDURE — 99213 OFFICE O/P EST LOW 20 MIN: CPT | Performed by: NURSE PRACTITIONER

## 2021-02-02 PROCEDURE — 36415 COLL VENOUS BLD VENIPUNCTURE: CPT

## 2021-02-02 PROCEDURE — 82784 ASSAY IGA/IGD/IGG/IGM EACH: CPT

## 2021-02-02 PROCEDURE — 86334 IMMUNOFIX E-PHORESIS SERUM: CPT

## 2021-02-02 RX ORDER — GABAPENTIN 100 MG/1
300 CAPSULE ORAL 3 TIMES DAILY
Qty: 270 CAPSULE | Refills: 2 | Status: SHIPPED | OUTPATIENT
Start: 2021-02-02 | End: 2021-03-16 | Stop reason: SDUPTHER

## 2021-02-03 LAB
IGA SERPL-MCNC: 557 MG/DL (ref 61–437)
IGG SERPL-MCNC: 1576 MG/DL (ref 603–1613)
IGM SERPL-MCNC: 150 MG/DL (ref 15–143)
PROT PATTERN SERPL IFE-IMP: ABNORMAL

## 2021-02-05 NOTE — PROGRESS NOTES
This patient has had elevated IgA levels twice now... do you have any recommendations as to what I should do about this? He has a history of neuropathy and Type 2 Diabetes. Neuropathy labs were checked and this was also found.

## 2021-02-08 ENCOUNTER — OFFICE VISIT (OUTPATIENT)
Dept: ENDOCRINOLOGY | Age: 76
End: 2021-02-08

## 2021-02-08 VITALS
DIASTOLIC BLOOD PRESSURE: 60 MMHG | RESPIRATION RATE: 16 BRPM | SYSTOLIC BLOOD PRESSURE: 106 MMHG | HEIGHT: 70 IN | BODY MASS INDEX: 35.53 KG/M2 | WEIGHT: 248.2 LBS

## 2021-02-08 DIAGNOSIS — E11.65 TYPE 2 DIABETES MELLITUS WITH HYPERGLYCEMIA, WITH LONG-TERM CURRENT USE OF INSULIN (HCC): Primary | ICD-10-CM

## 2021-02-08 DIAGNOSIS — Z79.4 TYPE 2 DIABETES MELLITUS WITH HYPERGLYCEMIA, WITH LONG-TERM CURRENT USE OF INSULIN (HCC): Primary | ICD-10-CM

## 2021-02-08 PROCEDURE — 99214 OFFICE O/P EST MOD 30 MIN: CPT | Performed by: NURSE PRACTITIONER

## 2021-02-08 RX ORDER — UBIDECARENONE 100 MG
100 CAPSULE ORAL DAILY
COMMUNITY
End: 2021-05-10

## 2021-02-08 NOTE — PROGRESS NOTES
"Chief Complaint  Diabetes (checking BG 3-4 times a day; last eye exam a year ago) and Hyperlipidemia    Subjective          Felix Olmedo presents to [facility] ENDOCRINOLOGY for   History of Present Illness  75-year-old white male is here for the follow-up of type 2 diabetes mellitus.     Type 2 dm - Diagnosed about 20 years ago.   Today in clinic pt reports being on Levemir 36 units at bedtime, NovoLog 28u QD ( divided up at meals)  also on Jardiance and Januvia.      FBG -110-140  Pre meals -100-200  Checks BG -3-4 times  Dm retinopathy -no,Last eye exam -March 2020  Dm nephropathy -no  Dm neuropathy -yes,Dm neuropathy meds -sees a neurologist  CAD -yes  CVA -no  Episodes of hypoglycemia- maybe once a month. He can tell when his blood sugar is dropping ( usually happens with double dosing by mistake)  Pt is somewhat physically active. weight has been stable.   Pt tries to follow DM diet for most part.   On Ace inb.    Hyperlipidemia  Atorvastatin 40mg daily  Has muscle cramps that he is seeing neurology for on gabapentin.     Objective   Vital Signs:   /60   Resp 16   Ht 177.8 cm (70\")   Wt 113 kg (248 lb 3.2 oz)   BMI 35.61 kg/m²     Physical Exam  Vitals signs reviewed.   Constitutional:       General: He is not in acute distress.  HENT:      Head: Normocephalic and atraumatic.   Neck:      Musculoskeletal: Normal range of motion and neck supple.   Cardiovascular:      Rate and Rhythm: Normal rate and regular rhythm.   Pulmonary:      Effort: Pulmonary effort is normal. No respiratory distress.   Musculoskeletal: Normal range of motion.         General: No signs of injury.   Skin:     General: Skin is warm and dry.   Neurological:      Mental Status: He is alert and oriented to person, place, and time. Mental status is at baseline.   Psychiatric:         Mood and Affect: Mood normal.         Behavior: Behavior normal.         Thought Content: Thought content normal.         " Judgment: Judgment normal.        Result Review :   The following data was reviewed by: MILADYS Spencer on 02/08/2021:  Common labs    Common Labsle 10/1/20 10/1/20 10/30/20 1/25/21 1/25/21 1/25/21 1/25/21    1524 1524  1335 1335 1335 1335   Glucose    75      BUN   20 20      Creatinine   1.21 1.32 (A)      eGFR Non African Am   58 (A) 52 (A)      eGFR African Am    60      Sodium   139 141      Potassium   4.2 4.8      Chloride   104 102      Calcium   9.2 9.3      Total Protein  7.1        Albumin  3.4 3.90       Total Bilirubin   0.4       Alkaline Phosphatase   114       AST (SGOT)   26       ALT (SGPT)   24       Total Cholesterol     115     Triglycerides     124     HDL Cholesterol     49     LDL Cholesterol      44     Hemoglobin A1C 7.40 (A)      8.0 (A)   Microalbumin, Urine      <3.0    (A) Abnormal value       Comments are available for some flowsheets but are not being displayed.                     Assessment and Plan    Problem List Items Addressed This Visit     None      Visit Diagnoses     Type 2 diabetes mellitus with hyperglycemia, with long-term current use of insulin (CMS/[facility])    -  Primary    Relevant Orders    Hemoglobin A1c          Follow Up {Instructions Charge Capture  Follow-up Communications :23}  Return in about 3 months (around 5/8/2021).   dm2- wrose  Improve diet   Increase novolog 2-4u with meals  Fasting BS stable so continue levemir at current dose   Moderate hypoglycemia risk   Higher risk of diabetic complications given his worsening A1c and history of neuropathy  Continue blood glucose monitoring  Continue Januvia and Jardiance  Continue statin as long as neurology does not feel leg cramps are worsened by statin therapy    Patient was given instructions and counseling regarding his condition or for health maintenance advice. Please see specific information pulled into the AVS if appropriate.

## 2021-02-17 ENCOUNTER — IMMUNIZATION (OUTPATIENT)
Dept: VACCINE CLINIC | Facility: HOSPITAL | Age: 76
End: 2021-02-17

## 2021-02-17 PROCEDURE — 0001A: CPT | Performed by: INTERNAL MEDICINE

## 2021-02-17 PROCEDURE — 91300 HC SARSCOV02 VAC 30MCG/0.3ML IM: CPT | Performed by: INTERNAL MEDICINE

## 2021-02-24 ENCOUNTER — TELEPHONE (OUTPATIENT)
Dept: ENDOCRINOLOGY | Age: 76
End: 2021-02-24

## 2021-02-25 RX ORDER — INSULIN ASPART 100 [IU]/ML
INJECTION, SOLUTION INTRAVENOUS; SUBCUTANEOUS
Qty: 45 ML | Refills: 3 | Status: SHIPPED | OUTPATIENT
Start: 2021-02-25 | End: 2021-03-04 | Stop reason: SDUPTHER

## 2021-02-26 ENCOUNTER — TELEPHONE (OUTPATIENT)
Dept: ENDOCRINOLOGY | Age: 76
End: 2021-02-26

## 2021-03-04 RX ORDER — INSULIN ASPART 100 [IU]/ML
INJECTION, SOLUTION INTRAVENOUS; SUBCUTANEOUS
Qty: 45 ML | Refills: 3 | Status: SHIPPED | OUTPATIENT
Start: 2021-03-04 | End: 2022-01-01 | Stop reason: SDUPTHER

## 2021-03-10 ENCOUNTER — IMMUNIZATION (OUTPATIENT)
Dept: VACCINE CLINIC | Facility: HOSPITAL | Age: 76
End: 2021-03-10

## 2021-03-10 PROCEDURE — 0002A: CPT | Performed by: INTERNAL MEDICINE

## 2021-03-10 PROCEDURE — 91300 HC SARSCOV02 VAC 30MCG/0.3ML IM: CPT | Performed by: INTERNAL MEDICINE

## 2021-03-16 ENCOUNTER — OFFICE VISIT (OUTPATIENT)
Dept: INTERNAL MEDICINE | Age: 76
End: 2021-03-16

## 2021-03-16 VITALS
HEIGHT: 70 IN | WEIGHT: 240 LBS | DIASTOLIC BLOOD PRESSURE: 60 MMHG | SYSTOLIC BLOOD PRESSURE: 120 MMHG | TEMPERATURE: 97.3 F | HEART RATE: 69 BPM | OXYGEN SATURATION: 96 % | BODY MASS INDEX: 34.36 KG/M2

## 2021-03-16 DIAGNOSIS — IMO0002 TYPE II DIABETES MELLITUS WITH NEUROLOGICAL MANIFESTATIONS, UNCONTROLLED: Primary | ICD-10-CM

## 2021-03-16 DIAGNOSIS — E78.2 MIXED HYPERLIPIDEMIA: Chronic | ICD-10-CM

## 2021-03-16 DIAGNOSIS — R79.89 INCREASE IN CREATININE: ICD-10-CM

## 2021-03-16 DIAGNOSIS — I25.118 CORONARY ARTERY DISEASE OF NATIVE ARTERY OF NATIVE HEART WITH STABLE ANGINA PECTORIS (HCC): Chronic | ICD-10-CM

## 2021-03-16 LAB
ALBUMIN SERPL-MCNC: 3.8 G/DL (ref 3.5–5.2)
ALBUMIN/GLOB SERPL: 1.2 G/DL
ALP SERPL-CCNC: 126 U/L (ref 39–117)
ALT SERPL-CCNC: 26 U/L (ref 1–41)
APPEARANCE UR: CLEAR
AST SERPL-CCNC: 34 U/L (ref 1–40)
BILIRUB SERPL-MCNC: 0.5 MG/DL (ref 0–1.2)
BILIRUB UR QL STRIP: NEGATIVE
BUN SERPL-MCNC: 16 MG/DL (ref 8–23)
BUN/CREAT SERPL: 14.2 (ref 7–25)
CALCIUM SERPL-MCNC: 9.6 MG/DL (ref 8.6–10.5)
CHLORIDE SERPL-SCNC: 105 MMOL/L (ref 98–107)
CO2 SERPL-SCNC: 28.1 MMOL/L (ref 22–29)
COLOR UR: YELLOW
CREAT SERPL-MCNC: 1.13 MG/DL (ref 0.76–1.27)
GLOBULIN SER CALC-MCNC: 3.3 GM/DL
GLUCOSE SERPL-MCNC: 108 MG/DL (ref 65–99)
GLUCOSE UR QL: ABNORMAL
HGB UR QL STRIP: NEGATIVE
KETONES UR QL STRIP: NEGATIVE
LEUKOCYTE ESTERASE UR QL STRIP: NEGATIVE
NITRITE UR QL STRIP: NEGATIVE
PH UR STRIP: 6 [PH] (ref 5–8)
POTASSIUM SERPL-SCNC: 5 MMOL/L (ref 3.5–5.2)
PROT SERPL-MCNC: 7.1 G/DL (ref 6–8.5)
PROT UR QL STRIP: NEGATIVE
SODIUM SERPL-SCNC: 142 MMOL/L (ref 136–145)
SP GR UR: ABNORMAL (ref 1–1.03)
UROBILINOGEN UR STRIP-MCNC: ABNORMAL MG/DL

## 2021-03-16 PROCEDURE — 99214 OFFICE O/P EST MOD 30 MIN: CPT | Performed by: INTERNAL MEDICINE

## 2021-03-16 NOTE — PROGRESS NOTES
I N T E R N A L  M E D I C I N E  J U N O H  K I M,  M D      ENCOUNTER DATE:  03/16/2021    Felix Olmedo / 75 y.o. / male      CHIEF COMPLAINT / REASON FOR OFFICE VISIT     Chronic diastolic congestive heart failure; Hyperlipidemia; and Type II diabetes mellitus with neurological manifestations,       ASSESSMENT & PLAN     Problem List Items Addressed This Visit     Type II diabetes mellitus with neurological manifestations, uncontrolled (CMS/[facility]) - Primary (Chronic)    Overview     Dx'ed 2000  *Sees endocrine  Complications: +retinopathy and peripheral neuropathy         Relevant Medications    Levemir FlexTouch 100 UNIT/ML injection    SITagliptin (Januvia) 100 MG tablet    Empagliflozin (Jardiance) 25 MG tablet    insulin aspart (NovoLOG FlexPen) 100 UNIT/ML solution pen-injector sc pen    Coronary artery disease of native artery of native heart with stable angina pectoris (CMS/HCC) (Chronic)    Overview     S/p 2V CABG ([place]) (5/2017)  *[place] (cards)    Stable. Continue ASA, atorvastatin, metoprolol and Imdur.          Relevant Medications    isosorbide mononitrate (IMDUR) 30 MG 24 hr tablet    metoprolol tartrate (LOPRESSOR) 25 MG tablet    Hyperlipidemia (Chronic)    Overview     Stable. Continue atorvastatin 40 mg daily         Relevant Medications    atorvastatin (LIPITOR) 40 MG tablet      Other Visit Diagnoses     Increase in creatinine        Relevant Orders    Comprehensive Metabolic Panel    Urinalysis With Microscopic If Indicated (No Culture) - Urine, Clean Catch        Orders Placed This Encounter   Procedures   • Comprehensive Metabolic Panel   • Urinalysis With Microscopic If Indicated (No Culture) - Urine, Clean Catch     No orders of the defined types were placed in this encounter.      SUMMARY/DISCUSSION  • Check CMP and UA to assess increasing creatinine level last 3 labs. Not change in medications noted.   • Continue follow-up with endocrinology   • Consider splitting dose  "of Levemir BID ; discuss with endocrinology       Next Appointment with me: Visit date not found    Return in about 4 months (around 7/16/2021) for Reassess chronic medical problems.        VITAL SIGNS     Visit Vitals  /60 (BP Location: Left arm)   Pulse 69   Temp 97.3 °F (36.3 °C)   Ht 177.8 cm (70\")   Wt 109 kg (240 lb)   SpO2 96%   BMI 34.44 kg/m²       BP Readings from Last 3 Encounters:   03/16/21 120/60   02/08/21 106/60   02/02/21 128/64     Wt Readings from Last 3 Encounters:   03/16/21 109 kg (240 lb)   02/08/21 113 kg (248 lb 3.2 oz)   02/02/21 112 kg (247 lb)     Body mass index is 34.44 kg/m².      MEDICATIONS AT THE TIME OF OFFICE VISIT     Current Outpatient Medications on File Prior to Visit   Medication Sig   • acetaminophen (TYLENOL) 325 MG tablet Take 2 tablets by mouth Every 4 (Four) Hours As Needed for Mild Pain (1-3). (Patient taking differently: Take 500 mg by mouth Every 4 (Four) Hours As Needed for Mild Pain .)   • aspirin 81 MG tablet Take 81 mg by mouth Every Other Day.   • atorvastatin (LIPITOR) 40 MG tablet TAKE 1 TABLET EVERY DAY   • bumetanide (BUMEX) 2 MG tablet Take 0.5 tablets by mouth Daily.   • coenzyme Q10 100 MG capsule Take 100 mg by mouth Daily.   • Empagliflozin (Jardiance) 25 MG tablet Take 25 mg by mouth Daily.   • gabapentin (NEURONTIN) 300 MG capsule Take 1 capsule by mouth 2 (two) times a day. (Patient taking differently: Take 300 mg by mouth 3 (Three) Times a Day. tid)   • insulin aspart (NovoLOG FlexPen) 100 UNIT/ML solution pen-injector sc pen 10-14 units  3 times daily with meal   • Insulin Pen Needle 31G X 8 MM misc Use to inject insulin   • isosorbide mononitrate (IMDUR) 30 MG 24 hr tablet TAKE 1 TABLET TWICE DAILY (Patient taking differently: 30 mg Daily. prn)   • Levemir FlexTouch 100 UNIT/ML injection INJECT 36 UNITS UNDER THE SKIN INTO THE APPROPRIATE AREA EVERY DAY, AS DIRECTED.   • Menthol, Topical Analgesic, (BIOFREEZE ROLL-ON) 4 % gel Apply 1 " application topically Daily As Needed.   • metoprolol tartrate (LOPRESSOR) 25 MG tablet Take 1 tablet by mouth 2 (Two) Times a Day. (Patient taking differently: Take 25 mg by mouth 2 (Two) Times a Day. Once daily)   • Multiple Vitamins-Minerals (MULTIVITAMIN ADULT PO) Take 1 tablet by mouth Daily.   • potassium chloride (MICRO-K) 10 MEQ CR capsule Take 1 capsule (10 mEq total) by mouth 2 (Two) Times a Day (Patient taking differently: Take 10 mEq by mouth Daily.)   • SITagliptin (Januvia) 100 MG tablet Take 1 tablet by mouth Daily.   • TRUE METRIX BLOOD GLUCOSE TEST test strip USE TO TEST BLOOD SUGAR  4 TIMES DAILY  E 11.8   • Unable to find 1 each 1 (One) Time. nerve shield   • [DISCONTINUED] gabapentin (NEURONTIN) 100 MG capsule Take 3 capsules by mouth 3 (Three) Times a Day.     No current facility-administered medications on file prior to visit.         HISTORY OF PRESENT ILLNESS     Rising creatinine level last 3 labs. Denies recent change in medications, NSAIDS. On Bumex for diastolic HF.   Lab Results   Component Value Date    CREATININE 1.32 (H) 01/25/2021    CREATININE 1.21 10/30/2020    CREATININE 0.96 06/18/2020    BUN 20 01/25/2021    EGFRIFNONA 52 (L) 01/25/2021    EGFRIFAFRI 60 01/25/2021        Denies angina or increased ZELAYA.        REVIEW OF SYSTEMS     Constitutional neg except per HPI ; no significant wt gain   Resp neg  CV neg    denies change in urination or gross hematuria       PHYSICAL EXAMINATION     Physical Exam  Obese, no acute distress   Cardiovascular: Normal rate, regular rhythm. 1+ bilateral lower extremities edema.   Pulm/Chest: Effort normal, breath sounds with faint inspiratory crackles bilaterally at the bases       REVIEWED DATA     Labs:       Lab Results   Component Value Date     01/25/2021    K 4.8 01/25/2021    AST 26 10/30/2020    ALT 24 10/30/2020    BUN 20 01/25/2021    CREATININE 1.32 (H) 01/25/2021    CREATININE 1.21 10/30/2020    CREATININE 0.96 06/18/2020     EGFRIFNONA 52 (L) 01/25/2021    EGFRIFAFRI 60 01/25/2021     Lab Results   Component Value Date    CALCIUM 9.3 01/25/2021    PHOS 4.1 05/11/2017        Lab Results   Component Value Date    HGBA1C 8.0 (H) 01/25/2021    HGBA1C 7.40 (H) 10/01/2020    HGBA1C 7.8 (H) 03/13/2020       Lab Results   Component Value Date    LDL 44 01/25/2021    LDL 57 10/23/2019    HDL 49 01/25/2021    TRIG 124 01/25/2021       Lab Results   Component Value Date    TSH 2.490 01/25/2021    FREET4 1.21 01/25/2021       Lab Results   Component Value Date    WBC 17.96 (H) 05/21/2017    HGB 12.4 (L) 05/21/2017     (H) 05/21/2017     Lab Results   Component Value Date    GLUCOSEU 500 mg/dL (2+) (A) 05/12/2017    BLOODU Large (3+) (A) 05/12/2017    NITRITEU Negative 05/12/2017    LEUKOCYTESUR Small (1+) (A) 05/12/2017        Imaging:           Medical Tests:           Summary of old records / correspondence / consultant report:           Request outside records:           *Examiner was wearing KN95 mask, face shield and exam gloves during the entire duration of the visit. Patient was masked the entire time.   Minimum social distance of 6 ft maintained entire visit except if physical contact was necessary as documented.     **Dragon Disclaimer:   Much of this encounter note is an electronic transcription/translation of spoken language to printed text. The electronic translation of spoken language may permit erroneous, or at times, nonsensical words or phrases to be inadvertently transcribed. Although I have reviewed the note for such errors, some may still exist.     Template created by Helen Rahman MD

## 2021-05-10 ENCOUNTER — OFFICE VISIT (OUTPATIENT)
Dept: ENDOCRINOLOGY | Age: 76
End: 2021-05-10

## 2021-05-10 VITALS
DIASTOLIC BLOOD PRESSURE: 80 MMHG | HEIGHT: 70 IN | BODY MASS INDEX: 34.88 KG/M2 | WEIGHT: 243.6 LBS | OXYGEN SATURATION: 94 % | SYSTOLIC BLOOD PRESSURE: 146 MMHG | HEART RATE: 75 BPM

## 2021-05-10 DIAGNOSIS — E11.42 DIABETIC POLYNEUROPATHY ASSOCIATED WITH TYPE 2 DIABETES MELLITUS (HCC): ICD-10-CM

## 2021-05-10 DIAGNOSIS — Z79.4 LONG-TERM INSULIN USE (HCC): Primary | ICD-10-CM

## 2021-05-10 DIAGNOSIS — IMO0002 DM (DIABETES MELLITUS), TYPE 2, UNCONTROLLED W/NEUROLOGIC COMPLICATION: ICD-10-CM

## 2021-05-10 PROCEDURE — 99214 OFFICE O/P EST MOD 30 MIN: CPT | Performed by: INTERNAL MEDICINE

## 2021-05-10 NOTE — PROGRESS NOTES
"Chief Complaint  Chief Complaint   Patient presents with   • Diabetes     testing bs 3-4 x day / last dm eye exam 2019   • Hyperlipidemia   • Coronary Artery Disease       Subjective          History of Present Illness    Felix Olmedo 75 y.o. presents with Type 2 dm as a F/u patient.     Type 2 dm - Diagnosed in 2000   Today in clinic pt reports being on Levemir 36 units daily, NovoLog 10-15 units with each meal, Jardiance 25 mg oral daily.  Tried GLP-1 analogs in the past which she could not tolerate with the side effects.  Supposed to be on Januvia but because of the cost he is not taking the medication anymore    Average blood ynivey-133-353.  Checks BG -3-4 times  Sensor -none  Dm retinopathy -no history,Last eye exam -due for the exam  Dm nephropathy -no  Dm neuropathy -severe, reports that Neurontin is no longer helping him,Dm neuropathy meds -planning to stop the Neurontin  CAD - yes  CVA -x  Episodes of hypoglycemia -lowest blood sugar was 81  Pt is physically active. weight has been stable.   Pt tries to follow DM diet for most part.   On Ace inb.      Reviewed primary care physician's/consulting physician documentation and lab results         I have reviewed the patient's allergies, medicines, past medical hx, family hx and social hx in detail.    Objective   Vital Signs:   /80 (BP Location: Right arm, Patient Position: Sitting, Cuff Size: Large Adult)   Pulse 75   Ht 177.8 cm (70\")   Wt 110 kg (243 lb 9.6 oz)   SpO2 94%   BMI 34.95 kg/m²   Physical Exam   General appearance - no distress  Eyes- anicteric sclera  Ear nose and throat-external ears and nose normal.    Respiratory-normal chest on inspection.  No respiratory distress noted.  Skin-no rashes.  Neuro-alert and oriented x3            Result Review :   The following data was reviewed by: Samira Recinos MD on 05/10/2021:  Office Visit on 03/16/2021   Component Date Value Ref Range Status   • Glucose 03/16/2021 108* 65 - 99 mg/dL " Final   • BUN 03/16/2021 16  8 - 23 mg/dL Final   • Creatinine 03/16/2021 1.13  0.76 - 1.27 mg/dL Final   • eGFR Non  Am 03/16/2021 63  >60 mL/min/1.73 Final    Comment: The MDRD GFR formula is only valid for adults with stable  renal function between ages 18 and 70.     • eGFR  Am 03/16/2021 76  >60 mL/min/1.73 Final   • BUN/Creatinine Ratio 03/16/2021 14.2  7.0 - 25.0 Final   • Sodium 03/16/2021 142  136 - 145 mmol/L Final   • Potassium 03/16/2021 5.0  3.5 - 5.2 mmol/L Final   • Chloride 03/16/2021 105  98 - 107 mmol/L Final   • Total CO2 03/16/2021 28.1  22.0 - 29.0 mmol/L Final   • Calcium 03/16/2021 9.6  8.6 - 10.5 mg/dL Final   • Total Protein 03/16/2021 7.1  6.0 - 8.5 g/dL Final   • Albumin 03/16/2021 3.80  3.50 - 5.20 g/dL Final   • Globulin 03/16/2021 3.3  gm/dL Final   • A/G Ratio 03/16/2021 1.2  g/dL Final   • Total Bilirubin 03/16/2021 0.5  0.0 - 1.2 mg/dL Final   • Alkaline Phosphatase 03/16/2021 126* 39 - 117 U/L Final   • AST (SGOT) 03/16/2021 34  1 - 40 U/L Final   • ALT (SGPT) 03/16/2021 26  1 - 41 U/L Final   • Specific Gravity, UA 03/16/2021 Comment  1.005 - 1.030 Final    >=1.030   • pH, UA 03/16/2021 6.0  5.0 - 8.0 Final   • Color, UA 03/16/2021 Yellow   Final    Comment: Urine microscopic not indicated.  REFERENCE RANGE: Yellow, Straw     • Appearance, UA 03/16/2021 Clear  Clear Final   • Leukocytes, UA 03/16/2021 Negative  Negative Final   • Protein 03/16/2021 Negative  Negative Final   • Glucose, UA 03/16/2021 See below:* Negative Final    >=1000 mg/dL (3+)   • Ketones 03/16/2021 Negative  Negative Final   • Blood, UA 03/16/2021 Negative  Negative Final   • Bilirubin, UA 03/16/2021 Negative  Negative Final   • Urobilinogen, UA 03/16/2021 Comment   Final    Comment: 1.0 E.U./dL  REFERENCE RANGE: 0.2 - 1.0 E.U./dL     • Nitrite, UA 03/16/2021 Negative  Negative Final     Data reviewed: PCP documentation       Results Review:    I reviewed the patient's new clinical results.    "  Assessment and Plan    Problem List Items Addressed This Visit        Other    Diabetic polyneuropathy associated with type 2 diabetes mellitus (CMS/[facility])    Relevant Orders    Hemoglobin A1c    Basic Metabolic Panel      Other Visit Diagnoses     Long-term insulin use (CMS/[facility])    -  Primary    Relevant Orders    Hemoglobin A1c    Basic Metabolic Panel    DM (diabetes mellitus), type 2, uncontrolled w/neurologic complication (CMS/[facility])        Relevant Orders    Hemoglobin A1c    Basic Metabolic Panel        Type diabetes mellitus-uncontrolled  HbA1c is worse  Increase Levemir to 40 units daily  Increase NovoLog to 12-18 units with each meal  Continue Jardiance.    Emphasized to the patient to touch base with us in 2 to 3 weeks to update with the blood sugar improvement.  If the blood sugars are not any better might advised the patient to come into the office for CGM placement to further adjust the blood glucose trends with insulin adjustments.    Hyperlipidemia  Continue Lipitor 40 mg oral daily.  Interpreted the blood work-up/imaging results performed by the primary care/consulting physician -    Refills sent to pharmacy    Follow Up     Patient was given instructions and counseling regarding her condition or for health maintenance advice. Please see specific information pulled into the AVS if appropriate.       Thank you for asking me to see your patient, Felix Olmedo in consultation.         Samira Recinos MD  05/10/21      [facility] Dragon / transcription disclaimer:     \"Dictated utilizing Dragon dictation\".         "

## 2021-06-21 NOTE — PROGRESS NOTES
Date of Office Visit:  6/15/2021  Encounter Provider: Noah Montejo MD  Place of Service: [facility] CARDIOLOGY  Patient Name: Felix Olmedo  :1945    Chief complaint: Follow-up for coronary artery disease.    History of Present Illness:    Dear Dr. Rahman:      I again had the pleasure of seeing your patient in cardiology office on 6/15/2021.  As you   well know, he is a very pleasant 75 year-old white male with a past medical history   significant for coronary artery disease status post coronary artery bypass grafting,   diabetes, and postoperative SVT who presents for follow-up. I initially saw the patient   on 10/3/2016. He had been experiencing 5-6 weeks of a vague chest discomfort   intermittently which she described as an aching and burning sensation in the center of   his chest and over the left precordium. He underwent a Lexiscan Myoview stress test   on 10/12/2016 which showed a small infarct and inferior wall with a moderate amount   of evon-infarct ischemia. He continued to have symptoms, and underwent a left heart   catheterization on 10/18/2016 which showed the right coronary artery to be chronically   occluded the ostium, although left to right collaterals filled the distal right coronary   artery and PDA. The LAD also had up to 60% disease in the mid to distal section.   Medical management was recommended at that time, although the patient was placed   on beta blockers and Imdur and still continued to have exertional angina which actually   worsened. He underwent a repeat left heart catheterization on 2017 by Dr. Guzman, which again showed the right coronary artery to be occluded at the ostium   chronically, and a 60-70% stenosis in the mid LAD. FFR of the mid LAD was   borderline significant at 0.80. These lesions were not felt to be amenable to   percutaneous intervention optimally, and he was referred for coronary artery bypass   surgery. He  ultimately underwent a two-vessel coronary artery bypass grafting   surgery on 5/10/2017 by Dr. Poole (BEAN to LAD, SVG to PDA). He did have some   brief postoperative SVT, but no atrial fibrillation. He also had postoperative leukocytosis   without evidence of infection.      The patient presents today for follow-up.  Overall, he has been doing well.  His main   complaint continues to be neuropathy in his lower extremities.  He is walking the dog   and mowing the yard without difficulty.  He does have some exertional dyspnea, although   this has not changed and is more in his baseline.  He has had no chest discomfort.      Past Medical History:   Diagnosis Date   • Coronary artery disease     Status post 2 vessel CABG 5/10/17 by Dr. Poole (LIMA-LAD, SVG-PDA)   • Diabetes (CMS/HCC)    • Diastolic dysfunction    • Hyperlipidemia    • Hypertension    • LAFB (left anterior fascicular block)    • Lung nodule    • Osteoarthritis    • Pneumonia 02/2017   • Squamous cell carcinoma     Left cheek s/p resection   • SVT (supraventricular tachycardia) (CMS/HCC)     Diagnosed following CABG   • Type 2 diabetes mellitus (CMS/HCC)        Past Surgical History:   Procedure Laterality Date   • CARDIAC CATHETERIZATION N/A 10/18/2016    Procedure: Coronary angiography;  Surgeon: Jesus Guzman MD;  Location: [facility] INVASIVE LOCATION;  Service:    • CARDIAC CATHETERIZATION N/A 10/18/2016    Procedure: Left heart cath;  Surgeon: Jesus Guzman MD;  Location: [facility] CATH INVASIVE LOCATION;  Service:    • CARDIAC CATHETERIZATION N/A 10/18/2016    Procedure: Left ventriculography;  Surgeon: Jesus Guzman MD;  Location: [facility] CATH INVASIVE LOCATION;  Service:    • CARDIAC CATHETERIZATION N/A 4/4/2017    Procedure: Coronary angiography;  Surgeon: Jesus Guzman MD;  Location: [facility] CATH INVASIVE LOCATION;  Service:    • CARDIAC CATHETERIZATION N/A 4/4/2017    Procedure: Left heart cath;  Surgeon:  Jesus Guzman MD;  Location: [facility] CATH INVASIVE LOCATION;  Service:    • CARDIAC CATHETERIZATION N/A 4/4/2017    Procedure: Left ventriculography;  Surgeon: Jesus Guzman MD;  Location: [facility] CATH INVASIVE LOCATION;  Service:    • CARDIAC CATHETERIZATION  4/4/2017    Procedure: Functional Flow [place];  Surgeon: Jesus Guzman MD;  Location: [facility] CATH INVASIVE LOCATION;  Service:    • CARDIAC SURGERY     • CATARACT EXTRACTION W/ INTRAOCULAR LENS IMPLANT Left    • COLONOSCOPY     • CORONARY ARTERY BYPASS GRAFT N/A 5/10/2017    Procedure: CORONARY ARTERY BYPASS TIMES TWO UTILIZING THE LEFT GREATER SAPHENOUS VEIN WITH INTERNAL MAMMARY ARTERY GRAFT;  TRANSESOPHAGEAL ECHOCARDIOGRAM;  Surgeon: Alden Poole MD;  Location: [facility] MAIN OR;  Service:    • EYE SURGERY     • KNEE SURGERY Left 2005    Dr. Gallegos   • MOHS SURGERY Left     CHEEK   • SKIN BIOPSY         Current Outpatient Medications on File Prior to Visit   Medication Sig Dispense Refill   • aspirin 81 MG tablet Take 81 mg by mouth Every Other Day.     • atorvastatin (LIPITOR) 40 MG tablet TAKE 1 TABLET EVERY DAY 90 tablet 2   • bumetanide (BUMEX) 2 MG tablet Take 0.5 tablets by mouth Daily. 90 tablet 0   • Empagliflozin (Jardiance) 25 MG tablet Take 25 mg by mouth Daily. 90 tablet 3   • insulin aspart (NovoLOG FlexPen) 100 UNIT/ML solution pen-injector sc pen 10-14 units  3 times daily with meal 45 mL 3   • Insulin Pen Needle 31G X 8 MM misc Use to inject insulin 500 each 3   • isosorbide mononitrate (IMDUR) 30 MG 24 hr tablet TAKE 1 TABLET TWICE DAILY (Patient taking differently: 30 mg Daily. prn) 180 tablet 3   • Levemir FlexTouch 100 UNIT/ML injection INJECT 36 UNITS UNDER THE SKIN INTO THE APPROPRIATE AREA EVERY DAY, AS DIRECTED. 45 mL 3   • Menthol, Topical Analgesic, (BIOFREEZE ROLL-ON) 4 % gel Apply 1 application topically Daily As Needed.     • metoprolol tartrate (LOPRESSOR) 25 MG tablet Take 1 tablet by mouth 2 (Two)  Times a Day. (Patient taking differently: Take 25 mg by mouth 2 (Two) Times a Day. Once daily) 180 tablet 3   • Multiple Vitamins-Minerals (MULTIVITAMIN ADULT PO) Take 1 tablet by mouth Daily.     • potassium chloride (MICRO-K) 10 MEQ CR capsule Take 1 capsule (10 mEq total) by mouth 2 (Two) Times a Day (Patient taking differently: Take 10 mEq by mouth Daily.) 60 capsule 0   • TRUE METRIX BLOOD GLUCOSE TEST test strip USE TO TEST BLOOD SUGAR  4 TIMES DAILY  E 11.8 400 each 3   • Unable to find 1 each 1 (One) Time. nerve shield       No current facility-administered medications on file prior to visit.     Allergies as of 06/15/2021 - Reviewed 06/15/2021   Allergen Reaction Noted   • Adhesive tape Rash 2017     Social History     Socioeconomic History   • Marital status:      Spouse name: Mendy Vargas   • Number of children: 3   • Years of education: Not on file   • Highest education level: Not on file   Tobacco Use   • Smoking status: Former Smoker     Packs/day: 1.50     Years: 40.00     Pack years: 60.00     Types: Cigarettes     Quit date:      Years since quittin.4   • Smokeless tobacco: Never Used   • Tobacco comment: Smoked up to 1.5 ppd.  Smoked for 50 years.  Quit .   Substance and Sexual Activity   • Alcohol use: Yes     Comment: 2-3 BEERS YEARLY    • Drug use: No     Comment: caffeine use   • Sexual activity: Not Currently     Family History   Problem Relation Age of Onset   • Cancer Mother         uterine   • Heart disease Father         Father with fatal MI in 80's   • Diabetes Sister    • Esophageal cancer Brother    • Alzheimer's disease Maternal Grandfather    • Stroke Maternal Grandfather        Review of Systems   Constitutional: Positive for malaise/fatigue.   Cardiovascular: Positive for dyspnea on exertion and leg swelling.   Respiratory: Positive for shortness of breath.    Musculoskeletal: Positive for joint pain.   Genitourinary: Positive for frequency.   Neurological:  "Positive for excessive daytime sleepiness, numbness and paresthesias.   All other systems reviewed and are negative.     Objective:     Vitals:    06/15/21 1152   BP: 110/62   Pulse: 70   SpO2: 96%   Weight: 112 kg (246 lb)   Height: 177.8 cm (70\")     Body mass index is 35.3 kg/m².    Physical Exam  Constitutional:       Appearance: He is well-developed.   HENT:      Head: Normocephalic and atraumatic.   Eyes:      Conjunctiva/sclera: Conjunctivae normal.   Cardiovascular:      Rate and Rhythm: Normal rate and regular rhythm.      Heart sounds: No murmur heard.   No friction rub.   Pulmonary:      Effort: Pulmonary effort is normal.      Breath sounds: Normal breath sounds. No rales.   Abdominal:      Palpations: Abdomen is soft.      Tenderness: There is no abdominal tenderness.   Musculoskeletal:      Cervical back: Neck supple.   Skin:     General: Skin is warm.   Neurological:      Mental Status: He is alert and oriented to person, place, and time.   Psychiatric:         Behavior: Behavior normal.       Lab Review:   Procedures    Cardiac Procedures:  1. Echocardiogram on 10/12/2016: Ejection fraction was 62%. There was grade 1   diastolic dysfunction. There was moderate to severe thickening of the noncoronary   cusp of the aortic valve, but no aortic stenosis.  2. Left heart catheterization on 10/18/2016 by Dr. Guzman: The left main had 20%   distal disease. The LAD had a 40-50% proximal stenosis. The LAD had diffuse 60%   mid to distal disease. The left circumflex coronary artery had a 20% mid stenosis. The   right coronary artery was chronically occluded at the ostium, left to right collaterals   filled the distal vessel and PDA.  3.  Left heart catheterization on 4/4/2017: The left main was normal. The LAD had   30-40% proximal disease and 60-70% mid disease. FFR the mid LAD was borderline   significant at 0.80. The left circumflex had a 20% mid stenosis. The right coronary   artery was chronically occluded " at the ostium. Coronary artery bypass grafting was   recommended at that time.  4. Carotid Doppler ultrasound on 4/4/2017: The right internal carotid artery had   50-59% disease. The left internal carotid artery had 16-49% disease.  5. Status post two-vessel coronary artery bypass grafting on 5/10/2017 by Dr. Poole   (LIMA to LAD, SVG to PDA).  6.  Echocardiogram on 5/20/2017: Ejection fraction was 54%.  There was mild LVH.    There was grade 1a diastolic dysfunction.  7.  Lexiscan PET stress test on 12/8/2020: There was a small infarct in the inferior wall   with mild to moderate evon-infarct ischemia.  The ejection fraction was 70%.  8.  Echocardiogram on 12/8/2020: The ejection fraction was 60 to 65%.  There was mild   LVH.  There was grade 1 diastolic dysfunction.  The right ventricle was normal.  The left   atrium was mildly enlarged.  There was aortic sclerosis.  There was moderate mitral   annular calcification.  The ascending aorta was 3.8 cm.    Assessment:       Diagnosis Plan   1. Coronary artery disease involving coronary bypass graft of native heart without angina pectoris       Plan:       His echocardiogram and Lexiscan PET stress test were both unremarkable in December 2020.  His shortness of breath is at baseline, and has not changed.  He is still having difficulty with neuropathy in his legs, but he is walking his dog and mowing the yard without any issues.  He has had no angina.  His blood pressure is excellent at 110/62.  He will continue on the aspirin, metoprolol, and Lipitor.  His most recent lipid panel from 4/26/2021 showed an LDL of 57, HDL 60, triglycerides 118.  He will continue on the Imdur as well.  He is euvolemic on the Bumex at 1 mg/day.  I will see him back in the office in 6 months.

## 2021-07-23 PROBLEM — R19.5 CHANGE IN STOOL CALIBER: Status: ACTIVE | Noted: 2021-01-01

## 2021-07-23 NOTE — PROGRESS NOTES
Call patient with his test result(s) and mail the results to him if MyChart is NOT active.    Mild elevation of white blood count. Hemoglobin level is within normal.   Recheck complete blood count in 1 month.   Call for any fever, chills, night sweat or any other symptoms of infection.

## 2021-07-23 NOTE — PROGRESS NOTES
I N T E R N A L  M E D I C I N E  J U N O H  K I M,  M D      ENCOUNTER DATE:  07/23/2021    Felix Olmedo / 75 y.o. / male      CHIEF COMPLAINT / REASON FOR OFFICE VISIT     Change in stool caliber over 8 months, Diabetes, and Coronary artery disease of native artery of native heart wit      ASSESSMENT & PLAN     Problem List Items Addressed This Visit        High    Change in stool caliber - Primary    Current Assessment & Plan     8 months history of thin stools without bleeding, night sweat or unintended wt loss.   Check complete blood count, Cologuard, referral to GI.          Relevant Orders    Ambulatory Referral to Gastroenterology    CBC & Differential (Completed)       Medium    Type II diabetes mellitus with neurological manifestations, uncontrolled (CMS/HCC) (Chronic)    Overview     Dx'ed 2000  *Sees endocrine  Complications: +retinopathy and peripheral neuropathy         Relevant Medications    Levemir FlexTouch 100 UNIT/ML injection    Empagliflozin (Jardiance) 25 MG tablet    insulin aspart (NovoLOG FlexPen) 100 UNIT/ML solution pen-injector sc pen    Coronary artery disease of native artery of native heart with stable angina pectoris (CMS/HCC) (Chronic)    Overview     S/p 2V CABG ([place]) (5/2017)  *[place] (cards)    Stable. Continue ASA, atorvastatin, metoprolol and Imdur.          Relevant Medications    isosorbide mononitrate (IMDUR) 30 MG 24 hr tablet    metoprolol tartrate (LOPRESSOR) 25 MG tablet       Low    Multiple pulmonary nodules (Chronic)    Overview     Stable over 2 years. No routine follow-up for this needed.   Continue annual lung cancer screening CT.   Discussed with patient.          Current Assessment & Plan     Ordered low dose CT.          Gait difficulty (Chronic)    Current Assessment & Plan     He has been seeing a chiro for this problem.   Recommended PT. He will think about it.            Other Visit Diagnoses     Encounter for screening for malignant  "neoplasm of respiratory organs        Relevant Orders     CT Chest Low Dose Cancer Screening WO    History of smoking 30 or more pack years        Relevant Orders     CT Chest Low Dose Cancer Screening WO    Screening for colon cancer            Orders Placed This Encounter   Procedures   •  CT Chest Low Dose Cancer Screening WO   • CBC Auto Differential   • Ambulatory Referral to Gastroenterology   • CBC & Differential     No orders of the defined types were placed in this encounter.      SUMMARY/DISCUSSION  •       Next Appointment with me: Visit date not found    Return in about 6 months (around 1/23/2022) for Reassess chronic medical problems.      VITAL SIGNS     Visit Vitals  /58 (BP Location: Left arm)   Pulse 70   Temp 97.1 °F (36.2 °C)   Ht 177.8 cm (70\")   Wt 108 kg (237 lb)   SpO2 93%   BMI 34.01 kg/m²       BP Readings from Last 3 Encounters:   07/23/21 120/58   06/15/21 110/62   05/10/21 146/80     Wt Readings from Last 3 Encounters:   07/23/21 108 kg (237 lb)   06/15/21 112 kg (246 lb)   05/10/21 110 kg (243 lb 9.6 oz)     Body mass index is 34.01 kg/m².      MEDICATIONS AT THE TIME OF OFFICE VISIT     Current Outpatient Medications on File Prior to Visit   Medication Sig   • aspirin 81 MG tablet Take 81 mg by mouth Every Other Day.   • atorvastatin (LIPITOR) 40 MG tablet TAKE 1 TABLET EVERY DAY   • bumetanide (BUMEX) 2 MG tablet Take 0.5 tablets by mouth Daily.   • Empagliflozin (Jardiance) 25 MG tablet Take 25 mg by mouth Daily.   • insulin aspart (NovoLOG FlexPen) 100 UNIT/ML solution pen-injector sc pen 10-14 units  3 times daily with meal   • Insulin Pen Needle 31G X 8 MM misc Use to inject insulin   • isosorbide mononitrate (IMDUR) 30 MG 24 hr tablet TAKE 1 TABLET TWICE DAILY (Patient taking differently: 30 mg Daily. prn)   • Levemir FlexTouch 100 UNIT/ML injection INJECT 36 UNITS UNDER THE SKIN INTO THE APPROPRIATE AREA EVERY DAY, AS DIRECTED.   • Menthol, Topical Analgesic, (BIOFREEZE " ROLL-ON) 4 % gel Apply 1 application topically Daily As Needed.   • metoprolol tartrate (LOPRESSOR) 25 MG tablet Take 1 tablet by mouth 2 (Two) Times a Day. (Patient taking differently: Take 25 mg by mouth 2 (Two) Times a Day. Once daily)   • Multiple Vitamins-Minerals (MULTIVITAMIN ADULT PO) Take 1 tablet by mouth Daily.   • potassium chloride (MICRO-K) 10 MEQ CR capsule Take 1 capsule (10 mEq total) by mouth 2 (Two) Times a Day (Patient taking differently: Take 10 mEq by mouth Daily.)   • True Metrix Blood Glucose Test test strip USE TO TEST BLOOD SUGAR  4 TIMES DAILY   • Unable to find 1 each 1 (One) Time. nerve shield     No current facility-administered medications on file prior to visit.          HISTORY OF PRESENT ILLNESS     8 months of thin stools, decreased bulk with BM's, denies blood in stool, abdominal pain, night sweat, loss of appetite or unintended weight loss. Last colonoscopy 2013 but has had polyps in the past.     Sees endocrinologist for diabetes and hyperlipidemia. Sees cardiologist for CAD and CHF.   Denies significant changes.     Sees chiro for peripheral neuropathy and gait problems.   Previously I recommended PT evaluation.       Patient Care Team:  Hilario Rahman MD as PCP - General (Internal Medicine)  Noah Montejo MD as Consulting Physician (Cardiology)  Jr Alden Poole MD as Surgeon (Cardiothoracic Surgery)  Samira Recinos MD as Consulting Physician (Endocrinology)    REVIEW OF SYSTEMS     Review of Systems       PHYSICAL EXAMINATION     Physical Exam  Obese, no acute distress   Cardiovascular: Normal rate, regular rhythm.  Pulm/Chest: Effort normal, breath sounds normal.   Walks tentatively with a cane       REVIEWED DATA     Labs:     Lab Results   Component Value Date     04/26/2021    K 4.6 04/26/2021    CALCIUM 9.9 04/26/2021    AST 34 03/16/2021    ALT 26 03/16/2021    BUN 25 04/26/2021    CREATININE 1.15 04/26/2021    CREATININE 1.13 03/16/2021    CREATININE  1.32 (H) 01/25/2021    EGFRIFNONA 61 04/26/2021    EGFRIFAFRI 71 04/26/2021       Lab Results   Component Value Date    HGBA1C 8.3 (H) 04/26/2021    HGBA1C 8.0 (H) 01/25/2021    HGBA1C 7.40 (H) 10/01/2020       Lab Results   Component Value Date    LDL 57 04/26/2021    LDL 44 01/25/2021    HDL 60 04/26/2021    TRIG 118 04/26/2021       Lab Results   Component Value Date    TSH 2.540 04/26/2021    TSH 2.490 01/25/2021    TSH 2.760 10/23/2019    FREET4 1.19 04/26/2021    FREET4 1.21 01/25/2021       Lab Results   Component Value Date    WBC 12.76 (H) 07/23/2021    HGB 17.0 07/23/2021     07/23/2021         Imaging:     CT OF THE CHEST WITHOUT CONTRAST 06/24/2019     HISTORY: Follow-up pulmonary nodules.     Axial images were obtained from the lung apices to the upper abdomen. No  intravenous contrast was given.     Previous CT of the chest dated 07/06/2018 is compared.     Again seen are multiple calcified and noncalcified nodules in the  bilateral lungs. There is a somewhat ill-defined nodular density in the  right lower lobe which appears partially calcified measuring 2.2 x 1.7  cm. This appears stable when compared to the previous study.     The lung nodules appear stable. No new nodules are seen.     There are emphysematous changes of both lungs.     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     No pathologically enlarged hilar or mediastinal lymph nodes are seen. A  few small calcified and noncalcified lymph nodes are seen.     IMPRESSION:  Stable appearance of the chest since the CT scan dated  07/06/2018. These nodules were also seen on previous studies dating back  to 07/21/2017.     Radiation dose reduction techniques were utilized, including automated  exposure control and exposure modulation based on body size.     This report was finalized on 6/27/2019        Medical Tests:           Summary of old records / correspondence / consultant report:           Request outside records:             *Examiner  was wearing KN95 mask during the entire duration of the visit. Patient was masked the entire time.   Minimum social distance of 6 ft maintained entire visit except if physical contact was necessary as documented.     **Dragon Disclaimer:   Much of this encounter note is an electronic transcription/translation of spoken language to printed text. The electronic translation of spoken language may permit erroneous, or at times, nonsensical words or phrases to be inadvertently transcribed. Although I have reviewed the note for such errors, some may still exist.     Template created by Helen Rahman MD

## 2021-07-23 NOTE — ASSESSMENT & PLAN NOTE
8 months history of thin stools without bleeding, night sweat or unintended wt loss.   Check complete blood count, Cologuard, referral to GI.

## 2021-08-10 NOTE — TELEPHONE ENCOUNTER
Received Referral. 1st attempt. Called PT at [phone], no answer, no option of VM to contact office back to schedule appointment.

## 2021-08-31 PROBLEM — D72.829 LEUKOCYTOSIS: Chronic | Status: ACTIVE | Noted: 2021-01-01

## 2021-08-31 PROBLEM — D72.829 LEUKOCYTOSIS: Status: ACTIVE | Noted: 2021-01-01

## 2021-08-31 PROBLEM — R93.89 ABNORMAL CT OF THE CHEST: Status: ACTIVE | Noted: 2021-01-01

## 2021-08-31 PROBLEM — J43.9 PULMONARY EMPHYSEMA (HCC): Status: ACTIVE | Noted: 2021-01-01

## 2021-08-31 NOTE — PROGRESS NOTES
"    I N T E R N A L  M E D I C I N E  J U N O H  K I M,  M D      ENCOUNTER DATE:  08/31/2021    Felix Olmedo / 75 y.o. / male      CHIEF COMPLAINT / REASON FOR OFFICE VISIT     discuss CT result      ASSESSMENT & PLAN     Problem List Items Addressed This Visit        High    Abnormal CT of the chest - Primary    Current Assessment & Plan     Referral to pulmonary for further assessment / monitoring.          Relevant Orders    Ambulatory Referral to Pulmonology (Completed)       Medium    Leukocytosis (Chronic)    Current Assessment & Plan     Persistent elevation of white blood count. Referral to hematology for further evaluation.             Low    Multiple pulmonary nodules (Chronic)    Overview     Stable over 2 years. No routine follow-up for this needed.   Continue annual lung cancer screening CT.   Discussed with patient.             Unprioritized    Pulmonary emphysema (CMS/HCC)        Orders Placed This Encounter   Procedures   • Ambulatory Referral to Pulmonology     No orders of the defined types were placed in this encounter.      SUMMARY/DISCUSSION  •       Next Appointment with me: 11/2/2021    Return in about 2 months (around 10/31/2021) for Reassess today's problem(s).      VITAL SIGNS     Visit Vitals  /54 (BP Location: Left arm)   Pulse 72   Temp 97.7 °F (36.5 °C)   Ht 177.8 cm (70\")   Wt 108 kg (239 lb)   SpO2 95%   BMI 34.29 kg/m²       BP Readings from Last 3 Encounters:   08/31/21 120/54   08/25/21 130/72   07/23/21 120/58     Wt Readings from Last 3 Encounters:   08/31/21 108 kg (239 lb)   08/25/21 111 kg (245 lb)   07/23/21 108 kg (237 lb)     Body mass index is 34.29 kg/m².      MEDICATIONS AT THE TIME OF OFFICE VISIT     Current Outpatient Medications on File Prior to Visit   Medication Sig   • aspirin 81 MG tablet Take 81 mg by mouth Every Other Day.   • atorvastatin (LIPITOR) 40 MG tablet TAKE 1 TABLET EVERY DAY   • bumetanide (BUMEX) 2 MG tablet Take 0.5 tablets by mouth " Daily.   • cephalexin (KEFLEX) 500 MG capsule Take 1 capsule by mouth 3 (Three) Times a Day for 10 days.   • Empagliflozin (Jardiance) 25 MG tablet Take 25 mg by mouth Daily.   • insulin aspart (NovoLOG FlexPen) 100 UNIT/ML solution pen-injector sc pen 10-14 units  3 times daily with meal   • Insulin Pen Needle 31G X 8 MM misc Use to inject insulin   • isosorbide mononitrate (IMDUR) 30 MG 24 hr tablet TAKE 1 TABLET TWICE DAILY (Patient taking differently: 30 mg Daily. prn)   • Levemir FlexTouch 100 UNIT/ML injection INJECT 36 UNITS UNDER THE SKIN INTO THE APPROPRIATE AREA EVERY DAY, AS DIRECTED.   • Menthol, Topical Analgesic, (BIOFREEZE ROLL-ON) 4 % gel Apply 1 application topically Daily As Needed.   • metoprolol tartrate (LOPRESSOR) 25 MG tablet Take 1 tablet by mouth 2 (Two) Times a Day. (Patient taking differently: Take 25 mg by mouth 2 (Two) Times a Day. Once daily)   • Multiple Vitamins-Minerals (MULTIVITAMIN ADULT PO) Take 1 tablet by mouth Daily.   • potassium chloride (MICRO-K) 10 MEQ CR capsule Take 1 capsule (10 mEq total) by mouth 2 (Two) Times a Day (Patient taking differently: Take 10 mEq by mouth Daily.)   • True Metrix Blood Glucose Test test strip USE TO TEST BLOOD SUGAR  4 TIMES DAILY   • Unable to find 1 each 1 (One) Time. nerve shield     No current facility-administered medications on file prior to visit.          HISTORY OF PRESENT ILLNESS     Low dose CT chest showed a new area of consolidation associated with multiple nodules at the right lung base compared to 2019 CT. There is evidence emphysematous changes of the upper lung fields. He has > 60 pack years smoking history. Denies any recent fever, increased cough or increased shortness of breath. He does report chronic dyspnea. He is taking an antibiotic (Keflex) for possible left lower leg skin infection from leg scrape injury.  Recently a referral to hematology was made for persistent elevation of WBC.  He was noted to have WBC of 11.06  March 2017.  It was 11.85 in April 2017.  He underwent bypass surgery in May 2017 and was noted to have significant elevation of white blood cell count which was felt to be reactive to surgery.  Last CBC in May 2017 his WBC was 17.96.  July 23, 2021 WBC was 12.76 and more recently August 27, 2021 it is 13.16.    Patient Care Team:  Hilario Rahman MD as PCP - General (Internal Medicine)  Noah Montejo MD as Consulting Physician (Cardiology)  Jr Alden Poole MD as Surgeon (Cardiothoracic Surgery)  Samira Recinos MD as Consulting Physician (Endocrinology)  Jacob Abdul MD as Consulting Physician (Hematology and Oncology)    REVIEW OF SYSTEMS     Review of Systems   Denies fever, chills, night sweats  Denies increased cough, sputum, hemoptysis or increased shortness of breath; chronic dyspnea is noted  Denies chest pains  Denies abdominal pain  Left lower leg infection is getting better without drainage, increased pain or redness      PHYSICAL EXAMINATION     Physical Exam  No acute distress, he is alert and oriented with normal thought and judgment  Cardiovascular: Normal rate, regular rhythm.   Lungs: decreased BS throughout without wheezing or rales   Left lower leg: superficial skin abrasion, areas of healing with multiple scabs forming, mild erythema with mild warmth, no significant tenderness to palpation       REVIEWED DATA     Labs:     Lab Results   Component Value Date     04/26/2021    K 4.6 04/26/2021    CALCIUM 9.9 04/26/2021    AST 34 03/16/2021    ALT 26 03/16/2021    BUN 25 04/26/2021    CREATININE 1.15 04/26/2021    CREATININE 1.13 03/16/2021    CREATININE 1.32 (H) 01/25/2021    EGFRIFNONA 61 04/26/2021    EGFRIFAFRI 71 04/26/2021       Lab Results   Component Value Date    HGBA1C 8.3 (H) 04/26/2021    HGBA1C 8.0 (H) 01/25/2021    HGBA1C 7.40 (H) 10/01/2020       Lab Results   Component Value Date    LDL 57 04/26/2021    LDL 44 01/25/2021    HDL 60 04/26/2021    TRIG 118  04/26/2021       Lab Results   Component Value Date    TSH 2.540 04/26/2021    TSH 2.490 01/25/2021    TSH 2.760 10/23/2019    FREET4 1.19 04/26/2021    FREET4 1.21 01/25/2021       Lab Results   Component Value Date    WBC 13.16 (H) 08/27/2021    HGB 16.7 08/27/2021     08/27/2021         Imaging:     CT SCAN OF THE CHEST WITHOUT CONTRAST WITH LOW-DOSE TECHNIQUE     HISTORY: Lung cancer screening.     COMPARISON:CT chest dated 06/29/2019     TECHNIQUE: Radiation dose reduction techniques were utilized, including  automated exposure control and exposure modulation based on body size.  Spiral CT images were obtained through the chest with no IV contrast  with low-dose technique and were reconstructed in 2 mm thick axial  slices.     There are moderately severe emphysematous changes that are most  prominent in the upper lung zone. Again seen are numerous calcified  pulmonary nodules scattered throughout both lungs. There are no  suspicious noncalcified nodules. A punctate nodule located along the  course of the major fissure is stable and is quite likely a normal  intrapulmonary lymph node. There is patchy ill-defined consolidation at  the right lung base posteriorly associated with numerous calcified  pulmonary nodules. The consolidation is new since the preceding CT scan  could represent a focal area of acute infiltrate. Correlation with  clinical findings is recommended. There are no pleural effusions.  Scattered nonpathologically enlarged lymph nodes are present within the  mediastinum. 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     IMPRESSION:  Probably benign CT scan of the chest without contrast. No  suspicious discrete pulmonary nodules are identified. There are numerous  bilateral calcified pulmonary nodules, including a cluster of multiple  nodules at the right lung base posteriorly that are associated with  focal parenchymal consolidation on  the current CT scan. A focal area of  pneumonia cannot be excluded. Continued CT follow up to document  clearing with appropriate medical treatment is recommended. Suggest a  follow-up CT scan of the chest in 3-4 months.     Lung-RADS Category 3-probably benign-short term imaging follow-up  recommended     DLP:  125.5 mGycm     CTD/Vol: 3.0 mGy     This report was finalized on 8/20/2021      Medical Tests:           Summary of old records / correspondence / consultant report:           Request outside records:             *Examiner was wearing KN95 mask and eye protection during the entire duration of the visit. Patient was masked the entire time. Minimum social distance of 6 ft maintained entire visit except if physical contact was necessary as documented.     **Dragon Disclaimer: Much of this encounter note is an electronic transcription/translation of spoken language to printed text. The electronic translation of spoken language may permit erroneous, or at times, nonsensical words or phrases to be inadvertently transcribed. Although I have reviewed the note for such errors, some may still exist.       Template created by Helen Rahman MD

## 2021-09-17 PROBLEM — R35.0 INCREASED URINARY FREQUENCY: Status: ACTIVE | Noted: 2021-01-01

## 2021-09-17 NOTE — PROGRESS NOTES
Subjective Reviewed patient's previous history    REASON FOR CONSULTATION: Leukocytosis  Provide an opinion on any further workup or treatment                             REQUESTING PHYSICIAN: Hilario Rahman MD    RECORDS OBTAINED:  Records of the patients history including those obtained from the referring provider were reviewed and summarized in detail.    HISTORY OF PRESENT ILLNESS:  The patient is a 75 y.o. year old male who is here for an opinion about the above issue.    History of Present Illness     The patient is a 75-year-old male followed with history of coronary disease, diabetes, hyperlipidemia, hypertension and pulmonary nodules.  The latter had included a series of scans including a low-dose chest CT 8/19/2021 showing no suspicious discrete pulmonary nodules but numerous bilateral calcified nodules including a cluster of multiple nodules right lung base that are associated with consolidation.  The patient had been seen by his primary care doctor 8/31 reviewing the studies as well as the use of Keflex for a lower leg skin infection.    We are asked to see him for leukocytosis with studies documented from 5/20/2017 with H&H of 11.5 and 35.4, white count of 17 320, platelet count of 4 74,000, normal automated differential, similar test 1 day later 5/21/2017 and later testing 7/23/2021 with H&H now 17 and 51.5, WBC of 12,760 with a normal automated differential and platelet count 237,000, 8/27/2021 with white count of 13,116 H&H of 16.7 51.7, platelet count 1 50,000 and normal automated differential.    Past Medical History:   Diagnosis Date   • Coronary artery disease     Status post 2 vessel CABG 5/10/17 by Dr. Poole (LIMA-LAD, SVG-PDA)   • Diabetes (CMS/HCC)    • Diastolic dysfunction    • Hyperlipidemia    • Hypertension    • LAFB (left anterior fascicular block)    • Lung nodule    • Osteoarthritis    • Pneumonia 02/2017   • Pulmonary emphysema (CMS/HCC) 8/31/2021   • Squamous cell carcinoma     Left  cheek s/p resection   • SVT (supraventricular tachycardia) (CMS/[facility])     Diagnosed following CABG   • Type 2 diabetes mellitus (CMS/[facility])         Past Surgical History:   Procedure Laterality Date   • CARDIAC CATHETERIZATION N/A 10/18/2016    Procedure: Coronary angiography;  Surgeon: Jesus Guzman MD;  Location: [facility] CATH INVASIVE LOCATION;  Service:    • CARDIAC CATHETERIZATION N/A 10/18/2016    Procedure: Left heart cath;  Surgeon: Jesus Guzman MD;  Location: [facility] CATH INVASIVE LOCATION;  Service:    • CARDIAC CATHETERIZATION N/A 10/18/2016    Procedure: Left ventriculography;  Surgeon: Jesus Guzman MD;  Location: [facility] CATH INVASIVE LOCATION;  Service:    • CARDIAC CATHETERIZATION N/A 4/4/2017    Procedure: Coronary angiography;  Surgeon: Jesus Guzman MD;  Location: [facility] CATH INVASIVE LOCATION;  Service:    • CARDIAC CATHETERIZATION N/A 4/4/2017    Procedure: Left heart cath;  Surgeon: Jesus Guzman MD;  Location: [facility] CATH INVASIVE LOCATION;  Service:    • CARDIAC CATHETERIZATION N/A 4/4/2017    Procedure: Left ventriculography;  Surgeon: Jesus Guzman MD;  Location: [facility] CATH INVASIVE LOCATION;  Service:    • CARDIAC CATHETERIZATION  4/4/2017    Procedure: Functional Flow [place];  Surgeon: Jesus Guzman MD;  Location: [facility] CATH INVASIVE LOCATION;  Service:    • CARDIAC SURGERY     • CATARACT EXTRACTION W/ INTRAOCULAR LENS IMPLANT Left    • COLONOSCOPY     • CORONARY ARTERY BYPASS GRAFT N/A 5/10/2017    Procedure: CORONARY ARTERY BYPASS TIMES TWO UTILIZING THE LEFT GREATER SAPHENOUS VEIN WITH INTERNAL MAMMARY ARTERY GRAFT;  TRANSESOPHAGEAL ECHOCARDIOGRAM;  Surgeon: Alden Poole MD;  Location: [facility];  Service:    • EYE SURGERY     • KNEE SURGERY Left 2005    Dr. Gallegos   • MOHS SURGERY Left     CHEEK   • SKIN BIOPSY          Current Outpatient Medications on File Prior to Visit   Medication Sig Dispense Refill   • aspirin 81  MG tablet Take 81 mg by mouth Every Other Day.     • atorvastatin (LIPITOR) 40 MG tablet TAKE 1 TABLET EVERY DAY 90 tablet 2   • bumetanide (BUMEX) 2 MG tablet Take 0.5 tablets by mouth Daily. 90 tablet 0   • Empagliflozin (Jardiance) 25 MG tablet Take 25 mg by mouth Daily. 90 tablet 3   • insulin aspart (NovoLOG FlexPen) 100 UNIT/ML solution pen-injector sc pen 10-14 units  3 times daily with meal 45 mL 3   • Insulin Pen Needle 31G X 8 MM misc Use to inject insulin 500 each 3   • isosorbide mononitrate (IMDUR) 30 MG 24 hr tablet TAKE 1 TABLET TWICE DAILY (Patient taking differently: 30 mg Daily. prn) 180 tablet 3   • Levemir FlexTouch 100 UNIT/ML injection INJECT 36 UNITS UNDER THE SKIN INTO THE APPROPRIATE AREA EVERY DAY, AS DIRECTED. 45 mL 3   • Menthol, Topical Analgesic, (BIOFREEZE ROLL-ON) 4 % gel Apply 1 application topically Daily As Needed.     • metoprolol tartrate (LOPRESSOR) 25 MG tablet Take 1 tablet by mouth 2 (Two) Times a Day. (Patient taking differently: Take 25 mg by mouth 2 (Two) Times a Day. Once daily) 180 tablet 3   • Multiple Vitamins-Minerals (MULTIVITAMIN ADULT PO) Take 1 tablet by mouth Daily.     • potassium chloride (MICRO-K) 10 MEQ CR capsule Take 1 capsule (10 mEq total) by mouth 2 (Two) Times a Day (Patient taking differently: Take 10 mEq by mouth Daily.) 60 capsule 0   • True Metrix Blood Glucose Test test strip USE TO TEST BLOOD SUGAR  4 TIMES DAILY 120 each 6   • Unable to find 1 each 1 (One) Time. nerve shield       No current facility-administered medications on file prior to visit.        ALLERGIES:    Allergies   Allergen Reactions   • Adhesive Tape Rash        Social History     Socioeconomic History   • Marital status:      Spouse name: Mendy Vargas   • Number of children: 3   • Years of education: Not on file   • Highest education level: Not on file   Tobacco Use   • Smoking status: Former Smoker     Packs/day: 1.50     Years: 40.00     Pack years: 60.00     Types:  "Cigarettes     Quit date: 2009     Years since quittin.7   • Smokeless tobacco: Never Used   • Tobacco comment: Smoked up to 1.5 ppd.  Smoked for 50 years.  Quit .   Vaping Use   • Vaping Use: Never used   Substance and Sexual Activity   • Alcohol use: Yes     Comment: 2-3 BEERS YEARLY    • Drug use: No     Comment: caffeine use   • Sexual activity: Not Currently        Family History   Problem Relation Age of Onset   • Cancer Mother         uterine   • Heart disease Father         Father with fatal MI in 80's   • Diabetes Sister    • Esophageal cancer Brother    • Alzheimer's disease Maternal Grandfather    • Stroke Maternal Grandfather         Review of Systems   Constitutional: Positive for fatigue. Negative for activity change, appetite change, diaphoresis and unexpected weight change.   HENT: Negative.    Respiratory: Positive for shortness of breath.    Cardiovascular: Negative.    Gastrointestinal: Positive for constipation (Change in bowel habit that has been normalizing as of late).   Genitourinary: Positive for frequency and urgency.   Musculoskeletal: Negative.    Skin: Negative.    Allergic/Immunologic: Negative.    Neurological: Negative.    Psychiatric/Behavioral: Negative.         Objective     Vitals:    21 1443   BP: 110/69   Pulse: 64   Resp: 18   Temp: 97.7 °F (36.5 °C)   TempSrc: Temporal   SpO2: 95%   Weight: 108 kg (237 lb 3.2 oz)   Height: 177.8 cm (70\")   PainSc: 0-No pain     Current Status 2021   ECOG score 1       Physical Exam  Constitutional:       Appearance: Normal appearance. He is obese.   HENT:      Head: Normocephalic and atraumatic.      Nose: Nose normal.      Mouth/Throat:      Mouth: Mucous membranes are moist.      Pharynx: Oropharynx is clear.   Eyes:      Extraocular Movements: Extraocular movements intact.      Conjunctiva/sclera: Conjunctivae normal.      Pupils: Pupils are equal, round, and reactive to light.   Cardiovascular:      Rate and Rhythm: " Normal rate and regular rhythm.      Pulses: Normal pulses.      Heart sounds: Normal heart sounds.   Pulmonary:      Effort: Pulmonary effort is normal.      Breath sounds: Normal breath sounds.   Abdominal:      General: Bowel sounds are normal. There is no distension.      Palpations: Abdomen is soft. There is no mass.      Tenderness: There is no abdominal tenderness.   Musculoskeletal:         General: Normal range of motion.      Cervical back: Normal range of motion and neck supple.   Skin:     General: Skin is warm and dry.      Findings: Lesion (Near resolution of the left anterior tibial lesion) present.   Neurological:      General: No focal deficit present.      Mental Status: He is alert and oriented to person, place, and time.      Sensory: Sensory deficit present.   Psychiatric:         Mood and Affect: Mood normal.         Behavior: Behavior normal.         RECENT LABS:  Hematology WBC   Date Value Ref Range Status   09/17/2021 12.78 (H) 3.40 - 10.80 10*3/mm3 Final   08/27/2021 13.16 (H) 3.40 - 10.80 10*3/mm3 Final     RBC   Date Value Ref Range Status   09/17/2021 5.44 4.14 - 5.80 10*6/mm3 Final   08/27/2021 5.57 4.14 - 5.80 10*6/mm3 Final     Hemoglobin   Date Value Ref Range Status   09/17/2021 16.5 13.0 - 17.7 g/dL Final     Hematocrit   Date Value Ref Range Status   09/17/2021 49.4 37.5 - 51.0 % Final     Platelets   Date Value Ref Range Status   09/17/2021 241 140 - 450 10*3/mm3 Final          Assessment/Plan   75-year-old male with a history of coronary disease, diabetes (associated with neuropathy), hyperlipidemia, hypertension and previous pulmonary nodules.  These latter nodules have been reviewed on scans and he appears to have calcified nodules and possibly some degree of consolidation on recent scans.  He is also been treated recently for lower skin infection.  We are asked to see him for long-term leukocytosis that can be documented as variable in and around multiple medical issues in  his life but with a definite degree of chronicity including more recently.  Review of his smear does not find evidence of abnormality with normocytic normochromic RBCs, without anisopoikilocytosis, leukocytes mildly increased without right or left shift, no immature forms, platelets appear normal per number and size.    The patient appears to have a reactive lymphocytosis and not a hematologic disorder.  As result of his symptoms we do plan CMP and PSA levels which, at the time of this dictation, are found to be normal.  The patient states that he expects to undergo Cologuard at this point and possible urologic assessment.  Otherwise plan otherwise to see him back in approximately 6 months for additional review of his peripheral blood smear but, again, find no significant evidence of a hematologic disorder but, rather, reactive leukocytosis.  Thank you for asking us to see Mr. Olmedo in consultation and please let me know if you have any questions concerning this case.

## 2021-10-04 NOTE — PROGRESS NOTES
"Chief Complaint  Diabetes    Subjective          Felix Olmedo presents to [facility] ENDOCRINOLOGY  History of Present Illness     I have reviewed PMH, allergies and medications UTD at this visit     Plan last visit:  Increase Levemir to 40 units daily  Increase NovoLog to 12-18 units with each meal  Continue Jardiance.    Type 2 dm    Diagnosed in 2000   Today in clinic pt reports being on Levemir 40 units daily, NovoLog 12-18 units with each meal, Jardiance 25 mg oral daily.  Tried GLP-1 analogs in the past which he could not tolerate with the side effects.  Supposed to be on Januvia but because of the cost he is not taking the medication anymore     Average blood sugars- 100-130  Checks BG -3-4 times  Dm retinopathy -mild NPDR, + mac degen, Last eye exam -6/2021  Dm nephropathy -no  Dm neuropathy -severe, not on medication, also saw neurology for eval, wife cuts his toenails   CAD - yes, s/p CABG  CVA -x  Episodes of hypoglycemia -very rare, lowest blood sugar was 64  Pt is less physically active using a cane. weight has been stable.   Pt tries to follow DM diet for most part.   On Ace inb.    HLP  Atorvastatin 40mg daily   Lab Results   Component Value Date    CHOL 117 04/05/2017    CHLPL 138 04/26/2021    TRIG 118 04/26/2021    HDL 60 04/26/2021    LDL 57 04/26/2021       Objective   Vital Signs:   /54   Pulse 68   Ht 177.8 cm (70\")   Wt 109 kg (241 lb)   SpO2 92%   BMI 34.58 kg/m²     Physical Exam  Vitals reviewed.   Constitutional:       General: He is not in acute distress.  HENT:      Head: Normocephalic and atraumatic.   Cardiovascular:      Rate and Rhythm: Normal rate and regular rhythm.   Pulmonary:      Effort: Pulmonary effort is normal. No respiratory distress.   Musculoskeletal:         General: No signs of injury. Normal range of motion.      Cervical back: Normal range of motion and neck supple.   Skin:     General: Skin is warm and dry.   Neurological:      " Mental Status: He is alert and oriented to person, place, and time. Mental status is at baseline.   Psychiatric:         Mood and Affect: Mood normal.         Behavior: Behavior normal.         Thought Content: Thought content normal.         Judgment: Judgment normal.          Result Review :   The following data was reviewed by: MILADYS Spencer on 10/04/2021:  Common labs    Common Labsle 8/27/21 9/17/21 9/17/21 9/17/21 9/20/21 9/20/21     1351 1531 1531 1329 1329   Glucose   115      Glucose      184 (A)   BUN   17   17   Creatinine   1.04   0.97   eGFR Non  Am   69   75   eGFR African Am      91   Sodium   140   141   Potassium   4.5   4.6   Chloride   104   104   Calcium   9.7   9.5   Albumin   4.00      Total Bilirubin   0.7      Alkaline Phosphatase   119 (A)      AST (SGOT)   36      ALT (SGPT)   24      WBC 13.16 (A) 12.78 (A)       Hemoglobin 16.7 16.5       Hematocrit 51.7 (A) 49.4       Platelets 250 241       Hemoglobin A1C     8.00 (A)    PSA    0.560     (A) Abnormal value       Comments are available for some flowsheets but are not being displayed.                     Assessment and Plan    Diagnoses and all orders for this visit:    1. Type 2 diabetes mellitus with hyperglycemia, with long-term current use of insulin (HCC) (Primary)    2. Mixed hyperlipidemia    3. S/P CABG x 2        Follow Up   Return in about 3 months (around 1/4/2022).   continue levemir at current dose  Increase meal time insulin b 1u at meals   Hypoglycemia prevention- ensure bedtime snack   a1c goal closer to 7.5 %  Continue Jardiance  On statin     Yearly diabetic eye exams  Daily diabetic foot care  Diabetic diet  Routine physical activity as tolerated    Patient was given instructions and counseling regarding his condition or for health maintenance advice. Please see specific information pulled into the AVS if appropriate.     MILADYS Spencer

## 2021-10-13 NOTE — TELEPHONE ENCOUNTER
Patient's wife Mendy Vargas called in they need authorization sent to [facility] number is [phone].    Scripts needed:    Jardiance 25 mg    Pin Needles 5/6 of a inch 18MM

## 2021-11-02 NOTE — PROGRESS NOTES
"    I N T E R N A L  M E D I C I N E  J U N O H  K I M,  M D      ENCOUNTER DATE:  11/02/2021    Felix Olmedo / 76 y.o. / male        MEDICARE ANNUAL WELLNESS VISIT       Chief Complaint: AWV    Patient's general assessment of his health since a year ago:     - Compared to one year ago, he feels his physical health is slightly worse.    - Compared to one year ago, he feels his mental health is slightly worse.      HPI for other active medical problems:     He has noticed some decline in his memory. His wife has also verbalized some concern.         * The required components of Health Risk Assessment (HRA) that were completed by the patient and/or my staff are contained within this note and in the scanned documents titled \"Health Risk Assessment\" within the media section of the patient's chart in [facility].         HISTORY       Recent Hospitalizations:    Recent hospitalization?:     If YES, location, date, and diagnoses:     · Location:   · Date:   · Principle Discharge Dx:   · Secondary Dx:       Patient Care Team:    Patient Care Team:  Hilario Rahman MD as PCP - General (Internal Medicine)  Noah Montejo MD as Consulting Physician (Cardiology)  Jr Alden Poole MD as Surgeon (Cardiothoracic Surgery)  Samira Recinos MD as Consulting Physician (Endocrinology)  Jacob Abdul MD as Consulting Physician (Hematology and Oncology)  Hilario Rahman MD as Referring Physician (Internal Medicine)  Juan Ramon Feliciano MD as Consulting Physician (Pulmonary Disease)      Allergies:  Adhesive tape    Medications:  Current Outpatient Medications on File Prior to Visit   Medication Sig Dispense Refill   • aspirin 81 MG tablet Take 81 mg by mouth Every Other Day.     • atorvastatin (LIPITOR) 40 MG tablet TAKE 1 TABLET EVERY DAY 90 tablet 2   • bumetanide (BUMEX) 2 MG tablet Take 0.5 tablets by mouth Daily. (Patient taking differently: Take 1 mg by mouth Daily. Prn for swelling) 90 tablet 0   • empagliflozin " (Jardiance) 25 MG tablet tablet Take 1 tablet by mouth Daily. 90 tablet 3   • insulin aspart (NovoLOG FlexPen) 100 UNIT/ML solution pen-injector sc pen 10-14 units  3 times daily with meal 45 mL 3   • Insulin Pen Needle 31G X 8 MM misc Use to inject insulin 500 each 3   • isosorbide mononitrate (IMDUR) 30 MG 24 hr tablet Take 1 tablet by mouth 2 (Two) Times a Day. 180 tablet 3   • Levemir FlexTouch 100 UNIT/ML injection INJECT 36 UNITS UNDER THE SKIN INTO THE APPROPRIATE AREA EVERY DAY, AS DIRECTED. (Patient taking differently: 38 units daily) 45 mL 3   • Menthol, Topical Analgesic, (BIOFREEZE ROLL-ON) 4 % gel Apply 1 application topically Daily As Needed.     • metoprolol tartrate (LOPRESSOR) 25 MG tablet Take 1 tablet by mouth 2 (Two) Times a Day. (Patient taking differently: Take 25 mg by mouth 2 (Two) Times a Day. Once daily) 180 tablet 3   • Multiple Vitamins-Minerals (MULTIVITAMIN ADULT PO) Take 1 tablet by mouth Daily.     • potassium chloride (MICRO-K) 10 MEQ CR capsule Take 1 capsule (10 mEq total) by mouth 2 (Two) Times a Day (Patient taking differently: Take 10 mEq by mouth Daily. Prn with bumex) 60 capsule 0   • True Metrix Blood Glucose Test test strip USE TO TEST BLOOD SUGAR  4 TIMES DAILY 120 each 6   • Unable to find 1 each 1 (One) Time. nerve shield       No current facility-administered medications on file prior to visit.        PFSH:     The following portions of the patient's history were reviewed and updated as appropriate: Allergies / Current Medications / Past Medical History / Surgical History / Social History / Family History    Problem List:  Patient Active Problem List   Diagnosis   • Type II diabetes mellitus with neurological manifestations, uncontrolled (HCC)   • Coronary artery disease of native artery of native heart with stable angina pectoris ([facility])   • SVT (supraventricular tachycardia) (Former[facility])   • Chronic diastolic congestive heart failure ([facility]ly [facility])   • S/P CABG x 2   • Hyperlipidemia    • Obesity (BMI 30-39.9)   • Multiple pulmonary nodules   • Gait difficulty   • Diabetic polyneuropathy associated with type 2 diabetes mellitus (HCC)   • Change in stool caliber   • Pulmonary emphysema (HCC)   • Abnormal CT of the chest   • Leukocytosis   • Increased urinary frequency       Past Medical History:  Past Medical History:   Diagnosis Date   • Coronary artery disease     Status post 2 vessel CABG 5/10/17 by Dr. Poole (LIMA-LAD, SVG-PDA)   • Diabetes (HCC)    • Diastolic dysfunction    • Hyperlipidemia    • Hypertension    • LAFB (left anterior fascicular block)    • Lung nodule    • Neuropathy    • Osteoarthritis    • Pneumonia 02/2017   • Pulmonary emphysema (HCC) 8/31/2021   • Squamous cell carcinoma     Left cheek s/p resection   • SVT (supraventricular tachycardia) (HCC)     Diagnosed following CABG   • Type 2 diabetes mellitus (HCC)        Past Surgical History:  Past Surgical History:   Procedure Laterality Date   • CARDIAC CATHETERIZATION N/A 10/18/2016    Procedure: Coronary angiography;  Surgeon: Jesus Guzman MD;  Location: [facility] CATH INVASIVE LOCATION;  Service:    • CARDIAC CATHETERIZATION N/A 10/18/2016    Procedure: Left heart cath;  Surgeon: Jesus Guzman MD;  Location: [facility] CATH INVASIVE LOCATION;  Service:    • CARDIAC CATHETERIZATION N/A 10/18/2016    Procedure: Left ventriculography;  Surgeon: Jesus Guzman MD;  Location: [facility] CATH INVASIVE LOCATION;  Service:    • CARDIAC CATHETERIZATION N/A 4/4/2017    Procedure: Coronary angiography;  Surgeon: Jesus Guzman MD;  Location: [facility] CATH INVASIVE LOCATION;  Service:    • CARDIAC CATHETERIZATION N/A 4/4/2017    Procedure: Left heart cath;  Surgeon: Jesus Guzman MD;  Location: [facility] CATH INVASIVE LOCATION;  Service:    • CARDIAC CATHETERIZATION N/A 4/4/2017    Procedure: Left ventriculography;  Surgeon: Jesus Guzman MD;  Location: [facility] CATH INVASIVE LOCATION;  Service:    •  "CARDIAC CATHETERIZATION  2017    Procedure: Functional Flow [place];  Surgeon: Jesus Guzman MD;  Location: [facility] INVASIVE LOCATION;  Service:    • CARDIAC SURGERY     • CATARACT EXTRACTION W/ INTRAOCULAR LENS IMPLANT Left    • COLONOSCOPY     • CORONARY ARTERY BYPASS GRAFT N/A 5/10/2017    Procedure: CORONARY ARTERY BYPASS TIMES TWO UTILIZING THE LEFT GREATER SAPHENOUS VEIN WITH INTERNAL MAMMARY ARTERY GRAFT;  TRANSESOPHAGEAL ECHOCARDIOGRAM;  Surgeon: Alden Poole MD;  Location: [facility] OR;  Service:    • EYE SURGERY     • KNEE SURGERY Left     Dr. Gallegos   • MOHS SURGERY Left     CHEEK   • SKIN BIOPSY         Social History:  Social History     Socioeconomic History   • Marital status:      Spouse name: Mendy Vargas   • Number of children: 3   Tobacco Use   • Smoking status: Former Smoker     Packs/day: 1.50     Years: 40.00     Pack years: 60.00     Types: Cigarettes     Quit date:      Years since quittin.8   • Smokeless tobacco: Never Used   • Tobacco comment: Smoked up to 1.5 ppd.  Smoked for 50 years.  Quit .   Vaping Use   • Vaping Use: Never used   Substance and Sexual Activity   • Alcohol use: Yes     Comment: 2-3 BEERS YEARLY    • Drug use: No     Comment: caffeine use   • Sexual activity: Not Currently       Family History:  Family History   Problem Relation Age of Onset   • Cancer Mother         uterine   • Heart disease Father         Father with fatal MI in 80's   • Heart attack Father    • Diabetes Sister    • Esophageal cancer Brother    • Hepatitis Brother    • Alzheimer's disease Maternal Grandfather    • Stroke Maternal Grandfather          PATIENT ASSESSMENT     Vitals:  /60 (BP Location: Left arm)   Pulse 83   Temp 97.5 °F (36.4 °C)   Ht 177.8 cm (70\")   Wt 106 kg (234 lb)   SpO2 94%   BMI 33.58 kg/m²   BP Readings from Last 3 Encounters:   21 124/60   10/04/21 108/54   21 110/69     Wt Readings from Last 3 Encounters: "   11/02/21 106 kg (234 lb)   10/04/21 109 kg (241 lb)   09/17/21 108 kg (237 lb 3.2 oz)      Body mass index is 33.58 kg/m².    There were no vitals filed for this visit.      Review of Systems:    Review of Systems      Physical Exam:    Physical Exam      Reviewed Data:    Labs:   Lab Results   Component Value Date     09/20/2021    K 4.6 09/20/2021    CALCIUM 9.5 09/20/2021    AST 36 09/17/2021    ALT 24 09/17/2021    BUN 17 09/20/2021    CREATININE 0.97 09/20/2021    CREATININE 1.04 09/17/2021    CREATININE 1.15 04/26/2021    EGFRIFNONA 75 09/20/2021    EGFRIFAFRI 91 09/20/2021       Lab Results   Component Value Date    HGBA1C 8.00 (H) 09/20/2021    HGBA1C 8.3 (H) 04/26/2021    HGBA1C 8.0 (H) 01/25/2021    MICROALBUR <3.0 04/26/2021       Lab Results   Component Value Date    LDL 57 04/26/2021    LDL 44 01/25/2021    LDL 57 10/23/2019    HDL 60 04/26/2021    TRIG 118 04/26/2021       Lab Results   Component Value Date    TSH 2.540 04/26/2021    FREET4 1.19 04/26/2021          Lab Results   Component Value Date    WBC 12.78 (H) 09/17/2021    HGB 16.5 09/17/2021    HGB 16.7 08/27/2021    HGB 17.0 07/23/2021     09/17/2021                   Lab Results   Component Value Date    PSA 0.560 09/17/2021       Imaging:          Medical Tests:          Screening for Glaucoma:  Previous screening for glaucoma?: Yes      Hearing Loss Screen:  Finger Rub Hearing Test (right ear): passed  Finger Rub Hearing Test (left ear): passed      Urinary Incontinence Screen:  Episodes of urinary incontinence? : Yes      Depression Screen:  PHQ-2/PHQ-9 Depression Screening 11/2/2021   Little interest or pleasure in doing things 0   Feeling down, depressed, or hopeless 0   Total Score 0        PHQ-2: 0 (Not depressed)    PHQ-9: 0 (Negative screening for depression)       FUNCTIONAL, FALL RISK, & COGNITIVE SCREENING (Components below):    DATA:    Functional & Cognitive Status 11/2/2021   Do you have difficulty preparing food  and eating? No   Do you have difficulty bathing yourself, getting dressed or grooming yourself? No   Do you have difficulty using the toilet? No   Do you have difficulty moving around from place to place? No   Do you have trouble with steps or getting out of a bed or a chair? No   Current Diet Well Balanced Diet   Dental Exam Not up to date   Eye Exam Up to date   Do you need help using the phone?  No   Are you deaf or do you have serious difficulty hearing?  No   Do you need help with transportation? No   Do you need help shopping? No   Do you need help preparing meals?  No   Do you need help with housework?  No   Do you need help with laundry? No   Do you need help taking your medications? No   Do you need help managing money? No   Do you ever drive or ride in a car without wearing a seat belt? No   Who do you live with? Spouse   Do you have difficulty concentrating, remembering or making decisions? Yes         A) Assessment of Functional Ability:  (Assessment of ability to perform ADL's (showering/bathing, using toilet, dressing, feeding self, moving self around) and IADL's (use telephone, shop, prepare food, housekeep, do laundry, transport independently, take medications independently, and handle finances)    Degree of functional impairment: MILD (based on assessment noted above)      B) Assessment of Fall Risk:  Fall Risk Assessment was completed, and patient is at moderate risk for falls.       Need for further evaluation of gait, strength, and balance? : Yes    Timed Up and Go (TUG):   (>= 12 seconds indicates high risk for falling)    Observable abnormalities included: walks with cane        C. Assessment of Cognitive Function:    Mini-Cog Test:     1) Registration (3 objects): Yes   2) Number of objects recalled: 3   3) Clock Draw: Passed? : Yes       Further evaluation required? : Will need to follow        COUNSELING       A. Identification of Health Risk Factors:    Risk factors include: cardiovascular  risk factors, increased fall risk, history of tobacco use and incontinence      B. Age-Appropriate Screening Schedule:  (Refer to the list below for future screening recommendations based on patient's age, sex and/or medical conditions. Orders for these recommended tests are listed in the plan section. The patient has been provided with a written plan)    Health Maintenance Topics  Health Maintenance   Topic Date Due   • HEMOGLOBIN A1C  03/20/2022   • LIPID PANEL  04/26/2022   • URINE MICROALBUMIN  04/26/2022   • DIABETIC EYE EXAM  06/01/2022   • INFLUENZA VACCINE  Discontinued   • TDAP/TD VACCINES  Discontinued   • ZOSTER VACCINE  Discontinued       Health Maintenance Topics Due or Over-Due  Health Maintenance Due   Topic Date Due   • HEPATITIS C SCREENING  Never done   • COVID-19 Vaccine (3 - Pfizer booster) 09/10/2021         C. Advanced Care Planning:    Advance Care Planning   ACP discussion was held with the patient during this visit. Patient has an advance directive (not in EMR), copy requested.       D. Patient Self-Management and Personalized Health Advice:    He has been provided with personalized counseling/information (including brochures/handouts) about:     -- reducing risk for cardiac/vascular events with pre-existing disease, fall prevention, possible evidence of cognitive problems and need for ongoing surveillance for progressive changes and polypharmacy concerns, side effects of medications      He has been recommended for the following preventative services which has been performed today, will be ordered today or ordered/performed on upcoming follow-up visit:     -- NUTRITION counseling provided, CARDIOVASCULAR disease risk reduction counseling performed, FALL RISK assessment / plan of care completed, URINARY incontinence assessment done, **COLORECTAL cancer screening (colonoscopy/Cologuard discussed or ordered), LUNG CANCER screening DISCUSSED, vaccination for INFLUENZA administered/ (or  recommended), COVID-19 vaccination (and/or booster) recommendations provided      E. Miscellaneous Items:    -Aspirin use counseling: Taking ASA appropriately as indicated    -Discussed BMI with him. The BMI is above average; BMI management plan is completed (discussed plans for weight loss)    -Reviewed use of high risk medication in the elderly: YES    -Reviewed for potential of harmful drug interactions in the elderly: YES        WRAP UP       Assessment & Plan:    1) MEDICARE ANNUAL WELLNESS VISIT    2) OTHER MEDICAL CONDITIONS ADDRESSED TODAY:            Problem List Items Addressed This Visit        High    Multiple pulmonary nodules (Chronic)    Current Assessment & Plan     Same as per abnormal CT chest.   Advised to reschedule follow-up with Dr. Feliciano regarding abnormal CT.          Change in stool caliber    Current Assessment & Plan     Placed order for Cologuard.          Abnormal CT of the chest - Primary    Current Assessment & Plan     He did not complete his visit with pulmonologist.   Advised him to call Dr. Feliciano's to reschedule his visit.   At the least a follow-up CT may be needed.   He states he will call pulmonologist to schedule another appointment.             Medium    Leukocytosis (Chronic)    Current Assessment & Plan     Seen by CBC. To follow-up in 6 months with repeat labs.     Lab Results   Component Value Date    WBC 12.78 (H) 09/17/2021    HGB 16.5 09/17/2021    MCV 90.8 09/17/2021     09/17/2021               Other Visit Diagnoses     Screening for colon cancer        Relevant Orders    Cologuard - Stool, Per Rectum    Medicare annual wellness visit, initial                        Orders Placed This Encounter   Procedures   • Cologuard - Stool, Per Rectum       Discussion / Summary:        Medications as of TODAY:              Current Outpatient Medications   Medication Sig Dispense Refill   • aspirin 81 MG tablet Take 81 mg by mouth Every Other Day.     • atorvastatin  (LIPITOR) 40 MG tablet TAKE 1 TABLET EVERY DAY 90 tablet 2   • bumetanide (BUMEX) 2 MG tablet Take 0.5 tablets by mouth Daily. (Patient taking differently: Take 1 mg by mouth Daily. Prn for swelling) 90 tablet 0   • empagliflozin (Jardiance) 25 MG tablet tablet Take 1 tablet by mouth Daily. 90 tablet 3   • insulin aspart (NovoLOG FlexPen) 100 UNIT/ML solution pen-injector sc pen 10-14 units  3 times daily with meal 45 mL 3   • Insulin Pen Needle 31G X 8 MM misc Use to inject insulin 500 each 3   • isosorbide mononitrate (IMDUR) 30 MG 24 hr tablet Take 1 tablet by mouth 2 (Two) Times a Day. 180 tablet 3   • Levemir FlexTouch 100 UNIT/ML injection INJECT 36 UNITS UNDER THE SKIN INTO THE APPROPRIATE AREA EVERY DAY, AS DIRECTED. (Patient taking differently: 38 units daily) 45 mL 3   • Menthol, Topical Analgesic, (BIOFREEZE ROLL-ON) 4 % gel Apply 1 application topically Daily As Needed.     • metoprolol tartrate (LOPRESSOR) 25 MG tablet Take 1 tablet by mouth 2 (Two) Times a Day. (Patient taking differently: Take 25 mg by mouth 2 (Two) Times a Day. Once daily) 180 tablet 3   • Multiple Vitamins-Minerals (MULTIVITAMIN ADULT PO) Take 1 tablet by mouth Daily.     • potassium chloride (MICRO-K) 10 MEQ CR capsule Take 1 capsule (10 mEq total) by mouth 2 (Two) Times a Day (Patient taking differently: Take 10 mEq by mouth Daily. Prn with bumex) 60 capsule 0   • True Metrix Blood Glucose Test test strip USE TO TEST BLOOD SUGAR  4 TIMES DAILY 120 each 6   • Unable to find 1 each 1 (One) Time. nerve shield       No current facility-administered medications for this visit.         FOLLOW-UP:            Return in about 6 months (around 5/2/2022) for Reassess chronic medical problems.                 Future Appointments   Date Time Provider Department Center   1/5/2022 12:30 PM [facility]   1/19/2022  3:30 PM Marsha Bell APRN [facility]   2/2/2022 11:20 AM Noah Montejo MD [facility]    3/10/2022  1:10 PM LAB CHAIR 2 [facility] [place]   3/10/2022  1:40 PM Jacob Abdul MD [facility] [place]   3/21/2022  1:30 PM [facility]   4/4/2022  1:30 PM Marsha Bell APRN [facility]   5/3/2022 10:45 AM Hilario Rahman MD [facility]   6/27/2022  1:30 PM [facility]   7/11/2022  2:15 PM Samira Recinos MD [facility]           After Visit Summary (AVS) including the Personalized Prevention  Plan Services (PPPS) was either printed and given to the patient at check-out today and/or sent to [facility] for review.         *Examiner was wearing KN95 mask and eye protection during the entire duration of the visit. Patient was masked the entire time. Minimum social distance of 6 ft maintained entire visit except if physical contact was necessary as documented.     **Dragon Disclaimer: Much of this encounter note is an electronic transcription/translation of spoken language to printed text. The electronic translation of spoken language may permit erroneous, or at times, nonsensical words or phrases to be inadvertently transcribed. Although I have reviewed the note for such errors, some may still exist.       Template created by Helen Rahman MD

## 2021-11-02 NOTE — ASSESSMENT & PLAN NOTE
Seen by [facility]. To follow-up in 6 months with repeat labs.     Lab Results   Component Value Date    WBC 12.78 (H) 09/17/2021    HGB 16.5 09/17/2021    MCV 90.8 09/17/2021     09/17/2021

## 2021-11-02 NOTE — ASSESSMENT & PLAN NOTE
Same as per abnormal CT chest.   Advised to reschedule follow-up with Dr. Feliciano regarding abnormal CT.

## 2021-11-02 NOTE — PATIENT INSTRUCTIONS
** IMPORTANT MESSAGE FROM DR. STILES **    In our office, your satisfaction is VERY important to us.     You may receive a survey from [employer] by mail or E-mail for you to provide feedback about your visit. This information is invaluable for me to know what we can do to improve our services.     I ask that you please take a few minutes to complete the survey and let us know how we are doing in serving your needs. (You may receive the survey more than once for multiple visits)    Thank You !    Dr. Stiles & Staff    _________________________________________________________________________________________________________________________      ** ADDITIONAL INSTRUCTION / REMINDERS FROM DR. STILES **      Medicare Wellness  Personal Prevention Plan of Service     Date of Office Visit:  2021  Encounter Provider:  Hilario Stiles MD  Place of Service:  [facility]  Patient Name: Felix Olmedo  :  1945    As part of the Medicare Wellness portion of your visit today, we are providing you with this personalized preventive plan of services (PPPS). This plan is based upon recommendations of the [place] Preventive Services Task Force (USPSTF) and the Advisory Committee on Immunization Practices (ACIP).    This lists the preventive care services that should be considered, and provides dates of when you are due. Items listed as completed are up-to-date and do not require any further intervention.      Age-Appropriate Screening Schedule:  (Refer to the list below for future screening recommendations based on patient's age, sex and/or medical conditions. Orders for these recommended tests are listed in the plan section. The patient has been provided with a written plan)    Health Maintenance Topics  Health Maintenance   Topic Date Due   • HEPATITIS C SCREENING  Never done   • COVID-19 Vaccine (3 - Pfizer booster) 09/10/2021   • HEMOGLOBIN A1C  2022   • LIPID PANEL  2022   •  URINE MICROALBUMIN  04/26/2022   • DIABETIC EYE EXAM  06/01/2022   • LUNG CANCER SCREENING  08/19/2022   • ANNUAL WELLNESS VISIT  11/02/2022   • COLORECTAL CANCER SCREENING  11/18/2023   • Pneumococcal Vaccine 65+  Completed   • INFLUENZA VACCINE  Discontinued   • TDAP/TD VACCINES  Discontinued   • ZOSTER VACCINE  Discontinued       Health Maintenance Topics Due or Over-Due  Health Maintenance Due   Topic Date Due   • HEPATITIS C SCREENING  Never done   • COVID-19 Vaccine (3 - Pfizer booster) 09/10/2021         ADDITIONAL RESOURCES:    For excellent information on senior health and wellness refer to the [employer] [place] [employer] web-site at:    WWW.NIHSENIORHEALTH.GOV

## 2021-11-02 NOTE — ASSESSMENT & PLAN NOTE
He did not complete his visit with pulmonologist.   Advised him to call Dr. Feliciano's to reschedule his visit.   At the least a follow-up CT may be needed.   He states he will call pulmonologist to schedule another appointment.

## 2021-11-02 NOTE — PROGRESS NOTES
"    I N T E R N A L  M E D I C I N E  J U N O H  K I M,  M D      ENCOUNTER DATE:  11/02/2021    Felix Olmedo / 76 y.o. / male      CHIEF COMPLAINT / REASON FOR OFFICE VISIT     Abnormal CT of the chest, Leukocytosis, and Multiple pulmonary nodules      ASSESSMENT & PLAN     Problem List Items Addressed This Visit        High    Multiple pulmonary nodules (Chronic)    Current Assessment & Plan     Same as per abnormal CT chest.   Advised to reschedule follow-up with Dr. Feliciano regarding abnormal CT.          Change in stool caliber    Current Assessment & Plan     Placed order for Cologuard.          Abnormal CT of the chest - Primary    Current Assessment & Plan     He did not complete his visit with pulmonologist.   Advised him to call Dr. Feliciano's to reschedule his visit.   At the least a follow-up CT may be needed.   He states he will call pulmonologist to schedule another appointment.             Medium    Leukocytosis (Chronic)    Current Assessment & Plan     Seen by [facility]. To follow-up in 6 months with repeat labs.     Lab Results   Component Value Date    WBC 12.78 (H) 09/17/2021    HGB 16.5 09/17/2021    MCV 90.8 09/17/2021     09/17/2021               Other Visit Diagnoses     Screening for colon cancer        Relevant Orders    Cologuard - Stool, Per Rectum    Medicare annual wellness visit, initial            Orders Placed This Encounter   Procedures   • Cologuard - Stool, Per Rectum     No orders of the defined types were placed in this encounter.      SUMMARY/DISCUSSION  •       Next Appointment with me: Visit date not found    Return in about 6 months (around 5/2/2022) for Reassess chronic medical problems.      VITAL SIGNS     Visit Vitals  /60 (BP Location: Left arm)   Pulse 83   Temp 97.5 °F (36.4 °C)   Ht 177.8 cm (70\")   Wt 106 kg (234 lb)   SpO2 94%   BMI 33.58 kg/m²       BP Readings from Last 3 Encounters:   11/02/21 124/60   10/04/21 108/54   09/17/21 110/69     Wt " Readings from Last 3 Encounters:   11/02/21 106 kg (234 lb)   10/04/21 109 kg (241 lb)   09/17/21 108 kg (237 lb 3.2 oz)     Body mass index is 33.58 kg/m².      MEDICATIONS AT THE TIME OF OFFICE VISIT     Current Outpatient Medications on File Prior to Visit   Medication Sig   • aspirin 81 MG tablet Take 81 mg by mouth Every Other Day.   • atorvastatin (LIPITOR) 40 MG tablet TAKE 1 TABLET EVERY DAY   • bumetanide (BUMEX) 2 MG tablet Take 0.5 tablets by mouth Daily. (Patient taking differently: Take 1 mg by mouth Daily. Prn for swelling)   • empagliflozin (Jardiance) 25 MG tablet tablet Take 1 tablet by mouth Daily.   • insulin aspart (NovoLOG FlexPen) 100 UNIT/ML solution pen-injector sc pen 10-14 units  3 times daily with meal   • Insulin Pen Needle 31G X 8 MM misc Use to inject insulin   • isosorbide mononitrate (IMDUR) 30 MG 24 hr tablet Take 1 tablet by mouth 2 (Two) Times a Day.   • Levemir FlexTouch 100 UNIT/ML injection INJECT 36 UNITS UNDER THE SKIN INTO THE APPROPRIATE AREA EVERY DAY, AS DIRECTED. (Patient taking differently: 38 units daily)   • Menthol, Topical Analgesic, (BIOFREEZE ROLL-ON) 4 % gel Apply 1 application topically Daily As Needed.   • metoprolol tartrate (LOPRESSOR) 25 MG tablet Take 1 tablet by mouth 2 (Two) Times a Day. (Patient taking differently: Take 25 mg by mouth 2 (Two) Times a Day. Once daily)   • Multiple Vitamins-Minerals (MULTIVITAMIN ADULT PO) Take 1 tablet by mouth Daily.   • potassium chloride (MICRO-K) 10 MEQ CR capsule Take 1 capsule (10 mEq total) by mouth 2 (Two) Times a Day (Patient taking differently: Take 10 mEq by mouth Daily. Prn with bumex)   • True Metrix Blood Glucose Test test strip USE TO TEST BLOOD SUGAR  4 TIMES DAILY   • Unable to find 1 each 1 (One) Time. nerve shield     No current facility-administered medications on file prior to visit.          HISTORY OF PRESENT ILLNESS     He went to the appointment to see his pulmonologist, completed a pulmonary  function test component but had to leave therefore was not able to see the physician.  He has been seen by CBC for persistent leukocytosis and is to follow-up with repeat labs in 6 months.  Denies any fever, or other signs of infection.  He has persistent thinner caliber stool without blood.  Previously Cologuard was not performed.  Last colonoscopy was around 8 years ago which did not reveal any polyps.      Patient Care Team:  Hilario Rahman MD as PCP - General (Internal Medicine)  Noah Montejo MD as Consulting Physician (Cardiology)  Jr Alden Poole MD as Surgeon (Cardiothoracic Surgery)  Samira Recinos MD as Consulting Physician (Endocrinology)  Jacob Abdul MD as Consulting Physician (Hematology and Oncology)  Hilario Rahman MD as Referring Physician (Internal Medicine)  Juan Ramon Feliciano MD as Consulting Physician (Pulmonary Disease)    REVIEW OF SYSTEMS     Review of Systems   No fever , chills, night sweat or abnormal weight loss  Denies chest pain  Denies increased cough or shortness of breath; no hemoptysis  GI neg for blood or melena, abdominal pain; thinner stools     PHYSICAL EXAMINATION     Physical Exam  General: No acute distress  Psych: Normal thought and judgment   Cardiovascular Rate: normal. Rhythm: regular. Heart sounds: normal.   Pulm/Chest: Effort normal, breath sounds normal.       REVIEWED DATA     Labs:     Lab Results   Component Value Date     09/20/2021    K 4.6 09/20/2021    CALCIUM 9.5 09/20/2021    AST 36 09/17/2021    ALT 24 09/17/2021    BUN 17 09/20/2021    CREATININE 0.97 09/20/2021    CREATININE 1.04 09/17/2021    CREATININE 1.15 04/26/2021    EGFRIFNONA 75 09/20/2021    EGFRIFAFRI 91 09/20/2021       Lab Results   Component Value Date    HGBA1C 8.00 (H) 09/20/2021    HGBA1C 8.3 (H) 04/26/2021    HGBA1C 8.0 (H) 01/25/2021       Lab Results   Component Value Date    LDL 57 04/26/2021    LDL 44 01/25/2021    HDL 60 04/26/2021    TRIG 118 04/26/2021        Lab Results   Component Value Date    TSH 2.540 04/26/2021    TSH 2.490 01/25/2021    TSH 2.760 10/23/2019    FREET4 1.19 04/26/2021    FREET4 1.21 01/25/2021       Lab Results   Component Value Date    WBC 12.78 (H) 09/17/2021    HGB 16.5 09/17/2021     09/17/2021       Lab Results   Component Value Date    MICROALBUR <3.0 04/26/2021           Imaging:     CT SCAN OF THE CHEST WITHOUT CONTRAST WITH LOW-DOSE TECHNIQUE     HISTORY: Lung cancer screening.     COMPARISON:CT chest dated 06/29/2019     TECHNIQUE: Radiation dose reduction techniques were utilized, including  automated exposure control and exposure modulation based on body size.  Spiral CT images were obtained through the chest with no IV contrast  with low-dose technique and were reconstructed in 2 mm thick axial  slices.     There are moderately severe emphysematous changes that are most  prominent in the upper lung zone. Again seen are numerous calcified  pulmonary nodules scattered throughout both lungs. There are no  suspicious noncalcified nodules. A punctate nodule located along the  course of the major fissure is stable and is quite likely a normal  intrapulmonary lymph node. There is patchy ill-defined consolidation at  the right lung base posteriorly associated with numerous calcified  pulmonary nodules. The consolidation is new since the preceding CT scan  could represent a focal area of acute infiltrate. Correlation with  clinical findings is recommended. There are no pleural effusions.  Scattered nonpathologically enlarged lymph nodes are present within the  mediastinum. 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     IMPRESSION:  Probably benign CT scan of the chest without contrast. No  suspicious discrete pulmonary nodules are identified. There are numerous  bilateral calcified pulmonary nodules, including a cluster of multiple  nodules at the right lung base posteriorly  that are associated with  focal parenchymal consolidation on the current CT scan. A focal area of  pneumonia cannot be excluded. Continued CT follow up to document  clearing with appropriate medical treatment is recommended. Suggest a  follow-up CT scan of the chest in 3-4 months.     Lung-RADS Category 3-probably benign-short term imaging follow-up  recommended     DLP:  125.5 mGycm     CTD/Vol: 3.0 mGy     This report was finalized on 8/20/2021       Medical Tests:           Summary of old records / correspondence / consultant report:           Request outside records:             *Examiner was wearing KN95 mask and eye protection during the entire duration of the visit. Patient was masked the entire time. Minimum social distance of 6 ft maintained entire visit except if physical contact was necessary as documented.     **Dragon Disclaimer: Much of this encounter note is an electronic transcription/translation of spoken language to printed text. The electronic translation of spoken language may permit erroneous, or at times, nonsensical words or phrases to be inadvertently transcribed. Although I have reviewed the note for such errors, some may still exist.       Template created by Helen Rahman MD

## 2021-11-30 PROBLEM — U07.1 CLINICAL DIAGNOSIS OF COVID-19: Status: ACTIVE | Noted: 2021-01-01

## 2021-11-30 NOTE — PROGRESS NOTES
11/26/2021 12:17 PM    Patient called and stated he was positive for COVID-19 and wanted to get antibody infusions.  Patient was informed this was a process and I cannot order it over the phone.  Patient was advised to go to the emergency room or discuss with Dr. Rahman on Monday.

## 2021-11-30 NOTE — PROGRESS NOTES
[facility] [place] [facility]  MILADYS Knox Honorio / 76 y.o. / male  11/30/2021      CC:  Main reason(s) for today's visit: TELEHEALTH ENCOUNTER: Covid-19 Home Monitoring Video Visit      HPI:     THIS WAS A MYCHART TELEHEALTH ENCOUNTER NECESSITATED BY CURRENT COVID-19 CRISIS.      You have chosen to receive care through a telehealth visit.  Do you consent to use a video/audio connection for your medical care today? Yes patient identity confirmed using home address and date of birth.    Patient presents with positive COVID-19 test which resulted on November 24.  Symptom onset November 22 with productive cough, mild shortness of breath.  Negative for fever, GI symptoms such as nausea, vomiting or diarrhea.  No loss of taste or smell.  No significant muscle aches or chills.  Significant nasal congestion.  Taking DayQuil and NyQuil for relief.  Urgent care provided him with doxycycline which he has 2 to 3 days remaining along with Tessalon Perles which are suppressing his cough adequately.  Declines any refills of cough suppressant.  He is requesting monoclonal antibody infusion for further treatment.  Risk factor of BMI 33.43, age of 76, diabetes and hypertension.  In July 2021 CT chest low-dose did show focal parenchymal consolidation in which pneumonia could not be excluded in the right lung base.  He did not have respiratory symptoms at that time.  Overall, patient believes he is improving from initiation of symptoms.    Patient Care Team:  Hilario Rahman MD as PCP - General (Internal Medicine)  Noah Montejo MD as Consulting Physician (Cardiology)  Jr Alden Poole MD as Surgeon (Cardiothoracic Surgery)  Samira Recinos MD as Consulting Physician (Endocrinology)  Jacob Abdul MD as Consulting Physician (Hematology and Oncology)  Hilario Rahman MD as Referring Physician (Internal Medicine)  Juan Ramon Feliciano MD as Consulting Physician (Pulmonary Disease)    ASSESSMENT & PLAN:      Diagnosis Plan   1. Clinical diagnosis of COVID-19  Ambulatory Referral For Covid-19 Infusion Treatment       Summary/Discussion:  • Order for chest x-ray with previous pneumonia, pulmonary emphysema and current productive cough.  Patient states he will present to the hospital tomorrow to have completed.  • Order for monoclonal antibody infusion which he will need to have completed within the next 2 days as symptom onset was a days prior.  • Complete full course of doxycycline.  Continue DayQuil, NyQuil for symptomatic management.  Continue Tessalon Perles as needed for cough.  • Instructed to purchase a pulse oximeter and watch for oxygenation below 90% in which he should present to the emergency room for further evaluation of oxygen desaturation.   • Patient is to remain in isolation until 7 days after symptom onset or Kniffen improvement of respiratory symptoms or fever free without antipyretics for 24 to 48 hours.    Time spent: 25 minutes    Next Appointment with me: Visit date not found    No follow-ups on file.    ____________________________________________________________________    MEDICATIONS  Current Outpatient Medications   Medication Sig Dispense Refill   • aspirin 81 MG tablet Take 81 mg by mouth Every Other Day.     • atorvastatin (LIPITOR) 40 MG tablet TAKE 1 TABLET EVERY DAY 90 tablet 2   • benzonatate (TESSALON) 100 MG capsule Take 1 capsule by mouth 3 (Three) Times a Day As Needed for Cough. 30 capsule 0   • bumetanide (BUMEX) 2 MG tablet Take 0.5 tablets by mouth Daily. (Patient taking differently: Take 1 mg by mouth Daily. Prn for swelling) 90 tablet 0   • doxycycline (VIBRAMYICN) 100 MG tablet Take 1 tablet by mouth 2 (Two) Times a Day for 10 days. 20 tablet 0   • empagliflozin (Jardiance) 25 MG tablet tablet Take 1 tablet by mouth Daily. 90 tablet 3   • insulin aspart (NovoLOG FlexPen) 100 UNIT/ML solution pen-injector sc pen 10-14 units  3 times daily with meal 45 mL 3   • Insulin Pen Needle  31G X 8 MM misc Use to inject insulin 500 each 3   • isosorbide mononitrate (IMDUR) 30 MG 24 hr tablet Take 1 tablet by mouth 2 (Two) Times a Day. 180 tablet 3   • Levemir FlexTouch 100 UNIT/ML injection INJECT 36 UNITS UNDER THE SKIN INTO THE APPROPRIATE AREA EVERY DAY, AS DIRECTED. (Patient taking differently: 38 units daily) 45 mL 3   • Menthol, Topical Analgesic, (BIOFREEZE ROLL-ON) 4 % gel Apply 1 application topically Daily As Needed.     • metoprolol tartrate (LOPRESSOR) 25 MG tablet Take 1 tablet by mouth 2 (Two) Times a Day. (Patient taking differently: Take 25 mg by mouth 2 (Two) Times a Day. Once daily) 180 tablet 3   • Multiple Vitamins-Minerals (MULTIVITAMIN ADULT PO) Take 1 tablet by mouth Daily.     • potassium chloride (MICRO-K) 10 MEQ CR capsule Take 1 capsule (10 mEq total) by mouth 2 (Two) Times a Day (Patient taking differently: Take 10 mEq by mouth Daily. Prn with bumex) 60 capsule 0   • True Metrix Blood Glucose Test test strip USE TO TEST BLOOD SUGAR  4 TIMES DAILY 120 each 6   • Unable to find 1 each 1 (One) Time. nerve shield       No current facility-administered medications for this visit.          ____________________________________________________________________      REVIEW OF SYSTEMS    Review of Systems  Constitutional neg except per HPI   ENT nasal congestion   Resp productive cough   CV neg    PHYSICAL EXAMINATION  There were no vitals taken for this visit.   No acute distress.  Alert with normal thought and judgment.       REVIEWED DATA:    Labs:   COVID-19 Test Result   Telephone Encounter    Patient Name: Felix Olmedo   : 1945   MRN: [id number]     SARS-CoV-2, KRISS   Date Value Ref Range Status   2021 Detected (A) Not Detected Final     Comment:     Patients who have a positive COVID-19 test result may now have  treatment options. Treatment options are available for patients  with mild to moderate symptoms and for hospitalized patients.  Visit our website at  [email or web address] for  resources and information.  This nucleic acid amplification test was developed and its performance  characteristics determined by [facility]. Nucleic acid  amplification tests include RT-PCR and TMA. This test has not been  FDA cleared or approved. This test has been authorized by FDA under  an Emergency Use Authorization (EUA). This test is only authorized  for the duration of time the declaration that circumstances exist  justifying the authorization of the emergency use of in vitro  diagnostic tests for detection of SARS-CoV-2 virus and/or diagnosis  of COVID-19 infection under section 564(b)(1) of the Act, 21 U.S.C.  360bbb-3(b) (1), unless the authorization is terminated or revoked  sooner.  When diagnostic testing is negative, the possibility of a false  negative result should be considered in the context of a patient's  recent exposures and the presence of clinical signs and symptoms  consistent with COVID-19. An individual without symptoms of COVID-19  and who is not shedding SARS-CoV-2 virus would expect to have a  negative (not detected) result in this assay.        Patient was counseled as follows:  • (+) positive COVID-19 test result with or without symptoms   • The majority of patients with a positive test result will recover, symptom severity is variable among those infected.   • Certain comorbidities such as COPD/asthma, DM, CAD and others can increase the risk of more severe illness.   • The optimal duration of home isolation is uncertain. The [place] Centers for Disease Control and Prevention (CDC) has issued recommendations on discontinuation of home isolation.  • For this reason, Felix is strongly encouraged to practice the safest standards in protecting their health and others given the current pandemic concerns.   • If Felix is asymptomatic, he should self-isolate at home for a total of 14 days from time of when lab test for Covid-19 was  collected.   o If he is without symptoms, then he should practice social distancing by staying at least 6 feet from other people, including limiting contact with family (especially at home) and friends as much as possible for at least 14 days.   o Encouraged him to wear a mask in addition to social distancing in the home.   o Implement 14 day home self-quarantining, with the exception of seeking required or essential medical care.   o Continue to wash his hands frequently with soap and hot water, and cover their mouth while coughing.   • If a Felix is symptomatic then he may discontinue home isolation when the following criteria are met:   o At least seven days have passed since symptoms first appeared AND   o At least three days (72 hours) have passed since recovery of symptoms (defined as resolution of fever without the use of fever-reducing medications and improvement in respiratory symptoms [eg, cough, shortness of breath])   • For support of non-emergent symptoms expected with this virus such as increased shortness of breath, fever, cough, or other questions, Felix was asked to please call their primary care physician’s office or the [place] hotline at [phone].   • He was advised to seek care in the Emergency Department should extreme symptoms arise such as new onset confusion, extreme lethargy, hypoxia, or new hypotension.   · Questions were engaged and answered to the best of my ability, patient expressed verbal understanding of their test results and my advice.      Primary Care Physician verified as being: Hilario Rahman MD      Electronically signed by MILADYS Knox, 11/30/21, 4:24 PM EST.      Imaging:         Medical Tests:         Summary of old records / correspondence / consultant report:          Request outside records:         ALLERGIES  Allergies   Allergen Reactions   • Adhesive Tape Rash        PFSH:     The following portions of the patient's history were reviewed and updated as  appropriate: Allergies / Current Medications / Past Medical History / Surgical History / Social History / Family History    PROBLEM LIST   Patient Active Problem List   Diagnosis   • Type II diabetes mellitus with neurological manifestations, uncontrolled (HCC)   • Coronary artery disease of native artery of native heart with stable angina pectoris (HCC)   • SVT (supraventricular tachycardia) (HCC)   • Chronic diastolic congestive heart failure (HCC)   • S/P CABG x 2   • Hyperlipidemia   • Obesity (BMI 30-39.9)   • Multiple pulmonary nodules   • Gait difficulty   • Diabetic polyneuropathy associated with type 2 diabetes mellitus (HCC)   • Change in stool caliber   • Pulmonary emphysema (HCC)   • Abnormal CT of the chest   • Leukocytosis   • Increased urinary frequency   • Clinical diagnosis of COVID-19       PAST MEDICAL HISTORY  Past Medical History:   Diagnosis Date   • Coronary artery disease     Status post 2 vessel CABG 5/10/17 by Dr. Poole (LIMA-LAD, SVG-PDA)   • Diabetes (HCC)    • Diastolic dysfunction    • Hyperlipidemia    • Hypertension    • LAFB (left anterior fascicular block)    • Lung nodule    • Neuropathy    • Osteoarthritis    • Pneumonia 02/2017   • Pulmonary emphysema (HCC) 8/31/2021   • Squamous cell carcinoma     Left cheek s/p resection   • SVT (supraventricular tachycardia) (HCC)     Diagnosed following CABG   • Type 2 diabetes mellitus (HCC)        SURGICAL HISTORY  Past Surgical History:   Procedure Laterality Date   • CARDIAC CATHETERIZATION N/A 10/18/2016    Procedure: Coronary angiography;  Surgeon: Jesus Guzman MD;  Location: [facility] CATH INVASIVE LOCATION;  Service:    • CARDIAC CATHETERIZATION N/A 10/18/2016    Procedure: Left heart cath;  Surgeon: Jesus Guzman MD;  Location: [facility] CATH INVASIVE LOCATION;  Service:    • CARDIAC CATHETERIZATION N/A 10/18/2016    Procedure: Left ventriculography;  Surgeon: Jesus Guzman MD;  Location: [facility] CATH INVASIVE  LOCATION;  Service:    • CARDIAC CATHETERIZATION N/A 2017    Procedure: Coronary angiography;  Surgeon: Jesus Guzman MD;  Location: [facility] CATH INVASIVE LOCATION;  Service:    • CARDIAC CATHETERIZATION N/A 2017    Procedure: Left heart cath;  Surgeon: Jesus Guzman MD;  Location: [facility] CATH INVASIVE LOCATION;  Service:    • CARDIAC CATHETERIZATION N/A 2017    Procedure: Left ventriculography;  Surgeon: Jesus Guzman MD;  Location: [facility] CATH INVASIVE LOCATION;  Service:    • CARDIAC CATHETERIZATION  2017    Procedure: Functional Flow [place];  Surgeon: Jesus Guzman MD;  Location: [facility] CATH INVASIVE LOCATION;  Service:    • CARDIAC SURGERY     • CATARACT EXTRACTION W/ INTRAOCULAR LENS IMPLANT Left    • COLONOSCOPY     • CORONARY ARTERY BYPASS GRAFT N/A 5/10/2017    Procedure: CORONARY ARTERY BYPASS TIMES TWO UTILIZING THE LEFT GREATER SAPHENOUS VEIN WITH INTERNAL MAMMARY ARTERY GRAFT;  TRANSESOPHAGEAL ECHOCARDIOGRAM;  Surgeon: Alden Poole MD;  Location: [facility];  Service:    • EYE SURGERY     • KNEE SURGERY Left     Dr. Gallegos   • MOHS SURGERY Left     CHEEK   • SKIN BIOPSY         SOCIAL HISTORY  Social History     Socioeconomic History   • Marital status:      Spouse name: Mendy Vargas   • Number of children: 3   Tobacco Use   • Smoking status: Former Smoker     Packs/day: 1.50     Years: 40.00     Pack years: 60.00     Types: Cigarettes     Quit date:      Years since quittin.9   • Smokeless tobacco: Never Used   • Tobacco comment: Smoked up to 1.5 ppd.  Smoked for 50 years.  Quit .   Vaping Use   • Vaping Use: Never used   Substance and Sexual Activity   • Alcohol use: Yes     Comment: 2-3 BEERS YEARLY    • Drug use: No     Comment: caffeine use   • Sexual activity: Not Currently           **Dragon Disclaimer:   Much of this encounter note is an electronic transcription/translation of spoken language to printed text. The  electronic translation of spoken language may permit erroneous, or at times, nonsensical words or phrases to be inadvertently transcribed. Although I have reviewed the note for such errors, some may still exist.

## 2021-12-02 NOTE — CODE DOCUMENTATION
Patient provided with Fact Sheet for Patients, Parents and Caregivers Emergency Use Authorization (EUA) of Bamlanivimab / Etesevimab  for Coronavirus Disease 2019 (COVID-19) form.    Reviewed and patient verbalized understanding.  Appropriate PPE worn during the care of the patient.  Advised patient not to receive Covid vaccine for 90 days.

## 2022-01-01 ENCOUNTER — APPOINTMENT (OUTPATIENT)
Dept: MRI IMAGING | Facility: HOSPITAL | Age: 77
End: 2022-01-01

## 2022-01-01 ENCOUNTER — APPOINTMENT (OUTPATIENT)
Dept: GENERAL RADIOLOGY | Facility: HOSPITAL | Age: 77
End: 2022-01-01

## 2022-01-01 ENCOUNTER — OFFICE VISIT (OUTPATIENT)
Dept: INTERNAL MEDICINE | Age: 77
End: 2022-01-01

## 2022-01-01 ENCOUNTER — APPOINTMENT (OUTPATIENT)
Dept: CARDIOLOGY | Facility: HOSPITAL | Age: 77
End: 2022-01-01

## 2022-01-01 ENCOUNTER — HOSPITAL ENCOUNTER (OUTPATIENT)
Dept: GENERAL RADIOLOGY | Facility: HOSPITAL | Age: 77
Discharge: HOME | End: 2022-05-16
Admitting: INTERNAL MEDICINE

## 2022-01-01 ENCOUNTER — HOSPITAL ENCOUNTER (INPATIENT)
Facility: HOSPITAL | Age: 77
LOS: 11 days | Discharge: HOSPICE | End: 2022-05-29
Attending: EMERGENCY MEDICINE | Admitting: INTERNAL MEDICINE

## 2022-01-01 ENCOUNTER — OFFICE VISIT (OUTPATIENT)
Dept: ENDOCRINOLOGY | Age: 77
End: 2022-01-01

## 2022-01-01 ENCOUNTER — TELEPHONE (OUTPATIENT)
Dept: INTERNAL MEDICINE | Age: 77
End: 2022-01-01

## 2022-01-01 ENCOUNTER — ANESTHESIA (OUTPATIENT)
Dept: PERIOP | Facility: HOSPITAL | Age: 77
End: 2022-01-01

## 2022-01-01 ENCOUNTER — DOCUMENTATION (OUTPATIENT)
Dept: INTERNAL MEDICINE | Age: 77
End: 2022-01-01

## 2022-01-01 ENCOUNTER — ANESTHESIA EVENT (OUTPATIENT)
Dept: PERIOP | Facility: HOSPITAL | Age: 77
End: 2022-01-01

## 2022-01-01 ENCOUNTER — OFFICE VISIT (OUTPATIENT)
Dept: ONCOLOGY | Facility: CLINIC | Age: 77
End: 2022-01-01

## 2022-01-01 ENCOUNTER — LAB (OUTPATIENT)
Dept: LAB | Facility: HOSPITAL | Age: 77
End: 2022-01-01

## 2022-01-01 ENCOUNTER — HOSPITAL ENCOUNTER (OUTPATIENT)
Dept: GENERAL RADIOLOGY | Facility: HOSPITAL | Age: 77
Discharge: HOME | End: 2022-05-03
Admitting: INTERNAL MEDICINE

## 2022-01-01 ENCOUNTER — APPOINTMENT (OUTPATIENT)
Dept: ULTRASOUND IMAGING | Facility: HOSPITAL | Age: 77
End: 2022-01-01

## 2022-01-01 ENCOUNTER — APPOINTMENT (OUTPATIENT)
Dept: LAB | Facility: HOSPITAL | Age: 77
End: 2022-01-01

## 2022-01-01 ENCOUNTER — READMISSION MANAGEMENT (OUTPATIENT)
Dept: CALL CENTER | Facility: HOSPITAL | Age: 77
End: 2022-01-01

## 2022-01-01 ENCOUNTER — APPOINTMENT (OUTPATIENT)
Dept: CT IMAGING | Facility: HOSPITAL | Age: 77
End: 2022-01-01

## 2022-01-01 ENCOUNTER — LAB (OUTPATIENT)
Dept: ENDOCRINOLOGY | Age: 77
End: 2022-01-01

## 2022-01-01 ENCOUNTER — ANESTHESIA (OUTPATIENT)
Dept: GASTROENTEROLOGY | Facility: HOSPITAL | Age: 77
End: 2022-01-01

## 2022-01-01 ENCOUNTER — ANESTHESIA EVENT (OUTPATIENT)
Dept: GASTROENTEROLOGY | Facility: HOSPITAL | Age: 77
End: 2022-01-01

## 2022-01-01 ENCOUNTER — DOCUMENTATION (OUTPATIENT)
Dept: ENDOCRINOLOGY | Age: 77
End: 2022-01-01

## 2022-01-01 ENCOUNTER — TRANSITIONAL CARE MANAGEMENT TELEPHONE ENCOUNTER (OUTPATIENT)
Dept: CALL CENTER | Facility: HOSPITAL | Age: 77
End: 2022-01-01

## 2022-01-01 ENCOUNTER — HOSPITAL ENCOUNTER (INPATIENT)
Facility: HOSPITAL | Age: 77
LOS: 3 days | Discharge: HOME | End: 2022-05-06
Attending: EMERGENCY MEDICINE | Admitting: INTERNAL MEDICINE

## 2022-01-01 VITALS
HEIGHT: 70 IN | TEMPERATURE: 97.5 F | RESPIRATION RATE: 18 BRPM | HEART RATE: 69 BPM | BODY MASS INDEX: 33.64 KG/M2 | DIASTOLIC BLOOD PRESSURE: 75 MMHG | SYSTOLIC BLOOD PRESSURE: 135 MMHG | OXYGEN SATURATION: 96 % | WEIGHT: 235 LBS

## 2022-01-01 VITALS
HEART RATE: 80 BPM | WEIGHT: 235.2 LBS | HEIGHT: 70 IN | SYSTOLIC BLOOD PRESSURE: 120 MMHG | DIASTOLIC BLOOD PRESSURE: 55 MMHG | OXYGEN SATURATION: 94 % | BODY MASS INDEX: 33.67 KG/M2

## 2022-01-01 VITALS
BODY MASS INDEX: 32.2 KG/M2 | SYSTOLIC BLOOD PRESSURE: 128 MMHG | WEIGHT: 230 LBS | HEART RATE: 90 BPM | OXYGEN SATURATION: 91 % | HEIGHT: 71 IN | DIASTOLIC BLOOD PRESSURE: 80 MMHG | TEMPERATURE: 97.1 F

## 2022-01-01 VITALS
TEMPERATURE: 97.1 F | HEIGHT: 71 IN | HEART RATE: 94 BPM | BODY MASS INDEX: 33.01 KG/M2 | DIASTOLIC BLOOD PRESSURE: 70 MMHG | OXYGEN SATURATION: 98 % | WEIGHT: 235.8 LBS | SYSTOLIC BLOOD PRESSURE: 138 MMHG

## 2022-01-01 VITALS
TEMPERATURE: 97.5 F | SYSTOLIC BLOOD PRESSURE: 100 MMHG | HEIGHT: 70 IN | WEIGHT: 227.74 LBS | HEART RATE: 65 BPM | RESPIRATION RATE: 24 BRPM | OXYGEN SATURATION: 68 % | DIASTOLIC BLOOD PRESSURE: 45 MMHG | BODY MASS INDEX: 32.6 KG/M2

## 2022-01-01 VITALS
HEIGHT: 70 IN | HEART RATE: 82 BPM | OXYGEN SATURATION: 98 % | DIASTOLIC BLOOD PRESSURE: 62 MMHG | BODY MASS INDEX: 34.04 KG/M2 | WEIGHT: 237.8 LBS | SYSTOLIC BLOOD PRESSURE: 115 MMHG

## 2022-01-01 VITALS
SYSTOLIC BLOOD PRESSURE: 129 MMHG | WEIGHT: 234.6 LBS | OXYGEN SATURATION: 94 % | TEMPERATURE: 98 F | HEART RATE: 87 BPM | BODY MASS INDEX: 32.84 KG/M2 | DIASTOLIC BLOOD PRESSURE: 73 MMHG | HEIGHT: 71 IN | RESPIRATION RATE: 16 BRPM

## 2022-01-01 VITALS
HEART RATE: 79 BPM | DIASTOLIC BLOOD PRESSURE: 70 MMHG | WEIGHT: 234 LBS | TEMPERATURE: 96.9 F | BODY MASS INDEX: 33.5 KG/M2 | SYSTOLIC BLOOD PRESSURE: 126 MMHG | HEIGHT: 70 IN | OXYGEN SATURATION: 89 %

## 2022-01-01 DIAGNOSIS — R91.8 LUNG MASS: Primary | ICD-10-CM

## 2022-01-01 DIAGNOSIS — R05.3 CHRONIC COUGH: ICD-10-CM

## 2022-01-01 DIAGNOSIS — E11.65 TYPE 2 DIABETES MELLITUS WITH HYPERGLYCEMIA, WITH LONG-TERM CURRENT USE OF INSULIN: Primary | ICD-10-CM

## 2022-01-01 DIAGNOSIS — J18.9 PNEUMONIA OF RIGHT LOWER LOBE DUE TO INFECTIOUS ORGANISM: Primary | ICD-10-CM

## 2022-01-01 DIAGNOSIS — J96.21 ACUTE ON CHRONIC RESPIRATORY FAILURE WITH HYPOXIA: Primary | ICD-10-CM

## 2022-01-01 DIAGNOSIS — R68.89 ABNORMAL ENDOCRINE LABORATORY TEST FINDING: ICD-10-CM

## 2022-01-01 DIAGNOSIS — R93.89 ABNORMAL CT OF THE CHEST: ICD-10-CM

## 2022-01-01 DIAGNOSIS — I50.32 CHRONIC DIASTOLIC CONGESTIVE HEART FAILURE: ICD-10-CM

## 2022-01-01 DIAGNOSIS — Z79.4 TYPE 2 DIABETES MELLITUS WITH HYPERGLYCEMIA, WITH LONG-TERM CURRENT USE OF INSULIN: ICD-10-CM

## 2022-01-01 DIAGNOSIS — J91.0 MALIGNANT PLEURAL EFFUSION: ICD-10-CM

## 2022-01-01 DIAGNOSIS — J44.1 COPD WITH EXACERBATION: Primary | ICD-10-CM

## 2022-01-01 DIAGNOSIS — Z79.4 TYPE 2 DIABETES MELLITUS WITH HYPERGLYCEMIA, WITH LONG-TERM CURRENT USE OF INSULIN: Primary | ICD-10-CM

## 2022-01-01 DIAGNOSIS — D72.829 LEUKOCYTOSIS, UNSPECIFIED TYPE: Primary | ICD-10-CM

## 2022-01-01 DIAGNOSIS — E11.65 TYPE 2 DIABETES MELLITUS WITH HYPERGLYCEMIA, WITH LONG-TERM CURRENT USE OF INSULIN: ICD-10-CM

## 2022-01-01 DIAGNOSIS — R91.8 MULTIPLE PULMONARY NODULES: Chronic | ICD-10-CM

## 2022-01-01 DIAGNOSIS — J18.9 PNEUMONIA OF RIGHT LOWER LOBE DUE TO INFECTIOUS ORGANISM: ICD-10-CM

## 2022-01-01 DIAGNOSIS — C34.91 SMALL CELL CARCINOMA OF RIGHT LUNG: ICD-10-CM

## 2022-01-01 DIAGNOSIS — Z95.1 S/P CABG X 2: ICD-10-CM

## 2022-01-01 DIAGNOSIS — R09.02 HYPOXIA: ICD-10-CM

## 2022-01-01 DIAGNOSIS — D72.829 LEUKOCYTOSIS, UNSPECIFIED TYPE: Primary | Chronic | ICD-10-CM

## 2022-01-01 DIAGNOSIS — E78.2 MIXED HYPERLIPIDEMIA: ICD-10-CM

## 2022-01-01 DIAGNOSIS — D72.829 LEUKOCYTOSIS, UNSPECIFIED TYPE: ICD-10-CM

## 2022-01-01 DIAGNOSIS — J90 PLEURAL EFFUSION: ICD-10-CM

## 2022-01-01 DIAGNOSIS — J44.0 COPD WITH LOWER RESPIRATORY INFECTION: ICD-10-CM

## 2022-01-01 LAB
ALBUMIN FLD-MCNC: 2.4 G/DL
ALBUMIN SERPL-MCNC: 2 G/DL (ref 3.5–5.2)
ALBUMIN SERPL-MCNC: 2.3 G/DL (ref 3.5–5.2)
ALBUMIN SERPL-MCNC: 2.3 G/DL (ref 3.5–5.2)
ALBUMIN SERPL-MCNC: 2.5 G/DL (ref 3.5–5.2)
ALBUMIN SERPL-MCNC: 2.6 G/DL (ref 3.5–5.2)
ALBUMIN SERPL-MCNC: 3.2 G/DL (ref 3.5–5.2)
ALBUMIN SERPL-MCNC: 3.5 G/DL (ref 3.5–5.2)
ALBUMIN SERPL-MCNC: 3.5 G/DL (ref 3.5–5.2)
ALBUMIN SERPL-MCNC: 3.7 G/DL (ref 3.5–5.2)
ALBUMIN/GLOB SERPL: 0.5 G/DL
ALBUMIN/GLOB SERPL: 0.7 G/DL
ALBUMIN/GLOB SERPL: 0.7 G/DL
ALBUMIN/GLOB SERPL: 0.8 G/DL
ALBUMIN/GLOB SERPL: 0.9 G/DL
ALBUMIN/GLOB SERPL: 1 G/DL
ALBUMIN/GLOB SERPL: 1.1 G/DL
ALBUMIN/GLOB SERPL: 1.1 G/DL
ALP SERPL-CCNC: 132 U/L (ref 39–117)
ALP SERPL-CCNC: 142 U/L (ref 39–117)
ALP SERPL-CCNC: 179 U/L (ref 39–117)
ALP SERPL-CCNC: 199 U/L (ref 39–117)
ALP SERPL-CCNC: 201 U/L (ref 39–117)
ALP SERPL-CCNC: 225 U/L (ref 39–117)
ALP SERPL-CCNC: 328 U/L (ref 39–117)
ALP SERPL-CCNC: 371 U/L (ref 39–117)
ALT SERPL W P-5'-P-CCNC: 21 U/L (ref 1–41)
ALT SERPL W P-5'-P-CCNC: 26 U/L (ref 1–41)
ALT SERPL W P-5'-P-CCNC: 33 U/L (ref 1–41)
ALT SERPL W P-5'-P-CCNC: 38 U/L (ref 1–41)
ALT SERPL W P-5'-P-CCNC: 39 U/L (ref 1–41)
ALT SERPL W P-5'-P-CCNC: 40 U/L (ref 1–41)
ALT SERPL W P-5'-P-CCNC: 45 U/L (ref 1–41)
ALT SERPL W P-5'-P-CCNC: 52 U/L (ref 1–41)
ANION GAP SERPL CALCULATED.3IONS-SCNC: 10 MMOL/L (ref 5–15)
ANION GAP SERPL CALCULATED.3IONS-SCNC: 11 MMOL/L (ref 5–15)
ANION GAP SERPL CALCULATED.3IONS-SCNC: 11.5 MMOL/L (ref 5–15)
ANION GAP SERPL CALCULATED.3IONS-SCNC: 12 MMOL/L (ref 5–15)
ANION GAP SERPL CALCULATED.3IONS-SCNC: 13 MMOL/L (ref 5–15)
ANION GAP SERPL CALCULATED.3IONS-SCNC: 14 MMOL/L (ref 5–15)
ANION GAP SERPL CALCULATED.3IONS-SCNC: 14.2 MMOL/L (ref 5–15)
ANION GAP SERPL CALCULATED.3IONS-SCNC: 15 MMOL/L (ref 5–15)
ANION GAP SERPL CALCULATED.3IONS-SCNC: 15.1 MMOL/L (ref 5–15)
ANION GAP SERPL CALCULATED.3IONS-SCNC: 18 MMOL/L (ref 5–15)
ANION GAP SERPL CALCULATED.3IONS-SCNC: 18 MMOL/L (ref 5–15)
ANION GAP SERPL CALCULATED.3IONS-SCNC: 8.7 MMOL/L (ref 5–15)
AORTIC DIMENSIONLESS INDEX: 0.9 (DI)
APPEARANCE FLD: ABNORMAL
APPEARANCE FLD: ABNORMAL
APTT PPP: 32.4 SECONDS (ref 22.7–35.4)
APTT PPP: 33.7 SECONDS (ref 22.7–35.4)
ARTERIAL PATENCY WRIST A: ABNORMAL
ARTERIAL PATENCY WRIST A: POSITIVE
AST SERPL-CCNC: 112 U/L (ref 1–40)
AST SERPL-CCNC: 133 U/L (ref 1–40)
AST SERPL-CCNC: 38 U/L (ref 1–40)
AST SERPL-CCNC: 42 U/L (ref 1–40)
AST SERPL-CCNC: 71 U/L (ref 1–40)
AST SERPL-CCNC: 82 U/L (ref 1–40)
AST SERPL-CCNC: 84 U/L (ref 1–40)
AST SERPL-CCNC: 91 U/L (ref 1–40)
ATMOSPHERIC PRESS: 747 MMHG
ATMOSPHERIC PRESS: 750.1 MMHG
ATMOSPHERIC PRESS: 750.9 MMHG
ATMOSPHERIC PRESS: 751.1 MMHG
ATMOSPHERIC PRESS: 751.2 MMHG
ATMOSPHERIC PRESS: 755.6 MMHG
ATMOSPHERIC PRESS: 756 MMHG
B PARAPERT DNA SPEC QL NAA+PROBE: NOT DETECTED
B PERT DNA SPEC QL NAA+PROBE: NOT DETECTED
BACTERIA FLD CULT: NORMAL
BACTERIA SPEC AEROBE CULT: NORMAL
BACTERIA SPEC RESP CULT: NORMAL
BASE EXCESS BLDA CALC-SCNC: -0.2 MMOL/L (ref 0–2)
BASE EXCESS BLDA CALC-SCNC: -1.9 MMOL/L (ref 0–2)
BASE EXCESS BLDA CALC-SCNC: -1.9 MMOL/L (ref 0–2)
BASE EXCESS BLDA CALC-SCNC: -2.5 MMOL/L (ref 0–2)
BASE EXCESS BLDA CALC-SCNC: -2.6 MMOL/L (ref 0–2)
BASE EXCESS BLDA CALC-SCNC: -3.1 MMOL/L (ref 0–2)
BASE EXCESS BLDA CALC-SCNC: 0 MMOL/L (ref 0–2)
BASOPHILS # BLD AUTO: 0.12 10*3/MM3 (ref 0–0.2)
BASOPHILS # BLD AUTO: 0.17 10*3/MM3 (ref 0–0.2)
BASOPHILS # BLD AUTO: 0.17 10*3/MM3 (ref 0–0.2)
BASOPHILS # BLD AUTO: 0.18 10*3/MM3 (ref 0–0.2)
BASOPHILS # BLD AUTO: 0.21 10*3/MM3 (ref 0–0.2)
BASOPHILS # BLD AUTO: 0.21 10*3/MM3 (ref 0–0.2)
BASOPHILS NFR BLD AUTO: 0.7 % (ref 0–1.5)
BASOPHILS NFR BLD AUTO: 1.3 % (ref 0–1.5)
BASOPHILS NFR BLD AUTO: 1.3 % (ref 0–1.5)
BASOPHILS NFR BLD AUTO: 1.4 % (ref 0–1.5)
BASOPHILS NFR BLD AUTO: 1.4 % (ref 0–1.5)
BASOPHILS NFR BLD AUTO: 1.5 % (ref 0–1.5)
BASOPHILS NFR BLD AUTO: 1.5 % (ref 0–1.5)
BASOPHILS NFR BLD AUTO: 1.6 % (ref 0–1.5)
BDY SITE: ABNORMAL
BH CV ECHO MEAS - AO MAX PG: 8.6 MMHG
BH CV ECHO MEAS - AO MEAN PG: 4.8 MMHG
BH CV ECHO MEAS - AO ROOT DIAM: 3.4 CM
BH CV ECHO MEAS - AO V2 MAX: 146.6 CM/SEC
BH CV ECHO MEAS - AO V2 VTI: 23.2 CM
BH CV ECHO MEAS - AVA(I,D): 3.2 CM2
BH CV ECHO MEAS - EDV(CUBED): 53.5 ML
BH CV ECHO MEAS - EDV(MOD-SP2): 114 ML
BH CV ECHO MEAS - EDV(MOD-SP4): 112 ML
BH CV ECHO MEAS - EF(MOD-BP): 58.9 %
BH CV ECHO MEAS - EF(MOD-SP2): 65.8 %
BH CV ECHO MEAS - EF(MOD-SP4): 52.7 %
BH CV ECHO MEAS - ESV(CUBED): 17.9 ML
BH CV ECHO MEAS - ESV(MOD-SP2): 39 ML
BH CV ECHO MEAS - ESV(MOD-SP4): 53 ML
BH CV ECHO MEAS - FS: 30.6 %
BH CV ECHO MEAS - IVS/LVPW: 1.17 CM
BH CV ECHO MEAS - IVSD: 1.21 CM
BH CV ECHO MEAS - LAT PEAK E' VEL: 7.6 CM/SEC
BH CV ECHO MEAS - LV DIASTOLIC VOL/BSA (35-75): 50.8 CM2
BH CV ECHO MEAS - LV MASS(C)D: 137.7 GRAMS
BH CV ECHO MEAS - LV MAX PG: 5.4 MMHG
BH CV ECHO MEAS - LV MEAN PG: 3.5 MMHG
BH CV ECHO MEAS - LV SYSTOLIC VOL/BSA (12-30): 24 CM2
BH CV ECHO MEAS - LV V1 MAX: 116.6 CM/SEC
BH CV ECHO MEAS - LV V1 VTI: 20.6 CM
BH CV ECHO MEAS - LVIDD: 3.8 CM
BH CV ECHO MEAS - LVIDS: 2.6 CM
BH CV ECHO MEAS - LVOT AREA: 3.6 CM2
BH CV ECHO MEAS - LVOT DIAM: 2.14 CM
BH CV ECHO MEAS - LVPWD: 1.04 CM
BH CV ECHO MEAS - MED PEAK E' VEL: 6.5 CM/SEC
BH CV ECHO MEAS - MV A MAX VEL: 156.9 CM/SEC
BH CV ECHO MEAS - MV DEC SLOPE: 464.8 CM/SEC2
BH CV ECHO MEAS - MV DEC TIME: 210 MSEC
BH CV ECHO MEAS - MV E MAX VEL: 101 CM/SEC
BH CV ECHO MEAS - MV E/A: 0.64
BH CV ECHO MEAS - MV MAX PG: 12.2 MMHG
BH CV ECHO MEAS - MV MEAN PG: 4.9 MMHG
BH CV ECHO MEAS - MV P1/2T: 66 MSEC
BH CV ECHO MEAS - MV V2 VTI: 29.4 CM
BH CV ECHO MEAS - MVA(P1/2T): 3.3 CM2
BH CV ECHO MEAS - MVA(VTI): 2.5 CM2
BH CV ECHO MEAS - RAP SYSTOLE: 3 MMHG
BH CV ECHO MEAS - SI(MOD-SP2): 34 ML/M2
BH CV ECHO MEAS - SI(MOD-SP4): 26.7 ML/M2
BH CV ECHO MEAS - SV(LVOT): 74.4 ML
BH CV ECHO MEAS - SV(MOD-SP2): 75 ML
BH CV ECHO MEAS - SV(MOD-SP4): 59 ML
BH CV ECHO MEAS - TAPSE (>1.6): 1.4 CM
BH CV ECHO MEASUREMENTS AVERAGE E/E' RATIO: 14.33
BH CV XLRA - RV BASE: 3.5 CM
BH CV XLRA - TDI S': 12.9 CM/SEC
BILIRUB SERPL-MCNC: 0.4 MG/DL (ref 0–1.2)
BILIRUB SERPL-MCNC: 0.5 MG/DL (ref 0–1.2)
BILIRUB SERPL-MCNC: 0.6 MG/DL (ref 0–1.2)
BILIRUB SERPL-MCNC: 0.6 MG/DL (ref 0–1.2)
BUN SERPL-MCNC: 11 MG/DL (ref 8–23)
BUN SERPL-MCNC: 12 MG/DL (ref 8–23)
BUN SERPL-MCNC: 13 MG/DL (ref 8–23)
BUN SERPL-MCNC: 15 MG/DL (ref 8–23)
BUN SERPL-MCNC: 15 MG/DL (ref 8–23)
BUN SERPL-MCNC: 16 MG/DL (ref 8–23)
BUN SERPL-MCNC: 17 MG/DL (ref 8–23)
BUN SERPL-MCNC: 17 MG/DL (ref 8–23)
BUN SERPL-MCNC: 17 MG/DL (ref 8–27)
BUN SERPL-MCNC: 18 MG/DL (ref 8–27)
BUN SERPL-MCNC: 20 MG/DL (ref 8–23)
BUN SERPL-MCNC: 21 MG/DL (ref 8–23)
BUN SERPL-MCNC: 21 MG/DL (ref 8–23)
BUN SERPL-MCNC: 24 MG/DL (ref 8–23)
BUN SERPL-MCNC: 30 MG/DL (ref 8–23)
BUN SERPL-MCNC: 42 MG/DL (ref 8–23)
BUN SERPL-MCNC: 47 MG/DL (ref 8–23)
BUN/CREAT SERPL: 12.8 (ref 7–25)
BUN/CREAT SERPL: 14.1 (ref 7–25)
BUN/CREAT SERPL: 14.4 (ref 7–25)
BUN/CREAT SERPL: 15 (ref 10–24)
BUN/CREAT SERPL: 16 (ref 7–25)
BUN/CREAT SERPL: 16 (ref 7–25)
BUN/CREAT SERPL: 16.5 (ref 7–25)
BUN/CREAT SERPL: 17 (ref 10–24)
BUN/CREAT SERPL: 17.2 (ref 7–25)
BUN/CREAT SERPL: 17.8 (ref 7–25)
BUN/CREAT SERPL: 18.7 (ref 7–25)
BUN/CREAT SERPL: 19 (ref 7–25)
BUN/CREAT SERPL: 21.9 (ref 7–25)
BUN/CREAT SERPL: 26.1 (ref 7–25)
BUN/CREAT SERPL: 35.3 (ref 7–25)
BUN/CREAT SERPL: 42.7 (ref 7–25)
BUN/CREAT SERPL: 44.7 (ref 7–25)
C PNEUM DNA NPH QL NAA+NON-PROBE: NOT DETECTED
CALCIUM SERPL-MCNC: 8.6 MG/DL (ref 8.6–10.2)
CALCIUM SERPL-MCNC: 9.2 MG/DL (ref 8.6–10.2)
CALCIUM SPEC-SCNC: 8.2 MG/DL (ref 8.6–10.5)
CALCIUM SPEC-SCNC: 8.6 MG/DL (ref 8.6–10.5)
CALCIUM SPEC-SCNC: 8.8 MG/DL (ref 8.6–10.5)
CALCIUM SPEC-SCNC: 8.9 MG/DL (ref 8.6–10.5)
CALCIUM SPEC-SCNC: 9 MG/DL (ref 8.6–10.5)
CALCIUM SPEC-SCNC: 9 MG/DL (ref 8.6–10.5)
CALCIUM SPEC-SCNC: 9.1 MG/DL (ref 8.6–10.5)
CALCIUM SPEC-SCNC: 9.2 MG/DL (ref 8.6–10.5)
CALCIUM SPEC-SCNC: 9.3 MG/DL (ref 8.6–10.5)
CALCIUM SPEC-SCNC: 9.3 MG/DL (ref 8.6–10.5)
CALCIUM SPEC-SCNC: 9.6 MG/DL (ref 8.6–10.5)
CALCIUM SPEC-SCNC: 9.7 MG/DL (ref 8.6–10.5)
CALCIUM SPEC-SCNC: 9.8 MG/DL (ref 8.6–10.5)
CHLORIDE SERPL-SCNC: 101 MMOL/L (ref 98–107)
CHLORIDE SERPL-SCNC: 102 MMOL/L (ref 96–106)
CHLORIDE SERPL-SCNC: 102 MMOL/L (ref 96–106)
CHLORIDE SERPL-SCNC: 102 MMOL/L (ref 98–107)
CHLORIDE SERPL-SCNC: 102 MMOL/L (ref 98–107)
CHLORIDE SERPL-SCNC: 104 MMOL/L (ref 98–107)
CHLORIDE SERPL-SCNC: 104 MMOL/L (ref 98–107)
CHLORIDE SERPL-SCNC: 105 MMOL/L (ref 98–107)
CHLORIDE SERPL-SCNC: 105 MMOL/L (ref 98–107)
CHLORIDE SERPL-SCNC: 106 MMOL/L (ref 98–107)
CHLORIDE SERPL-SCNC: 106 MMOL/L (ref 98–107)
CHLORIDE SERPL-SCNC: 107 MMOL/L (ref 98–107)
CHLORIDE SERPL-SCNC: 98 MMOL/L (ref 98–107)
CHLORIDE SERPL-SCNC: 99 MMOL/L (ref 98–107)
CHOLEST SERPL-MCNC: 127 MG/DL (ref 100–199)
CO2 SERPL-SCNC: 14 MMOL/L (ref 22–29)
CO2 SERPL-SCNC: 18 MMOL/L (ref 22–29)
CO2 SERPL-SCNC: 19 MMOL/L (ref 22–29)
CO2 SERPL-SCNC: 21 MMOL/L (ref 20–29)
CO2 SERPL-SCNC: 21 MMOL/L (ref 22–29)
CO2 SERPL-SCNC: 21.8 MMOL/L (ref 22–29)
CO2 SERPL-SCNC: 22 MMOL/L (ref 20–29)
CO2 SERPL-SCNC: 22 MMOL/L (ref 22–29)
CO2 SERPL-SCNC: 23 MMOL/L (ref 22–29)
CO2 SERPL-SCNC: 23 MMOL/L (ref 22–29)
CO2 SERPL-SCNC: 23.3 MMOL/L (ref 22–29)
CO2 SERPL-SCNC: 23.5 MMOL/L (ref 22–29)
CO2 SERPL-SCNC: 23.9 MMOL/L (ref 22–29)
COLOR FLD: ABNORMAL
COLOR FLD: ABNORMAL
CREAT SERPL-MCNC: 0.81 MG/DL (ref 0.76–1.27)
CREAT SERPL-MCNC: 0.85 MG/DL (ref 0.76–1.27)
CREAT SERPL-MCNC: 0.85 MG/DL (ref 0.76–1.27)
CREAT SERPL-MCNC: 0.86 MG/DL (ref 0.76–1.27)
CREAT SERPL-MCNC: 0.87 MG/DL (ref 0.76–1.27)
CREAT SERPL-MCNC: 0.91 MG/DL (ref 0.76–1.27)
CREAT SERPL-MCNC: 0.92 MG/DL (ref 0.76–1.27)
CREAT SERPL-MCNC: 0.94 MG/DL (ref 0.76–1.27)
CREAT SERPL-MCNC: 0.94 MG/DL (ref 0.76–1.27)
CREAT SERPL-MCNC: 0.96 MG/DL (ref 0.76–1.27)
CREAT SERPL-MCNC: 0.97 MG/DL (ref 0.76–1.27)
CREAT SERPL-MCNC: 1.04 MG/DL (ref 0.76–1.27)
CREAT SERPL-MCNC: 1.05 MG/DL (ref 0.76–1.27)
CREAT SERPL-MCNC: 1.1 MG/DL (ref 0.76–1.27)
CREAT SERPL-MCNC: 1.14 MG/DL (ref 0.76–1.27)
CREAT SERPL-MCNC: 1.18 MG/DL (ref 0.76–1.27)
CREAT SERPL-MCNC: 1.18 MG/DL (ref 0.76–1.27)
CYTO UR: NORMAL
CYTO UR: NORMAL
D-LACTATE SERPL-SCNC: 1.7 MMOL/L (ref 0.5–2)
D-LACTATE SERPL-SCNC: 2.6 MMOL/L (ref 0.5–2)
D-LACTATE SERPL-SCNC: 2.9 MMOL/L (ref 0.5–2)
D-LACTATE SERPL-SCNC: 2.9 MMOL/L (ref 0.5–2)
D-LACTATE SERPL-SCNC: 3.2 MMOL/L (ref 0.5–2)
D-LACTATE SERPL-SCNC: 3.3 MMOL/L (ref 0.5–2)
D-LACTATE SERPL-SCNC: 3.5 MMOL/L (ref 0.5–2)
DEPRECATED RDW RBC AUTO: 43.3 FL (ref 37–54)
DEPRECATED RDW RBC AUTO: 45.3 FL (ref 37–54)
DEPRECATED RDW RBC AUTO: 45.5 FL (ref 37–54)
DEPRECATED RDW RBC AUTO: 45.9 FL (ref 37–54)
DEPRECATED RDW RBC AUTO: 46.1 FL (ref 37–54)
DEPRECATED RDW RBC AUTO: 46.3 FL (ref 37–54)
DEPRECATED RDW RBC AUTO: 46.5 FL (ref 37–54)
DEPRECATED RDW RBC AUTO: 46.8 FL (ref 37–54)
DEPRECATED RDW RBC AUTO: 47.1 FL (ref 37–54)
DEPRECATED RDW RBC AUTO: 47.5 FL (ref 37–54)
DEPRECATED RDW RBC AUTO: 47.5 FL (ref 37–54)
DEPRECATED RDW RBC AUTO: 47.9 FL (ref 37–54)
DEPRECATED RDW RBC AUTO: 48.6 FL (ref 37–54)
DEPRECATED RDW RBC AUTO: 48.9 FL (ref 37–54)
DEPRECATED RDW RBC AUTO: 50.8 FL (ref 37–54)
EGFRCR SERPLBLD CKD-EPI 2021: 63.2 ML/MIN/1.73
EGFRCR SERPLBLD CKD-EPI 2021: 63.2 ML/MIN/1.73
EGFRCR SERPLBLD CKD-EPI 2021: 66 ML/MIN/1.73
EGFRCR SERPLBLD CKD-EPI 2021: 68.7 ML/MIN/1.73
EGFRCR SERPLBLD CKD-EPI 2021: 72.7 ML/MIN/1.73
EGFRCR SERPLBLD CKD-EPI 2021: 79.9 ML/MIN/1.73
EGFRCR SERPLBLD CKD-EPI 2021: 80.9 ML/MIN/1.73
EGFRCR SERPLBLD CKD-EPI 2021: 83 ML/MIN/1.73
EGFRCR SERPLBLD CKD-EPI 2021: 83 ML/MIN/1.73
EGFRCR SERPLBLD CKD-EPI 2021: 85.1 ML/MIN/1.73
EGFRCR SERPLBLD CKD-EPI 2021: 86.3 ML/MIN/1.73
EGFRCR SERPLBLD CKD-EPI 2021: 88.3 ML/MIN/1.73
EGFRCR SERPLBLD CKD-EPI 2021: 88.6 ML/MIN/1.73
EGFRCR SERPLBLD CKD-EPI 2021: 88.9 ML/MIN/1.73
EGFRCR SERPLBLD CKD-EPI 2021: 88.9 ML/MIN/1.73
EGFRCR SERPLBLD CKD-EPI 2021: 90.2 ML/MIN/1.73
EOSINOPHIL # BLD AUTO: 0.12 10*3/MM3 (ref 0–0.4)
EOSINOPHIL # BLD AUTO: 0.16 10*3/MM3 (ref 0–0.4)
EOSINOPHIL # BLD AUTO: 0.19 10*3/MM3 (ref 0–0.4)
EOSINOPHIL # BLD AUTO: 0.27 10*3/MM3 (ref 0–0.4)
EOSINOPHIL # BLD AUTO: 0.36 10*3/MM3 (ref 0–0.4)
EOSINOPHIL # BLD AUTO: 0.47 10*3/MM3 (ref 0–0.4)
EOSINOPHIL # BLD AUTO: 0.47 10*3/MM3 (ref 0–0.4)
EOSINOPHIL # BLD AUTO: 0.58 10*3/MM3 (ref 0–0.4)
EOSINOPHIL # BLD MANUAL: 0.3 10*3/MM3 (ref 0–0.4)
EOSINOPHIL NFR BLD AUTO: 0.7 % (ref 0.3–6.2)
EOSINOPHIL NFR BLD AUTO: 1.2 % (ref 0.3–6.2)
EOSINOPHIL NFR BLD AUTO: 1.5 % (ref 0.3–6.2)
EOSINOPHIL NFR BLD AUTO: 2.2 % (ref 0.3–6.2)
EOSINOPHIL NFR BLD AUTO: 3.1 % (ref 0.3–6.2)
EOSINOPHIL NFR BLD AUTO: 3.2 % (ref 0.3–6.2)
EOSINOPHIL NFR BLD AUTO: 3.7 % (ref 0.3–6.2)
EOSINOPHIL NFR BLD AUTO: 4.4 % (ref 0.3–6.2)
EOSINOPHIL NFR BLD MANUAL: 2 % (ref 0.3–6.2)
EOSINOPHIL NFR FLD MANUAL: 3 %
ERYTHROCYTE [DISTWIDTH] IN BLOOD BY AUTOMATED COUNT: 13.4 % (ref 12.3–15.4)
ERYTHROCYTE [DISTWIDTH] IN BLOOD BY AUTOMATED COUNT: 13.5 % (ref 12.3–15.4)
ERYTHROCYTE [DISTWIDTH] IN BLOOD BY AUTOMATED COUNT: 13.6 % (ref 12.3–15.4)
ERYTHROCYTE [DISTWIDTH] IN BLOOD BY AUTOMATED COUNT: 13.6 % (ref 12.3–15.4)
ERYTHROCYTE [DISTWIDTH] IN BLOOD BY AUTOMATED COUNT: 13.7 % (ref 12.3–15.4)
ERYTHROCYTE [DISTWIDTH] IN BLOOD BY AUTOMATED COUNT: 13.8 % (ref 12.3–15.4)
ERYTHROCYTE [DISTWIDTH] IN BLOOD BY AUTOMATED COUNT: 13.9 % (ref 12.3–15.4)
ERYTHROCYTE [DISTWIDTH] IN BLOOD BY AUTOMATED COUNT: 13.9 % (ref 12.3–15.4)
ERYTHROCYTE [DISTWIDTH] IN BLOOD BY AUTOMATED COUNT: 14 % (ref 12.3–15.4)
ERYTHROCYTE [DISTWIDTH] IN BLOOD BY AUTOMATED COUNT: 14.2 % (ref 12.3–15.4)
ERYTHROCYTE [DISTWIDTH] IN BLOOD BY AUTOMATED COUNT: 15.1 % (ref 12.3–15.4)
FLUAV SUBTYP SPEC NAA+PROBE: NOT DETECTED
FLUBV RNA ISLT QL NAA+PROBE: NOT DETECTED
GAS FLOW AIRWAY: 15 LPM
GAS FLOW AIRWAY: 2 LPM
GIE STN SPEC: NORMAL
GLOBULIN UR ELPH-MCNC: 3 GM/DL
GLOBULIN UR ELPH-MCNC: 3.1 GM/DL
GLOBULIN UR ELPH-MCNC: 3.3 GM/DL
GLOBULIN UR ELPH-MCNC: 3.4 GM/DL
GLOBULIN UR ELPH-MCNC: 3.5 GM/DL
GLOBULIN UR ELPH-MCNC: 3.5 GM/DL
GLOBULIN UR ELPH-MCNC: 4 GM/DL
GLOBULIN UR ELPH-MCNC: 4.4 GM/DL
GLUCOSE BLDC GLUCOMTR-MCNC: 100 MG/DL (ref 70–130)
GLUCOSE BLDC GLUCOMTR-MCNC: 100 MG/DL (ref 70–130)
GLUCOSE BLDC GLUCOMTR-MCNC: 105 MG/DL (ref 70–130)
GLUCOSE BLDC GLUCOMTR-MCNC: 106 MG/DL (ref 70–130)
GLUCOSE BLDC GLUCOMTR-MCNC: 117 MG/DL (ref 70–130)
GLUCOSE BLDC GLUCOMTR-MCNC: 118 MG/DL (ref 70–130)
GLUCOSE BLDC GLUCOMTR-MCNC: 122 MG/DL (ref 70–130)
GLUCOSE BLDC GLUCOMTR-MCNC: 125 MG/DL (ref 70–130)
GLUCOSE BLDC GLUCOMTR-MCNC: 126 MG/DL (ref 70–130)
GLUCOSE BLDC GLUCOMTR-MCNC: 130 MG/DL (ref 70–130)
GLUCOSE BLDC GLUCOMTR-MCNC: 132 MG/DL (ref 70–130)
GLUCOSE BLDC GLUCOMTR-MCNC: 136 MG/DL (ref 70–130)
GLUCOSE BLDC GLUCOMTR-MCNC: 141 MG/DL (ref 70–130)
GLUCOSE BLDC GLUCOMTR-MCNC: 141 MG/DL (ref 70–130)
GLUCOSE BLDC GLUCOMTR-MCNC: 142 MG/DL (ref 70–130)
GLUCOSE BLDC GLUCOMTR-MCNC: 147 MG/DL (ref 70–130)
GLUCOSE BLDC GLUCOMTR-MCNC: 147 MG/DL (ref 70–130)
GLUCOSE BLDC GLUCOMTR-MCNC: 148 MG/DL (ref 70–130)
GLUCOSE BLDC GLUCOMTR-MCNC: 149 MG/DL (ref 70–130)
GLUCOSE BLDC GLUCOMTR-MCNC: 149 MG/DL (ref 70–130)
GLUCOSE BLDC GLUCOMTR-MCNC: 155 MG/DL (ref 70–130)
GLUCOSE BLDC GLUCOMTR-MCNC: 156 MG/DL (ref 70–130)
GLUCOSE BLDC GLUCOMTR-MCNC: 158 MG/DL (ref 70–130)
GLUCOSE BLDC GLUCOMTR-MCNC: 163 MG/DL (ref 70–130)
GLUCOSE BLDC GLUCOMTR-MCNC: 167 MG/DL (ref 70–130)
GLUCOSE BLDC GLUCOMTR-MCNC: 171 MG/DL (ref 70–130)
GLUCOSE BLDC GLUCOMTR-MCNC: 173 MG/DL (ref 70–130)
GLUCOSE BLDC GLUCOMTR-MCNC: 179 MG/DL (ref 70–130)
GLUCOSE BLDC GLUCOMTR-MCNC: 184 MG/DL (ref 70–130)
GLUCOSE BLDC GLUCOMTR-MCNC: 193 MG/DL (ref 70–130)
GLUCOSE BLDC GLUCOMTR-MCNC: 202 MG/DL (ref 70–130)
GLUCOSE BLDC GLUCOMTR-MCNC: 207 MG/DL (ref 70–130)
GLUCOSE BLDC GLUCOMTR-MCNC: 210 MG/DL (ref 70–130)
GLUCOSE BLDC GLUCOMTR-MCNC: 212 MG/DL (ref 70–130)
GLUCOSE BLDC GLUCOMTR-MCNC: 219 MG/DL (ref 70–130)
GLUCOSE BLDC GLUCOMTR-MCNC: 219 MG/DL (ref 70–130)
GLUCOSE BLDC GLUCOMTR-MCNC: 241 MG/DL (ref 70–130)
GLUCOSE BLDC GLUCOMTR-MCNC: 251 MG/DL (ref 70–130)
GLUCOSE BLDC GLUCOMTR-MCNC: 251 MG/DL (ref 70–130)
GLUCOSE BLDC GLUCOMTR-MCNC: 272 MG/DL (ref 70–130)
GLUCOSE BLDC GLUCOMTR-MCNC: 294 MG/DL (ref 70–130)
GLUCOSE BLDC GLUCOMTR-MCNC: 309 MG/DL (ref 70–130)
GLUCOSE BLDC GLUCOMTR-MCNC: 318 MG/DL (ref 70–130)
GLUCOSE BLDC GLUCOMTR-MCNC: 333 MG/DL (ref 70–130)
GLUCOSE BLDC GLUCOMTR-MCNC: 334 MG/DL (ref 70–130)
GLUCOSE BLDC GLUCOMTR-MCNC: 349 MG/DL (ref 70–130)
GLUCOSE BLDC GLUCOMTR-MCNC: 359 MG/DL (ref 70–130)
GLUCOSE BLDC GLUCOMTR-MCNC: 361 MG/DL (ref 70–130)
GLUCOSE BLDC GLUCOMTR-MCNC: 369 MG/DL (ref 70–130)
GLUCOSE BLDC GLUCOMTR-MCNC: 372 MG/DL (ref 70–130)
GLUCOSE BLDC GLUCOMTR-MCNC: 385 MG/DL (ref 70–130)
GLUCOSE BLDC GLUCOMTR-MCNC: 411 MG/DL (ref 70–130)
GLUCOSE BLDC GLUCOMTR-MCNC: 77 MG/DL (ref 70–130)
GLUCOSE BLDC GLUCOMTR-MCNC: 97 MG/DL (ref 70–130)
GLUCOSE FLD-MCNC: 148 MG/DL
GLUCOSE SERPL-MCNC: 103 MG/DL (ref 65–99)
GLUCOSE SERPL-MCNC: 113 MG/DL (ref 65–99)
GLUCOSE SERPL-MCNC: 127 MG/DL (ref 65–99)
GLUCOSE SERPL-MCNC: 137 MG/DL (ref 65–99)
GLUCOSE SERPL-MCNC: 142 MG/DL (ref 65–99)
GLUCOSE SERPL-MCNC: 148 MG/DL (ref 65–99)
GLUCOSE SERPL-MCNC: 159 MG/DL (ref 65–99)
GLUCOSE SERPL-MCNC: 166 MG/DL (ref 65–99)
GLUCOSE SERPL-MCNC: 221 MG/DL (ref 65–99)
GLUCOSE SERPL-MCNC: 230 MG/DL (ref 65–99)
GLUCOSE SERPL-MCNC: 361 MG/DL (ref 65–99)
GLUCOSE SERPL-MCNC: 400 MG/DL (ref 65–99)
GLUCOSE SERPL-MCNC: 403 MG/DL (ref 65–99)
GLUCOSE SERPL-MCNC: 411 MG/DL (ref 65–99)
GLUCOSE SERPL-MCNC: 80 MG/DL (ref 65–99)
GLUCOSE SERPL-MCNC: 89 MG/DL (ref 65–99)
GLUCOSE SERPL-MCNC: NORMAL MG/DL
GRAM STN SPEC: NORMAL
HADV DNA SPEC NAA+PROBE: NOT DETECTED
HBA1C MFR BLD: 8.4 % (ref 4.8–5.6)
HBA1C MFR BLD: 8.7 % (ref 4.8–5.6)
HCO3 BLDA-SCNC: 21.6 MMOL/L (ref 22–28)
HCO3 BLDA-SCNC: 23.2 MMOL/L (ref 22–28)
HCO3 BLDA-SCNC: 23.5 MMOL/L (ref 22–28)
HCO3 BLDA-SCNC: 24.8 MMOL/L (ref 22–28)
HCO3 BLDA-SCNC: 24.9 MMOL/L (ref 22–28)
HCO3 BLDA-SCNC: 26.4 MMOL/L (ref 22–28)
HCO3 BLDA-SCNC: 28 MMOL/L (ref 22–28)
HCOV 229E RNA SPEC QL NAA+PROBE: NOT DETECTED
HCOV HKU1 RNA SPEC QL NAA+PROBE: NOT DETECTED
HCOV NL63 RNA SPEC QL NAA+PROBE: NOT DETECTED
HCOV OC43 RNA SPEC QL NAA+PROBE: NOT DETECTED
HCT VFR BLD AUTO: 47.1 % (ref 37.5–51)
HCT VFR BLD AUTO: 47.6 % (ref 37.5–51)
HCT VFR BLD AUTO: 47.7 % (ref 37.5–51)
HCT VFR BLD AUTO: 48 % (ref 37.5–51)
HCT VFR BLD AUTO: 49.3 % (ref 37.5–51)
HCT VFR BLD AUTO: 50 % (ref 37.5–51)
HCT VFR BLD AUTO: 50.7 % (ref 37.5–51)
HCT VFR BLD AUTO: 51 % (ref 37.5–51)
HCT VFR BLD AUTO: 51.7 % (ref 37.5–51)
HCT VFR BLD AUTO: 52 % (ref 37.5–51)
HCT VFR BLD AUTO: 52.3 % (ref 37.5–51)
HCT VFR BLD AUTO: 52.5 % (ref 37.5–51)
HCT VFR BLD AUTO: 52.9 % (ref 37.5–51)
HCT VFR BLD AUTO: 53.5 % (ref 37.5–51)
HCT VFR BLD AUTO: 54.5 % (ref 37.5–51)
HDLC SERPL-MCNC: 52 MG/DL
HGB BLD-MCNC: 14.8 G/DL (ref 13–17.7)
HGB BLD-MCNC: 15.3 G/DL (ref 13–17.7)
HGB BLD-MCNC: 15.5 G/DL (ref 13–17.7)
HGB BLD-MCNC: 15.5 G/DL (ref 13–17.7)
HGB BLD-MCNC: 16.3 G/DL (ref 13–17.7)
HGB BLD-MCNC: 16.4 G/DL (ref 13–17.7)
HGB BLD-MCNC: 16.4 G/DL (ref 13–17.7)
HGB BLD-MCNC: 16.6 G/DL (ref 13–17.7)
HGB BLD-MCNC: 17.1 G/DL (ref 13–17.7)
HGB BLD-MCNC: 17.2 G/DL (ref 13–17.7)
HGB BLD-MCNC: 17.5 G/DL (ref 13–17.7)
HGB BLD-MCNC: 17.7 G/DL (ref 13–17.7)
HGB BLD-MCNC: 17.8 G/DL (ref 13–17.7)
HMPV RNA NPH QL NAA+NON-PROBE: NOT DETECTED
HPIV1 RNA ISLT QL NAA+PROBE: NOT DETECTED
HPIV2 RNA SPEC QL NAA+PROBE: NOT DETECTED
HPIV3 RNA NPH QL NAA+PROBE: NOT DETECTED
HPIV4 P GENE NPH QL NAA+PROBE: NOT DETECTED
IMM GRANULOCYTES # BLD AUTO: 0.06 10*3/MM3 (ref 0–0.05)
IMM GRANULOCYTES # BLD AUTO: 0.07 10*3/MM3 (ref 0–0.05)
IMM GRANULOCYTES # BLD AUTO: 0.07 10*3/MM3 (ref 0–0.05)
IMM GRANULOCYTES # BLD AUTO: 0.16 10*3/MM3 (ref 0–0.05)
IMM GRANULOCYTES # BLD AUTO: 0.2 10*3/MM3 (ref 0–0.05)
IMM GRANULOCYTES # BLD AUTO: 0.2 10*3/MM3 (ref 0–0.05)
IMM GRANULOCYTES # BLD AUTO: 0.21 10*3/MM3 (ref 0–0.05)
IMM GRANULOCYTES # BLD AUTO: 0.29 10*3/MM3 (ref 0–0.05)
IMM GRANULOCYTES NFR BLD AUTO: 0.5 % (ref 0–0.5)
IMM GRANULOCYTES NFR BLD AUTO: 0.5 % (ref 0–0.5)
IMM GRANULOCYTES NFR BLD AUTO: 0.6 % (ref 0–0.5)
IMM GRANULOCYTES NFR BLD AUTO: 1.3 % (ref 0–0.5)
IMM GRANULOCYTES NFR BLD AUTO: 1.4 % (ref 0–0.5)
IMM GRANULOCYTES NFR BLD AUTO: 1.5 % (ref 0–0.5)
IMM GRANULOCYTES NFR BLD AUTO: 1.7 % (ref 0–0.5)
IMM GRANULOCYTES NFR BLD AUTO: 1.8 % (ref 0–0.5)
IMP & REVIEW OF LAB RESULTS: NORMAL
INHALED O2 CONCENTRATION: 100 %
INHALED O2 CONCENTRATION: 50 %
INHALED O2 CONCENTRATION: 50 %
INHALED O2 CONCENTRATION: 65 %
INHALED O2 CONCENTRATION: 70 %
INR PPP: 1.1 (ref 0.9–1.1)
INR PPP: 1.25 (ref 0.9–1.1)
L PNEUMO1 AG UR QL IA: NEGATIVE
LAB AP CASE REPORT: NORMAL
LAB AP CLINICAL INFORMATION: NORMAL
LAB AP DIAGNOSIS COMMENT: NORMAL
LAB AP DIAGNOSIS COMMENT: NORMAL
LAB AP SPECIAL STAINS: NORMAL
LAB AP SPECIAL STAINS: NORMAL
LDH FLD-CCNC: 530 U/L
LDLC SERPL CALC-MCNC: 59 MG/DL (ref 0–99)
LEFT ATRIUM VOLUME INDEX: 16.6 ML/M2
LYMPHOCYTES # BLD AUTO: 2.09 10*3/MM3 (ref 0.7–3.1)
LYMPHOCYTES # BLD AUTO: 2.17 10*3/MM3 (ref 0.7–3.1)
LYMPHOCYTES # BLD AUTO: 2.19 10*3/MM3 (ref 0.7–3.1)
LYMPHOCYTES # BLD AUTO: 2.62 10*3/MM3 (ref 0.7–3.1)
LYMPHOCYTES # BLD AUTO: 2.75 10*3/MM3 (ref 0.7–3.1)
LYMPHOCYTES # BLD AUTO: 2.76 10*3/MM3 (ref 0.7–3.1)
LYMPHOCYTES # BLD AUTO: 2.87 10*3/MM3 (ref 0.7–3.1)
LYMPHOCYTES # BLD AUTO: 2.9 10*3/MM3 (ref 0.7–3.1)
LYMPHOCYTES # BLD MANUAL: 0.77 10*3/MM3 (ref 0.7–3.1)
LYMPHOCYTES # BLD MANUAL: 2.09 10*3/MM3 (ref 0.7–3.1)
LYMPHOCYTES NFR BLD AUTO: 13.3 % (ref 19.6–45.3)
LYMPHOCYTES NFR BLD AUTO: 15.8 % (ref 19.6–45.3)
LYMPHOCYTES NFR BLD AUTO: 17.3 % (ref 19.6–45.3)
LYMPHOCYTES NFR BLD AUTO: 18.8 % (ref 19.6–45.3)
LYMPHOCYTES NFR BLD AUTO: 20.6 % (ref 19.6–45.3)
LYMPHOCYTES NFR BLD AUTO: 21.7 % (ref 19.6–45.3)
LYMPHOCYTES NFR BLD AUTO: 22.9 % (ref 19.6–45.3)
LYMPHOCYTES NFR BLD AUTO: 23.8 % (ref 19.6–45.3)
LYMPHOCYTES NFR BLD MANUAL: 16 % (ref 5–12)
LYMPHOCYTES NFR BLD MANUAL: 9.1 % (ref 5–12)
LYMPHOCYTES NFR FLD MANUAL: 3 %
LYMPHOCYTES NFR FLD MANUAL: 30 %
M PNEUMO IGG SER IA-ACNC: NOT DETECTED
MAGNESIUM SERPL-MCNC: 2.1 MG/DL (ref 1.6–2.4)
MAXIMAL PREDICTED HEART RATE: 142 BPM
MCH RBC QN AUTO: 29.7 PG (ref 26.6–33)
MCH RBC QN AUTO: 29.8 PG (ref 26.6–33)
MCH RBC QN AUTO: 29.8 PG (ref 26.6–33)
MCH RBC QN AUTO: 29.9 PG (ref 26.6–33)
MCH RBC QN AUTO: 30.2 PG (ref 26.6–33)
MCH RBC QN AUTO: 30.3 PG (ref 26.6–33)
MCH RBC QN AUTO: 30.3 PG (ref 26.6–33)
MCH RBC QN AUTO: 30.4 PG (ref 26.6–33)
MCH RBC QN AUTO: 30.5 PG (ref 26.6–33)
MCH RBC QN AUTO: 30.5 PG (ref 26.6–33)
MCH RBC QN AUTO: 30.7 PG (ref 26.6–33)
MCH RBC QN AUTO: 30.8 PG (ref 26.6–33)
MCHC RBC AUTO-ENTMCNC: 31.4 G/DL (ref 31.5–35.7)
MCHC RBC AUTO-ENTMCNC: 32.1 G/DL (ref 31.5–35.7)
MCHC RBC AUTO-ENTMCNC: 32.3 G/DL (ref 31.5–35.7)
MCHC RBC AUTO-ENTMCNC: 32.3 G/DL (ref 31.5–35.7)
MCHC RBC AUTO-ENTMCNC: 32.5 G/DL (ref 31.5–35.7)
MCHC RBC AUTO-ENTMCNC: 32.5 G/DL (ref 31.5–35.7)
MCHC RBC AUTO-ENTMCNC: 32.6 G/DL (ref 31.5–35.7)
MCHC RBC AUTO-ENTMCNC: 32.7 G/DL (ref 31.5–35.7)
MCHC RBC AUTO-ENTMCNC: 32.7 G/DL (ref 31.5–35.7)
MCHC RBC AUTO-ENTMCNC: 32.8 G/DL (ref 31.5–35.7)
MCHC RBC AUTO-ENTMCNC: 33.1 G/DL (ref 31.5–35.7)
MCHC RBC AUTO-ENTMCNC: 33.1 G/DL (ref 31.5–35.7)
MCHC RBC AUTO-ENTMCNC: 33.3 G/DL (ref 31.5–35.7)
MCHC RBC AUTO-ENTMCNC: 33.7 G/DL (ref 31.5–35.7)
MCHC RBC AUTO-ENTMCNC: 33.8 G/DL (ref 31.5–35.7)
MCV RBC AUTO: 89.9 FL (ref 79–97)
MCV RBC AUTO: 91.4 FL (ref 79–97)
MCV RBC AUTO: 91.5 FL (ref 79–97)
MCV RBC AUTO: 91.6 FL (ref 79–97)
MCV RBC AUTO: 91.7 FL (ref 79–97)
MCV RBC AUTO: 92.1 FL (ref 79–97)
MCV RBC AUTO: 92.5 FL (ref 79–97)
MCV RBC AUTO: 92.6 FL (ref 79–97)
MCV RBC AUTO: 93 FL (ref 79–97)
MCV RBC AUTO: 93.3 FL (ref 79–97)
MCV RBC AUTO: 93.6 FL (ref 79–97)
MCV RBC AUTO: 93.6 FL (ref 79–97)
MCV RBC AUTO: 95 FL (ref 79–97)
METHOD: ABNORMAL
METHOD: ABNORMAL
MODALITY: ABNORMAL
MONOCYTES # BLD AUTO: 1.43 10*3/MM3 (ref 0.1–0.9)
MONOCYTES # BLD AUTO: 1.46 10*3/MM3 (ref 0.1–0.9)
MONOCYTES # BLD AUTO: 1.5 10*3/MM3 (ref 0.1–0.9)
MONOCYTES # BLD AUTO: 1.53 10*3/MM3 (ref 0.1–0.9)
MONOCYTES # BLD AUTO: 1.76 10*3/MM3 (ref 0.1–0.9)
MONOCYTES # BLD AUTO: 1.98 10*3/MM3 (ref 0.1–0.9)
MONOCYTES # BLD AUTO: 2.06 10*3/MM3 (ref 0.1–0.9)
MONOCYTES # BLD AUTO: 2.53 10*3/MM3 (ref 0.1–0.9)
MONOCYTES # BLD: 2.35 10*3/MM3 (ref 0.1–0.9)
MONOCYTES # BLD: 2.38 10*3/MM3 (ref 0.1–0.9)
MONOCYTES NFR BLD AUTO: 11.1 % (ref 5–12)
MONOCYTES NFR BLD AUTO: 11.3 % (ref 5–12)
MONOCYTES NFR BLD AUTO: 12.1 % (ref 5–12)
MONOCYTES NFR BLD AUTO: 12.9 % (ref 5–12)
MONOCYTES NFR BLD AUTO: 14.1 % (ref 5–12)
MONOCYTES NFR BLD AUTO: 14.3 % (ref 5–12)
MONOCYTES NFR BLD AUTO: 14.5 % (ref 5–12)
MONOCYTES NFR BLD AUTO: 15.5 % (ref 5–12)
MONOCYTES NFR FLD: 10 %
MONOS+MACROS NFR FLD: 18 %
MONOS+MACROS NFR FLD: 70 %
MRSA DNA SPEC QL NAA+PROBE: NORMAL
NEUTROPHILS # BLD AUTO: 10.13 10*3/MM3 (ref 1.7–7)
NEUTROPHILS # BLD AUTO: 22.7 10*3/MM3 (ref 1.7–7)
NEUTROPHILS NFR BLD AUTO: 11.07 10*3/MM3 (ref 1.7–7)
NEUTROPHILS NFR BLD AUTO: 58.2 % (ref 42.7–76)
NEUTROPHILS NFR BLD AUTO: 6.76 10*3/MM3 (ref 1.7–7)
NEUTROPHILS NFR BLD AUTO: 60.1 % (ref 42.7–76)
NEUTROPHILS NFR BLD AUTO: 60.7 % (ref 42.7–76)
NEUTROPHILS NFR BLD AUTO: 61.1 % (ref 42.7–76)
NEUTROPHILS NFR BLD AUTO: 62.8 % (ref 42.7–76)
NEUTROPHILS NFR BLD AUTO: 63.2 % (ref 42.7–76)
NEUTROPHILS NFR BLD AUTO: 65.9 % (ref 42.7–76)
NEUTROPHILS NFR BLD AUTO: 68 % (ref 42.7–76)
NEUTROPHILS NFR BLD AUTO: 7.61 10*3/MM3 (ref 1.7–7)
NEUTROPHILS NFR BLD AUTO: 7.63 10*3/MM3 (ref 1.7–7)
NEUTROPHILS NFR BLD AUTO: 8.01 10*3/MM3 (ref 1.7–7)
NEUTROPHILS NFR BLD AUTO: 8.02 10*3/MM3 (ref 1.7–7)
NEUTROPHILS NFR BLD AUTO: 8.94 10*3/MM3 (ref 1.7–7)
NEUTROPHILS NFR BLD AUTO: 9.13 10*3/MM3 (ref 1.7–7)
NEUTROPHILS NFR BLD MANUAL: 68 % (ref 42.7–76)
NEUTROPHILS NFR BLD MANUAL: 87.9 % (ref 42.7–76)
NEUTROPHILS NFR FLD MANUAL: 66 %
NRBC BLD AUTO-RTO: 0 /100 WBC (ref 0–0.2)
NRBC BLD AUTO-RTO: 0.2 /100 WBC (ref 0–0.2)
NT-PROBNP SERPL-MCNC: 103 PG/ML (ref 0–1800)
NT-PROBNP SERPL-MCNC: 1613 PG/ML (ref 0–1800)
NT-PROBNP SERPL-MCNC: 162 PG/ML (ref 0–1800)
NUC CELL # FLD: 1207 /MM3
NUC CELL # FLD: 495 /MM3
O2 A-A PPRESDIFF RESPIRATORY: 0.2 MMHG
O2 A-A PPRESDIFF RESPIRATORY: 0.3 MMHG
O2 A-A PPRESDIFF RESPIRATORY: 0.3 MMHG
PATH REPORT.FINAL DX SPEC: NORMAL
PATH REPORT.GROSS SPEC: NORMAL
PCO2 BLDA: 37 MM HG (ref 35–45)
PCO2 BLDA: 40.9 MM HG (ref 35–45)
PCO2 BLDA: 41.6 MM HG (ref 35–45)
PCO2 BLDA: 42.3 MM HG (ref 35–45)
PCO2 BLDA: 47.3 MM HG (ref 35–45)
PCO2 BLDA: 47.8 MM HG (ref 35–45)
PCO2 BLDA: 68.9 MM HG (ref 35–45)
PEEP RESPIRATORY: 10 CM[H2O]
PEEP RESPIRATORY: 10 CM[H2O]
PEEP RESPIRATORY: 7.5 CM[H2O]
PH BLDA: 7.22 PH UNITS (ref 7.35–7.45)
PH BLDA: 7.32 PH UNITS (ref 7.35–7.45)
PH BLDA: 7.35 PH UNITS (ref 7.35–7.45)
PH BLDA: 7.35 PH UNITS (ref 7.35–7.45)
PH BLDA: 7.36 PH UNITS (ref 7.35–7.45)
PH BLDA: 7.37 PH UNITS (ref 7.35–7.45)
PH BLDA: 7.39 PH UNITS (ref 7.35–7.45)
PHOSPHATE SERPL-MCNC: 2.5 MG/DL (ref 2.5–4.5)
PLAT MORPH BLD: NORMAL
PLAT MORPH BLD: NORMAL
PLATELET # BLD AUTO: 146 10*3/MM3 (ref 140–450)
PLATELET # BLD AUTO: 154 10*3/MM3 (ref 140–450)
PLATELET # BLD AUTO: 163 10*3/MM3 (ref 140–450)
PLATELET # BLD AUTO: 164 10*3/MM3 (ref 140–450)
PLATELET # BLD AUTO: 173 10*3/MM3 (ref 140–450)
PLATELET # BLD AUTO: 174 10*3/MM3 (ref 140–450)
PLATELET # BLD AUTO: 178 10*3/MM3 (ref 140–450)
PLATELET # BLD AUTO: 182 10*3/MM3 (ref 140–450)
PLATELET # BLD AUTO: 189 10*3/MM3 (ref 140–450)
PLATELET # BLD AUTO: 193 10*3/MM3 (ref 140–450)
PLATELET # BLD AUTO: 206 10*3/MM3 (ref 140–450)
PLATELET # BLD AUTO: 212 10*3/MM3 (ref 140–450)
PLATELET # BLD AUTO: 222 10*3/MM3 (ref 140–450)
PLATELET # BLD AUTO: 223 10*3/MM3 (ref 140–450)
PLATELET # BLD AUTO: 231 10*3/MM3 (ref 140–450)
PMV BLD AUTO: 10 FL (ref 6–12)
PMV BLD AUTO: 10.1 FL (ref 6–12)
PMV BLD AUTO: 10.3 FL (ref 6–12)
PMV BLD AUTO: 10.3 FL (ref 6–12)
PMV BLD AUTO: 10.4 FL (ref 6–12)
PMV BLD AUTO: 10.6 FL (ref 6–12)
PMV BLD AUTO: 10.6 FL (ref 6–12)
PMV BLD AUTO: 10.7 FL (ref 6–12)
PMV BLD AUTO: 10.8 FL (ref 6–12)
PMV BLD AUTO: 11.2 FL (ref 6–12)
PMV BLD AUTO: 11.3 FL (ref 6–12)
PMV BLD AUTO: 11.4 FL (ref 6–12)
PO2 BLDA: 137 MM HG (ref 80–100)
PO2 BLDA: 242 MM HG (ref 80–100)
PO2 BLDA: 70 MM HG (ref 80–100)
PO2 BLDA: 72.6 MM HG (ref 80–100)
PO2 BLDA: 80.7 MM HG (ref 80–100)
PO2 BLDA: 81.4 MM HG (ref 80–100)
PO2 BLDA: 84.4 MM HG (ref 80–100)
POTASSIUM SERPL-SCNC: 3.5 MMOL/L (ref 3.5–5.2)
POTASSIUM SERPL-SCNC: 3.8 MMOL/L (ref 3.5–5.2)
POTASSIUM SERPL-SCNC: 3.8 MMOL/L (ref 3.5–5.2)
POTASSIUM SERPL-SCNC: 3.9 MMOL/L (ref 3.5–5.2)
POTASSIUM SERPL-SCNC: 3.9 MMOL/L (ref 3.5–5.2)
POTASSIUM SERPL-SCNC: 4.1 MMOL/L (ref 3.5–5.2)
POTASSIUM SERPL-SCNC: 4.2 MMOL/L (ref 3.5–5.2)
POTASSIUM SERPL-SCNC: 4.3 MMOL/L (ref 3.5–5.2)
POTASSIUM SERPL-SCNC: 4.3 MMOL/L (ref 3.5–5.2)
POTASSIUM SERPL-SCNC: 4.4 MMOL/L (ref 3.5–5.2)
POTASSIUM SERPL-SCNC: 4.7 MMOL/L (ref 3.5–5.2)
POTASSIUM SERPL-SCNC: 4.7 MMOL/L (ref 3.5–5.2)
POTASSIUM SERPL-SCNC: 5.4 MMOL/L (ref 3.5–5.2)
POTASSIUM SERPL-SCNC: 5.4 MMOL/L (ref 3.5–5.2)
POTASSIUM SERPL-SCNC: 5.8 MMOL/L (ref 3.5–5.2)
POTASSIUM SERPL-SCNC: NORMAL MMOL/L
PROCALCITONIN SERPL-MCNC: 0.06 NG/ML (ref 0–0.25)
PROCALCITONIN SERPL-MCNC: 0.08 NG/ML (ref 0–0.25)
PROCALCITONIN SERPL-MCNC: 0.2 NG/ML (ref 0–0.25)
PROT FLD-MCNC: 4.2 G/DL
PROT SERPL-MCNC: 5.4 G/DL (ref 6–8.5)
PROT SERPL-MCNC: 5.9 G/DL (ref 6–8.5)
PROT SERPL-MCNC: 6 G/DL (ref 6–8.5)
PROT SERPL-MCNC: 6.2 G/DL (ref 6–8.5)
PROT SERPL-MCNC: 6.4 G/DL (ref 6–8.5)
PROT SERPL-MCNC: 6.9 G/DL (ref 6–8.5)
PROT SERPL-MCNC: 7.2 G/DL (ref 6–8.5)
PROT SERPL-MCNC: 7.5 G/DL (ref 6–8.5)
PROTHROMBIN TIME: 14 SECONDS (ref 11.7–14.2)
PROTHROMBIN TIME: 15.6 SECONDS (ref 11.7–14.2)
QT INTERVAL: 287 MS
QT INTERVAL: 309 MS
QT INTERVAL: 372 MS
QT INTERVAL: 393 MS
RBC # BLD AUTO: 4.96 10*6/MM3 (ref 4.14–5.8)
RBC # BLD AUTO: 5.13 10*6/MM3 (ref 4.14–5.8)
RBC # BLD AUTO: 5.13 10*6/MM3 (ref 4.14–5.8)
RBC # BLD AUTO: 5.14 10*6/MM3 (ref 4.14–5.8)
RBC # BLD AUTO: 5.38 10*6/MM3 (ref 4.14–5.8)
RBC # BLD AUTO: 5.43 10*6/MM3 (ref 4.14–5.8)
RBC # BLD AUTO: 5.45 10*6/MM3 (ref 4.14–5.8)
RBC # BLD AUTO: 5.53 10*6/MM3 (ref 4.14–5.8)
RBC # BLD AUTO: 5.67 10*6/MM3 (ref 4.14–5.8)
RBC # BLD AUTO: 5.68 10*6/MM3 (ref 4.14–5.8)
RBC # BLD AUTO: 5.7 10*6/MM3 (ref 4.14–5.8)
RBC # BLD AUTO: 5.72 10*6/MM3 (ref 4.14–5.8)
RBC # BLD AUTO: 5.75 10*6/MM3 (ref 4.14–5.8)
RBC # BLD AUTO: 5.81 10*6/MM3 (ref 4.14–5.8)
RBC # BLD AUTO: 5.89 10*6/MM3 (ref 4.14–5.8)
RBC # FLD AUTO: 4850 /MM3
RBC # FLD AUTO: ABNORMAL /MM3
RBC MORPH BLD: NORMAL
RBC MORPH BLD: NORMAL
RHINOVIRUS RNA SPEC NAA+PROBE: NOT DETECTED
RSV RNA NPH QL NAA+NON-PROBE: NOT DETECTED
S PNEUM AG SPEC QL LA: NEGATIVE
SAO2 % BLDCOA: 92.8 % (ref 92–99)
SAO2 % BLDCOA: 93.4 % (ref 92–99)
SAO2 % BLDCOA: 93.7 % (ref 92–99)
SAO2 % BLDCOA: 95.3 % (ref 92–99)
SAO2 % BLDCOA: 95.7 % (ref 92–99)
SAO2 % BLDCOA: 99.1 % (ref 92–99)
SAO2 % BLDCOA: 99.8 % (ref 92–99)
SARS-COV-2 RNA NPH QL NAA+NON-PROBE: NOT DETECTED
SARS-COV-2 RNA PNL SPEC NAA+PROBE: NOT DETECTED
SET MECH RESP RATE: 18
SET MECH RESP RATE: 1818
SODIUM SERPL-SCNC: 133 MMOL/L (ref 136–145)
SODIUM SERPL-SCNC: 134 MMOL/L (ref 136–145)
SODIUM SERPL-SCNC: 134 MMOL/L (ref 136–145)
SODIUM SERPL-SCNC: 135 MMOL/L (ref 136–145)
SODIUM SERPL-SCNC: 136 MMOL/L (ref 136–145)
SODIUM SERPL-SCNC: 137 MMOL/L (ref 136–145)
SODIUM SERPL-SCNC: 138 MMOL/L (ref 134–144)
SODIUM SERPL-SCNC: 138 MMOL/L (ref 136–145)
SODIUM SERPL-SCNC: 138 MMOL/L (ref 136–145)
SODIUM SERPL-SCNC: 139 MMOL/L (ref 136–145)
SODIUM SERPL-SCNC: 140 MMOL/L (ref 136–145)
SODIUM SERPL-SCNC: 140 MMOL/L (ref 136–145)
SODIUM SERPL-SCNC: 141 MMOL/L (ref 134–144)
SODIUM SERPL-SCNC: 141 MMOL/L (ref 136–145)
STRESS TARGET HR: 121 BPM
T4 FREE SERPL-MCNC: 1.04 NG/DL (ref 0.93–1.7)
T4 FREE SERPL-MCNC: 1.15 NG/DL (ref 0.82–1.77)
TOTAL RATE: 18 BREATHS/MINUTE
TOTAL RATE: 18 BREATHS/MINUTE
TOTAL RATE: 19 BREATHS/MINUTE
TOTAL RATE: 20 BREATHS/MINUTE
TOTAL RATE: 32 BREATHS/MINUTE
TRIGL SERPL-MCNC: 177 MG/DL (ref 0–150)
TRIGL SERPL-MCNC: 84 MG/DL (ref 0–149)
TROPONIN T SERPL-MCNC: <0.01 NG/ML (ref 0–0.03)
TSH SERPL DL<=0.005 MIU/L-ACNC: 1.86 UIU/ML (ref 0.45–4.5)
TSH SERPL DL<=0.05 MIU/L-ACNC: 0.35 UIU/ML (ref 0.27–4.2)
URATE SERPL-MCNC: 4.1 MG/DL (ref 3.4–7)
URATE SERPL-MCNC: 4.5 MG/DL (ref 3.4–7)
URATE SERPL-MCNC: 5.7 MG/DL (ref 3.4–7)
URATE SERPL-MCNC: 7.1 MG/DL (ref 3.4–7)
URATE SERPL-MCNC: 7.3 MG/DL (ref 3.4–7)
URATE SERPL-MCNC: 7.5 MG/DL (ref 3.4–7)
URATE SERPL-MCNC: 8.1 MG/DL (ref 3.4–7)
VARIANT LYMPHS NFR BLD MANUAL: 14 % (ref 19.6–45.3)
VARIANT LYMPHS NFR BLD MANUAL: 3 % (ref 19.6–45.3)
VENTILATOR MODE: ABNORMAL
VENTILATOR MODE: AC
VLDLC SERPL CALC-MCNC: 16 MG/DL (ref 5–40)
VT ON VENT VENT: 0.6 ML
VT ON VENT VENT: 550 ML
VT ON VENT VENT: 600 ML
WBC MORPH BLD: NORMAL
WBC MORPH BLD: NORMAL
WBC NRBC COR # BLD: 10.72 10*3/MM3 (ref 3.4–10.8)
WBC NRBC COR # BLD: 11.61 10*3/MM3 (ref 3.4–10.8)
WBC NRBC COR # BLD: 12.11 10*3/MM3 (ref 3.4–10.8)
WBC NRBC COR # BLD: 12.68 10*3/MM3 (ref 3.4–10.8)
WBC NRBC COR # BLD: 12.69 10*3/MM3 (ref 3.4–10.8)
WBC NRBC COR # BLD: 13.2 10*3/MM3 (ref 3.4–10.8)
WBC NRBC COR # BLD: 13.56 10*3/MM3 (ref 3.4–10.8)
WBC NRBC COR # BLD: 13.85 10*3/MM3 (ref 3.4–10.8)
WBC NRBC COR # BLD: 14.63 10*3/MM3 (ref 3.4–10.8)
WBC NRBC COR # BLD: 14.9 10*3/MM3 (ref 3.4–10.8)
WBC NRBC COR # BLD: 16.3 10*3/MM3 (ref 3.4–10.8)
WBC NRBC COR # BLD: 16.67 10*3/MM3 (ref 3.4–10.8)
WBC NRBC COR # BLD: 17.24 10*3/MM3 (ref 3.4–10.8)
WBC NRBC COR # BLD: 18.15 10*3/MM3 (ref 3.4–10.8)
WBC NRBC COR # BLD: 25.83 10*3/MM3 (ref 3.4–10.8)

## 2022-01-01 PROCEDURE — 89051 BODY FLUID CELL COUNT: CPT | Performed by: INTERNAL MEDICINE

## 2022-01-01 PROCEDURE — 25010000002 FUROSEMIDE PER 20 MG: Performed by: NURSE PRACTITIONER

## 2022-01-01 PROCEDURE — 94799 UNLISTED PULMONARY SVC/PX: CPT

## 2022-01-01 PROCEDURE — 82962 GLUCOSE BLOOD TEST: CPT

## 2022-01-01 PROCEDURE — 99223 1ST HOSP IP/OBS HIGH 75: CPT | Performed by: NURSE PRACTITIONER

## 2022-01-01 PROCEDURE — 25010000002 CEFTRIAXONE PER 250 MG: Performed by: EMERGENCY MEDICINE

## 2022-01-01 PROCEDURE — 94003 VENT MGMT INPAT SUBQ DAY: CPT

## 2022-01-01 PROCEDURE — 80053 COMPREHEN METABOLIC PANEL: CPT | Performed by: INTERNAL MEDICINE

## 2022-01-01 PROCEDURE — 88112 CYTOPATH CELL ENHANCE TECH: CPT | Performed by: INTERNAL MEDICINE

## 2022-01-01 PROCEDURE — 71045 X-RAY EXAM CHEST 1 VIEW: CPT

## 2022-01-01 PROCEDURE — 71046 X-RAY EXAM CHEST 2 VIEWS: CPT

## 2022-01-01 PROCEDURE — 82803 BLOOD GASES ANY COMBINATION: CPT

## 2022-01-01 PROCEDURE — 99222 1ST HOSP IP/OBS MODERATE 55: CPT | Performed by: INTERNAL MEDICINE

## 2022-01-01 PROCEDURE — 99213 OFFICE O/P EST LOW 20 MIN: CPT | Performed by: INTERNAL MEDICINE

## 2022-01-01 PROCEDURE — 94664 DEMO&/EVAL PT USE INHALER: CPT

## 2022-01-01 PROCEDURE — 80053 COMPREHEN METABOLIC PANEL: CPT | Performed by: EMERGENCY MEDICINE

## 2022-01-01 PROCEDURE — 94002 VENT MGMT INPAT INIT DAY: CPT

## 2022-01-01 PROCEDURE — 94761 N-INVAS EAR/PLS OXIMETRY MLT: CPT

## 2022-01-01 PROCEDURE — 63710000001 INSULIN LISPRO (HUMAN) PER 5 UNITS: Performed by: INTERNAL MEDICINE

## 2022-01-01 PROCEDURE — 25010000002 PROPOFOL 10 MG/ML EMULSION: Performed by: NURSE ANESTHETIST, CERTIFIED REGISTERED

## 2022-01-01 PROCEDURE — 99232 SBSQ HOSP IP/OBS MODERATE 35: CPT | Performed by: INTERNAL MEDICINE

## 2022-01-01 PROCEDURE — 82042 OTHER SOURCE ALBUMIN QUAN EA: CPT | Performed by: INTERNAL MEDICINE

## 2022-01-01 PROCEDURE — 87205 SMEAR GRAM STAIN: CPT | Performed by: NURSE PRACTITIONER

## 2022-01-01 PROCEDURE — 32550 INSERT PLEURAL CATH: CPT | Performed by: THORACIC SURGERY (CARDIOTHORACIC VASCULAR SURGERY)

## 2022-01-01 PROCEDURE — 99214 OFFICE O/P EST MOD 30 MIN: CPT | Performed by: INTERNAL MEDICINE

## 2022-01-01 PROCEDURE — 80048 BASIC METABOLIC PNL TOTAL CA: CPT | Performed by: INTERNAL MEDICINE

## 2022-01-01 PROCEDURE — 83735 ASSAY OF MAGNESIUM: CPT | Performed by: NURSE PRACTITIONER

## 2022-01-01 PROCEDURE — 25010000002 HEPARIN (PORCINE) PER 1000 UNITS: Performed by: STUDENT IN AN ORGANIZED HEALTH CARE EDUCATION/TRAINING PROGRAM

## 2022-01-01 PROCEDURE — 25010000002 ENOXAPARIN PER 10 MG: Performed by: INTERNAL MEDICINE

## 2022-01-01 PROCEDURE — 36600 WITHDRAWAL OF ARTERIAL BLOOD: CPT

## 2022-01-01 PROCEDURE — 84550 ASSAY OF BLOOD/URIC ACID: CPT | Performed by: STUDENT IN AN ORGANIZED HEALTH CARE EDUCATION/TRAINING PROGRAM

## 2022-01-01 PROCEDURE — 85027 COMPLETE CBC AUTOMATED: CPT | Performed by: INTERNAL MEDICINE

## 2022-01-01 PROCEDURE — 25010000002 PROPOFOL 10 MG/ML EMULSION: Performed by: STUDENT IN AN ORGANIZED HEALTH CARE EDUCATION/TRAINING PROGRAM

## 2022-01-01 PROCEDURE — 0 IOPAMIDOL PER 1 ML: Performed by: EMERGENCY MEDICINE

## 2022-01-01 PROCEDURE — 87899 AGENT NOS ASSAY W/OPTIC: CPT | Performed by: NURSE PRACTITIONER

## 2022-01-01 PROCEDURE — 25010000002 METHYLPREDNISOLONE PER 40 MG: Performed by: INTERNAL MEDICINE

## 2022-01-01 PROCEDURE — 85610 PROTHROMBIN TIME: CPT | Performed by: INTERNAL MEDICINE

## 2022-01-01 PROCEDURE — 99231 SBSQ HOSP IP/OBS SF/LOW 25: CPT | Performed by: INTERNAL MEDICINE

## 2022-01-01 PROCEDURE — 25010000002 HYDROMORPHONE PER 4 MG: Performed by: STUDENT IN AN ORGANIZED HEALTH CARE EDUCATION/TRAINING PROGRAM

## 2022-01-01 PROCEDURE — 80048 BASIC METABOLIC PNL TOTAL CA: CPT | Performed by: HOSPITALIST

## 2022-01-01 PROCEDURE — 84439 ASSAY OF FREE THYROXINE: CPT | Performed by: INTERNAL MEDICINE

## 2022-01-01 PROCEDURE — 25010000002 CEFTRIAXONE PER 250 MG: Performed by: NURSE PRACTITIONER

## 2022-01-01 PROCEDURE — 84484 ASSAY OF TROPONIN QUANT: CPT | Performed by: INTERNAL MEDICINE

## 2022-01-01 PROCEDURE — 25010000002 FENTANYL CITRATE (PF) 50 MCG/ML SOLUTION: Performed by: ANESTHESIOLOGY

## 2022-01-01 PROCEDURE — 83605 ASSAY OF LACTIC ACID: CPT | Performed by: NURSE PRACTITIONER

## 2022-01-01 PROCEDURE — 85025 COMPLETE CBC W/AUTO DIFF WBC: CPT | Performed by: INTERNAL MEDICINE

## 2022-01-01 PROCEDURE — 25010000002 CEFAZOLIN IN DEXTROSE 2-4 GM/100ML-% SOLUTION: Performed by: ANESTHESIOLOGY

## 2022-01-01 PROCEDURE — 25010000002 PROPOFOL 10 MG/ML EMULSION

## 2022-01-01 PROCEDURE — 87205 SMEAR GRAM STAIN: CPT | Performed by: INTERNAL MEDICINE

## 2022-01-01 PROCEDURE — 76942 ECHO GUIDE FOR BIOPSY: CPT

## 2022-01-01 PROCEDURE — 99233 SBSQ HOSP IP/OBS HIGH 50: CPT | Performed by: INTERNAL MEDICINE

## 2022-01-01 PROCEDURE — 84100 ASSAY OF PHOSPHORUS: CPT | Performed by: INTERNAL MEDICINE

## 2022-01-01 PROCEDURE — 80053 COMPREHEN METABOLIC PANEL: CPT | Performed by: NURSE PRACTITIONER

## 2022-01-01 PROCEDURE — 99214 OFFICE O/P EST MOD 30 MIN: CPT | Performed by: NURSE PRACTITIONER

## 2022-01-01 PROCEDURE — 25010000002 CEFAZOLIN PER 500 MG: Performed by: STUDENT IN AN ORGANIZED HEALTH CARE EDUCATION/TRAINING PROGRAM

## 2022-01-01 PROCEDURE — 84478 ASSAY OF TRIGLYCERIDES: CPT | Performed by: INTERNAL MEDICINE

## 2022-01-01 PROCEDURE — 0W9930Z DRAINAGE OF RIGHT PLEURAL CAVITY WITH DRAINAGE DEVICE, PERCUTANEOUS APPROACH: ICD-10-PCS | Performed by: THORACIC SURGERY (CARDIOTHORACIC VASCULAR SURGERY)

## 2022-01-01 PROCEDURE — 85730 THROMBOPLASTIN TIME PARTIAL: CPT | Performed by: INTERNAL MEDICINE

## 2022-01-01 PROCEDURE — 87206 SMEAR FLUORESCENT/ACID STAI: CPT | Performed by: INTERNAL MEDICINE

## 2022-01-01 PROCEDURE — 88305 TISSUE EXAM BY PATHOLOGIST: CPT | Performed by: INTERNAL MEDICINE

## 2022-01-01 PROCEDURE — 83880 ASSAY OF NATRIURETIC PEPTIDE: CPT | Performed by: INTERNAL MEDICINE

## 2022-01-01 PROCEDURE — C1894 INTRO/SHEATH, NON-LASER: HCPCS | Performed by: STUDENT IN AN ORGANIZED HEALTH CARE EDUCATION/TRAINING PROGRAM

## 2022-01-01 PROCEDURE — 25010000002 FENTANYL CITRATE (PF) 50 MCG/ML SOLUTION: Performed by: INTERNAL MEDICINE

## 2022-01-01 PROCEDURE — 93306 TTE W/DOPPLER COMPLETE: CPT

## 2022-01-01 PROCEDURE — 25010000002 MORPHINE PER 10 MG: Performed by: NURSE PRACTITIONER

## 2022-01-01 PROCEDURE — 93005 ELECTROCARDIOGRAM TRACING: CPT | Performed by: EMERGENCY MEDICINE

## 2022-01-01 PROCEDURE — 0JH60WZ INSERTION OF TOTALLY IMPLANTABLE VASCULAR ACCESS DEVICE INTO CHEST SUBCUTANEOUS TISSUE AND FASCIA, OPEN APPROACH: ICD-10-PCS | Performed by: STUDENT IN AN ORGANIZED HEALTH CARE EDUCATION/TRAINING PROGRAM

## 2022-01-01 PROCEDURE — 87116 MYCOBACTERIA CULTURE: CPT | Performed by: INTERNAL MEDICINE

## 2022-01-01 PROCEDURE — 02HV33Z INSERTION OF INFUSION DEVICE INTO SUPERIOR VENA CAVA, PERCUTANEOUS APPROACH: ICD-10-PCS | Performed by: STUDENT IN AN ORGANIZED HEALTH CARE EDUCATION/TRAINING PROGRAM

## 2022-01-01 PROCEDURE — 93005 ELECTROCARDIOGRAM TRACING: CPT | Performed by: NURSE PRACTITIONER

## 2022-01-01 PROCEDURE — 31500 INSERT EMERGENCY AIRWAY: CPT | Performed by: ANESTHESIOLOGY

## 2022-01-01 PROCEDURE — 94760 N-INVAS EAR/PLS OXIMETRY 1: CPT

## 2022-01-01 PROCEDURE — 87015 SPECIMEN INFECT AGNT CONCNTJ: CPT | Performed by: INTERNAL MEDICINE

## 2022-01-01 PROCEDURE — 87070 CULTURE OTHR SPECIMN AEROBIC: CPT | Performed by: INTERNAL MEDICINE

## 2022-01-01 PROCEDURE — 83615 LACTATE (LD) (LDH) ENZYME: CPT | Performed by: INTERNAL MEDICINE

## 2022-01-01 PROCEDURE — 25010000002 LORAZEPAM PER 2 MG: Performed by: INTERNAL MEDICINE

## 2022-01-01 PROCEDURE — 94640 AIRWAY INHALATION TREATMENT: CPT

## 2022-01-01 PROCEDURE — 99232 SBSQ HOSP IP/OBS MODERATE 35: CPT | Performed by: NURSE PRACTITIONER

## 2022-01-01 PROCEDURE — 88342 IMHCHEM/IMCYTCHM 1ST ANTB: CPT | Performed by: INTERNAL MEDICINE

## 2022-01-01 PROCEDURE — 93010 ELECTROCARDIOGRAM REPORT: CPT | Performed by: INTERNAL MEDICINE

## 2022-01-01 PROCEDURE — 99285 EMERGENCY DEPT VISIT HI MDM: CPT

## 2022-01-01 PROCEDURE — 84145 PROCALCITONIN (PCT): CPT | Performed by: INTERNAL MEDICINE

## 2022-01-01 PROCEDURE — 0 GADOBENATE DIMEGLUMINE 529 MG/ML SOLUTION: Performed by: STUDENT IN AN ORGANIZED HEALTH CARE EDUCATION/TRAINING PROGRAM

## 2022-01-01 PROCEDURE — 25010000002 PROPOFOL 10 MG/ML EMULSION: Performed by: ANESTHESIOLOGY

## 2022-01-01 PROCEDURE — 85025 COMPLETE CBC W/AUTO DIFF WBC: CPT

## 2022-01-01 PROCEDURE — 84484 ASSAY OF TROPONIN QUANT: CPT | Performed by: NURSE PRACTITIONER

## 2022-01-01 PROCEDURE — 0202U NFCT DS 22 TRGT SARS-COV-2: CPT | Performed by: EMERGENCY MEDICINE

## 2022-01-01 PROCEDURE — 25010000002 CEFTRIAXONE PER 250 MG: Performed by: INTERNAL MEDICINE

## 2022-01-01 PROCEDURE — 0 LIDOCAINE 1 % SOLUTION 20 ML VIAL: Performed by: STUDENT IN AN ORGANIZED HEALTH CARE EDUCATION/TRAINING PROGRAM

## 2022-01-01 PROCEDURE — 25010000002 METHYLPREDNISOLONE PER 125 MG: Performed by: INTERNAL MEDICINE

## 2022-01-01 PROCEDURE — 84145 PROCALCITONIN (PCT): CPT | Performed by: EMERGENCY MEDICINE

## 2022-01-01 PROCEDURE — 85025 COMPLETE CBC W/AUTO DIFF WBC: CPT | Performed by: NURSE PRACTITIONER

## 2022-01-01 PROCEDURE — 0202U NFCT DS 22 TRGT SARS-COV-2: CPT | Performed by: NURSE PRACTITIONER

## 2022-01-01 PROCEDURE — 84157 ASSAY OF PROTEIN OTHER: CPT | Performed by: INTERNAL MEDICINE

## 2022-01-01 PROCEDURE — 93005 ELECTROCARDIOGRAM TRACING: CPT

## 2022-01-01 PROCEDURE — 93005 ELECTROCARDIOGRAM TRACING: CPT | Performed by: INTERNAL MEDICINE

## 2022-01-01 PROCEDURE — 84132 ASSAY OF SERUM POTASSIUM: CPT | Performed by: INTERNAL MEDICINE

## 2022-01-01 PROCEDURE — 84145 PROCALCITONIN (PCT): CPT | Performed by: NURSE PRACTITIONER

## 2022-01-01 PROCEDURE — 25010000002 PERFLUTREN (DEFINITY) 8.476 MG IN SODIUM CHLORIDE (PF) 0.9 % 10 ML INJECTION: Performed by: INTERNAL MEDICINE

## 2022-01-01 PROCEDURE — 36415 COLL VENOUS BLD VENIPUNCTURE: CPT

## 2022-01-01 PROCEDURE — 83605 ASSAY OF LACTIC ACID: CPT | Performed by: EMERGENCY MEDICINE

## 2022-01-01 PROCEDURE — 87635 SARS-COV-2 COVID-19 AMP PRB: CPT | Performed by: SURGERY

## 2022-01-01 PROCEDURE — 76000 FLUOROSCOPY <1 HR PHYS/QHP: CPT

## 2022-01-01 PROCEDURE — 85007 BL SMEAR W/DIFF WBC COUNT: CPT | Performed by: INTERNAL MEDICINE

## 2022-01-01 PROCEDURE — 0B9D8ZX DRAINAGE OF RIGHT MIDDLE LUNG LOBE, VIA NATURAL OR ARTIFICIAL OPENING ENDOSCOPIC, DIAGNOSTIC: ICD-10-PCS | Performed by: INTERNAL MEDICINE

## 2022-01-01 PROCEDURE — 87641 MR-STAPH DNA AMP PROBE: CPT | Performed by: NURSE PRACTITIONER

## 2022-01-01 PROCEDURE — A9577 INJ MULTIHANCE: HCPCS | Performed by: STUDENT IN AN ORGANIZED HEALTH CARE EDUCATION/TRAINING PROGRAM

## 2022-01-01 PROCEDURE — 71275 CT ANGIOGRAPHY CHEST: CPT

## 2022-01-01 PROCEDURE — 25010000002 HYDROMORPHONE 1 MG/ML SOLUTION: Performed by: INTERNAL MEDICINE

## 2022-01-01 PROCEDURE — 70553 MRI BRAIN STEM W/O & W/DYE: CPT

## 2022-01-01 PROCEDURE — 88341 IMHCHEM/IMCYTCHM EA ADD ANTB: CPT | Performed by: INTERNAL MEDICINE

## 2022-01-01 PROCEDURE — 87040 BLOOD CULTURE FOR BACTERIA: CPT | Performed by: NURSE PRACTITIONER

## 2022-01-01 PROCEDURE — 83880 ASSAY OF NATRIURETIC PEPTIDE: CPT | Performed by: EMERGENCY MEDICINE

## 2022-01-01 PROCEDURE — 63710000001 INSULIN LISPRO (HUMAN) PER 5 UNITS: Performed by: STUDENT IN AN ORGANIZED HEALTH CARE EDUCATION/TRAINING PROGRAM

## 2022-01-01 PROCEDURE — 87040 BLOOD CULTURE FOR BACTERIA: CPT | Performed by: EMERGENCY MEDICINE

## 2022-01-01 PROCEDURE — 0BJ08ZZ INSPECTION OF TRACHEOBRONCHIAL TREE, VIA NATURAL OR ARTIFICIAL OPENING ENDOSCOPIC: ICD-10-PCS | Performed by: INTERNAL MEDICINE

## 2022-01-01 PROCEDURE — 36415 COLL VENOUS BLD VENIPUNCTURE: CPT | Performed by: NURSE PRACTITIONER

## 2022-01-01 PROCEDURE — 87102 FUNGUS ISOLATION CULTURE: CPT | Performed by: INTERNAL MEDICINE

## 2022-01-01 PROCEDURE — 25010000002 DIGOXIN PER 500 MCG: Performed by: INTERNAL MEDICINE

## 2022-01-01 PROCEDURE — 84484 ASSAY OF TROPONIN QUANT: CPT | Performed by: EMERGENCY MEDICINE

## 2022-01-01 PROCEDURE — 85025 COMPLETE CBC W/AUTO DIFF WBC: CPT | Performed by: EMERGENCY MEDICINE

## 2022-01-01 PROCEDURE — 93005 ELECTROCARDIOGRAM TRACING: CPT | Performed by: HOSPITALIST

## 2022-01-01 PROCEDURE — 85027 COMPLETE CBC AUTOMATED: CPT | Performed by: HOSPITALIST

## 2022-01-01 PROCEDURE — 25010000002 SUCCINYLCHOLINE PER 20 MG: Performed by: ANESTHESIOLOGY

## 2022-01-01 PROCEDURE — 1111F DSCHRG MED/CURRENT MED MERGE: CPT | Performed by: NURSE PRACTITIONER

## 2022-01-01 PROCEDURE — 0BBD8ZX EXCISION OF RIGHT MIDDLE LUNG LOBE, VIA NATURAL OR ARTIFICIAL OPENING ENDOSCOPIC, DIAGNOSTIC: ICD-10-PCS | Performed by: INTERNAL MEDICINE

## 2022-01-01 PROCEDURE — 84550 ASSAY OF BLOOD/URIC ACID: CPT | Performed by: INTERNAL MEDICINE

## 2022-01-01 PROCEDURE — 83880 ASSAY OF NATRIURETIC PEPTIDE: CPT | Performed by: NURSE PRACTITIONER

## 2022-01-01 PROCEDURE — C1788 PORT, INDWELLING, IMP: HCPCS | Performed by: STUDENT IN AN ORGANIZED HEALTH CARE EDUCATION/TRAINING PROGRAM

## 2022-01-01 PROCEDURE — 88360 TUMOR IMMUNOHISTOCHEM/MANUAL: CPT | Performed by: INTERNAL MEDICINE

## 2022-01-01 PROCEDURE — 25010000002 LEVOFLOXACIN PER 250 MG: Performed by: INTERNAL MEDICINE

## 2022-01-01 PROCEDURE — 88312 SPECIAL STAINS GROUP 1: CPT | Performed by: INTERNAL MEDICINE

## 2022-01-01 PROCEDURE — 25010000002 MORPHINE PER 10 MG: Performed by: INTERNAL MEDICINE

## 2022-01-01 PROCEDURE — 25010000002 LEVOFLOXACIN PER 250 MG: Performed by: NURSE PRACTITIONER

## 2022-01-01 PROCEDURE — 0 LIDOCAINE 1 % SOLUTION: Performed by: RADIOLOGY

## 2022-01-01 PROCEDURE — 84443 ASSAY THYROID STIM HORMONE: CPT | Performed by: INTERNAL MEDICINE

## 2022-01-01 PROCEDURE — 82945 GLUCOSE OTHER FLUID: CPT | Performed by: INTERNAL MEDICINE

## 2022-01-01 PROCEDURE — 25010000002 PHENYLEPHRINE 10 MG/ML SOLUTION: Performed by: ANESTHESIOLOGY

## 2022-01-01 PROCEDURE — C1729 CATH, DRAINAGE: HCPCS | Performed by: STUDENT IN AN ORGANIZED HEALTH CARE EDUCATION/TRAINING PROGRAM

## 2022-01-01 PROCEDURE — 87071 CULTURE AEROBIC QUANT OTHER: CPT | Performed by: INTERNAL MEDICINE

## 2022-01-01 PROCEDURE — 0202U NFCT DS 22 TRGT SARS-COV-2: CPT | Performed by: INTERNAL MEDICINE

## 2022-01-01 PROCEDURE — 93306 TTE W/DOPPLER COMPLETE: CPT | Performed by: INTERNAL MEDICINE

## 2022-01-01 PROCEDURE — 99231 SBSQ HOSP IP/OBS SF/LOW 25: CPT | Performed by: NURSE PRACTITIONER

## 2022-01-01 DEVICE — BARDPORT TITANIUM IMPLANTABLE PORT WITH ATTACHABLE 8F CHRONOFLEX OPEN-ENDED SINGLE-LUMEN VENOUS CATHETER INTERMEDIATE KIT
Type: IMPLANTABLE DEVICE | Site: CHEST | Status: FUNCTIONAL
Brand: BARDPORT, CHRONOFLEX

## 2022-01-01 RX ORDER — PROMETHAZINE HYDROCHLORIDE 25 MG/1
25 TABLET ORAL ONCE AS NEEDED
Status: DISCONTINUED | OUTPATIENT
Start: 2022-01-01 | End: 2022-01-01 | Stop reason: HOSPADM

## 2022-01-01 RX ORDER — SODIUM CHLORIDE 9 MG/ML
30 INJECTION, SOLUTION INTRAVENOUS CONTINUOUS PRN
Status: DISCONTINUED | OUTPATIENT
Start: 2022-01-01 | End: 2022-01-01

## 2022-01-01 RX ORDER — LORAZEPAM 2 MG/ML
2 INJECTION INTRAMUSCULAR
Status: DISCONTINUED | OUTPATIENT
Start: 2022-01-01 | End: 2022-05-30 | Stop reason: HOSPADM

## 2022-01-01 RX ORDER — ACETAMINOPHEN 325 MG/1
650 TABLET ORAL EVERY 4 HOURS PRN
Status: DISCONTINUED | OUTPATIENT
Start: 2022-01-01 | End: 2022-01-01

## 2022-01-01 RX ORDER — BUMETANIDE 2 MG/1
1 TABLET ORAL DAILY
Start: 2022-01-01

## 2022-01-01 RX ORDER — PROMETHAZINE HYDROCHLORIDE 25 MG/1
25 SUPPOSITORY RECTAL ONCE AS NEEDED
Status: DISCONTINUED | OUTPATIENT
Start: 2022-01-01 | End: 2022-01-01 | Stop reason: HOSPADM

## 2022-01-01 RX ORDER — LORAZEPAM 2 MG/ML
1 INJECTION INTRAMUSCULAR
Status: DISCONTINUED | OUTPATIENT
Start: 2022-01-01 | End: 2022-05-30 | Stop reason: HOSPADM

## 2022-01-01 RX ORDER — ALLOPURINOL 300 MG/1
300 TABLET ORAL DAILY
Status: DISCONTINUED | OUTPATIENT
Start: 2022-01-01 | End: 2022-01-01

## 2022-01-01 RX ORDER — DIPHENHYDRAMINE HYDROCHLORIDE 50 MG/ML
12.5 INJECTION INTRAMUSCULAR; INTRAVENOUS
Status: DISCONTINUED | OUTPATIENT
Start: 2022-01-01 | End: 2022-01-01 | Stop reason: HOSPADM

## 2022-01-01 RX ORDER — ISOSORBIDE MONONITRATE 30 MG/1
30 TABLET, EXTENDED RELEASE ORAL 2 TIMES DAILY
Start: 2022-01-01

## 2022-01-01 RX ORDER — ENOXAPARIN SODIUM 100 MG/ML
40 INJECTION SUBCUTANEOUS EVERY 24 HOURS
Status: DISCONTINUED | OUTPATIENT
Start: 2022-01-01 | End: 2022-01-01 | Stop reason: HOSPADM

## 2022-01-01 RX ORDER — ASPIRIN 81 MG/1
81 TABLET, CHEWABLE ORAL DAILY
Status: DISCONTINUED | OUTPATIENT
Start: 2022-01-01 | End: 2022-01-01

## 2022-01-01 RX ORDER — ONDANSETRON 2 MG/ML
4 INJECTION INTRAMUSCULAR; INTRAVENOUS EVERY 6 HOURS PRN
Status: DISCONTINUED | OUTPATIENT
Start: 2022-01-01 | End: 2022-05-30 | Stop reason: HOSPADM

## 2022-01-01 RX ORDER — HYDROMORPHONE HYDROCHLORIDE 1 MG/ML
0.5 INJECTION, SOLUTION INTRAMUSCULAR; INTRAVENOUS; SUBCUTANEOUS
Status: DISCONTINUED | OUTPATIENT
Start: 2022-01-01 | End: 2022-01-01

## 2022-01-01 RX ORDER — LORAZEPAM 2 MG/ML
0.5 INJECTION INTRAMUSCULAR
Status: DISCONTINUED | OUTPATIENT
Start: 2022-01-01 | End: 2022-05-30 | Stop reason: HOSPADM

## 2022-01-01 RX ORDER — LIDOCAINE HYDROCHLORIDE 20 MG/ML
INJECTION, SOLUTION INFILTRATION; PERINEURAL AS NEEDED
Status: DISCONTINUED | OUTPATIENT
Start: 2022-01-01 | End: 2022-01-01 | Stop reason: SURG

## 2022-01-01 RX ORDER — GLYCOPYRROLATE 0.2 MG/ML
0.4 INJECTION INTRAMUSCULAR; INTRAVENOUS
Status: DISCONTINUED | OUTPATIENT
Start: 2022-01-01 | End: 2022-05-30 | Stop reason: HOSPADM

## 2022-01-01 RX ORDER — LEVOFLOXACIN 5 MG/ML
750 INJECTION, SOLUTION INTRAVENOUS ONCE
Status: COMPLETED | OUTPATIENT
Start: 2022-01-01 | End: 2022-01-01

## 2022-01-01 RX ORDER — LORAZEPAM 2 MG/ML
2 CONCENTRATE ORAL
Status: DISCONTINUED | OUTPATIENT
Start: 2022-01-01 | End: 2022-05-30 | Stop reason: HOSPADM

## 2022-01-01 RX ORDER — PROPOFOL 10 MG/ML
VIAL (ML) INTRAVENOUS AS NEEDED
Status: DISCONTINUED | OUTPATIENT
Start: 2022-01-01 | End: 2022-01-01 | Stop reason: SURG

## 2022-01-01 RX ORDER — FLUMAZENIL 0.1 MG/ML
0.2 INJECTION INTRAVENOUS AS NEEDED
Status: DISCONTINUED | OUTPATIENT
Start: 2022-01-01 | End: 2022-01-01 | Stop reason: HOSPADM

## 2022-01-01 RX ORDER — ALBUTEROL SULFATE 2.5 MG/3ML
2.5 SOLUTION RESPIRATORY (INHALATION) EVERY 6 HOURS PRN
Status: DISCONTINUED | OUTPATIENT
Start: 2022-01-01 | End: 2022-01-01

## 2022-01-01 RX ORDER — POLYETHYLENE GLYCOL 3350 17 G/17G
17 POWDER, FOR SOLUTION ORAL DAILY PRN
Status: DISCONTINUED | OUTPATIENT
Start: 2022-01-01 | End: 2022-01-01

## 2022-01-01 RX ORDER — FAMOTIDINE 10 MG/ML
20 INJECTION, SOLUTION INTRAVENOUS ONCE
Status: DISCONTINUED | OUTPATIENT
Start: 2022-01-01 | End: 2022-01-01 | Stop reason: HOSPADM

## 2022-01-01 RX ORDER — FENTANYL CITRATE 50 UG/ML
50 INJECTION, SOLUTION INTRAMUSCULAR; INTRAVENOUS
Status: DISCONTINUED | OUTPATIENT
Start: 2022-01-01 | End: 2022-01-01 | Stop reason: HOSPADM

## 2022-01-01 RX ORDER — ROCURONIUM BROMIDE 10 MG/ML
INJECTION, SOLUTION INTRAVENOUS AS NEEDED
Status: DISCONTINUED | OUTPATIENT
Start: 2022-01-01 | End: 2022-01-01 | Stop reason: SURG

## 2022-01-01 RX ORDER — SODIUM CHLORIDE, SODIUM LACTATE, POTASSIUM CHLORIDE, CALCIUM CHLORIDE 600; 310; 30; 20 MG/100ML; MG/100ML; MG/100ML; MG/100ML
INJECTION, SOLUTION INTRAVENOUS CONTINUOUS PRN
Status: DISCONTINUED | OUTPATIENT
Start: 2022-01-01 | End: 2022-01-01 | Stop reason: SURG

## 2022-01-01 RX ORDER — ACETAMINOPHEN 160 MG/5ML
650 SOLUTION ORAL EVERY 4 HOURS PRN
Status: DISCONTINUED | OUTPATIENT
Start: 2022-01-01 | End: 2022-05-30 | Stop reason: HOSPADM

## 2022-01-01 RX ORDER — INSULIN DETEMIR 100 [IU]/ML
20 INJECTION, SOLUTION SUBCUTANEOUS 2 TIMES DAILY
Qty: 45 ML | Refills: 1 | Status: SHIPPED | OUTPATIENT
Start: 2022-01-01 | End: 2022-01-01 | Stop reason: SDUPTHER

## 2022-01-01 RX ORDER — DEXTROSE MONOHYDRATE 25 G/50ML
25 INJECTION, SOLUTION INTRAVENOUS
Status: DISCONTINUED | OUTPATIENT
Start: 2022-01-01 | End: 2022-01-01 | Stop reason: HOSPADM

## 2022-01-01 RX ORDER — DIGOXIN 0.25 MG/ML
500 INJECTION INTRAMUSCULAR; INTRAVENOUS ONCE
Status: COMPLETED | OUTPATIENT
Start: 2022-01-01 | End: 2022-01-01

## 2022-01-01 RX ORDER — DIPHENOXYLATE HYDROCHLORIDE AND ATROPINE SULFATE 2.5; .025 MG/1; MG/1
1 TABLET ORAL
Status: DISCONTINUED | OUTPATIENT
Start: 2022-01-01 | End: 2022-05-30 | Stop reason: HOSPADM

## 2022-01-01 RX ORDER — BISACODYL 5 MG/1
5 TABLET, DELAYED RELEASE ORAL DAILY PRN
Status: DISCONTINUED | OUTPATIENT
Start: 2022-01-01 | End: 2022-01-01

## 2022-01-01 RX ORDER — ALBUTEROL SULFATE 2.5 MG/3ML
2.5 SOLUTION RESPIRATORY (INHALATION) EVERY 4 HOURS PRN
Refills: 0 | Status: DISCONTINUED | OUTPATIENT
Start: 2022-01-01 | End: 2022-01-01 | Stop reason: HOSPADM

## 2022-01-01 RX ORDER — ATROPINE SULFATE 10 MG/ML
2 SOLUTION/ DROPS OPHTHALMIC 2 TIMES DAILY PRN
Status: DISCONTINUED | OUTPATIENT
Start: 2022-01-01 | End: 2022-05-30 | Stop reason: HOSPADM

## 2022-01-01 RX ORDER — AMOXICILLIN 250 MG
2 CAPSULE ORAL 2 TIMES DAILY
Status: DISCONTINUED | OUTPATIENT
Start: 2022-01-01 | End: 2022-01-01

## 2022-01-01 RX ORDER — IPRATROPIUM BROMIDE AND ALBUTEROL SULFATE 2.5; .5 MG/3ML; MG/3ML
3 SOLUTION RESPIRATORY (INHALATION) 4 TIMES DAILY
Qty: 120 ML | Refills: 2 | Status: SHIPPED | OUTPATIENT
Start: 2022-01-01

## 2022-01-01 RX ORDER — LIDOCAINE HYDROCHLORIDE 20 MG/ML
INJECTION, SOLUTION EPIDURAL; INFILTRATION; INTRACAUDAL; PERINEURAL AS NEEDED
Status: DISCONTINUED | OUTPATIENT
Start: 2022-01-01 | End: 2022-01-01 | Stop reason: HOSPADM

## 2022-01-01 RX ORDER — METHYLPREDNISOLONE SODIUM SUCCINATE 40 MG/ML
40 INJECTION, POWDER, LYOPHILIZED, FOR SOLUTION INTRAMUSCULAR; INTRAVENOUS EVERY 12 HOURS
Status: DISCONTINUED | OUTPATIENT
Start: 2022-01-01 | End: 2022-01-01

## 2022-01-01 RX ORDER — HEPARIN SODIUM 1000 [USP'U]/ML
INJECTION, SOLUTION INTRAVENOUS; SUBCUTANEOUS AS NEEDED
Status: DISCONTINUED | OUTPATIENT
Start: 2022-01-01 | End: 2022-01-01 | Stop reason: HOSPADM

## 2022-01-01 RX ORDER — DIPHENHYDRAMINE HCL 25 MG
25 CAPSULE ORAL
Status: DISCONTINUED | OUTPATIENT
Start: 2022-01-01 | End: 2022-01-01 | Stop reason: HOSPADM

## 2022-01-01 RX ORDER — GLYCOPYRROLATE 0.2 MG/ML
0.2 INJECTION INTRAMUSCULAR; INTRAVENOUS
Status: DISCONTINUED | OUTPATIENT
Start: 2022-01-01 | End: 2022-05-30 | Stop reason: HOSPADM

## 2022-01-01 RX ORDER — CHLORHEXIDINE GLUCONATE 0.12 MG/ML
15 RINSE ORAL EVERY 12 HOURS SCHEDULED
Status: DISCONTINUED | OUTPATIENT
Start: 2022-01-01 | End: 2022-01-01

## 2022-01-01 RX ORDER — NITROGLYCERIN 0.4 MG/1
0.4 TABLET SUBLINGUAL
Status: DISCONTINUED | OUTPATIENT
Start: 2022-01-01 | End: 2022-01-01 | Stop reason: HOSPADM

## 2022-01-01 RX ORDER — ACETAMINOPHEN 325 MG/1
650 TABLET ORAL EVERY 4 HOURS PRN
Status: DISCONTINUED | OUTPATIENT
Start: 2022-01-01 | End: 2022-05-30 | Stop reason: HOSPADM

## 2022-01-01 RX ORDER — DEXTROSE MONOHYDRATE 25 G/50ML
25 INJECTION, SOLUTION INTRAVENOUS
Status: DISCONTINUED | OUTPATIENT
Start: 2022-01-01 | End: 2022-01-01

## 2022-01-01 RX ORDER — MORPHINE SULFATE 2 MG/ML
1 INJECTION, SOLUTION INTRAMUSCULAR; INTRAVENOUS ONCE
Status: COMPLETED | OUTPATIENT
Start: 2022-01-01 | End: 2022-01-01

## 2022-01-01 RX ORDER — INSULIN DETEMIR 100 [IU]/ML
20 INJECTION, SOLUTION SUBCUTANEOUS 2 TIMES DAILY
Qty: 45 ML | Refills: 1 | Status: SHIPPED | OUTPATIENT
Start: 2022-01-01

## 2022-01-01 RX ORDER — NALOXONE HCL 0.4 MG/ML
0.2 VIAL (ML) INJECTION AS NEEDED
Status: DISCONTINUED | OUTPATIENT
Start: 2022-01-01 | End: 2022-01-01 | Stop reason: HOSPADM

## 2022-01-01 RX ORDER — PROMETHAZINE HYDROCHLORIDE 12.5 MG/1
6.25 SUPPOSITORY RECTAL EVERY 4 HOURS PRN
Status: DISCONTINUED | OUTPATIENT
Start: 2022-01-01 | End: 2022-05-30 | Stop reason: HOSPADM

## 2022-01-01 RX ORDER — LIDOCAINE HYDROCHLORIDE 10 MG/ML
10 INJECTION, SOLUTION INFILTRATION; PERINEURAL ONCE
Status: COMPLETED | OUTPATIENT
Start: 2022-01-01 | End: 2022-01-01

## 2022-01-01 RX ORDER — CEFAZOLIN SODIUM 2 G/100ML
2 INJECTION, SOLUTION INTRAVENOUS ONCE
Status: CANCELLED | OUTPATIENT
Start: 2022-01-01 | End: 2022-01-01

## 2022-01-01 RX ORDER — MORPHINE SULFATE 2 MG/ML
4 INJECTION, SOLUTION INTRAMUSCULAR; INTRAVENOUS
Status: DISCONTINUED | OUTPATIENT
Start: 2022-01-01 | End: 2022-05-30 | Stop reason: HOSPADM

## 2022-01-01 RX ORDER — SODIUM CHLORIDE 9 MG/ML
75 INJECTION, SOLUTION INTRAVENOUS CONTINUOUS
Status: DISCONTINUED | OUTPATIENT
Start: 2022-01-01 | End: 2022-01-01

## 2022-01-01 RX ORDER — PROPOFOL 10 MG/ML
VIAL (ML) INTRAVENOUS
Status: COMPLETED
Start: 2022-01-01 | End: 2022-01-01

## 2022-01-01 RX ORDER — MORPHINE SULFATE 20 MG/ML
20 SOLUTION ORAL
Status: DISCONTINUED | OUTPATIENT
Start: 2022-01-01 | End: 2022-05-30 | Stop reason: HOSPADM

## 2022-01-01 RX ORDER — ONDANSETRON 4 MG/1
4 TABLET, FILM COATED ORAL EVERY 6 HOURS PRN
Status: DISCONTINUED | OUTPATIENT
Start: 2022-01-01 | End: 2022-01-01

## 2022-01-01 RX ORDER — INSULIN LISPRO 100 [IU]/ML
0-24 INJECTION, SOLUTION INTRAVENOUS; SUBCUTANEOUS
Status: DISCONTINUED | OUTPATIENT
Start: 2022-01-01 | End: 2022-01-01

## 2022-01-01 RX ORDER — PROMETHAZINE HYDROCHLORIDE 6.25 MG/5ML
6.25 SYRUP ORAL EVERY 4 HOURS PRN
Status: DISCONTINUED | OUTPATIENT
Start: 2022-01-01 | End: 2022-05-30 | Stop reason: HOSPADM

## 2022-01-01 RX ORDER — ACETAMINOPHEN 650 MG/1
650 SUPPOSITORY RECTAL EVERY 4 HOURS PRN
Status: DISCONTINUED | OUTPATIENT
Start: 2022-01-01 | End: 2022-05-30 | Stop reason: HOSPADM

## 2022-01-01 RX ORDER — CEFAZOLIN SODIUM 2 G/100ML
INJECTION, SOLUTION INTRAVENOUS AS NEEDED
Status: DISCONTINUED | OUTPATIENT
Start: 2022-01-01 | End: 2022-01-01 | Stop reason: SURG

## 2022-01-01 RX ORDER — CEFDINIR 300 MG/1
300 CAPSULE ORAL 2 TIMES DAILY
Qty: 6 CAPSULE | Refills: 0 | Status: SHIPPED | OUTPATIENT
Start: 2022-01-01 | End: 2022-01-01

## 2022-01-01 RX ORDER — ISOSORBIDE MONONITRATE 30 MG/1
30 TABLET, EXTENDED RELEASE ORAL
Status: DISCONTINUED | OUTPATIENT
Start: 2022-01-01 | End: 2022-01-01

## 2022-01-01 RX ORDER — SODIUM CHLORIDE 0.9 % (FLUSH) 0.9 %
3-10 SYRINGE (ML) INJECTION AS NEEDED
Status: DISCONTINUED | OUTPATIENT
Start: 2022-01-01 | End: 2022-01-01 | Stop reason: HOSPADM

## 2022-01-01 RX ORDER — POTASSIUM CHLORIDE 750 MG/1
10 CAPSULE, EXTENDED RELEASE ORAL DAILY
Start: 2022-01-01

## 2022-01-01 RX ORDER — SODIUM CHLORIDE 0.9 % (FLUSH) 0.9 %
10 SYRINGE (ML) INJECTION AS NEEDED
Status: DISCONTINUED | OUTPATIENT
Start: 2022-01-01 | End: 2022-01-01

## 2022-01-01 RX ORDER — DIGOXIN 125 MCG
125 TABLET ORAL
Status: DISCONTINUED | OUTPATIENT
Start: 2022-01-01 | End: 2022-01-01

## 2022-01-01 RX ORDER — INSULIN LISPRO 100 [IU]/ML
10 INJECTION, SOLUTION INTRAVENOUS; SUBCUTANEOUS
Status: DISCONTINUED | OUTPATIENT
Start: 2022-01-01 | End: 2022-01-01

## 2022-01-01 RX ORDER — SODIUM CHLORIDE 0.9 % (FLUSH) 0.9 %
10 SYRINGE (ML) INJECTION EVERY 12 HOURS SCHEDULED
Status: DISCONTINUED | OUTPATIENT
Start: 2022-01-01 | End: 2022-01-01 | Stop reason: HOSPADM

## 2022-01-01 RX ORDER — ATORVASTATIN CALCIUM 20 MG/1
40 TABLET, FILM COATED ORAL DAILY
Status: DISCONTINUED | OUTPATIENT
Start: 2022-01-01 | End: 2022-01-01 | Stop reason: HOSPADM

## 2022-01-01 RX ORDER — MIDAZOLAM HYDROCHLORIDE 1 MG/ML
0.5 INJECTION INTRAMUSCULAR; INTRAVENOUS
Status: DISCONTINUED | OUTPATIENT
Start: 2022-01-01 | End: 2022-01-01 | Stop reason: HOSPADM

## 2022-01-01 RX ORDER — GUAIFENESIN AND DEXTROMETHORPHAN HYDROBROMIDE 1200; 60 MG/1; MG/1
1 TABLET, EXTENDED RELEASE ORAL 2 TIMES DAILY
Qty: 20 TABLET | Refills: 0 | COMMUNITY
Start: 2022-01-01 | End: 2022-01-01

## 2022-01-01 RX ORDER — ALBUTEROL SULFATE 90 UG/1
2 AEROSOL, METERED RESPIRATORY (INHALATION) EVERY 4 HOURS PRN
Qty: 8 G | Refills: 0 | Status: SHIPPED | OUTPATIENT
Start: 2022-01-01

## 2022-01-01 RX ORDER — IBUPROFEN 600 MG/1
600 TABLET ORAL ONCE AS NEEDED
Status: DISCONTINUED | OUTPATIENT
Start: 2022-01-01 | End: 2022-01-01 | Stop reason: HOSPADM

## 2022-01-01 RX ORDER — NICOTINE POLACRILEX 4 MG
15 LOZENGE BUCCAL
Status: DISCONTINUED | OUTPATIENT
Start: 2022-01-01 | End: 2022-01-01

## 2022-01-01 RX ORDER — SODIUM CHLORIDE 0.9 % (FLUSH) 0.9 %
10 SYRINGE (ML) INJECTION AS NEEDED
Status: DISCONTINUED | OUTPATIENT
Start: 2022-01-01 | End: 2022-01-01 | Stop reason: HOSPADM

## 2022-01-01 RX ORDER — METOPROLOL SUCCINATE 25 MG/1
25 TABLET, EXTENDED RELEASE ORAL
Status: DISCONTINUED | OUTPATIENT
Start: 2022-01-01 | End: 2022-01-01

## 2022-01-01 RX ORDER — ONDANSETRON 2 MG/ML
4 INJECTION INTRAMUSCULAR; INTRAVENOUS EVERY 6 HOURS PRN
Status: DISCONTINUED | OUTPATIENT
Start: 2022-01-01 | End: 2022-01-01 | Stop reason: HOSPADM

## 2022-01-01 RX ORDER — ATORVASTATIN CALCIUM 20 MG/1
40 TABLET, FILM COATED ORAL NIGHTLY
Status: DISCONTINUED | OUTPATIENT
Start: 2022-01-01 | End: 2022-01-01

## 2022-01-01 RX ORDER — SODIUM CHLORIDE, SODIUM LACTATE, POTASSIUM CHLORIDE, CALCIUM CHLORIDE 600; 310; 30; 20 MG/100ML; MG/100ML; MG/100ML; MG/100ML
9 INJECTION, SOLUTION INTRAVENOUS CONTINUOUS
Status: DISCONTINUED | OUTPATIENT
Start: 2022-01-01 | End: 2022-01-01

## 2022-01-01 RX ORDER — ISOSORBIDE MONONITRATE 30 MG/1
30 TABLET, EXTENDED RELEASE ORAL
Status: DISCONTINUED | OUTPATIENT
Start: 2022-01-01 | End: 2022-01-01 | Stop reason: HOSPADM

## 2022-01-01 RX ORDER — OXYCODONE AND ACETAMINOPHEN 7.5; 325 MG/1; MG/1
1 TABLET ORAL EVERY 4 HOURS PRN
Status: DISCONTINUED | OUTPATIENT
Start: 2022-01-01 | End: 2022-01-01 | Stop reason: HOSPADM

## 2022-01-01 RX ORDER — ASPIRIN 81 MG/1
81 TABLET ORAL DAILY
Status: DISCONTINUED | OUTPATIENT
Start: 2022-01-01 | End: 2022-01-01

## 2022-01-01 RX ORDER — METHYLPREDNISOLONE SODIUM SUCCINATE 125 MG/2ML
60 INJECTION, POWDER, LYOPHILIZED, FOR SOLUTION INTRAMUSCULAR; INTRAVENOUS EVERY 8 HOURS
Status: DISCONTINUED | OUTPATIENT
Start: 2022-01-01 | End: 2022-01-01

## 2022-01-01 RX ORDER — DOXYCYCLINE HYCLATE 100 MG
100 TABLET ORAL 2 TIMES DAILY
Qty: 20 TABLET | Refills: 0 | Status: SHIPPED | OUTPATIENT
Start: 2022-01-01 | End: 2022-01-01 | Stop reason: HOSPADM

## 2022-01-01 RX ORDER — LORAZEPAM 2 MG/ML
1 CONCENTRATE ORAL
Status: DISCONTINUED | OUTPATIENT
Start: 2022-01-01 | End: 2022-05-30 | Stop reason: HOSPADM

## 2022-01-01 RX ORDER — CALCIUM CARBONATE 200(500)MG
2 TABLET,CHEWABLE ORAL 2 TIMES DAILY PRN
Status: DISCONTINUED | OUTPATIENT
Start: 2022-01-01 | End: 2022-01-01

## 2022-01-01 RX ORDER — MORPHINE SULFATE 20 MG/ML
10 SOLUTION ORAL
Status: DISCONTINUED | OUTPATIENT
Start: 2022-01-01 | End: 2022-05-30 | Stop reason: HOSPADM

## 2022-01-01 RX ORDER — SCOLOPAMINE TRANSDERMAL SYSTEM 1 MG/1
1 PATCH, EXTENDED RELEASE TRANSDERMAL
Status: DISCONTINUED | OUTPATIENT
Start: 2022-01-01 | End: 2022-05-30 | Stop reason: HOSPADM

## 2022-01-01 RX ORDER — LORAZEPAM 2 MG/ML
0.5 CONCENTRATE ORAL
Status: DISCONTINUED | OUTPATIENT
Start: 2022-01-01 | End: 2022-05-30 | Stop reason: HOSPADM

## 2022-01-01 RX ORDER — BISACODYL 10 MG
10 SUPPOSITORY, RECTAL RECTAL DAILY PRN
Status: DISCONTINUED | OUTPATIENT
Start: 2022-01-01 | End: 2022-01-01

## 2022-01-01 RX ORDER — ACETAMINOPHEN 650 MG/1
650 SUPPOSITORY RECTAL EVERY 4 HOURS PRN
Status: DISCONTINUED | OUTPATIENT
Start: 2022-01-01 | End: 2022-01-01

## 2022-01-01 RX ORDER — SUCCINYLCHOLINE CHLORIDE 20 MG/ML
INJECTION INTRAMUSCULAR; INTRAVENOUS AS NEEDED
Status: DISCONTINUED | OUTPATIENT
Start: 2022-01-01 | End: 2022-01-01 | Stop reason: SURG

## 2022-01-01 RX ORDER — NICOTINE POLACRILEX 4 MG
15 LOZENGE BUCCAL
Status: DISCONTINUED | OUTPATIENT
Start: 2022-01-01 | End: 2022-01-01 | Stop reason: HOSPADM

## 2022-01-01 RX ORDER — ONDANSETRON 4 MG/1
4 TABLET, FILM COATED ORAL EVERY 6 HOURS PRN
Status: DISCONTINUED | OUTPATIENT
Start: 2022-01-01 | End: 2022-01-01 | Stop reason: HOSPADM

## 2022-01-01 RX ORDER — PHENYLEPHRINE HYDROCHLORIDE 10 MG/ML
INJECTION INTRAVENOUS AS NEEDED
Status: DISCONTINUED | OUTPATIENT
Start: 2022-01-01 | End: 2022-01-01 | Stop reason: SURG

## 2022-01-01 RX ORDER — ONDANSETRON 2 MG/ML
4 INJECTION INTRAMUSCULAR; INTRAVENOUS ONCE AS NEEDED
Status: DISCONTINUED | OUTPATIENT
Start: 2022-01-01 | End: 2022-01-01 | Stop reason: HOSPADM

## 2022-01-01 RX ORDER — ASPIRIN 81 MG/1
81 TABLET ORAL DAILY
Status: DISCONTINUED | OUTPATIENT
Start: 2022-01-01 | End: 2022-01-01 | Stop reason: HOSPADM

## 2022-01-01 RX ORDER — INSULIN LISPRO 100 [IU]/ML
0-24 INJECTION, SOLUTION INTRAVENOUS; SUBCUTANEOUS EVERY 6 HOURS
Status: DISCONTINUED | OUTPATIENT
Start: 2022-01-01 | End: 2022-01-01

## 2022-01-01 RX ORDER — LABETALOL HYDROCHLORIDE 5 MG/ML
5 INJECTION, SOLUTION INTRAVENOUS
Status: DISCONTINUED | OUTPATIENT
Start: 2022-01-01 | End: 2022-01-01 | Stop reason: HOSPADM

## 2022-01-01 RX ORDER — ACETAMINOPHEN 160 MG/5ML
650 SOLUTION ORAL EVERY 4 HOURS PRN
Status: DISCONTINUED | OUTPATIENT
Start: 2022-01-01 | End: 2022-01-01

## 2022-01-01 RX ORDER — ENOXAPARIN SODIUM 100 MG/ML
40 INJECTION SUBCUTANEOUS EVERY 24 HOURS
Status: DISCONTINUED | OUTPATIENT
Start: 2022-01-01 | End: 2022-01-01

## 2022-01-01 RX ORDER — FAMOTIDINE 20 MG/1
20 TABLET, FILM COATED ORAL
Status: DISCONTINUED | OUTPATIENT
Start: 2022-01-01 | End: 2022-01-01

## 2022-01-01 RX ORDER — FUROSEMIDE 10 MG/ML
40 INJECTION INTRAMUSCULAR; INTRAVENOUS ONCE
Status: COMPLETED | OUTPATIENT
Start: 2022-01-01 | End: 2022-01-01

## 2022-01-01 RX ORDER — INSULIN LISPRO 100 [IU]/ML
0-9 INJECTION, SOLUTION INTRAVENOUS; SUBCUTANEOUS EVERY 6 HOURS
Status: DISCONTINUED | OUTPATIENT
Start: 2022-01-01 | End: 2022-01-01

## 2022-01-01 RX ORDER — GLYCOPYRROLATE 0.2 MG/ML
INJECTION INTRAMUSCULAR; INTRAVENOUS AS NEEDED
Status: DISCONTINUED | OUTPATIENT
Start: 2022-01-01 | End: 2022-01-01 | Stop reason: SURG

## 2022-01-01 RX ORDER — UREA 10 %
1 LOTION (ML) TOPICAL NIGHTLY PRN
Status: DISCONTINUED | OUTPATIENT
Start: 2022-01-01 | End: 2022-01-01

## 2022-01-01 RX ORDER — ALBUTEROL SULFATE 90 UG/1
2 AEROSOL, METERED RESPIRATORY (INHALATION) EVERY 4 HOURS PRN
Qty: 8 G | Refills: 0 | Status: SHIPPED | OUTPATIENT
Start: 2022-01-01 | End: 2022-01-01 | Stop reason: SDUPTHER

## 2022-01-01 RX ORDER — LIDOCAINE HYDROCHLORIDE 10 MG/ML
0.5 INJECTION, SOLUTION EPIDURAL; INFILTRATION; INTRACAUDAL; PERINEURAL ONCE AS NEEDED
Status: DISCONTINUED | OUTPATIENT
Start: 2022-01-01 | End: 2022-01-01 | Stop reason: HOSPADM

## 2022-01-01 RX ORDER — INSULIN LISPRO 100 [IU]/ML
0-9 INJECTION, SOLUTION INTRAVENOUS; SUBCUTANEOUS
Status: DISCONTINUED | OUTPATIENT
Start: 2022-01-01 | End: 2022-01-01 | Stop reason: HOSPADM

## 2022-01-01 RX ORDER — METHYLPREDNISOLONE SODIUM SUCCINATE 40 MG/ML
40 INJECTION, POWDER, LYOPHILIZED, FOR SOLUTION INTRAMUSCULAR; INTRAVENOUS EVERY 8 HOURS
Status: DISCONTINUED | OUTPATIENT
Start: 2022-01-01 | End: 2022-01-01

## 2022-01-01 RX ORDER — BUMETANIDE 1 MG/1
1 TABLET ORAL DAILY
Status: DISCONTINUED | OUTPATIENT
Start: 2022-01-01 | End: 2022-01-01 | Stop reason: HOSPADM

## 2022-01-01 RX ORDER — HYDROCODONE BITARTRATE AND ACETAMINOPHEN 7.5; 325 MG/1; MG/1
1 TABLET ORAL ONCE AS NEEDED
Status: DISCONTINUED | OUTPATIENT
Start: 2022-01-01 | End: 2022-01-01 | Stop reason: HOSPADM

## 2022-01-01 RX ORDER — INSULIN LISPRO 100 [IU]/ML
0-9 INJECTION, SOLUTION INTRAVENOUS; SUBCUTANEOUS
Status: DISCONTINUED | OUTPATIENT
Start: 2022-01-01 | End: 2022-01-01

## 2022-01-01 RX ORDER — LEVOFLOXACIN 5 MG/ML
750 INJECTION, SOLUTION INTRAVENOUS EVERY 24 HOURS
Status: DISCONTINUED | OUTPATIENT
Start: 2022-01-01 | End: 2022-01-01

## 2022-01-01 RX ORDER — MORPHINE SULFATE 10 MG/ML
6 INJECTION INTRAMUSCULAR; INTRAVENOUS; SUBCUTANEOUS
Status: DISCONTINUED | OUTPATIENT
Start: 2022-01-01 | End: 2022-05-30 | Stop reason: HOSPADM

## 2022-01-01 RX ORDER — PROMETHAZINE HYDROCHLORIDE 25 MG/1
6.25 TABLET ORAL EVERY 4 HOURS PRN
Status: DISCONTINUED | OUTPATIENT
Start: 2022-01-01 | End: 2022-05-30 | Stop reason: HOSPADM

## 2022-01-01 RX ORDER — INSULIN ASPART 100 [IU]/ML
INJECTION, SOLUTION INTRAVENOUS; SUBCUTANEOUS
Qty: 45 ML | Refills: 3 | Status: SHIPPED | OUTPATIENT
Start: 2022-01-01

## 2022-01-01 RX ORDER — EPHEDRINE SULFATE 50 MG/ML
5 INJECTION, SOLUTION INTRAVENOUS ONCE AS NEEDED
Status: DISCONTINUED | OUTPATIENT
Start: 2022-01-01 | End: 2022-01-01 | Stop reason: HOSPADM

## 2022-01-01 RX ORDER — ISOSORBIDE MONONITRATE 10 MG/1
10 TABLET ORAL
Status: DISCONTINUED | OUTPATIENT
Start: 2022-01-01 | End: 2022-01-01

## 2022-01-01 RX ORDER — BUMETANIDE 1 MG/1
1 TABLET ORAL DAILY PRN
Status: DISCONTINUED | OUTPATIENT
Start: 2022-01-01 | End: 2022-01-01

## 2022-01-01 RX ORDER — HYDRALAZINE HYDROCHLORIDE 20 MG/ML
5 INJECTION INTRAMUSCULAR; INTRAVENOUS
Status: DISCONTINUED | OUTPATIENT
Start: 2022-01-01 | End: 2022-01-01 | Stop reason: HOSPADM

## 2022-01-01 RX ORDER — SODIUM CHLORIDE 0.9 % (FLUSH) 0.9 %
3 SYRINGE (ML) INJECTION EVERY 12 HOURS SCHEDULED
Status: DISCONTINUED | OUTPATIENT
Start: 2022-01-01 | End: 2022-01-01 | Stop reason: HOSPADM

## 2022-01-01 RX ORDER — IPRATROPIUM BROMIDE AND ALBUTEROL SULFATE 2.5; .5 MG/3ML; MG/3ML
3 SOLUTION RESPIRATORY (INHALATION) 4 TIMES DAILY
Status: DISCONTINUED | OUTPATIENT
Start: 2022-01-01 | End: 2022-01-01

## 2022-01-01 RX ORDER — ACETYLCYSTEINE 200 MG/ML
2 SOLUTION ORAL; RESPIRATORY (INHALATION)
Status: DISCONTINUED | OUTPATIENT
Start: 2022-01-01 | End: 2022-01-01

## 2022-01-01 RX ORDER — FENTANYL CITRATE 50 UG/ML
50 INJECTION, SOLUTION INTRAMUSCULAR; INTRAVENOUS
Status: DISCONTINUED | OUTPATIENT
Start: 2022-01-01 | End: 2022-01-01

## 2022-01-01 RX ADMIN — DOCUSATE SODIUM 50MG AND SENNOSIDES 8.6MG 2 TABLET: 8.6; 5 TABLET, FILM COATED ORAL at 20:57

## 2022-01-01 RX ADMIN — EMPAGLIFLOZIN 25 MG: 25 TABLET, FILM COATED ORAL at 10:20

## 2022-01-01 RX ADMIN — IPRATROPIUM BROMIDE AND ALBUTEROL SULFATE 3 ML: 2.5; .5 SOLUTION RESPIRATORY (INHALATION) at 15:01

## 2022-01-01 RX ADMIN — ASPIRIN 81 MG: 81 TABLET, CHEWABLE ORAL at 14:28

## 2022-01-01 RX ADMIN — DOCUSATE SODIUM 50MG AND SENNOSIDES 8.6MG 2 TABLET: 8.6; 5 TABLET, FILM COATED ORAL at 21:29

## 2022-01-01 RX ADMIN — METOPROLOL SUCCINATE 25 MG: 25 TABLET, EXTENDED RELEASE ORAL at 10:49

## 2022-01-01 RX ADMIN — ALLOPURINOL 300 MG: 300 TABLET ORAL at 08:34

## 2022-01-01 RX ADMIN — METOPROLOL TARTRATE 12.5 MG: 25 TABLET ORAL at 20:57

## 2022-01-01 RX ADMIN — ENOXAPARIN SODIUM 40 MG: 100 INJECTION SUBCUTANEOUS at 17:14

## 2022-01-01 RX ADMIN — FENTANYL CITRATE 50 MCG: 0.05 INJECTION, SOLUTION INTRAMUSCULAR; INTRAVENOUS at 12:11

## 2022-01-01 RX ADMIN — ASPIRIN 81 MG: 81 TABLET, CHEWABLE ORAL at 08:46

## 2022-01-01 RX ADMIN — SODIUM CHLORIDE 75 ML/HR: 9 INJECTION, SOLUTION INTRAVENOUS at 09:41

## 2022-01-01 RX ADMIN — ATORVASTATIN CALCIUM 40 MG: 20 TABLET, FILM COATED ORAL at 21:25

## 2022-01-01 RX ADMIN — FAMOTIDINE 20 MG: 20 TABLET ORAL at 08:41

## 2022-01-01 RX ADMIN — BUMETANIDE 1 MG: 1 TABLET ORAL at 17:13

## 2022-01-01 RX ADMIN — EMPAGLIFLOZIN 25 MG: 25 TABLET, FILM COATED ORAL at 08:11

## 2022-01-01 RX ADMIN — PHENYLEPHRINE HYDROCHLORIDE 100 MCG: 10 INJECTION, SOLUTION INTRAVENOUS at 10:21

## 2022-01-01 RX ADMIN — METOPROLOL TARTRATE 12.5 MG: 25 TABLET ORAL at 08:09

## 2022-01-01 RX ADMIN — INSULIN LISPRO 16 UNITS: 100 INJECTION, SOLUTION INTRAVENOUS; SUBCUTANEOUS at 00:30

## 2022-01-01 RX ADMIN — IPRATROPIUM BROMIDE AND ALBUTEROL SULFATE 3 ML: 2.5; .5 SOLUTION RESPIRATORY (INHALATION) at 19:47

## 2022-01-01 RX ADMIN — ISOSORBIDE MONONITRATE 10 MG: 10 TABLET ORAL at 08:35

## 2022-01-01 RX ADMIN — DOCUSATE SODIUM 50MG AND SENNOSIDES 8.6MG 2 TABLET: 8.6; 5 TABLET, FILM COATED ORAL at 08:46

## 2022-01-01 RX ADMIN — Medication 10 ML: at 08:22

## 2022-01-01 RX ADMIN — ASPIRIN 81 MG: 81 TABLET, COATED ORAL at 10:19

## 2022-01-01 RX ADMIN — HYDROMORPHONE HYDROCHLORIDE 0.5 MG: 1 INJECTION, SOLUTION INTRAMUSCULAR; INTRAVENOUS; SUBCUTANEOUS at 08:14

## 2022-01-01 RX ADMIN — METHYLPREDNISOLONE SODIUM SUCCINATE 60 MG: 125 INJECTION, POWDER, FOR SOLUTION INTRAMUSCULAR; INTRAVENOUS at 17:22

## 2022-01-01 RX ADMIN — METOPROLOL TARTRATE 12.5 MG: 25 TABLET ORAL at 08:30

## 2022-01-01 RX ADMIN — ACETAMINOPHEN 650 MG: 325 TABLET ORAL at 04:15

## 2022-01-01 RX ADMIN — INSULIN GLARGINE-YFGN 25 UNITS: 100 INJECTION, SOLUTION SUBCUTANEOUS at 21:27

## 2022-01-01 RX ADMIN — INSULIN GLARGINE-YFGN 15 UNITS: 100 INJECTION, SOLUTION SUBCUTANEOUS at 20:57

## 2022-01-01 RX ADMIN — IPRATROPIUM BROMIDE AND ALBUTEROL SULFATE 3 ML: 2.5; .5 SOLUTION RESPIRATORY (INHALATION) at 19:30

## 2022-01-01 RX ADMIN — ACETAMINOPHEN 650 MG: 325 TABLET ORAL at 20:57

## 2022-01-01 RX ADMIN — METOPROLOL TARTRATE 25 MG: 25 TABLET, FILM COATED ORAL at 23:22

## 2022-01-01 RX ADMIN — Medication 10 ML: at 21:38

## 2022-01-01 RX ADMIN — ASPIRIN 81 MG: 81 TABLET, COATED ORAL at 08:14

## 2022-01-01 RX ADMIN — IPRATROPIUM BROMIDE AND ALBUTEROL SULFATE 3 ML: 2.5; .5 SOLUTION RESPIRATORY (INHALATION) at 10:23

## 2022-01-01 RX ADMIN — PHENYLEPHRINE HYDROCHLORIDE 100 MCG: 10 INJECTION, SOLUTION INTRAVENOUS at 11:09

## 2022-01-01 RX ADMIN — INSULIN LISPRO 16 UNITS: 100 INJECTION, SOLUTION INTRAVENOUS; SUBCUTANEOUS at 17:32

## 2022-01-01 RX ADMIN — INSULIN LISPRO 10 UNITS: 100 INJECTION, SOLUTION INTRAVENOUS; SUBCUTANEOUS at 08:50

## 2022-01-01 RX ADMIN — INSULIN LISPRO 10 UNITS: 100 INJECTION, SOLUTION INTRAVENOUS; SUBCUTANEOUS at 17:07

## 2022-01-01 RX ADMIN — PHENYLEPHRINE HYDROCHLORIDE 100 MCG: 10 INJECTION, SOLUTION INTRAVENOUS at 10:42

## 2022-01-01 RX ADMIN — ATORVASTATIN CALCIUM 40 MG: 20 TABLET, FILM COATED ORAL at 20:57

## 2022-01-01 RX ADMIN — INSULIN LISPRO 20 UNITS: 100 INJECTION, SOLUTION INTRAVENOUS; SUBCUTANEOUS at 06:09

## 2022-01-01 RX ADMIN — SODIUM CHLORIDE 75 ML/HR: 9 INJECTION, SOLUTION INTRAVENOUS at 06:17

## 2022-01-01 RX ADMIN — INSULIN LISPRO 4 UNITS: 100 INJECTION, SOLUTION INTRAVENOUS; SUBCUTANEOUS at 17:25

## 2022-01-01 RX ADMIN — ATORVASTATIN CALCIUM 40 MG: 20 TABLET, FILM COATED ORAL at 21:29

## 2022-01-01 RX ADMIN — INSULIN LISPRO 10 UNITS: 100 INJECTION, SOLUTION INTRAVENOUS; SUBCUTANEOUS at 18:24

## 2022-01-01 RX ADMIN — ACETYLCYSTEINE 2 ML: 200 SOLUTION ORAL; RESPIRATORY (INHALATION) at 19:06

## 2022-01-01 RX ADMIN — ISOSORBIDE MONONITRATE 30 MG: 30 TABLET ORAL at 13:30

## 2022-01-01 RX ADMIN — INSULIN LISPRO 10 UNITS: 100 INJECTION, SOLUTION INTRAVENOUS; SUBCUTANEOUS at 11:39

## 2022-01-01 RX ADMIN — IPRATROPIUM BROMIDE AND ALBUTEROL SULFATE 3 ML: 2.5; .5 SOLUTION RESPIRATORY (INHALATION) at 10:27

## 2022-01-01 RX ADMIN — ATORVASTATIN CALCIUM 40 MG: 20 TABLET, FILM COATED ORAL at 21:18

## 2022-01-01 RX ADMIN — CEFTRIAXONE: 1 INJECTION, POWDER, FOR SOLUTION INTRAMUSCULAR; INTRAVENOUS at 20:43

## 2022-01-01 RX ADMIN — PROPOFOL 30 MCG/KG/MIN: 10 INJECTION, EMULSION INTRAVENOUS at 03:22

## 2022-01-01 RX ADMIN — PROPOFOL 45 MCG/KG/MIN: 10 INJECTION, EMULSION INTRAVENOUS at 04:15

## 2022-01-01 RX ADMIN — DOCUSATE SODIUM 50MG AND SENNOSIDES 8.6MG 2 TABLET: 8.6; 5 TABLET, FILM COATED ORAL at 10:49

## 2022-01-01 RX ADMIN — METOPROLOL TARTRATE 12.5 MG: 25 TABLET ORAL at 20:14

## 2022-01-01 RX ADMIN — INSULIN LISPRO 24 UNITS: 100 INJECTION, SOLUTION INTRAVENOUS; SUBCUTANEOUS at 11:39

## 2022-01-01 RX ADMIN — Medication 1 MG: at 21:31

## 2022-01-01 RX ADMIN — IPRATROPIUM BROMIDE AND ALBUTEROL SULFATE 3 ML: 2.5; .5 SOLUTION RESPIRATORY (INHALATION) at 14:45

## 2022-01-01 RX ADMIN — DOCUSATE SODIUM 50MG AND SENNOSIDES 8.6MG 2 TABLET: 8.6; 5 TABLET, FILM COATED ORAL at 22:07

## 2022-01-01 RX ADMIN — SODIUM CHLORIDE 30 ML/HR: 9 INJECTION, SOLUTION INTRAVENOUS at 10:05

## 2022-01-01 RX ADMIN — GADOBENATE DIMEGLUMINE 20 ML: 529 INJECTION, SOLUTION INTRAVENOUS at 15:56

## 2022-01-01 RX ADMIN — METOPROLOL TARTRATE 12.5 MG: 25 TABLET ORAL at 21:12

## 2022-01-01 RX ADMIN — ISOSORBIDE MONONITRATE 30 MG: 30 TABLET ORAL at 10:49

## 2022-01-01 RX ADMIN — INSULIN LISPRO 4 UNITS: 100 INJECTION, SOLUTION INTRAVENOUS; SUBCUTANEOUS at 18:20

## 2022-01-01 RX ADMIN — INSULIN LISPRO 10 UNITS: 100 INJECTION, SOLUTION INTRAVENOUS; SUBCUTANEOUS at 13:11

## 2022-01-01 RX ADMIN — INSULIN LISPRO 2 UNITS: 100 INJECTION, SOLUTION INTRAVENOUS; SUBCUTANEOUS at 08:10

## 2022-01-01 RX ADMIN — SODIUM CHLORIDE 500 ML: 9 INJECTION, SOLUTION INTRAVENOUS at 12:15

## 2022-01-01 RX ADMIN — IPRATROPIUM BROMIDE AND ALBUTEROL SULFATE 3 ML: 2.5; .5 SOLUTION RESPIRATORY (INHALATION) at 07:37

## 2022-01-01 RX ADMIN — PROPOFOL 45 MCG/KG/MIN: 10 INJECTION, EMULSION INTRAVENOUS at 12:15

## 2022-01-01 RX ADMIN — LIDOCAINE HYDROCHLORIDE 6 ML: 10 INJECTION, SOLUTION INFILTRATION; PERINEURAL at 10:33

## 2022-01-01 RX ADMIN — CHLORHEXIDINE GLUCONATE 15 ML: 1.2 SOLUTION ORAL at 21:12

## 2022-01-01 RX ADMIN — ALLOPURINOL 300 MG: 300 TABLET ORAL at 08:47

## 2022-01-01 RX ADMIN — ISOSORBIDE MONONITRATE 10 MG: 10 TABLET ORAL at 08:09

## 2022-01-01 RX ADMIN — METHYLPREDNISOLONE SODIUM SUCCINATE 40 MG: 40 INJECTION, POWDER, FOR SOLUTION INTRAMUSCULAR; INTRAVENOUS at 08:41

## 2022-01-01 RX ADMIN — IPRATROPIUM BROMIDE AND ALBUTEROL SULFATE 3 ML: 2.5; .5 SOLUTION RESPIRATORY (INHALATION) at 12:33

## 2022-01-01 RX ADMIN — PROPOFOL 30 MCG/KG/MIN: 10 INJECTION, EMULSION INTRAVENOUS at 09:45

## 2022-01-01 RX ADMIN — INSULIN LISPRO 16 UNITS: 100 INJECTION, SOLUTION INTRAVENOUS; SUBCUTANEOUS at 00:16

## 2022-01-01 RX ADMIN — ACETYLCYSTEINE 2 ML: 200 SOLUTION ORAL; RESPIRATORY (INHALATION) at 06:29

## 2022-01-01 RX ADMIN — DOCUSATE SODIUM 50MG AND SENNOSIDES 8.6MG 2 TABLET: 8.6; 5 TABLET, FILM COATED ORAL at 21:12

## 2022-01-01 RX ADMIN — ALLOPURINOL 300 MG: 300 TABLET ORAL at 08:41

## 2022-01-01 RX ADMIN — IPRATROPIUM BROMIDE AND ALBUTEROL SULFATE 3 ML: 2.5; .5 SOLUTION RESPIRATORY (INHALATION) at 07:21

## 2022-01-01 RX ADMIN — IPRATROPIUM BROMIDE AND ALBUTEROL SULFATE 3 ML: 2.5; .5 SOLUTION RESPIRATORY (INHALATION) at 15:30

## 2022-01-01 RX ADMIN — IPRATROPIUM BROMIDE AND ALBUTEROL SULFATE 3 ML: 2.5; .5 SOLUTION RESPIRATORY (INHALATION) at 19:03

## 2022-01-01 RX ADMIN — CHLORHEXIDINE GLUCONATE 15 ML: 1.2 SOLUTION ORAL at 21:27

## 2022-01-01 RX ADMIN — INSULIN LISPRO 4 UNITS: 100 INJECTION, SOLUTION INTRAVENOUS; SUBCUTANEOUS at 13:27

## 2022-01-01 RX ADMIN — FENTANYL CITRATE 50 MCG: 0.05 INJECTION, SOLUTION INTRAMUSCULAR; INTRAVENOUS at 21:12

## 2022-01-01 RX ADMIN — INSULIN GLARGINE-YFGN 34 UNITS: 100 INJECTION, SOLUTION SUBCUTANEOUS at 21:18

## 2022-01-01 RX ADMIN — PROPOFOL 100 MG: 10 INJECTION, EMULSION INTRAVENOUS at 09:40

## 2022-01-01 RX ADMIN — INSULIN LISPRO 2 UNITS: 100 INJECTION, SOLUTION INTRAVENOUS; SUBCUTANEOUS at 17:07

## 2022-01-01 RX ADMIN — SODIUM CHLORIDE 75 ML/HR: 9 INJECTION, SOLUTION INTRAVENOUS at 23:59

## 2022-01-01 RX ADMIN — LIDOCAINE HYDROCHLORIDE 60 MG: 20 INJECTION, SOLUTION INFILTRATION; PERINEURAL at 10:34

## 2022-01-01 RX ADMIN — MORPHINE SULFATE 4 MG: 2 INJECTION, SOLUTION INTRAMUSCULAR; INTRAVENOUS at 16:53

## 2022-01-01 RX ADMIN — INSULIN LISPRO 10 UNITS: 100 INJECTION, SOLUTION INTRAVENOUS; SUBCUTANEOUS at 18:19

## 2022-01-01 RX ADMIN — DOCUSATE SODIUM 50MG AND SENNOSIDES 8.6MG 2 TABLET: 8.6; 5 TABLET, FILM COATED ORAL at 21:26

## 2022-01-01 RX ADMIN — INSULIN GLARGINE-YFGN 40 UNITS: 100 INJECTION, SOLUTION SUBCUTANEOUS at 21:12

## 2022-01-01 RX ADMIN — INSULIN GLARGINE-YFGN 40 UNITS: 100 INJECTION, SOLUTION SUBCUTANEOUS at 08:42

## 2022-01-01 RX ADMIN — ISOSORBIDE MONONITRATE 10 MG: 10 TABLET ORAL at 17:21

## 2022-01-01 RX ADMIN — HYDROMORPHONE HYDROCHLORIDE 0.5 MG: 1 INJECTION, SOLUTION INTRAMUSCULAR; INTRAVENOUS; SUBCUTANEOUS at 22:40

## 2022-01-01 RX ADMIN — PROPOFOL 20 MCG/KG/MIN: 10 INJECTION, EMULSION INTRAVENOUS at 17:30

## 2022-01-01 RX ADMIN — ACETYLCYSTEINE 2 ML: 200 SOLUTION ORAL; RESPIRATORY (INHALATION) at 06:28

## 2022-01-01 RX ADMIN — FUROSEMIDE 40 MG: 10 INJECTION, SOLUTION INTRAMUSCULAR; INTRAVENOUS at 15:07

## 2022-01-01 RX ADMIN — ISOSORBIDE MONONITRATE 10 MG: 10 TABLET ORAL at 17:00

## 2022-01-01 RX ADMIN — ALBUTEROL SULFATE 2.5 MG: 2.5 SOLUTION RESPIRATORY (INHALATION) at 19:52

## 2022-01-01 RX ADMIN — ATORVASTATIN CALCIUM 40 MG: 20 TABLET, FILM COATED ORAL at 21:26

## 2022-01-01 RX ADMIN — ISOSORBIDE MONONITRATE 10 MG: 10 TABLET ORAL at 08:41

## 2022-01-01 RX ADMIN — DIGOXIN 500 MCG: 0.25 INJECTION INTRAMUSCULAR; INTRAVENOUS at 15:45

## 2022-01-01 RX ADMIN — METHYLPREDNISOLONE SODIUM SUCCINATE 40 MG: 40 INJECTION, POWDER, FOR SOLUTION INTRAMUSCULAR; INTRAVENOUS at 00:30

## 2022-01-01 RX ADMIN — METOPROLOL TARTRATE 12.5 MG: 25 TABLET ORAL at 21:26

## 2022-01-01 RX ADMIN — PROPOFOL 20 MCG/KG/MIN: 10 INJECTION, EMULSION INTRAVENOUS at 03:01

## 2022-01-01 RX ADMIN — ASPIRIN 81 MG: 81 TABLET, CHEWABLE ORAL at 08:35

## 2022-01-01 RX ADMIN — SUCCINYLCHOLINE CHLORIDE 180 MG: 20 INJECTION, SOLUTION INTRAMUSCULAR; INTRAVENOUS; PARENTERAL at 09:40

## 2022-01-01 RX ADMIN — METHYLPREDNISOLONE SODIUM SUCCINATE 40 MG: 40 INJECTION, POWDER, FOR SOLUTION INTRAMUSCULAR; INTRAVENOUS at 08:58

## 2022-01-01 RX ADMIN — PHENYLEPHRINE HYDROCHLORIDE 100 MCG: 10 INJECTION, SOLUTION INTRAVENOUS at 10:18

## 2022-01-01 RX ADMIN — IPRATROPIUM BROMIDE AND ALBUTEROL SULFATE 3 ML: 2.5; .5 SOLUTION RESPIRATORY (INHALATION) at 11:19

## 2022-01-01 RX ADMIN — INSULIN LISPRO 2 UNITS: 100 INJECTION, SOLUTION INTRAVENOUS; SUBCUTANEOUS at 08:21

## 2022-01-01 RX ADMIN — PROPOFOL 35 MCG/KG/MIN: 10 INJECTION, EMULSION INTRAVENOUS at 13:00

## 2022-01-01 RX ADMIN — EMPAGLIFLOZIN 25 MG: 25 TABLET, FILM COATED ORAL at 08:31

## 2022-01-01 RX ADMIN — ATORVASTATIN CALCIUM 40 MG: 20 TABLET, FILM COATED ORAL at 22:07

## 2022-01-01 RX ADMIN — ASPIRIN 81 MG: 81 TABLET, COATED ORAL at 08:12

## 2022-01-01 RX ADMIN — IPRATROPIUM BROMIDE AND ALBUTEROL SULFATE 3 ML: 2.5; .5 SOLUTION RESPIRATORY (INHALATION) at 11:15

## 2022-01-01 RX ADMIN — LORAZEPAM 2 MG: 2 INJECTION, SOLUTION INTRAMUSCULAR; INTRAVENOUS at 15:53

## 2022-01-01 RX ADMIN — IPRATROPIUM BROMIDE AND ALBUTEROL SULFATE 3 ML: 2.5; .5 SOLUTION RESPIRATORY (INHALATION) at 11:02

## 2022-01-01 RX ADMIN — EMPAGLIFLOZIN 25 MG: 25 TABLET, FILM COATED ORAL at 08:19

## 2022-01-01 RX ADMIN — SODIUM CHLORIDE 75 ML/HR: 9 INJECTION, SOLUTION INTRAVENOUS at 19:57

## 2022-01-01 RX ADMIN — ASPIRIN 81 MG: 81 TABLET, COATED ORAL at 10:49

## 2022-01-01 RX ADMIN — IPRATROPIUM BROMIDE AND ALBUTEROL SULFATE 3 ML: 2.5; .5 SOLUTION RESPIRATORY (INHALATION) at 16:22

## 2022-01-01 RX ADMIN — METOPROLOL TARTRATE 25 MG: 25 TABLET, FILM COATED ORAL at 08:12

## 2022-01-01 RX ADMIN — INSULIN LISPRO 10 UNITS: 100 INJECTION, SOLUTION INTRAVENOUS; SUBCUTANEOUS at 17:30

## 2022-01-01 RX ADMIN — METOPROLOL SUCCINATE 25 MG: 25 TABLET, EXTENDED RELEASE ORAL at 08:50

## 2022-01-01 RX ADMIN — INSULIN GLARGINE-YFGN 34 UNITS: 100 INJECTION, SOLUTION SUBCUTANEOUS at 21:31

## 2022-01-01 RX ADMIN — SODIUM CHLORIDE 500 ML: 9 INJECTION, SOLUTION INTRAVENOUS at 13:13

## 2022-01-01 RX ADMIN — IPRATROPIUM BROMIDE AND ALBUTEROL SULFATE 3 ML: 2.5; .5 SOLUTION RESPIRATORY (INHALATION) at 07:14

## 2022-01-01 RX ADMIN — INSULIN LISPRO 2 UNITS: 100 INJECTION, SOLUTION INTRAVENOUS; SUBCUTANEOUS at 12:23

## 2022-01-01 RX ADMIN — HYDROMORPHONE HYDROCHLORIDE 1 MG: 1 INJECTION, SOLUTION INTRAMUSCULAR; INTRAVENOUS; SUBCUTANEOUS at 15:54

## 2022-01-01 RX ADMIN — METOPROLOL TARTRATE 12.5 MG: 25 TABLET ORAL at 14:28

## 2022-01-01 RX ADMIN — ACETAMINOPHEN 650 MG: 325 TABLET ORAL at 06:10

## 2022-01-01 RX ADMIN — PHENYLEPHRINE HYDROCHLORIDE 200 MCG: 10 INJECTION, SOLUTION INTRAVENOUS at 10:59

## 2022-01-01 RX ADMIN — IPRATROPIUM BROMIDE AND ALBUTEROL SULFATE 3 ML: 2.5; .5 SOLUTION RESPIRATORY (INHALATION) at 19:14

## 2022-01-01 RX ADMIN — IPRATROPIUM BROMIDE AND ALBUTEROL SULFATE 3 ML: 2.5; .5 SOLUTION RESPIRATORY (INHALATION) at 10:29

## 2022-01-01 RX ADMIN — MORPHINE SULFATE 4 MG: 2 INJECTION, SOLUTION INTRAMUSCULAR; INTRAVENOUS at 18:59

## 2022-01-01 RX ADMIN — BUMETANIDE 1 MG: 1 TABLET ORAL at 08:17

## 2022-01-01 RX ADMIN — ROCURONIUM BROMIDE 50 MG: 50 INJECTION INTRAVENOUS at 10:11

## 2022-01-01 RX ADMIN — INSULIN LISPRO 16 UNITS: 100 INJECTION, SOLUTION INTRAVENOUS; SUBCUTANEOUS at 17:30

## 2022-01-01 RX ADMIN — CEFTRIAXONE 1 G: 1 INJECTION, POWDER, FOR SOLUTION INTRAMUSCULAR; INTRAVENOUS at 20:37

## 2022-01-01 RX ADMIN — INSULIN LISPRO 16 UNITS: 100 INJECTION, SOLUTION INTRAVENOUS; SUBCUTANEOUS at 06:12

## 2022-01-01 RX ADMIN — INSULIN LISPRO 20 UNITS: 100 INJECTION, SOLUTION INTRAVENOUS; SUBCUTANEOUS at 12:11

## 2022-01-01 RX ADMIN — PROPOFOL 20 MCG/KG/MIN: 10 INJECTION, EMULSION INTRAVENOUS at 16:46

## 2022-01-01 RX ADMIN — ENOXAPARIN SODIUM 40 MG: 100 INJECTION SUBCUTANEOUS at 08:35

## 2022-01-01 RX ADMIN — ALLOPURINOL 300 MG: 300 TABLET ORAL at 10:56

## 2022-01-01 RX ADMIN — IPRATROPIUM BROMIDE AND ALBUTEROL SULFATE 3 ML: 2.5; .5 SOLUTION RESPIRATORY (INHALATION) at 06:28

## 2022-01-01 RX ADMIN — CEFTRIAXONE 1 G: 1 INJECTION, POWDER, FOR SOLUTION INTRAMUSCULAR; INTRAVENOUS at 20:43

## 2022-01-01 RX ADMIN — ATORVASTATIN CALCIUM 40 MG: 20 TABLET, FILM COATED ORAL at 08:12

## 2022-01-01 RX ADMIN — ENOXAPARIN SODIUM 40 MG: 100 INJECTION SUBCUTANEOUS at 08:42

## 2022-01-01 RX ADMIN — METOPROLOL TARTRATE 25 MG: 25 TABLET, FILM COATED ORAL at 08:19

## 2022-01-01 RX ADMIN — INSULIN LISPRO 7 UNITS: 100 INJECTION, SOLUTION INTRAVENOUS; SUBCUTANEOUS at 06:17

## 2022-01-01 RX ADMIN — Medication 10 ML: at 23:22

## 2022-01-01 RX ADMIN — PROPOFOL 35 MCG/KG/MIN: 10 INJECTION, EMULSION INTRAVENOUS at 04:04

## 2022-01-01 RX ADMIN — FAMOTIDINE 20 MG: 20 TABLET ORAL at 17:01

## 2022-01-01 RX ADMIN — Medication 10 ML: at 10:18

## 2022-01-01 RX ADMIN — PROPOFOL 20 MCG/KG/MIN: 10 INJECTION, EMULSION INTRAVENOUS at 11:11

## 2022-01-01 RX ADMIN — IPRATROPIUM BROMIDE AND ALBUTEROL SULFATE 3 ML: 2.5; .5 SOLUTION RESPIRATORY (INHALATION) at 06:27

## 2022-01-01 RX ADMIN — IOPAMIDOL 95 ML: 755 INJECTION, SOLUTION INTRAVENOUS at 12:26

## 2022-01-01 RX ADMIN — ASPIRIN 81 MG: 81 TABLET, COATED ORAL at 08:31

## 2022-01-01 RX ADMIN — MORPHINE SULFATE 4 MG: 2 INJECTION, SOLUTION INTRAMUSCULAR; INTRAVENOUS at 18:05

## 2022-01-01 RX ADMIN — ATORVASTATIN CALCIUM 40 MG: 20 TABLET, FILM COATED ORAL at 21:12

## 2022-01-01 RX ADMIN — SODIUM CHLORIDE, POTASSIUM CHLORIDE, SODIUM LACTATE AND CALCIUM CHLORIDE 2190 ML: 600; 310; 30; 20 INJECTION, SOLUTION INTRAVENOUS at 14:27

## 2022-01-01 RX ADMIN — IPRATROPIUM BROMIDE AND ALBUTEROL SULFATE 3 ML: 2.5; .5 SOLUTION RESPIRATORY (INHALATION) at 19:52

## 2022-01-01 RX ADMIN — FENTANYL CITRATE 50 MCG: 0.05 INJECTION, SOLUTION INTRAMUSCULAR; INTRAVENOUS at 02:27

## 2022-01-01 RX ADMIN — ALBUTEROL SULFATE 2.5 MG: 2.5 SOLUTION RESPIRATORY (INHALATION) at 01:15

## 2022-01-01 RX ADMIN — PROPOFOL 20 MCG/KG/MIN: 10 INJECTION, EMULSION INTRAVENOUS at 16:52

## 2022-01-01 RX ADMIN — FENTANYL CITRATE 50 MCG: 0.05 INJECTION, SOLUTION INTRAMUSCULAR; INTRAVENOUS at 04:15

## 2022-01-01 RX ADMIN — IPRATROPIUM BROMIDE AND ALBUTEROL SULFATE 3 ML: 2.5; .5 SOLUTION RESPIRATORY (INHALATION) at 19:05

## 2022-01-01 RX ADMIN — ISOSORBIDE MONONITRATE 10 MG: 10 TABLET ORAL at 17:25

## 2022-01-01 RX ADMIN — PROPOFOL 20 MCG/KG/MIN: 10 INJECTION, EMULSION INTRAVENOUS at 21:51

## 2022-01-01 RX ADMIN — LEVOFLOXACIN 750 MG: 750 INJECTION, SOLUTION INTRAVENOUS at 14:03

## 2022-01-01 RX ADMIN — PHENYLEPHRINE HYDROCHLORIDE 100 MCG: 10 INJECTION, SOLUTION INTRAVENOUS at 10:26

## 2022-01-01 RX ADMIN — IPRATROPIUM BROMIDE AND ALBUTEROL SULFATE 3 ML: 2.5; .5 SOLUTION RESPIRATORY (INHALATION) at 12:43

## 2022-01-01 RX ADMIN — ISOSORBIDE MONONITRATE 30 MG: 30 TABLET ORAL at 08:12

## 2022-01-01 RX ADMIN — DOCUSATE SODIUM 50MG AND SENNOSIDES 8.6MG 2 TABLET: 8.6; 5 TABLET, FILM COATED ORAL at 08:35

## 2022-01-01 RX ADMIN — ISOSORBIDE MONONITRATE 30 MG: 30 TABLET ORAL at 08:31

## 2022-01-01 RX ADMIN — METOPROLOL TARTRATE 12.5 MG: 25 TABLET ORAL at 08:47

## 2022-01-01 RX ADMIN — PERFLUTREN 2 ML: 6.52 INJECTION, SUSPENSION INTRAVENOUS at 17:21

## 2022-01-01 RX ADMIN — DIGOXIN 125 MCG: 125 TABLET ORAL at 12:11

## 2022-01-01 RX ADMIN — GLYCOPYRROLATE 0.2 MG: 0.2 INJECTION INTRAMUSCULAR; INTRAVENOUS at 10:34

## 2022-01-01 RX ADMIN — PROPOFOL 25 MCG/KG/MIN: 10 INJECTION, EMULSION INTRAVENOUS at 02:36

## 2022-01-01 RX ADMIN — IPRATROPIUM BROMIDE AND ALBUTEROL SULFATE 3 ML: 2.5; .5 SOLUTION RESPIRATORY (INHALATION) at 10:28

## 2022-01-01 RX ADMIN — INSULIN LISPRO 4 UNITS: 100 INJECTION, SOLUTION INTRAVENOUS; SUBCUTANEOUS at 17:14

## 2022-01-01 RX ADMIN — HYDROMORPHONE HYDROCHLORIDE 0.5 MG: 1 INJECTION, SOLUTION INTRAMUSCULAR; INTRAVENOUS; SUBCUTANEOUS at 06:09

## 2022-01-01 RX ADMIN — PROPOFOL 30 MCG/KG/MIN: 10 INJECTION, EMULSION INTRAVENOUS at 20:32

## 2022-01-01 RX ADMIN — DIGOXIN 125 MCG: 125 TABLET ORAL at 11:45

## 2022-01-01 RX ADMIN — DOCUSATE SODIUM 50MG AND SENNOSIDES 8.6MG 2 TABLET: 8.6; 5 TABLET, FILM COATED ORAL at 08:50

## 2022-01-01 RX ADMIN — INSULIN GLARGINE-YFGN 34 UNITS: 100 INJECTION, SOLUTION SUBCUTANEOUS at 22:23

## 2022-01-01 RX ADMIN — PHENYLEPHRINE HYDROCHLORIDE 100 MCG: 10 INJECTION, SOLUTION INTRAVENOUS at 10:50

## 2022-01-01 RX ADMIN — DOCUSATE SODIUM 50MG AND SENNOSIDES 8.6MG 2 TABLET: 8.6; 5 TABLET, FILM COATED ORAL at 09:26

## 2022-01-01 RX ADMIN — IPRATROPIUM BROMIDE AND ALBUTEROL SULFATE 3 ML: 2.5; .5 SOLUTION RESPIRATORY (INHALATION) at 07:53

## 2022-01-01 RX ADMIN — ACETYLCYSTEINE 2 ML: 200 SOLUTION ORAL; RESPIRATORY (INHALATION) at 20:02

## 2022-01-01 RX ADMIN — FENTANYL CITRATE 50 MCG: 0.05 INJECTION, SOLUTION INTRAMUSCULAR; INTRAVENOUS at 12:48

## 2022-01-01 RX ADMIN — DIGOXIN 500 MCG: 0.25 INJECTION INTRAMUSCULAR; INTRAVENOUS at 08:42

## 2022-01-01 RX ADMIN — INSULIN LISPRO 2 UNITS: 100 INJECTION, SOLUTION INTRAVENOUS; SUBCUTANEOUS at 18:40

## 2022-01-01 RX ADMIN — METOPROLOL TARTRATE 25 MG: 25 TABLET, FILM COATED ORAL at 10:19

## 2022-01-01 RX ADMIN — INSULIN LISPRO 2 UNITS: 100 INJECTION, SOLUTION INTRAVENOUS; SUBCUTANEOUS at 12:30

## 2022-01-01 RX ADMIN — PROPOFOL 35 MCG/KG/MIN: 10 INJECTION, EMULSION INTRAVENOUS at 22:27

## 2022-01-01 RX ADMIN — MORPHINE SULFATE 1 MG: 2 INJECTION, SOLUTION INTRAMUSCULAR; INTRAVENOUS at 00:53

## 2022-01-01 RX ADMIN — METOPROLOL SUCCINATE 25 MG: 25 TABLET, EXTENDED RELEASE ORAL at 09:26

## 2022-01-01 RX ADMIN — ISOSORBIDE MONONITRATE 30 MG: 30 TABLET ORAL at 09:26

## 2022-01-01 RX ADMIN — INSULIN GLARGINE-YFGN 25 UNITS: 100 INJECTION, SOLUTION SUBCUTANEOUS at 08:48

## 2022-01-01 RX ADMIN — IPRATROPIUM BROMIDE AND ALBUTEROL SULFATE 3 ML: 2.5; .5 SOLUTION RESPIRATORY (INHALATION) at 07:16

## 2022-01-01 RX ADMIN — IPRATROPIUM BROMIDE AND ALBUTEROL SULFATE 3 ML: 2.5; .5 SOLUTION RESPIRATORY (INHALATION) at 19:10

## 2022-01-01 RX ADMIN — DOCUSATE SODIUM 50MG AND SENNOSIDES 8.6MG 2 TABLET: 8.6; 5 TABLET, FILM COATED ORAL at 08:09

## 2022-01-01 RX ADMIN — INSULIN LISPRO 10 UNITS: 100 INJECTION, SOLUTION INTRAVENOUS; SUBCUTANEOUS at 09:26

## 2022-01-01 RX ADMIN — METOPROLOL TARTRATE 25 MG: 25 TABLET, FILM COATED ORAL at 20:37

## 2022-01-01 RX ADMIN — PROPOFOL 30 MCG/KG/MIN: 10 INJECTION, EMULSION INTRAVENOUS at 08:19

## 2022-01-01 RX ADMIN — IPRATROPIUM BROMIDE AND ALBUTEROL SULFATE 3 ML: 2.5; .5 SOLUTION RESPIRATORY (INHALATION) at 20:05

## 2022-01-01 RX ADMIN — FAMOTIDINE 20 MG: 20 TABLET ORAL at 17:23

## 2022-01-01 RX ADMIN — SODIUM CHLORIDE, POTASSIUM CHLORIDE, SODIUM LACTATE AND CALCIUM CHLORIDE: 600; 310; 30; 20 INJECTION, SOLUTION INTRAVENOUS at 10:03

## 2022-01-01 RX ADMIN — ASPIRIN 81 MG: 81 TABLET, COATED ORAL at 09:26

## 2022-01-01 RX ADMIN — ATORVASTATIN CALCIUM 40 MG: 20 TABLET, FILM COATED ORAL at 22:23

## 2022-01-01 RX ADMIN — PROPOFOL 30 MCG/KG/MIN: 10 INJECTION, EMULSION INTRAVENOUS at 18:23

## 2022-01-01 RX ADMIN — CHLORHEXIDINE GLUCONATE 15 ML: 1.2 SOLUTION ORAL at 11:11

## 2022-01-01 RX ADMIN — Medication 10 ML: at 08:13

## 2022-01-01 RX ADMIN — INSULIN GLARGINE-YFGN 34 UNITS: 100 INJECTION, SOLUTION SUBCUTANEOUS at 21:29

## 2022-01-01 RX ADMIN — METOPROLOL SUCCINATE 25 MG: 25 TABLET, EXTENDED RELEASE ORAL at 06:21

## 2022-01-01 RX ADMIN — DOCUSATE SODIUM 50MG AND SENNOSIDES 8.6MG 2 TABLET: 8.6; 5 TABLET, FILM COATED ORAL at 21:18

## 2022-01-01 RX ADMIN — ATORVASTATIN CALCIUM 40 MG: 20 TABLET, FILM COATED ORAL at 21:30

## 2022-01-01 RX ADMIN — IPRATROPIUM BROMIDE AND ALBUTEROL SULFATE 3 ML: 2.5; .5 SOLUTION RESPIRATORY (INHALATION) at 14:00

## 2022-01-01 RX ADMIN — METOPROLOL SUCCINATE 25 MG: 25 TABLET, EXTENDED RELEASE ORAL at 08:31

## 2022-01-01 RX ADMIN — DOCUSATE SODIUM 50MG AND SENNOSIDES 8.6MG 2 TABLET: 8.6; 5 TABLET, FILM COATED ORAL at 08:41

## 2022-01-01 RX ADMIN — METOPROLOL TARTRATE 5 MG: 1 INJECTION, SOLUTION INTRAVENOUS at 15:01

## 2022-01-01 RX ADMIN — Medication 10 ML: at 08:42

## 2022-01-01 RX ADMIN — PROPOFOL 45 MCG/KG/MIN: 10 INJECTION, EMULSION INTRAVENOUS at 08:40

## 2022-01-01 RX ADMIN — ALLOPURINOL 300 MG: 300 TABLET ORAL at 08:10

## 2022-01-01 RX ADMIN — IPRATROPIUM BROMIDE AND ALBUTEROL SULFATE 3 ML: 2.5; .5 SOLUTION RESPIRATORY (INHALATION) at 06:54

## 2022-01-01 RX ADMIN — SODIUM CHLORIDE 75 ML/HR: 9 INJECTION, SOLUTION INTRAVENOUS at 17:01

## 2022-01-01 RX ADMIN — IPRATROPIUM BROMIDE AND ALBUTEROL SULFATE 3 ML: 2.5; .5 SOLUTION RESPIRATORY (INHALATION) at 15:45

## 2022-01-01 RX ADMIN — INSULIN LISPRO 10 UNITS: 100 INJECTION, SOLUTION INTRAVENOUS; SUBCUTANEOUS at 08:31

## 2022-01-01 RX ADMIN — ISOSORBIDE MONONITRATE 10 MG: 10 TABLET ORAL at 08:46

## 2022-01-01 RX ADMIN — INSULIN LISPRO 4 UNITS: 100 INJECTION, SOLUTION INTRAVENOUS; SUBCUTANEOUS at 06:35

## 2022-01-01 RX ADMIN — DOXYCYCLINE 100 MG: 100 INJECTION, POWDER, LYOPHILIZED, FOR SOLUTION INTRAVENOUS at 23:22

## 2022-01-01 RX ADMIN — FENTANYL CITRATE 50 MCG: 0.05 INJECTION, SOLUTION INTRAMUSCULAR; INTRAVENOUS at 00:31

## 2022-01-01 RX ADMIN — ENOXAPARIN SODIUM 40 MG: 100 INJECTION SUBCUTANEOUS at 08:47

## 2022-01-01 RX ADMIN — METOPROLOL TARTRATE 25 MG: 25 TABLET, FILM COATED ORAL at 20:42

## 2022-01-01 RX ADMIN — PROPOFOL 25 MCG/KG/MIN: 10 INJECTION, EMULSION INTRAVENOUS at 01:13

## 2022-01-01 RX ADMIN — METOPROLOL TARTRATE 12.5 MG: 25 TABLET ORAL at 09:50

## 2022-01-01 RX ADMIN — ASPIRIN 81 MG: 81 TABLET, CHEWABLE ORAL at 08:41

## 2022-01-01 RX ADMIN — ACETYLCYSTEINE 2 ML: 200 SOLUTION ORAL; RESPIRATORY (INHALATION) at 11:16

## 2022-01-01 RX ADMIN — METHYLPREDNISOLONE SODIUM SUCCINATE 60 MG: 125 INJECTION, POWDER, FOR SOLUTION INTRAMUSCULAR; INTRAVENOUS at 08:29

## 2022-01-01 RX ADMIN — ATORVASTATIN CALCIUM 40 MG: 20 TABLET, FILM COATED ORAL at 08:18

## 2022-01-01 RX ADMIN — CHLORHEXIDINE GLUCONATE 15 ML: 1.2 SOLUTION ORAL at 08:41

## 2022-01-01 RX ADMIN — PROPOFOL 30 MCG/KG/MIN: 10 INJECTION, EMULSION INTRAVENOUS at 14:28

## 2022-01-01 RX ADMIN — PROPOFOL 120 MCG/KG/MIN: 10 INJECTION, EMULSION INTRAVENOUS at 10:41

## 2022-01-01 RX ADMIN — PROPOFOL 25 MCG/KG/MIN: 10 INJECTION, EMULSION INTRAVENOUS at 21:16

## 2022-01-01 RX ADMIN — IPRATROPIUM BROMIDE AND ALBUTEROL SULFATE 3 ML: 2.5; .5 SOLUTION RESPIRATORY (INHALATION) at 14:24

## 2022-01-01 RX ADMIN — METHYLPREDNISOLONE SODIUM SUCCINATE 60 MG: 125 INJECTION, POWDER, FOR SOLUTION INTRAMUSCULAR; INTRAVENOUS at 00:16

## 2022-01-01 RX ADMIN — ISOSORBIDE MONONITRATE 30 MG: 30 TABLET ORAL at 08:14

## 2022-01-01 RX ADMIN — ISOSORBIDE MONONITRATE 10 MG: 10 TABLET ORAL at 17:23

## 2022-01-01 RX ADMIN — INSULIN LISPRO 6 UNITS: 100 INJECTION, SOLUTION INTRAVENOUS; SUBCUTANEOUS at 00:13

## 2022-01-01 RX ADMIN — ASPIRIN 81 MG: 81 TABLET, CHEWABLE ORAL at 08:09

## 2022-01-01 RX ADMIN — ISOSORBIDE MONONITRATE 30 MG: 30 TABLET ORAL at 10:18

## 2022-01-01 RX ADMIN — METOPROLOL TARTRATE 5 MG: 1 INJECTION, SOLUTION INTRAVENOUS at 07:55

## 2022-01-01 RX ADMIN — METHYLPREDNISOLONE SODIUM SUCCINATE 40 MG: 40 INJECTION, POWDER, FOR SOLUTION INTRAMUSCULAR; INTRAVENOUS at 17:01

## 2022-01-01 RX ADMIN — METOPROLOL TARTRATE 5 MG: 1 INJECTION, SOLUTION INTRAVENOUS at 06:00

## 2022-01-01 RX ADMIN — CEFAZOLIN SODIUM 2 G: 2 INJECTION, SOLUTION INTRAVENOUS at 10:11

## 2022-01-01 RX ADMIN — INSULIN GLARGINE-YFGN 34 UNITS: 100 INJECTION, SOLUTION SUBCUTANEOUS at 22:07

## 2022-01-01 RX ADMIN — IPRATROPIUM BROMIDE AND ALBUTEROL SULFATE 3 ML: 2.5; .5 SOLUTION RESPIRATORY (INHALATION) at 06:51

## 2022-01-01 RX ADMIN — ISOSORBIDE MONONITRATE 30 MG: 30 TABLET ORAL at 08:50

## 2022-01-01 RX ADMIN — PHENYLEPHRINE HYDROCHLORIDE 100 MCG: 10 INJECTION, SOLUTION INTRAVENOUS at 10:31

## 2022-01-01 RX ADMIN — FAMOTIDINE 20 MG: 20 TABLET ORAL at 08:46

## 2022-01-01 RX ADMIN — ALBUTEROL SULFATE 2.5 MG: 2.5 SOLUTION RESPIRATORY (INHALATION) at 04:42

## 2022-01-01 RX ADMIN — PROPOFOL 20 MCG/KG/MIN: 10 INJECTION, EMULSION INTRAVENOUS at 08:48

## 2022-01-01 RX ADMIN — Medication 10 ML: at 20:42

## 2022-01-01 RX ADMIN — ASPIRIN 81 MG: 81 TABLET, COATED ORAL at 13:30

## 2022-01-01 RX ADMIN — INSULIN LISPRO 20 UNITS: 100 INJECTION, SOLUTION INTRAVENOUS; SUBCUTANEOUS at 11:46

## 2022-01-01 RX ADMIN — Medication 10 ML: at 21:27

## 2022-01-01 RX ADMIN — INSULIN LISPRO 2 UNITS: 100 INJECTION, SOLUTION INTRAVENOUS; SUBCUTANEOUS at 08:13

## 2022-01-01 RX ADMIN — DOCUSATE SODIUM 50MG AND SENNOSIDES 8.6MG 2 TABLET: 8.6; 5 TABLET, FILM COATED ORAL at 20:14

## 2022-01-01 RX ADMIN — ATORVASTATIN CALCIUM 40 MG: 20 TABLET, FILM COATED ORAL at 10:19

## 2022-01-01 RX ADMIN — INSULIN GLARGINE-YFGN 34 UNITS: 100 INJECTION, SOLUTION SUBCUTANEOUS at 21:25

## 2022-01-01 RX ADMIN — PROPOFOL 80 MG: 10 INJECTION, EMULSION INTRAVENOUS at 10:34

## 2022-01-01 RX ADMIN — ATORVASTATIN CALCIUM 40 MG: 20 TABLET, FILM COATED ORAL at 20:14

## 2022-01-01 RX ADMIN — INSULIN LISPRO 4 UNITS: 100 INJECTION, SOLUTION INTRAVENOUS; SUBCUTANEOUS at 11:20

## 2022-01-01 RX ADMIN — INSULIN LISPRO 8 UNITS: 100 INJECTION, SOLUTION INTRAVENOUS; SUBCUTANEOUS at 13:02

## 2022-01-01 RX ADMIN — CHLORHEXIDINE GLUCONATE 15 ML: 1.2 SOLUTION ORAL at 08:46

## 2022-01-01 RX ADMIN — FAMOTIDINE 20 MG: 20 TABLET ORAL at 11:11

## 2022-01-01 RX ADMIN — GLYCOPYRROLATE 0.2 MG: 0.2 INJECTION INTRAMUSCULAR; INTRAVENOUS at 15:53

## 2022-01-01 RX ADMIN — PROPOFOL 25 MCG/KG/MIN: 10 INJECTION, EMULSION INTRAVENOUS at 07:49

## 2022-01-01 RX ADMIN — IPRATROPIUM BROMIDE AND ALBUTEROL SULFATE 3 ML: 2.5; .5 SOLUTION RESPIRATORY (INHALATION) at 20:44

## 2022-01-01 RX ADMIN — IPRATROPIUM BROMIDE AND ALBUTEROL SULFATE 3 ML: 2.5; .5 SOLUTION RESPIRATORY (INHALATION) at 21:32

## 2022-01-01 RX ADMIN — LEVOFLOXACIN 750 MG: 750 INJECTION, SOLUTION INTRAVENOUS at 13:11

## 2022-01-01 RX ADMIN — ASPIRIN 81 MG: 81 TABLET, COATED ORAL at 08:50

## 2022-01-19 NOTE — PROGRESS NOTES
"Chief Complaint  Diabetes Mellitus (follow up)    Subjective          Felix Olmedo presents to [facility] ENDOCRINOLOGY  History of Present Illness     I have reviewed PMH, allergies and medications UTD at this visit      Type 2 dm    Diagnosed in 2000   Today in clinic pt reports being on Levemir 38 units daily, NovoLog 28-30u total daily dose, Jardiance 25 mg oral daily. He uses a 1:6 carb ratio    Tried GLP-1 analogs in the past which he could not tolerate with the side effects.  Supposed to be on Januvia but because of the cost he is not taking the medication anymore     Average blood sugars- <120  Checks BG -3-4 times  Dm retinopathy -mild NPDR, + mac degen, Last eye exam -6/2021  Dm nephropathy -no  Dm neuropathy -yes, sees chiropractor   CAD - yes, s/p CABG, CHF  CVA -x  Episodes of hypoglycemia - no   Pt is less physically active using a cane. weight has been stable.   Needs muriel improve diet after the holidays   On statin        Objective   Vital Signs:   /55   Pulse 80   Ht 177.8 cm (70\")   Wt 107 kg (235 lb 3.2 oz)   SpO2 94%   BMI 33.75 kg/m²       Physical Exam  Vitals reviewed.   Constitutional:       General: He is not in acute distress.  HENT:      Head: Normocephalic and atraumatic.   Cardiovascular:      Rate and Rhythm: Normal rate and regular rhythm.   Pulmonary:      Effort: Pulmonary effort is normal. No respiratory distress.   Musculoskeletal:         General: No signs of injury. Normal range of motion.      Cervical back: Normal range of motion and neck supple.   Skin:     General: Skin is warm and dry.   Neurological:      Mental Status: He is alert and oriented to person, place, and time. Mental status is at baseline.   Psychiatric:         Mood and Affect: Mood normal.         Behavior: Behavior normal.         Thought Content: Thought content normal.         Judgment: Judgment normal.            Result Review :   The following data was reviewed by: " MILADYS Spencer on 01/19/2022:  Common labs    Common Labsle 9/17/21 9/17/21 9/17/21 9/20/21 9/20/21 1/5/22 1/5/22 1/5/22    1351 1531 1531 1329 1329 1307 1307 1307   Glucose  115   184 (A) CANCELED     BUN  17   17 18     Creatinine  1.04   0.97 1.04     eGFR Non  Am  69   75 68     eGFR  Am     91 79     Sodium  140   141 138     Potassium  4.5   4.6 CANCELED     Chloride  104   104 102     Calcium  9.7   9.5 8.6     Albumin  4.00         Total Bilirubin  0.7         Alkaline Phosphatase  119 (A)         AST (SGOT)  36         ALT (SGPT)  24         WBC 12.78 (A)          Hemoglobin 16.5          Hematocrit 49.4          Platelets 241          Total Cholesterol       127    Triglycerides       84    HDL Cholesterol       52    LDL Cholesterol        59    Hemoglobin A1C    8.00 (A)    8.7 (A)   PSA   0.560        (A) Abnormal value       Comments are available for some flowsheets but are not being displayed.                     Assessment and Plan    Diagnoses and all orders for this visit:    1. Type 2 diabetes mellitus with hyperglycemia, with long-term current use of insulin (HCC) (Primary)  -     Hemoglobin A1c; Future  -     Basic Metabolic Panel; Future    2. Mixed hyperlipidemia    Other orders  -     insulin detemir (Levemir FlexTouch) 100 UNIT/ML injection; Inject 20 Units under the skin into the appropriate area as directed 2 (Two) Times a Day.  Dispense: 45 mL; Refill: 1        Follow Up   No follow-ups on file.     Change levemir to 20u BID  Change carb ratio 1:5   Continue Jardiance  Continue statin  A1c above target range of less than 7.5%    Patient was given instructions and counseling regarding his condition or for health maintenance advice. Please see specific information pulled into the AVS if appropriate.     MILADYS Spencer

## 2022-04-04 NOTE — PROGRESS NOTES
"Chief Complaint  Diabetes (Type 2)    Subjective          Felix Olmedo presents to [facility] ENDOCRINOLOGY  History of Present Illness     I have reviewed PMH, allergies and medications UTD at this visit      Type 2 dm    Diagnosed in 2000   Today in clinic pt reports being on Levemir 38 units daily, NovoLog 28-30u total daily dose, Jardiance 25 mg oral daily. I just guess at my insulin dosing   \"i'm 76 years old and there isn't a lot of change i'm going to make\"     Tried GLP-1 analogs in the past which he could not tolerate with the side effects.  Supposed to be on Januvia but because of the cost he is not taking the medication anymore     Average blood sugars- 100-200  Checks BG -3-4 times  Dm retinopathy -mild NPDR, + mac degen, Last eye exam -6/2021  Dm nephropathy -no  Dm neuropathy -yes, sees chiropractor   CAD - yes, s/p CABG, CHF  CVA -x  Episodes of hypoglycemia - lowest 79   Pt is less physically active using a cane. weight has been stable.   Needs muriel improve diet after the holidays   On statin     Objective   Vital Signs:   /62   Pulse 82   Ht 177.8 cm (70\")   Wt 108 kg (237 lb 12.8 oz)   SpO2 98%   BMI 34.12 kg/m²     BMI is above normal parameters. Recommendations: nutrition counseling/recommendations       Physical Exam  Vitals reviewed.   Constitutional:       General: He is not in acute distress.  HENT:      Head: Normocephalic and atraumatic.   Cardiovascular:      Rate and Rhythm: Normal rate and regular rhythm.   Pulmonary:      Effort: Pulmonary effort is normal. No respiratory distress.   Musculoskeletal:         General: No signs of injury. Normal range of motion.      Cervical back: Normal range of motion and neck supple.   Skin:     General: Skin is warm and dry.   Neurological:      Mental Status: He is alert and oriented to person, place, and time. Mental status is at baseline.   Psychiatric:         Mood and Affect: Mood normal.         Behavior: " Behavior normal.         Thought Content: Thought content normal.         Judgment: Judgment normal.        Result Review :   The following data was reviewed by: MILADYS Spencer on 04/04/2022:  Common labs    Common Labsle 9/20/21 9/20/21 1/5/22 1/5/22 1/5/22 3/22/22 3/22/22    1329 1329 1307 1307 1307 1336 1336   Glucose  184 (A) CANCELED    89   BUN  17 18    17   Creatinine  0.97 1.04    1.14   eGFR Non  Am  75 68       eGFR African Am  91 79       Sodium  141 138    141   Potassium  4.6 CANCELED    4.7   Chloride  104 102    102   Calcium  9.5 8.6    9.2   Total Cholesterol    127      Triglycerides    84      HDL Cholesterol    52      LDL Cholesterol     59      Hemoglobin A1C 8.00 (A)    8.7 (A) 8.4 (A)    (A) Abnormal value       Comments are available for some flowsheets but are not being displayed.                     Assessment and Plan    Diagnoses and all orders for this visit:    1. Type 2 diabetes mellitus with hyperglycemia, with long-term current use of insulin (HCC) (Primary)  -     TSH; Future  -     T4, Free; Future  -     Hemoglobin A1c; Future  -     Basic Metabolic Panel; Future  -     Lipid Panel; Future  -     Microalbumin / Creatinine Urine Ratio - Urine, Clean Catch; Future    2. Mixed hyperlipidemia  -     TSH; Future  -     T4, Free; Future  -     Hemoglobin A1c; Future  -     Basic Metabolic Panel; Future  -     Lipid Panel; Future  -     Microalbumin / Creatinine Urine Ratio - Urine, Clean Catch; Future    3. S/P CABG x 2  -     TSH; Future  -     T4, Free; Future  -     Hemoglobin A1c; Future  -     Basic Metabolic Panel; Future  -     Lipid Panel; Future  -     Microalbumin / Creatinine Urine Ratio - Urine, Clean Catch; Future        Follow Up   No follow-ups on file.     Add 2 units at each meals to current dose, not interested in doing anything differently for his insulin regimen  Continue jardiance   Hypoglycemia prevention and education today   Advised increased  glycemic control as he states his heart is his main concern- so we discussed how important glycemic control is for his CAD   Continue statin  Will be more active in the summer     Patient was given instructions and counseling regarding his condition or for health maintenance advice. Please see specific information pulled into the AVS if appropriate.       MILADYS Spencer

## 2022-04-14 NOTE — PROGRESS NOTES
Subjective Reviewed patient's previous history    REASON FOR FOLLOW-UP: Leukocytosis                               History of Present Illness     The patient is a 76-year-old male followed with history of coronary disease, diabetes, hyperlipidemia, hypertension and pulmonary nodules.  The latter had included a series of scans including a low-dose chest CT 8/19/2021 showing no suspicious discrete pulmonary nodules but numerous bilateral calcified nodules including a cluster of multiple nodules right lung base that are associated with consolidation.  The patient had been seen by his primary care doctor 8/31 reviewing the studies as well as the use of Keflex for a lower leg skin infection.    We are asked to see him for leukocytosis with studies documented from 5/20/2017 with H&H of 11.5 and 35.4, white count of 17 320, platelet count of 4 74,000, normal automated differential, similar test 1 day later 5/21/2017 and later testing 7/23/2021 with H&H now 17 and 51.5, WBC of 12,760 with a normal automated differential and platelet count 237,000, 8/27/2021 with white count of 13,116 H&H of 16.7 51.7, platelet count 1 50,000 and normal automated differential.    The patient is reevaluated 4/15/2022 with repeat CBC including H&H of 17.7 and 53.5 with white count of 12,680 and platelet count of 212,000 again with a normal automated differential.  In the interval he has follow-up with primary care treatment of his diabetes, hyperlipidemia and follow-up of his coronary disease.  The patient is queried about his current status and it is clear that he has issues with sleep apnea.  He had previously been advised about using CPAP therapy and tried it briefly but then discontinued it.  Is currently escalating hemoglobin hematocrit may well be related to hypoxemia during the evening hours and he is encouraged to reevaluate the use of CPAP therapy for apparent sleep apnea.  At this point he declines this option indicating that he  would not wear a mask.      Past Medical History:   Diagnosis Date   • Coronary artery disease     Status post 2 vessel CABG 5/10/17 by Dr. Poole (LIMA-LAD, SVG-PDA)   • Diabetes (HCC)    • Diastolic dysfunction    • Hyperlipidemia    • Hypertension    • LAFB (left anterior fascicular block)    • Lung nodule    • Neuropathy    • Osteoarthritis    • Pneumonia 02/2017   • Pulmonary emphysema (HCC) 8/31/2021   • Squamous cell carcinoma     Left cheek s/p resection   • SVT (supraventricular tachycardia) (HCC)     Diagnosed following CABG   • Type 2 diabetes mellitus (HCC)         Past Surgical History:   Procedure Laterality Date   • CARDIAC CATHETERIZATION N/A 10/18/2016    Procedure: Coronary angiography;  Surgeon: Jesus Guzman MD;  [facility];  Service:    • CARDIAC CATHETERIZATION N/A 10/18/2016    Procedure: Left heart cath;  Surgeon: Jesus Guzman MD;  [facility];  Service:    • CARDIAC CATHETERIZATION N/A 10/18/2016    Procedure: Left ventriculography;  Surgeon: Jesus Guzman MD;  [facility];  Service:    • CARDIAC CATHETERIZATION N/A 4/4/2017    Procedure: Coronary angiography;  Surgeon: Jesus Guzman MD;  [facility];  Service:    • CARDIAC CATHETERIZATION N/A 4/4/2017    Procedure: Left heart cath;  Surgeon: Jesus Guzman MD;  [facility];  Service:    • CARDIAC CATHETERIZATION N/A 4/4/2017    Procedure: Left ventriculography;  Surgeon: Jesus Guzman MD;  [facility];  Service:    • CARDIAC CATHETERIZATION  4/4/2017    Procedure: Functional Flow Stover;  Surgeon: Jesus Guzman MD;  Location: [facility] CATH INVASIVE LOCATION;  Service:    • CARDIAC SURGERY     • CATARACT EXTRACTION W/ INTRAOCULAR LENS IMPLANT Left    • COLONOSCOPY     • CORONARY ARTERY BYPASS GRAFT N/A 5/10/2017     Procedure: CORONARY ARTERY BYPASS TIMES TWO UTILIZING THE LEFT GREATER SAPHENOUS VEIN WITH INTERNAL MAMMARY ARTERY GRAFT;  TRANSESOPHAGEAL ECHOCARDIOGRAM;  Surgeon: Alden Poole MD;  Location: [facility];  Service:    • EYE SURGERY     • KNEE SURGERY Left 2005    Dr. Gallegos   • MOHS SURGERY Left     CHEEK   • SKIN BIOPSY          Current Outpatient Medications on File Prior to Visit   Medication Sig Dispense Refill   • aspirin 81 MG tablet Take 81 mg by mouth Every Other Day.     • atorvastatin (LIPITOR) 40 MG tablet TAKE 1 TABLET EVERY DAY 90 tablet 2   • benzonatate (TESSALON) 100 MG capsule Take 1 capsule by mouth 3 (Three) Times a Day As Needed for Cough. 30 capsule 0   • bumetanide (BUMEX) 2 MG tablet Take 0.5 tablets by mouth Daily. (Patient taking differently: Take 1 mg by mouth Daily. Prn for swelling) 90 tablet 0   • empagliflozin (Jardiance) 25 MG tablet tablet Take 1 tablet by mouth Daily. 90 tablet 3   • insulin aspart (NovoLOG FlexPen) 100 UNIT/ML solution pen-injector sc pen 10-14 units  3 times daily with meal 45 mL 3   • insulin detemir (Levemir FlexTouch) 100 UNIT/ML injection Inject 20 Units under the skin into the appropriate area as directed 2 (Two) Times a Day. 45 mL 1   • Insulin Pen Needle 31G X 8 MM misc Use to inject insulin 500 each 3   • isosorbide mononitrate (IMDUR) 30 MG 24 hr tablet Take 1 tablet by mouth 2 (Two) Times a Day. 180 tablet 3   • Menthol, Topical Analgesic, 4 % gel Apply 1 application topically Daily As Needed.     • metoprolol tartrate (LOPRESSOR) 25 MG tablet TAKE 1 TABLET TWICE DAILY 180 tablet 3   • Multiple Vitamins-Minerals (MULTIVITAMIN ADULT PO) Take 1 tablet by mouth Daily.     • potassium chloride (MICRO-K) 10 MEQ CR capsule Take 1 capsule (10 mEq total) by mouth 2 (Two) Times a Day (Patient taking differently: Take 10 mEq by mouth Daily. Prn with bumex) 60 capsule 0   • True Metrix Blood Glucose Test test strip USE TO TEST BLOOD SUGAR  4 TIMES DAILY  "120 each 6   • Unable to find 1 each 1 (One) Time. nerve shield       No current facility-administered medications on file prior to visit.        ALLERGIES:    Allergies   Allergen Reactions   • Adhesive Tape Rash        Social History     Socioeconomic History   • Marital status:      Spouse name: Mendy Vargas   • Number of children: 3   Tobacco Use   • Smoking status: Former Smoker     Packs/day: 1.50     Years: 40.00     Pack years: 60.00     Types: Cigarettes     Quit date:      Years since quittin.2   • Smokeless tobacco: Never Used   • Tobacco comment: Smoked up to 1.5 ppd.  Smoked for 50 years.  Quit .   Vaping Use   • Vaping Use: Never used   Substance and Sexual Activity   • Alcohol use: Yes     Comment: 2-3 BEERS YEARLY    • Drug use: No     Comment: caffeine use   • Sexual activity: Not Currently        Family History   Problem Relation Age of Onset   • Cancer Mother         uterine   • Heart disease Father         Father with fatal MI in 80's   • Heart attack Father    • Diabetes Sister    • Esophageal cancer Brother    • Hepatitis Brother    • Alzheimer's disease Maternal Grandfather    • Stroke Maternal Grandfather         Review of Systems   Constitutional: Positive for fatigue. Negative for activity change, appetite change, diaphoresis and unexpected weight change.   HENT: Negative.    Respiratory: Positive for shortness of breath.    Cardiovascular: Negative.    Gastrointestinal: Positive for constipation (Change in bowel habit that has been normalizing as of late).   Genitourinary: Positive for frequency and urgency.   Musculoskeletal: Negative.    Skin: Negative.    Allergic/Immunologic: Negative.    Neurological: Negative.    Psychiatric/Behavioral: Positive for sleep disturbance.        Objective     Vitals:    04/15/22 1317   BP: 135/75   Pulse: 69   Resp: 18   Temp: 97.5 °F (36.4 °C)   TempSrc: Temporal   SpO2: 96%   Weight: 107 kg (235 lb)   Height: 177.8 cm (70\")   PainSc:   6 "   PainLoc: Knee  Comment: left knee pain/ short of breathe     Current Status 4/15/2022   ECOG score 1       Physical Exam  Constitutional:       Appearance: Normal appearance. He is obese.   HENT:      Head: Normocephalic and atraumatic.      Nose: Nose normal.      Mouth/Throat:      Mouth: Mucous membranes are moist.      Pharynx: Oropharynx is clear.   Eyes:      Extraocular Movements: Extraocular movements intact.      Conjunctiva/sclera: Conjunctivae normal.      Pupils: Pupils are equal, round, and reactive to light.   Cardiovascular:      Rate and Rhythm: Normal rate and regular rhythm.      Pulses: Normal pulses.      Heart sounds: Normal heart sounds.   Pulmonary:      Effort: Pulmonary effort is normal.      Breath sounds: Normal breath sounds.   Abdominal:      General: Bowel sounds are normal. There is no distension.      Palpations: Abdomen is soft. There is no mass.      Tenderness: There is no abdominal tenderness.   Musculoskeletal:         General: Normal range of motion.      Cervical back: Normal range of motion and neck supple.   Skin:     General: Skin is warm and dry.   Neurological:      General: No focal deficit present.      Mental Status: He is alert and oriented to person, place, and time.      Sensory: Sensory deficit present.   Psychiatric:         Mood and Affect: Mood normal.         Behavior: Behavior normal.         RECENT LABS:  Hematology WBC   Date Value Ref Range Status   04/15/2022 12.68 (H) 3.40 - 10.80 10*3/mm3 Final   08/27/2021 13.16 (H) 3.40 - 10.80 10*3/mm3 Final     RBC   Date Value Ref Range Status   04/15/2022 5.81 (H) 4.14 - 5.80 10*6/mm3 Final   08/27/2021 5.57 4.14 - 5.80 10*6/mm3 Final     Hemoglobin   Date Value Ref Range Status   04/15/2022 17.7 13.0 - 17.7 g/dL Final     Hematocrit   Date Value Ref Range Status   04/15/2022 53.5 (H) 37.5 - 51.0 % Final     Platelets   Date Value Ref Range Status   04/15/2022 212 140 - 450 10*3/mm3 Final          Assessment/Plan    76-year-old male with a history of coronary disease, diabetes (associated with neuropathy), hyperlipidemia, hypertension and previous pulmonary nodules.  These latter nodules have been reviewed on scans and he appears to have calcified nodules and possibly some degree of consolidation on recent scans.  He is also been treated recently for lower skin infection.  We are asked to see him for long-term leukocytosis that can be documented as variable in and around multiple medical issues in his life but with a definite degree of chronicity including more recently.  Review of his smear does not find evidence of abnormality with normocytic normochromic RBCs, without anisopoikilocytosis, leukocytes mildly increased without right or left shift, no immature forms, platelets appear normal per number and size.    The patient appears to have a reactive lymphocytosis and not a hematologic disorder.  As result of his symptoms we do plan CMP and PSA levels which, at the time of this dictation, are found to be normal.  The patient states that he expects to undergo Cologuard at this point and possible urologic assessment.    The patient seen back 6 months later 4/15/2022.  He indicates that he is to be seen by primary care undergoing additional screening exams including apparent CT scan.    We have discussed his findings today indicate further polycythemia and this is likely signals issues with his sleep apnea.  In a cassandra conversation he states that he has no plans to ever use sleep apnea therapy in particular CPAP.  He is clear on this.  We do not find any further issues hematologically concerning his degree of leukocytosis but of asked him to be seen back in 6 months.  He agrees to this follow-up as well as follow-up with primary care scheduled in May.

## 2022-05-03 PROBLEM — J18.9 PNEUMONIA OF RIGHT LOWER LOBE DUE TO INFECTIOUS ORGANISM: Status: ACTIVE | Noted: 2022-01-01

## 2022-05-03 NOTE — ED NOTES
Patient was seen at Dr. Rahman office. Patient was instructed to come to the ED for PNA, lung noduled and low oxygen saturation.

## 2022-05-03 NOTE — PROGRESS NOTES
I N T E R N A L  M E D I C I N E  J U N O H  K I M,  M D      ENCOUNTER DATE:  05/03/2022    Felix Olmedo / 76 y.o. / male      CHIEF COMPLAINT / REASON FOR OFFICE VISIT     Hyperlipidemia, Diabetes, and Shortness of Breath (3 weeks. Cough . Sinus problem )      ASSESSMENT & PLAN     1. COPD with exacerbation (HCC)    2. Chronic cough    3. Multiple pulmonary nodules      Orders Placed This Encounter   Procedures   • XR Chest 2 View     New Medications Ordered This Visit   Medications   • Dextromethorphan-guaiFENesin (Mucinex DM Maximum Strength)  MG tablet sustained-release 12 hour     Sig: Take 1 tablet by mouth 2 (Two) Times a Day for 10 days.     Dispense:  20 tablet     Refill:  0   • doxycycline (VIBRAMYICN) 100 MG tablet     Sig: Take 1 tablet by mouth 2 (Two) Times a Day for 10 days.     Dispense:  20 tablet     Refill:  0   • albuterol sulfate  (90 Base) MCG/ACT inhaler     Sig: Inhale 2 puffs Every 4 (Four) Hours As Needed for Wheezing or Shortness of Air.     Dispense:  8 g     Refill:  0       SUMMARY/DISCUSSION  • Start doxycycline 100 mg twice daily x10 days, Mucinex DM twice daily, albuterol HFA as needed  • Chest x-ray today to rule out pneumonia  • Instructed patient to monitor pulse oximeter at home.  If his oxygen falls below 89% he may qualify for oxygen.  • He was instructed to go to the ER for any acute worsening of shortness of breath, fever or chest pain symptoms.  He and his wife expressed understanding and agreed to follow instructions.  • He was instructed to schedule follow-up with his pulmonologist.  He will need a follow-up CT scan regarding his multiple pulmonary nodules.  • He will be arranged to follow-up with one of the APRN's at the office this Friday to reassess.      Next Appointment with me: Visit date not found    Return in about 3 days (around 5/6/2022) for Reassess today's problem(s).        VITAL SIGNS     Visit Vitals  /70 (BP Location: Left  "arm)   Pulse 79   Temp 96.9 °F (36.1 °C)   Ht 177.8 cm (70\")   Wt 106 kg (234 lb)   SpO2 (!) 89% Comment: with ambulation   BMI 33.58 kg/m²       BP Readings from Last 3 Encounters:   05/03/22 126/70   04/15/22 135/75   04/04/22 115/62     Wt Readings from Last 3 Encounters:   05/03/22 106 kg (234 lb)   04/15/22 107 kg (235 lb)   04/04/22 108 kg (237 lb 12.8 oz)     Body mass index is 33.58 kg/m².    Blood pressure readings recorded on patient flowsheet:  No flowsheet data found.     MEDICATIONS AT THE TIME OF OFFICE VISIT     Current Outpatient Medications on File Prior to Visit   Medication Sig   • aspirin 81 MG tablet Take 81 mg by mouth Every Other Day.   • atorvastatin (LIPITOR) 40 MG tablet TAKE 1 TABLET EVERY DAY   • benzonatate (TESSALON) 100 MG capsule Take 1 capsule by mouth 3 (Three) Times a Day As Needed for Cough.   • bumetanide (BUMEX) 2 MG tablet Take 0.5 tablets by mouth Daily. (Patient taking differently: Take 1 mg by mouth Daily. Prn for swelling)   • empagliflozin (Jardiance) 25 MG tablet tablet Take 1 tablet by mouth Daily.   • insulin aspart (NovoLOG FlexPen) 100 UNIT/ML solution pen-injector sc pen 10-14 units  3 times daily with meal   • insulin detemir (Levemir FlexTouch) 100 UNIT/ML injection Inject 20 Units under the skin into the appropriate area as directed 2 (Two) Times a Day.   • Insulin Pen Needle 31G X 8 MM misc Use to inject insulin   • isosorbide mononitrate (IMDUR) 30 MG 24 hr tablet Take 1 tablet by mouth 2 (Two) Times a Day. (Patient taking differently: Take 30 mg by mouth 2 (Two) Times a Day. Once a day)   • Menthol, Topical Analgesic, 4 % gel Apply 1 application topically Daily As Needed.   • metoprolol tartrate (LOPRESSOR) 25 MG tablet TAKE 1 TABLET TWICE DAILY (Patient taking differently: Once a day)   • Multiple Vitamins-Minerals (MULTIVITAMIN ADULT PO) Take 1 tablet by mouth Daily.   • potassium chloride (MICRO-K) 10 MEQ CR capsule Take 1 capsule (10 mEq total) by mouth 2 " (Two) Times a Day (Patient taking differently: Take 10 mEq by mouth Daily. Prn with bumex)   • True Metrix Blood Glucose Test test strip USE TO TEST BLOOD SUGAR  4 TIMES DAILY   • [DISCONTINUED] Unable to find 1 each 1 (One) Time. nerve shield     No current facility-administered medications on file prior to visit.          HISTORY OF PRESENT ILLNESS     Patient complains of approximately 6 weeks history of persistent cough and increased shortness of breath.  Apparently he was diagnosed with COVID infection in November but he never followed up with me for that problem.  He complains of approximately 3 weeks history of increased cough productive of yellowish phlegm with increased shortness of breath.  He denies fever, chills.  He experiences some chest pain associated with cough but denies angina.        REVIEW OF SYSTEMS             PHYSICAL EXAMINATION     Physical Exam  No acute distress , mild respiratory distress  Alert with normal thought and judgment.   Cardiovascular: Normal rate, regular rhythm.  Lungs: coarse BS bilaterally; no wheezing or rales   1+ bilateral lower extremities edema       REVIEWED DATA     Labs:     Lab Results   Component Value Date     03/22/2022    K 4.7 03/22/2022    CALCIUM 9.2 03/22/2022    AST 36 09/17/2021    ALT 24 09/17/2021    BUN 17 03/22/2022    CREATININE 1.14 03/22/2022    CREATININE 1.04 01/05/2022    CREATININE 0.97 09/20/2021    EGFRIFNONA 68 01/05/2022    EGFRIFAFRI 79 01/05/2022       Lab Results   Component Value Date    HGBA1C 8.4 (H) 03/22/2022    HGBA1C 8.7 (H) 01/05/2022    HGBA1C 8.00 (H) 09/20/2021       Lab Results   Component Value Date    LDL 59 01/05/2022    LDL 57 04/26/2021    HDL 52 01/05/2022    TRIG 84 01/05/2022       Lab Results   Component Value Date    TSH 1.860 01/05/2022    TSH 2.540 04/26/2021    TSH 2.490 01/25/2021    FREET4 1.15 01/05/2022    FREET4 1.19 04/26/2021    FREET4 1.21 01/25/2021       Lab Results   Component Value Date    WBC  12.68 (H) 04/15/2022    HGB 17.7 04/15/2022     04/15/2022       Lab Results   Component Value Date    MICROALBUR <3.0 04/26/2021           Imaging:           Medical Tests:           Summary of old records / correspondence / consultant report:           Request outside records:             *Examiner was wearing KN95 mask and eye protection during the entire duration of the visit. Patient was masked the entire time. Minimum social distance of 6 ft maintained entire visit except if physical contact was necessary as documented.       Template created by Helen Rahman MD

## 2022-05-03 NOTE — ED PROVIDER NOTES
EMERGENCY DEPARTMENT ENCOUNTER    Room Number:  38/38  Date of encounter:  5/3/2022  PCP: Hilario Rahman MD  Patient Care Team:  Hilario Rahman MD as PCP - General (Internal Medicine)  Noah Montejo MD as Consulting Physician (Cardiology)  Jr Alden Poole MD as Surgeon (Cardiothoracic Surgery)  Samira Recinos MD as Consulting Physician (Endocrinology)  Jacob Abdul MD as Consulting Physician (Hematology and Oncology)  Hilario Rahman MD as Referring Physician (Internal Medicine)  Juan Ramon Feliciano MD as Consulting Physician (Pulmonary Disease)   Historian: Patient, family    HPI:  Chief Complaint: As of breath  A complete HPI/ROS/PMH/PSH/SH/FH are unobtainable due to: Nothing    Context: Felix Olmedo is a 76 y.o. male who presents to the ED c/o shortness of breath for the last week.  He reports that he has had progressively worse shortness of breath for the last week.  He had a regularly scheduled appointment with his primary care doctor today and they did a chest x-ray and diagnosed him with pneumonia.  They found him hypoxic in the office.  They sent him here for further evaluation.  Is not on oxygen at home.  He does not use breathing treatments at home.    Prior record review: Primary care note dated today with COPD exacerbation, chronic cough and multiple hornet nodules.  Chest x-ray shows right lower lobe pneumonia.    PAST MEDICAL HISTORY  Active Ambulatory Problems     Diagnosis Date Noted   • Type II diabetes mellitus with neurological manifestations, uncontrolled 04/05/2016   • Coronary artery disease of native artery of native heart with stable angina pectoris (HCC) 04/04/2017   • SVT (supraventricular tachycardia) ([facility])    • Chronic diastolic congestive heart failure (HCC)    • S/P CABG x 2 06/26/2017   • Hyperlipidemia 09/25/2017   • Obesity (BMI 30-39.9) 09/25/2017   • Multiple pulmonary nodules 09/25/2017   • Gait difficulty 06/18/2019   • Diabetic polyneuropathy associated  with type 2 diabetes mellitus (HCC) 10/30/2020   • Change in stool caliber 07/23/2021   • Pulmonary emphysema (HCC) 08/31/2021   • Abnormal CT of the chest 08/31/2021   • Leukocytosis 08/31/2021   • Increased urinary frequency 09/17/2021   • Clinical diagnosis of COVID-19 11/30/2021     Resolved Ambulatory Problems     Diagnosis Date Noted   • No Resolved Ambulatory Problems     Past Medical History:   Diagnosis Date   • Coronary artery disease    • COVID-19 virus infection 11/23/2021   • Diabetes (HCC)    • Diastolic dysfunction    • Hypertension    • LAFB (left anterior fascicular block)    • Lung nodule    • Neuropathy    • Osteoarthritis    • Pneumonia 02/2017   • Squamous cell carcinoma    • Type 2 diabetes mellitus (HCC)        The patient has started, but not completed, their COVID-19 vaccination series.    PAST SURGICAL HISTORY  Past Surgical History:   Procedure Laterality Date   • CARDIAC CATHETERIZATION N/A 10/18/2016    Procedure: Coronary angiography;  Surgeon: Jesus Guzman MD;  Location: [facility] CATH INVASIVE LOCATION;  Service:    • CARDIAC CATHETERIZATION N/A 10/18/2016    Procedure: Left heart cath;  Surgeon: Jesus Guzman MD;  Location: [facility] CATH INVASIVE LOCATION;  Service:    • CARDIAC CATHETERIZATION N/A 10/18/2016    Procedure: Left ventriculography;  Surgeon: Jesus Guzman MD;  Location: [facility] CATH INVASIVE LOCATION;  Service:    • CARDIAC CATHETERIZATION N/A 4/4/2017    Procedure: Coronary angiography;  Surgeon: Jesus Guzman MD;  Location: [facility] CATH INVASIVE LOCATION;  Service:    • CARDIAC CATHETERIZATION N/A 4/4/2017    Procedure: Left heart cath;  Surgeon: Jesus Guzman MD;  Location: [facility] CATH INVASIVE LOCATION;  Service:    • CARDIAC CATHETERIZATION N/A 4/4/2017    Procedure: Left ventriculography;  Surgeon: Jesus Guzman MD;  Location: [facility] CATH INVASIVE LOCATION;  Service:    • CARDIAC CATHETERIZATION  4/4/2017    Procedure:  Functional Flow [place];  Surgeon: Jesus Guzman MD;  Location: [facility] INVASIVE LOCATION;  Service:    • CARDIAC SURGERY     • CATARACT EXTRACTION W/ INTRAOCULAR LENS IMPLANT Left    • COLONOSCOPY     • CORONARY ARTERY BYPASS GRAFT N/A 5/10/2017    Procedure: CORONARY ARTERY BYPASS TIMES TWO UTILIZING THE LEFT GREATER SAPHENOUS VEIN WITH INTERNAL MAMMARY ARTERY GRAFT;  TRANSESOPHAGEAL ECHOCARDIOGRAM;  Surgeon: Alden Poole MD;  Location: [facility] OR;  Service:    • EYE SURGERY     • KNEE SURGERY Left     Dr. Gallegos   • MOHS SURGERY Left     CHEEK   • SKIN BIOPSY           FAMILY HISTORY  Family History   Problem Relation Age of Onset   • Cancer Mother         uterine   • Heart disease Father         Father with fatal MI in 80's   • Heart attack Father    • Diabetes Sister    • Esophageal cancer Brother    • Hepatitis Brother    • Alzheimer's disease Maternal Grandfather    • Stroke Maternal Grandfather          SOCIAL HISTORY  Social History     Socioeconomic History   • Marital status:      Spouse name: Mendy Vargas   • Number of children: 3   Tobacco Use   • Smoking status: Former Smoker     Packs/day: 1.50     Years: 40.00     Pack years: 60.00     Types: Cigarettes     Quit date:      Years since quittin.3   • Smokeless tobacco: Never Used   • Tobacco comment: Smoked up to 1.5 ppd.  Smoked for 50 years.  Quit .   Vaping Use   • Vaping Use: Never used   Substance and Sexual Activity   • Alcohol use: Yes     Comment: 2-3 BEERS YEARLY    • Drug use: No     Comment: caffeine use   • Sexual activity: Not Currently         ALLERGIES  Adhesive tape        REVIEW OF SYSTEMS  Review of Systems   Positive shortness of breath, negative chest pain, negative fever, negative chills, negative nausea, negative vomiting, negative abdominal pain  All systems reviewed and negative except for those discussed in HPI.       PHYSICAL EXAM    I have reviewed the triage vital signs and nursing  notes.    ED Triage Vitals   Temp Heart Rate Resp BP SpO2   05/03/22 1825 05/03/22 1825 05/03/22 1825 05/03/22 1841 05/03/22 1825   98.1 °F (36.7 °C) 100 24 169/84 (!) 79 %      Temp src Heart Rate Source Patient Position BP Location FiO2 (%)   -- 05/03/22 1841 05/03/22 1841 05/03/22 1841 --    Monitor Lying Left arm        Physical Exam  GENERAL: Awake, alert, no acute distress  SKIN: Warm, dry  HENT: Normocephalic, atraumatic  EYES: no scleral icterus  CV: regular rhythm, regular rate  RESPIRATORY: Tachypneic, hypoxic on room air, 91% on 2 L, rhonchi in the right base  ABDOMEN: soft, nontender, nondistended  MUSCULOSKELETAL: no deformity  NEURO: alert, moves all extremities, follows commands          LAB RESULTS  Recent Results (from the past 24 hour(s))   ECG 12 Lead    Collection Time: 05/03/22  6:37 PM   Result Value Ref Range    QT Interval 372 ms   Comprehensive Metabolic Panel    Collection Time: 05/03/22  7:00 PM    Specimen: Blood   Result Value Ref Range    Glucose 142 (H) 65 - 99 mg/dL    BUN 17 8 - 23 mg/dL    Creatinine 1.18 0.76 - 1.27 mg/dL    Sodium 141 136 - 145 mmol/L    Potassium 4.3 3.5 - 5.2 mmol/L    Chloride 105 98 - 107 mmol/L    CO2 21.8 (L) 22.0 - 29.0 mmol/L    Calcium 9.2 8.6 - 10.5 mg/dL    Total Protein 6.9 6.0 - 8.5 g/dL    Albumin 3.50 3.50 - 5.20 g/dL    ALT (SGPT) 21 1 - 41 U/L    AST (SGOT) 38 1 - 40 U/L    Alkaline Phosphatase 132 (H) 39 - 117 U/L    Total Bilirubin 0.4 0.0 - 1.2 mg/dL    Globulin 3.4 gm/dL    A/G Ratio 1.0 g/dL    BUN/Creatinine Ratio 14.4 7.0 - 25.0    Anion Gap 14.2 5.0 - 15.0 mmol/L    eGFR 63.2 >60.0 mL/min/1.73   Lactic Acid, Plasma    Collection Time: 05/03/22  7:00 PM    Specimen: Blood   Result Value Ref Range    Lactate 1.7 0.5 - 2.0 mmol/L   Troponin    Collection Time: 05/03/22  7:00 PM    Specimen: Blood   Result Value Ref Range    Troponin T <0.010 0.000 - 0.030 ng/mL   BNP    Collection Time: 05/03/22  7:00 PM    Specimen: Blood   Result Value Ref  Range    proBNP 103.0 0.0 - 1,800.0 pg/mL   Procalcitonin    Collection Time: 05/03/22  7:00 PM    Specimen: Blood   Result Value Ref Range    Procalcitonin 0.06 0.00 - 0.25 ng/mL   CBC Auto Differential    Collection Time: 05/03/22  7:00 PM    Specimen: Blood   Result Value Ref Range    WBC 11.61 (H) 3.40 - 10.80 10*3/mm3    RBC 5.67 4.14 - 5.80 10*6/mm3    Hemoglobin 17.2 13.0 - 17.7 g/dL    Hematocrit 52.9 (H) 37.5 - 51.0 %    MCV 93.3 79.0 - 97.0 fL    MCH 30.3 26.6 - 33.0 pg    MCHC 32.5 31.5 - 35.7 g/dL    RDW 13.9 12.3 - 15.4 %    RDW-SD 48.6 37.0 - 54.0 fl    MPV 10.0 6.0 - 12.0 fL    Platelets 223 140 - 450 10*3/mm3    Neutrophil % 58.2 42.7 - 76.0 %    Lymphocyte % 23.8 19.6 - 45.3 %    Monocyte % 12.9 (H) 5.0 - 12.0 %    Eosinophil % 3.1 0.3 - 6.2 %    Basophil % 1.5 0.0 - 1.5 %    Immature Grans % 0.5 0.0 - 0.5 %    Neutrophils, Absolute 6.76 1.70 - 7.00 10*3/mm3    Lymphocytes, Absolute 2.76 0.70 - 3.10 10*3/mm3    Monocytes, Absolute 1.50 (H) 0.10 - 0.90 10*3/mm3    Eosinophils, Absolute 0.36 0.00 - 0.40 10*3/mm3    Basophils, Absolute 0.17 0.00 - 0.20 10*3/mm3    Immature Grans, Absolute 0.06 (H) 0.00 - 0.05 10*3/mm3    nRBC 0.0 0.0 - 0.2 /100 WBC   Respiratory Panel PCR w/COVID-19(SARS-CoV-2) KATHERIN/BRITTANY/TONY/PAD/COR/MAD/CHRISTIN In-House, NP Swab in U[facility], 3-4 HR TAT - Swab, Nasopharynx    Collection Time: 05/03/22  7:01 PM    Specimen: Nasopharynx; Swab   Result Value Ref Range    ADENOVIRUS, PCR Not Detected Not Detected    Coronavirus 229E Not Detected Not Detected    Coronavirus HKU1 Not Detected Not Detected    Coronavirus NL63 Not Detected Not Detected    Coronavirus OC43 Not Detected Not Detected    COVID19 Not Detected Not Detected - Ref. Range    Human Metapneumovirus Not Detected Not Detected    Human Rhinovirus/Enterovirus Not Detected Not Detected    Influenza A PCR Not Detected Not Detected    Influenza B PCR Not Detected Not Detected    Parainfluenza Virus 1 Not Detected Not Detected     Parainfluenza Virus 2 Not Detected Not Detected    Parainfluenza Virus 3 Not Detected Not Detected    Parainfluenza Virus 4 Not Detected Not Detected    RSV, PCR Not Detected Not Detected    Bordetella pertussis pcr Not Detected Not Detected    Bordetella parapertussis PCR Not Detected Not Detected    Chlamydophila pneumoniae PCR Not Detected Not Detected    Mycoplasma pneumo by PCR Not Detected Not Detected   Blood Gas, Arterial -    Collection Time: 05/03/22  7:34 PM    Specimen: Arterial Blood   Result Value Ref Range    Site Arterial: right radial     Grey's Test Positive     pH, Arterial 7.391 7.350 - 7.450 pH units    pCO2, Arterial 40.9 35.0 - 45.0 mm Hg    pO2, Arterial 80.7 80.0 - 100.0 mm Hg    HCO3, Arterial 24.8 22.0 - 28.0 mmol/L    Base Excess, Arterial -0.2 (L) 0.0 - 2.0 mmol/L    O2 Saturation Calculated 95.7 92.0 - 99.0 %    Barometric Pressure for Blood Gas 750.1 mmHg    Modality Cannula     Flow Rate 2 lpm    Rate 20 Breaths/minute       Ordered the above labs and independently reviewed the results.        RADIOLOGY  XR Chest 2 View    Result Date: 5/3/2022  XR CHEST 2 VW-  Clinical: Persisting cough and short of breath  COMPARISON 12/7/2021  FINDINGS: There is a small right-sided pleural effusion blunting the costophrenic sulcus. There is consolidation and infiltrate demonstrated at the right lung base, likely pneumonia. There are tiny calcified benign granulomas seen within both upper lobes. There are median sternotomy wires in position. Cardiac size within normal limits. The mediastinum is stable. There is mild patchy infiltrate now demonstrated at the left lung base. No vascular congestion seen.  CONCLUSION: Right lung base consolidation and infiltrate with small pleural effusion, left lung base infiltrate. Likely pneumonia.  This report was finalized on 5/3/2022 3:47 PM by Dr. Julio Cesar Garcia M.D.        I ordered the above noted radiological studies. Reviewed by me and discussed with  radiologist.  See dictation for official radiology interpretation.      PROCEDURES    Procedures      MEDICATIONS GIVEN IN ER    Medications   sodium chloride 0.9 % flush 10 mL (has no administration in time range)   cefTRIAXone (ROCEPHIN) 1 g in sodium chloride 0.9 % 100 mL IVPB-VTB (1 g Intravenous New Bag 5/3/22 2043)         PROGRESS, DATA ANALYSIS, CONSULTS, AND MEDICAL DECISION MAKING    All labs have been independently reviewed by me.  All radiology studies have been reviewed by me and discussed with radiologist dictating the report.   EKG's independently viewed and interpreted by me.  Discussion below represents my analysis of pertinent findings related to patient's condition, differential diagnosis, treatment plan and final disposition.    Differential diagnosis includes but is not limited to pneumonia, viral syndrome, PE, CHF, OPD exacerbation.    ED Course as of 05/03/22 2119 Tue May 03, 2022   1855 EKG          EKG time: 1837  Rhythm/Rate: Sinus, rate 86  P waves and LA: Normal P, normal LA  QRS, axis: Narrow QRS, left axis  ST and T waves: No acute accounting for artifact    Interpreted Contemporaneously by me, independently viewed  Resolved tachycardia changed compared to prior 5/16/2017   [TR]   1921 The patient is not significantly wheezing.  His right base is rhonchorous.  I suspect primarily his hypoxia is related to this right basilar pneumonia..  Plan admission to the hospital for IV antibiotics, breathing treatments. [TR]   2003 Speaking with Dr. Danielle with [facility].  Will admit. [TR]      ED Course User Index  [TR] Andrew Miranda MD           PPE: The patient wore a mask and I wore an N95 mask throughout the entire patient encounter.       AS OF 21:19 EDT VITALS:    BP - 151/72  HR - 84  TEMP - 98.1 °F (36.7 °C)  O2 SATS - 95%        DIAGNOSIS  Final diagnoses:   Pneumonia of right lower lobe due to infectious organism   Hypoxia         DISPOSITION  ED Disposition     ED Disposition   Decision  to Admit    Condition   --    Comment   Level of Care: Telemetry [5]   Diagnosis: Pneumonia of right lower lobe due to infectious organism [[id number]]   Admitting Physician: CLEMENTINA SOLARES [[id number]]   Attending Physician: CLEMENTINA SOLARES [[id number]]   Certification: I Certify That Inpatient Hospital Services Are Medically Necessary For Greater Than 2 Midnights                     Andrew Miranda MD  05/03/22 2003       Andrew Miranda MD  05/03/22 8211

## 2022-05-03 NOTE — PROGRESS NOTES
I called and talked with  regarding chest xray showing right lung base consolidation consistent with pneumonia.   I advised him to go to the ER for further evaluation and possibly for admission. His O2 saturation was low as 89% in office today and he has significant comorbidities including COPD and CAD. He expressed understanding and agreed to follow above instructions.

## 2022-05-04 NOTE — H&P
Patient Name:  Felix Olmedo  YOB: 1945  MRN:  [id number]  Admit Date:  5/3/2022  Patient Care Team:  Hilario Rahman MD as PCP - General (Internal Medicine)  Noah Montejo MD as Consulting Physician (Cardiology)  Jr Alden Poole MD as Surgeon (Cardiothoracic Surgery)  Samira Recinos MD as Consulting Physician (Endocrinology)  Jacob Abdul MD as Consulting Physician (Hematology and Oncology)  Hilario Rahman MD as Referring Physician (Internal Medicine)  Juan Ramon Feliciano MD as Consulting Physician (Pulmonary Disease)      Subjective   History Present Illness     Chief Complaint   Patient presents with   • Shortness of Breath         History of Present Illness   76-year-old male, with history of coronary disease, hyperlipidemia, hypertension, neuropathy, presented to the hospital with main complaints of shortness of breath.  Upon further evaluation in the emergency room, chest x-ray was done, findings were consistent with pneumonia.  Patient was found to be hypoxemic on the outpatient basis.  Patient reports that he is a diabetic, he is compliant with his medication regimen on outpatient basis.  Patient will be admitted to hospitalist service for further work-up of ongoing symptoms as well as initiation of antibiotics.    Review of Systems   Review of Systems   Constitutional: Negative for activity change and appetite change.   HENT: Negative for congestion and dental problem.    Eyes: Negative for discharge and itching.   Respiratory: Negative for apnea and chest tightness.    Cardiovascular: Negative for chest pain and palpitations.   Gastrointestinal: Negative for abdominal distention and abdominal pain.   Endocrine: Negative for cold intolerance and heat intolerance.   Genitourinary: Negative for difficulty urinating and frequency.   Musculoskeletal: Negative for arthralgias and back pain.   Skin: Negative for color change and pallor.   Allergic/Immunologic: Negative  for environmental allergies and food allergies.   Neurological: Negative for dizziness and facial asymmetry.   Hematological: Negative for adenopathy. Does not bruise/bleed easily.   Psychiatric/Behavioral: Negative for agitation and suicidal ideas.       Personal History     Past Medical History:   Diagnosis Date   • Coronary artery disease     Status post 2 vessel CABG 5/10/17 by Dr. 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   • SVT (supraventricular tachycardia) (HCC)     Diagnosed following CABG   • Type 2 diabetes mellitus (HCC)      Past Surgical History:   Procedure Laterality Date   • CARDIAC CATHETERIZATION N/A 10/18/2016    Procedure: Coronary angiography;  Surgeon: Jesus Guzman MD;  Location: [facility] INVASIVE LOCATION;  Service:    • CARDIAC CATHETERIZATION N/A 10/18/2016    Procedure: Left heart cath;  Surgeon: Jesus Guzman MD;  Location: [facility] CATH INVASIVE LOCATION;  Service:    • CARDIAC CATHETERIZATION N/A 10/18/2016    Procedure: Left ventriculography;  Surgeon: Jesus Guzman MD;  Location: [facility] CATH INVASIVE LOCATION;  Service:    • CARDIAC CATHETERIZATION N/A 4/4/2017    Procedure: Coronary angiography;  Surgeon: Jesus Guzman MD;  Location: [facility] CATH INVASIVE LOCATION;  Service:    • CARDIAC CATHETERIZATION N/A 4/4/2017    Procedure: Left heart cath;  Surgeon: Jesus Guzman MD;  Location: [facility] CATH INVASIVE LOCATION;  Service:    • CARDIAC CATHETERIZATION N/A 4/4/2017    Procedure: Left ventriculography;  Surgeon: Jesus Guzman MD;  Location: [facility] CATH INVASIVE LOCATION;  Service:    • CARDIAC CATHETERIZATION   2017    Procedure: Functional Flow [place];  Surgeon: Jesus Guzman MD;  Location: [facility] INVASIVE LOCATION;  Service:    • CARDIAC SURGERY     • CATARACT EXTRACTION W/ INTRAOCULAR LENS IMPLANT Left    • COLONOSCOPY     • CORONARY ARTERY BYPASS GRAFT N/A 5/10/2017    Procedure: CORONARY ARTERY BYPASS TIMES TWO UTILIZING THE LEFT GREATER SAPHENOUS VEIN WITH INTERNAL MAMMARY ARTERY GRAFT;  TRANSESOPHAGEAL ECHOCARDIOGRAM;  Surgeon: Alden Poole MD;  Location: [facility] OR;  Service:    • EYE SURGERY     • KNEE SURGERY Left     Dr. Gallegos   • MOHS SURGERY Left     CHEEK   • SKIN BIOPSY       Family History   Problem Relation Age of Onset   • Cancer Mother         uterine   • Heart disease Father         Father with fatal MI in 's   • Heart attack Father    • Diabetes Sister    • Esophageal cancer Brother    • Hepatitis Brother    • Alzheimer's disease Maternal Grandfather    • Stroke Maternal Grandfather      Social History     Tobacco Use   • Smoking status: Former Smoker     Packs/day: 1.50     Years: 40.00     Pack years: 60.00     Types: Cigarettes     Quit date:      Years since quittin.3   • Smokeless tobacco: Never Used   • Tobacco comment: Smoked up to 1.5 ppd.  Smoked for 50 years.  Quit .   Vaping Use   • Vaping Use: Never used   Substance Use Topics   • Alcohol use: Yes     Comment: 2-3 BEERS YEARLY    • Drug use: No     Comment: caffeine use     No current facility-administered medications on file prior to encounter.     Current Outpatient Medications on File Prior to Encounter   Medication Sig Dispense Refill   • aspirin 81 MG tablet Take 81 mg by mouth Every Other Day.     • atorvastatin (LIPITOR) 40 MG tablet TAKE 1 TABLET EVERY DAY 90 tablet 2   • bumetanide (BUMEX) 2 MG tablet Take 0.5 tablets by mouth Daily. (Patient taking differently: Take 1 mg by mouth Daily. Prn for swelling) 90 tablet 0   • empagliflozin (Jardiance) 25 MG tablet tablet Take 1 tablet by  mouth Daily. 90 tablet 3   • insulin aspart (NovoLOG FlexPen) 100 UNIT/ML solution pen-injector sc pen 10-14 units  3 times daily with meal 45 mL 3   • insulin detemir (Levemir FlexTouch) 100 UNIT/ML injection Inject 20 Units under the skin into the appropriate area as directed 2 (Two) Times a Day. 45 mL 1   • Insulin Pen Needle 31G X 8 MM misc Use to inject insulin 500 each 3   • isosorbide mononitrate (IMDUR) 30 MG 24 hr tablet Take 1 tablet by mouth 2 (Two) Times a Day. (Patient taking differently: Take 30 mg by mouth 2 (Two) Times a Day. Once a day) 180 tablet 3   • metoprolol tartrate (LOPRESSOR) 25 MG tablet TAKE 1 TABLET TWICE DAILY (Patient taking differently: Once a day) 180 tablet 3   • Multiple Vitamins-Minerals (MULTIVITAMIN ADULT PO) Take 1 tablet by mouth Daily.     • potassium chloride (MICRO-K) 10 MEQ CR capsule Take 1 capsule (10 mEq total) by mouth 2 (Two) Times a Day (Patient taking differently: Take 10 mEq by mouth Daily. Prn with bumex) 60 capsule 0   • True Metrix Blood Glucose Test test strip USE TO TEST BLOOD SUGAR  4 TIMES DAILY 120 each 6   • albuterol sulfate  (90 Base) MCG/ACT inhaler Inhale 2 puffs Every 4 (Four) Hours As Needed for Wheezing or Shortness of Air. 8 g 0   • benzonatate (TESSALON) 100 MG capsule Take 1 capsule by mouth 3 (Three) Times a Day As Needed for Cough. 30 capsule 0   • Dextromethorphan-guaiFENesin (Mucinex DM Maximum Strength)  MG tablet sustained-release 12 hour Take 1 tablet by mouth 2 (Two) Times a Day for 10 days. 20 tablet 0   • doxycycline (VIBRAMYICN) 100 MG tablet Take 1 tablet by mouth 2 (Two) Times a Day for 10 days. 20 tablet 0   • Menthol, Topical Analgesic, 4 % gel Apply 1 application topically Daily As Needed.     • [DISCONTINUED] Unable to find 1 each 1 (One) Time. nerve shield       Allergies   Allergen Reactions   • Adhesive Tape Rash       Objective    Objective     Vital Signs  Temp:  [96.9 °F (36.1 °C)-98.1 °F (36.7 °C)] 98.1 °F  (36.7 °C)  Heart Rate:  [] 92  Resp:  [20-24] 20  BP: (107-179)/() 179/83  SpO2:  [79 %-95 %] 91 %  on  Flow (L/min):  [2-3] 2;   Device (Oxygen Therapy): nasal cannula  Body mass index is 33.19 kg/m².    Physical Exam  General Appearance:    Alert, cooperative, no distress, appears stated age.  Head:    Normocephalic, without obvious abnormality, atraumatic  Eyes:    PERRL, conjunctiva/corneas clear, EOM's intact, both eyes  Ears:    Normal external ear canals, both ears  Nose:   Nares normal, septum midline, mucosa normal, no drainage    or sinus tenderness  Throat:   Lips, tongue, gums normal; oral mucosa pink and moist  Neck:   Supple, symmetrical, trachea midline, no adenopathy;     thyroid:  no enlargement/tenderness/nodules; no carotid    bruit or JVD  Back:     Symmetric, no curvature, ROM normal, no CVA tenderness  Lungs:     Clear to auscultation bilaterally, respirations unlabored  Chest Wall:    No tenderness or deformity   Heart:    Regular rate and rhythm, S1 and S2 normal, no murmur, rub   or gallop  Abdomen:    Soft nontender no organomegaly detected  Extremities:   Extremities normal, atraumatic, no cyanosis or edema  Pulses:   Pulses palpable in all extremities; symmetric all extremities  Skin:   Skin color normal, Skin is warm and dry,  no rashes or palpable lesions  Neurologic:   CNII-XII intact, motor strength grossly intact, sensation grossly intact to light touch, no focal deficits noted         Results Review:  I reviewed the patient's new clinical results.  I reviewed the patient's new imaging results and agree with the interpretation.  I reviewed the patient's other test results and agree with the interpretation  I personally viewed and interpreted the patient's EKG/Telemetry data  Discussed with ED provider.    Lab Results (last 24 hours)     Procedure Component Value Units Date/Time    CBC & Differential [[id number]]  (Abnormal) Collected: 05/03/22 1900    Specimen: Blood  Updated: 05/03/22 1908    Narrative:      The following orders were created for panel order CBC & Differential.  Procedure                               Abnormality         Status                     ---------                               -----------         ------                     CBC Auto Differential[[id number]]        Abnormal            Final result                 Please view results for these tests on the individual orders.    Comprehensive Metabolic Panel [[id number]]  (Abnormal) Collected: 05/03/22 1900    Specimen: Blood Updated: 05/03/22 1945     Glucose 142 mg/dL      BUN 17 mg/dL      Creatinine 1.18 mg/dL      Sodium 141 mmol/L      Potassium 4.3 mmol/L      Chloride 105 mmol/L      CO2 21.8 mmol/L      Calcium 9.2 mg/dL      Total Protein 6.9 g/dL      Albumin 3.50 g/dL      ALT (SGPT) 21 U/L      AST (SGOT) 38 U/L      Alkaline Phosphatase 132 U/L      Total Bilirubin 0.4 mg/dL      Globulin 3.4 gm/dL      A/G Ratio 1.0 g/dL      BUN/Creatinine Ratio 14.4     Anion Gap 14.2 mmol/L      eGFR 63.2 mL/min/1.73      Comment: National Kidney Foundation and American Society of Nephrology (ASN) Task Force recommended calculation based on the Chronic Kidney Disease Epidemiology Collaboration (CKD-EPI) equation refit without adjustment for race.       Narrative:      GFR Normal >60  Chronic Kidney Disease <60  Kidney Failure <15      Blood Culture - Blood, Arm, Left [[id number]] Collected: 05/03/22 1900    Specimen: Blood from Arm, Left Updated: 05/03/22 1905    Lactic Acid, Plasma [[id number]]  (Normal) Collected: 05/03/22 1900    Specimen: Blood Updated: 05/03/22 1932     Lactate 1.7 mmol/L     Troponin [[id number]]  (Normal) Collected: 05/03/22 1900    Specimen: Blood Updated: 05/03/22 1942     Troponin T <0.010 ng/mL     Narrative:      Troponin T Reference Range:  <= 0.03 ng/mL-   Negative for AMI  >0.03 ng/mL-     Abnormal for myocardial necrosis.  Clinicians would have to utilize clinical acumen, EKG,  "Troponin and serial changes to determine if it is an Acute Myocardial Infarction or myocardial injury due to an underlying chronic condition.       Results may be falsely decreased if patient taking Biotin.      BNP [[id number]]  (Normal) Collected: 05/03/22 1900    Specimen: Blood Updated: 05/03/22 1934     proBNP 103.0 pg/mL     Narrative:      Among patients with dyspnea, NT-proBNP is highly sensitive for the detection of acute congestive heart failure. In addition NT-proBNP of <300 pg/ml effectively rules out acute congestive heart failure with 99% negative predictive value.    Results may be falsely decreased if patient taking Biotin.      Procalcitonin [[id number]]  (Normal) Collected: 05/03/22 1900    Specimen: Blood Updated: 05/03/22 1940     Procalcitonin 0.06 ng/mL     Narrative:      As a Marker for Sepsis (Non-Neonates):    1. <0.5 ng/mL represents a low risk of severe sepsis and/or septic shock.  2. >2 ng/mL represents a high risk of severe sepsis and/or septic shock.    As a Marker for Lower Respiratory Tract Infections that require antibiotic therapy:    PCT on Admission    Antibiotic Therapy       6-12 Hrs later    >0.5                Strongly Recommended  >0.25 - <0.5        Recommended   0.1 - 0.25          Discouraged              Remeasure/reassess PCT  <0.1                Strongly Discouraged     Remeasure/reassess PCT    As 28 day mortality risk marker: \"Change in Procalcitonin Result\" (>80% or <=80%) if Day 0 (or Day 1) and Day 4 values are available. Refer to http://[email or web address]-pct-calculator.[email or web address]    Change in PCT <=80%  A decrease of PCT levels below or equal to 80% defines a positive change in PCT test result representing a higher risk for 28-day all-cause mortality of patients diagnosed with severe sepsis for septic shock.    Change in PCT >80%  A decrease of PCT levels of more than 80% defines a negative change in PCT result representing a lower risk for 28-day all-cause mortality of patients " diagnosed with severe sepsis or septic shock.       CBC Auto Differential [[id number]]  (Abnormal) Collected: 05/03/22 1900    Specimen: Blood Updated: 05/03/22 1908     WBC 11.61 10*3/mm3      RBC 5.67 10*6/mm3      Hemoglobin 17.2 g/dL      Hematocrit 52.9 %      MCV 93.3 fL      MCH 30.3 pg      MCHC 32.5 g/dL      RDW 13.9 %      RDW-SD 48.6 fl      MPV 10.0 fL      Platelets 223 10*3/mm3      Neutrophil % 58.2 %      Lymphocyte % 23.8 %      Monocyte % 12.9 %      Eosinophil % 3.1 %      Basophil % 1.5 %      Immature Grans % 0.5 %      Neutrophils, Absolute 6.76 10*3/mm3      Lymphocytes, Absolute 2.76 10*3/mm3      Monocytes, Absolute 1.50 10*3/mm3      Eosinophils, Absolute 0.36 10*3/mm3      Basophils, Absolute 0.17 10*3/mm3      Immature Grans, Absolute 0.06 10*3/mm3      nRBC 0.0 /100 WBC     Respiratory Panel PCR w/COVID-19(SARS-CoV-2) KATHERIN/BRITTANY/TONY/PAD/COR/MAD/CHRISTIN In-House, NP Swab in UTM/VTM, 3-4 HR TAT - Swab, Nasopharynx [[id number]]  (Normal) Collected: 05/03/22 1901    Specimen: Swab from Nasopharynx Updated: 05/03/22 2009     ADENOVIRUS, PCR Not Detected     Coronavirus 229E Not Detected     Coronavirus HKU1 Not Detected     Coronavirus NL63 Not Detected     Coronavirus OC43 Not Detected     COVID19 Not Detected     Human Metapneumovirus Not Detected     Human Rhinovirus/Enterovirus Not Detected     Influenza A PCR Not Detected     Influenza B PCR Not Detected     Parainfluenza Virus 1 Not Detected     Parainfluenza Virus 2 Not Detected     Parainfluenza Virus 3 Not Detected     Parainfluenza Virus 4 Not Detected     RSV, PCR Not Detected     Bordetella pertussis pcr Not Detected     Bordetella parapertussis PCR Not Detected     Chlamydophila pneumoniae PCR Not Detected     Mycoplasma pneumo by PCR Not Detected    Narrative:      In the setting of a positive respiratory panel with a viral infection PLUS a negative procalcitonin without other underlying concern for bacterial infection, consider  observing off antibiotics or discontinuation of antibiotics and continue supportive care. If the respiratory panel is positive for atypical bacterial infection (Bordetella pertussis, Chlamydophila pneumoniae, or Mycoplasma pneumoniae), consider antibiotic de-escalation to target atypical bacterial infection.    Blood Gas, Arterial - [[id number]]  (Abnormal) Collected: 05/03/22 1934    Specimen: Arterial Blood Updated: 05/03/22 1936     Site Arterial: right radial     Grey's Test Positive     pH, Arterial 7.391 pH units      pCO2, Arterial 40.9 mm Hg      pO2, Arterial 80.7 mm Hg      HCO3, Arterial 24.8 mmol/L      Base Excess, Arterial -0.2 mmol/L      O2 Saturation Calculated 95.7 %      Barometric Pressure for Blood Gas 750.1 mmHg      Comment: 92% @1932 Meter: [id number] : [id number] IMN        Modality Cannula     Flow Rate 2 lpm      Rate 20 Breaths/minute           Imaging Results (Last 24 Hours)     ** No results found for the last 24 hours. **          Results for orders placed during the hospital encounter of 12/08/20    Adult Transthoracic Echo Complete W/ Cont if Necessary Per Protocol    Interpretation Summary  · Left ventricular ejection fraction appears to be 61 - 65%  · Left ventricular wall thickness is consistent with mild concentric hypertrophy.  · Left ventricular diastolic function is consistent with (grade I) impaired relaxation.  · Normal right ventricular cavity size and systolic function noted.  · The left atrial cavity is mildly dilated.  · The aortic valve leaflets are moderately calcified (aortic sclerosis)  · There is moderate mitral annular calcification with extension onto the posterior mitral valve leaflet  · The ascending aorta is mildly dilated at 3.8 cm  · There is no evidence of pericardial effusion      ECG 12 Lead   Preliminary Result   HEART RATE= 86  bpm   RR Interval= 696  ms   AL Interval= 165  ms   P Horizontal Axis= -13  deg   P Front Axis= 52  deg    QRSD Interval= 102  ms   QT Interval= 372  ms   QRS Axis= -52  deg   T Wave Axis= 22  deg   - ABNORMAL ECG -   Sinus rhythm   LAD, consider left anterior fascicular block   Electronically Signed By:    Date and Time of Study: 2022-05-03 18:37:37           Assessment/Plan     Active Hospital Problems    Diagnosis  POA   • Pneumonia of right lower lobe due to infectious organism [J18.9]  Yes   • Pulmonary emphysema (HCC) [J43.9]  Yes   • Diabetic polyneuropathy associated with type 2 diabetes mellitus (HCC) [E11.42]  Yes   • Hyperlipidemia [E78.5]  Yes   • Obesity (BMI 30-39.9) [E66.9]  Yes   • S/P CABG x 2 [Z95.1]  Not Applicable   • Chronic diastolic congestive heart failure (HCC) [I50.32]  Yes   • Coronary artery disease of native artery of native heart with stable angina pectoris (HCC) [I25.118]  Yes      Resolved Hospital Problems   No resolved problems to display.       Assessment and plan  1.  Pneumonia, present at the time of admission.  Start patient on Rocephin and doxycycline.  Pulmonary evaluation will be requested.    2.  History of COPD, currently does not appear to be in exacerbation at this point of time.  Continue supplemental oxygen as needed.    3.  Diabetes mellitus, associated with neuropathy, initiate Accu-Cheks and sliding scale insulin coverage.    4.  History of hyperlipidemia, continue home dose of statin therapy.    5.  Chronic diastolic CHF, currently does not appear to be in exacerbation at this point of time.  Monitor for volume status changes.    6.  Coronary disease, continue aspirin and statin therapy.    7.  CODE STATUS is full code.  Further plans based on hospital course.       Harvey Danielle MD  [facility]  05/03/22  22:51 EDT

## 2022-05-04 NOTE — CONSULTS
Patient Identification:  Felix Olmedo  76 y.o.  male  1945  [id number]          LOS 1    Requesting physician: Dr Danielle    Reason for Consultation:  PNA    History of Present Illness:     77-year-old male presented with shortness of breath.  Denies purulent sputum, chest pain or hemoptysis.  Chest imaging shows infiltrate consistent with pneumonia.  Requiring supplemental oxygen.  Does not use oxygen at home.  Shortness of breath moderate and worse with exertion.      Past Medical History:  Past Medical History:   Diagnosis Date   • Coronary artery disease     Status post 2 vessel CABG 5/10/17 by Dr. 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   • SVT (supraventricular tachycardia) (HCC)     Diagnosed following CABG   • Type 2 diabetes mellitus (HCC)        Past Surgical History:  Past Surgical History:   Procedure Laterality Date   • CARDIAC CATHETERIZATION N/A 10/18/2016    Procedure: Coronary angiography;  Surgeon: Jesus Guzman MD;  Location: [facility] CATH INVASIVE LOCATION;  Service:    • CARDIAC CATHETERIZATION N/A 10/18/2016    Procedure: Left heart cath;  Surgeon: Jesus Guzman MD;  Location: [facility] CATH INVASIVE LOCATION;  Service:    • CARDIAC CATHETERIZATION N/A 10/18/2016    Procedure: Left ventriculography;  Surgeon: Jesus Guzman MD;  Location: [facility] CATH INVASIVE LOCATION;  Service:    • CARDIAC CATHETERIZATION N/A 4/4/2017    Procedure: Coronary angiography;  Surgeon: Jesus Guzman MD;  Location: [facility] CATH INVASIVE LOCATION;  Service:    • CARDIAC CATHETERIZATION N/A 4/4/2017    Procedure: Left heart cath;  Surgeon: Jesus HARRIS  MD Megan;  Location: [facility] CATH INVASIVE LOCATION;  Service:    • CARDIAC CATHETERIZATION N/A 4/4/2017    Procedure: Left ventriculography;  Surgeon: Jesus Guzman MD;  Location: [facility] CATH INVASIVE LOCATION;  Service:    • CARDIAC CATHETERIZATION  4/4/2017    Procedure: Functional Flow [place];  Surgeon: Jesus Guzman MD;  Location: [facility] CATH INVASIVE LOCATION;  Service:    • CARDIAC SURGERY     • CATARACT EXTRACTION W/ INTRAOCULAR LENS IMPLANT Left    • COLONOSCOPY     • CORONARY ARTERY BYPASS GRAFT N/A 5/10/2017    Procedure: CORONARY ARTERY BYPASS TIMES TWO UTILIZING THE LEFT GREATER SAPHENOUS VEIN WITH INTERNAL MAMMARY ARTERY GRAFT;  TRANSESOPHAGEAL ECHOCARDIOGRAM;  Surgeon: Alden Poole MD;  Location: [facility] MAIN OR;  Service:    • EYE SURGERY     • KNEE SURGERY Left 2005    Dr. Gallegos   • MOHS SURGERY Left     CHEEK   • SKIN BIOPSY          Home Meds:  Medications Prior to Admission   Medication Sig Dispense Refill Last Dose   • aspirin 81 MG tablet Take 81 mg by mouth Every Other Day.   5/2/2022 at Unknown time   • atorvastatin (LIPITOR) 40 MG tablet TAKE 1 TABLET EVERY DAY 90 tablet 2 5/3/2022 at Unknown time   • bumetanide (BUMEX) 2 MG tablet Take 0.5 tablets by mouth Daily. (Patient taking differently: Take 1 mg by mouth Daily. Prn for swelling) 90 tablet 0 Past Week at Unknown time   • empagliflozin (Jardiance) 25 MG tablet tablet Take 1 tablet by mouth Daily. 90 tablet 3 5/3/2022 at Unknown time   • insulin aspart (NovoLOG FlexPen) 100 UNIT/ML solution pen-injector sc pen 10-14 units  3 times daily with meal 45 mL 3 5/3/2022 at Unknown time   • insulin detemir (Levemir FlexTouch) 100 UNIT/ML injection Inject 20 Units under the skin into the appropriate area as directed 2 (Two) Times a Day. 45 mL 1 5/2/2022 at Unknown time   • Insulin Pen Needle 31G X 8 MM misc Use to inject insulin 500 each 3 5/3/2022 at Unknown time   • isosorbide mononitrate (IMDUR) 30 MG 24 hr tablet Take  1 tablet by mouth 2 (Two) Times a Day. (Patient taking differently: Take 30 mg by mouth 2 (Two) Times a Day. Once a day) 180 tablet 3 5/3/2022 at Unknown time   • metoprolol tartrate (LOPRESSOR) 25 MG tablet TAKE 1 TABLET TWICE DAILY (Patient taking differently: Once a day) 180 tablet 3 5/3/2022 at Unknown time   • Multiple Vitamins-Minerals (MULTIVITAMIN ADULT PO) Take 1 tablet by mouth Daily.   5/3/2022 at Unknown time   • potassium chloride (MICRO-K) 10 MEQ CR capsule Take 1 capsule (10 mEq total) by mouth 2 (Two) Times a Day (Patient taking differently: Take 10 mEq by mouth Daily. Prn with bumex) 60 capsule 0 Past Week at Unknown time   • True Metrix Blood Glucose Test test strip USE TO TEST BLOOD SUGAR  4 TIMES DAILY 120 each 6 5/3/2022 at Unknown time   • albuterol sulfate  (90 Base) MCG/ACT inhaler Inhale 2 puffs Every 4 (Four) Hours As Needed for Wheezing or Shortness of Air. 8 g 0    • benzonatate (TESSALON) 100 MG capsule Take 1 capsule by mouth 3 (Three) Times a Day As Needed for Cough. 30 capsule 0    • Dextromethorphan-guaiFENesin (Mucinex DM Maximum Strength)  MG tablet sustained-release 12 hour Take 1 tablet by mouth 2 (Two) Times a Day for 10 days. 20 tablet 0    • doxycycline (VIBRAMYICN) 100 MG tablet Take 1 tablet by mouth 2 (Two) Times a Day for 10 days. 20 tablet 0    • Menthol, Topical Analgesic, 4 % gel Apply 1 application topically Daily As Needed.            Allergies:  Allergies   Allergen Reactions   • Adhesive Tape Rash       Social History:   Social History     Socioeconomic History   • Marital status:      Spouse name: Mendy Vargas   • Number of children: 3   Tobacco Use   • Smoking status: Former Smoker     Packs/day: 1.50     Years: 40.00     Pack years: 60.00     Types: Cigarettes     Quit date:      Years since quittin.3   • Smokeless tobacco: Never Used   • Tobacco comment: Smoked up to 1.5 ppd.  Smoked for 50 years.  Quit .   Vaping Use   • Vaping Use:  "Never used   Substance and Sexual Activity   • Alcohol use: Yes     Comment: 2-3 BEERS YEARLY    • Drug use: No     Comment: caffeine use   • Sexual activity: Not Currently       Family History:  Family History   Problem Relation Age of Onset   • Cancer Mother         uterine   • Heart disease Father         Father with fatal MI in 80's   • Heart attack Father    • Diabetes Sister    • Esophageal cancer Brother    • Hepatitis Brother    • Alzheimer's disease Maternal Grandfather    • Stroke Maternal Grandfather        Review of Systems:  Denies fevers or chills  Denies nausea or vomiting  No new vision or hearing changes  No chest pain  Cough and shortness of breath  No diarrhea, hematemesis or hematochezia, no dysuria or frequency  No musculoskeletal complaints  No heat or cold intolerance  No skin rashes  No dizziness or confusion.  No seizure activity  No new anxiety or depression  12 system review of systems performed and all else negative    Objective:    PHYSICAL EXAM:    /72 (BP Location: Left arm, Patient Position: Lying)   Pulse 73   Temp 97.7 °F (36.5 °C) (Oral)   Resp 18   Ht 179.1 cm (70.5\")   Wt 106 kg (234 lb 9.6 oz)   SpO2 92%   BMI 33.19 kg/m²  Body mass index is 33.19 kg/m². 92% 106 kg (234 lb 9.6 oz)    GENERAL APPEARANCE:   · Well developed  · Well nourished  · No acute distress   EYES:    · PERRL                                                                           · Conjunctivae normal  · Sclerae nonicteric.  HENT:   · Atraumatic, normocephalic  · External ears and nose normal  · Moist mucous membranes and no ulcers  NECK:  · Thyroid not enlarged  · Trachea midline   RESPIRATORY:    · Nonlabored breathing   · Diminished right basilar breath sounds compared to left  · No rales. No wheezing  · No dullness  CARDIOVASCULAR:    · RRR  · Normal S1, S2  · No murmur  · Lower extremity edema: none    GI:   · Bowel sounds normal  · Abdomen soft , nondistended, nontender  · No abdominal " masses  MUSCULOSKELETAL:  · Normal movement of extremities  · No tenderness, no deformities  · No clubbing or cyanosis   Skin:    · No visible rashes  · No palpable nodules  · Cap refill normal.  No mottling.   PSYCHIATRIC:  · Speech and behavior appropriate  · Normal mood and affect  · Oriented to person, place and time  NEUROLOGIC:  Cranial nerves II through XII grossly intact.  Sensation intact.      Lab Review:      Results from last 7 days   Lab Units 05/04/22  0720 05/03/22  1900   WBC 10*3/mm3 13.20* 11.61*   HEMOGLOBIN g/dL 17.5 17.2   HEMATOCRIT % 52.0* 52.9*   PLATELETS 10*3/mm3 222 223     Results from last 7 days   Lab Units 05/04/22  0720 05/03/22  1900   SODIUM mmol/L 139 141   POTASSIUM mmol/L 4.4 4.3   CHLORIDE mmol/L 104 105   CO2 mmol/L 23.5 21.8*   BUN mg/dL 17 17   CREATININE mg/dL 0.91 1.18   GLUCOSE mg/dL 137* 142*   CALCIUM mg/dL 8.9 9.2                Imaging reviewed  chest X-ray shows right lower infiltrate             Microbiology reviewed              Assessment:    Right lower lobe pneumonia  COPD with emphysema  Type 2 diabetes  Chronic diastolic CHF  CAD status post CABG  Type 2 diabetes        Recommendations:    Agree with Rocephin for pneumonia.  Wean oxygen as able.  Encourage pulmonary toilet.            Thank you for allowing me to participate in the care of this patient.  I will continue to follow along with you.      Juan Ramon Feliciano MD  [facility], [facility]  Pulmonary and Critical Care Medicine    5/4/2022  12:53 EDT

## 2022-05-04 NOTE — CASE MANAGEMENT/SOCIAL WORK
Discharge Planning Assessment  [facility]     Patient Name: Felix Olmedo  MRN: [id number]  Today's Date: 5/4/2022    Admit Date: 5/3/2022     Discharge Needs Assessment     Row Name 05/04/22 1400       Living Environment    People in Home spouse    Current Living Arrangements home    Primary Care Provided by self    Provides Primary Care For no one    Family Caregiver if Needed spouse;child(gamaliel), adult    Quality of Family Relationships involved;helpful    Able to Return to Prior Arrangements yes       Resource/Environmental Concerns    Resource/Environmental Concerns none       Transition Planning    Patient/Family Anticipates Transition to home with family    Patient/Family Anticipated Services at Transition none    Transportation Anticipated family or friend will provide       Discharge Needs Assessment    Readmission Within the Last 30 Days no previous admission in last 30 days    Equipment Currently Used at Home walker, standard;cane, straight    Provided Post Acute Provider List? Refused    Refused Provider List Comment declined need               Discharge Plan     Row Name 05/04/22 7144       Plan    Plan home with wife; follow for home o2 needs    Patient/Family in Agreement with Plan yes    Plan Comments Spoke with pt and pt’s wife bedside. Confirmed facesheet correct. Explained role of CCP. Pt lives with his wife. He has cane and walker at home. Pt reports he has never used HH or SNF. Pt reports if Home O2 is needed he wants to use [facility]. Verified PCP and pharmacy, doesn’t want to use meds to beds. Pt plans home at d/c. CCP to follow.              Continued Care and Services - Admitted Since 5/3/2022    Coordination has not been started for this encounter.          Demographic Summary     Row Name 05/04/22 1400       General Information    Admission Type inpatient    Arrived From home    Required Notices Provided Important Message from Medicare    Reason for Consult discharge planning     Preferred Language English       Contact Information    Permission Granted to Share Info With facility ;family/designee               Functional Status     Row Name 05/04/22 1400       Functional Status    Usual Activity Tolerance good    Current Activity Tolerance moderate       Functional Status, IADL    Medications independent    Meal Preparation independent    Housekeeping independent    Laundry independent    Shopping independent       Mental Status    General Appearance WDL WDL       Mental Status Summary    Recent Changes in Mental Status/Cognitive Functioning no changes               Psychosocial    No documentation.                Abuse/Neglect    No documentation.                Legal    No documentation.                Substance Abuse    No documentation.                Patient Forms    No documentation.                   RONNI Jefferson

## 2022-05-04 NOTE — CONSULTS
Consult request for covid rule out isolation removal. Patient reviewed and had negative testing for covid, no exposure, but no MD order placed okay to remove isolation for covid rule out. Notified RN that MD order needed to be placed.

## 2022-05-04 NOTE — PLAN OF CARE
Problem: Adult Inpatient Plan of Care  Goal: Plan of Care Review  Flowsheets (Taken 5/4/2022 5947)  Plan of Care Reviewed With: patient  Outcome Evaluation: New admission to [address] [place] [address]. AOx4, pleasant. NSR on monitor. No complaints of pain. 2 L nasal cannula. Standby assist, uses cane at times.   Goal Outcome Evaluation:  Plan of Care Reviewed With: patient           Outcome Evaluation: New admission to [address] [place] [address]. AOx4, pleasant. NSR on monitor. No complaints of pain. 2 L nasal cannula. Standby assist, uses cane at times.

## 2022-05-04 NOTE — PROGRESS NOTES
Name: Felix Olmedo ADMIT: 5/3/2022   : 1945  PCP: Hilario Rahman MD    MRN: [id number] LOS: 1 days   AGE/SEX: 76 y.o. male  ROOM: [facility]     Subjective   Subjective   Requiring O2 3L NC. Still w/some dyspnea especially with exertion. Denies fever. Has productive cough of yellow sputum. Denies chest pain, wheezing    Review of Systems   Constitutional: Negative for chills and fever.   HENT: Negative for congestion.    Respiratory: Positive for cough and shortness of breath.    Cardiovascular: Positive for leg swelling. Negative for chest pain.   Gastrointestinal: Negative for nausea.   Genitourinary: Negative for difficulty urinating.   Musculoskeletal: Negative for arthralgias and myalgias.   Skin: Negative for rash.   Neurological: Negative for headaches.   Psychiatric/Behavioral: Negative for sleep disturbance.        Objective   Objective   Vital Signs  Temp:  [97.7 °F (36.5 °C)-98.1 °F (36.7 °C)] 97.7 °F (36.5 °C)  Heart Rate:  [] 73  Resp:  [18-24] 18  BP: (107-179)/() 135/72  SpO2:  [79 %-95 %] 92 %  on  Flow (L/min):  [2-3] 2;   Device (Oxygen Therapy): nasal cannula  Body mass index is 33.19 kg/m².  Physical Exam  Vitals and nursing note reviewed.   Constitutional:       General: He is not in acute distress.     Appearance: He is obese.   HENT:      Head: Normocephalic.      Mouth/Throat:      Mouth: Mucous membranes are moist.   Eyes:      Conjunctiva/sclera: Conjunctivae normal.   Cardiovascular:      Rate and Rhythm: Normal rate and regular rhythm.   Pulmonary:      Effort: Pulmonary effort is normal. No respiratory distress.      Breath sounds: Examination of the right-middle field reveals decreased breath sounds. Examination of the right-lower field reveals decreased breath sounds. Decreased breath sounds present. No wheezing.   Abdominal:      General: Bowel sounds are normal.      Palpations: Abdomen is soft.   Musculoskeletal:      Cervical back: Neck supple.      Right  lower leg: Edema present.      Left lower leg: Edema present.      Comments: Trace-1+   Skin:     General: Skin is warm and dry.   Neurological:      Mental Status: He is alert and oriented to person, place, and time.   Psychiatric:         Mood and Affect: Mood normal.         Behavior: Behavior normal.         Results Review     I reviewed the patient's new clinical results.  Results from last 7 days   Lab Units 05/04/22  0720 05/03/22  1900   WBC 10*3/mm3 13.20* 11.61*   HEMOGLOBIN g/dL 17.5 17.2   PLATELETS 10*3/mm3 222 223     Results from last 7 days   Lab Units 05/04/22  0720 05/03/22  1900   SODIUM mmol/L 139 141   POTASSIUM mmol/L 4.4 4.3   CHLORIDE mmol/L 104 105   CO2 mmol/L 23.5 21.8*   BUN mg/dL 17 17   CREATININE mg/dL 0.91 1.18   GLUCOSE mg/dL 137* 142*   EGFR mL/min/1.73 86.3 63.2     Results from last 7 days   Lab Units 05/04/22  0720 05/03/22  1900   ALBUMIN g/dL 3.50 3.50   BILIRUBIN mg/dL 0.6 0.4   ALK PHOS U/L 142* 132*   AST (SGOT) U/L 42* 38   ALT (SGPT) U/L 26 21     Results from last 7 days   Lab Units 05/04/22  0720 05/03/22  1900   CALCIUM mg/dL 8.9 9.2   ALBUMIN g/dL 3.50 3.50     Results from last 7 days   Lab Units 05/03/22  1900   PROCALCITONIN ng/mL 0.06   LACTATE mmol/L 1.7     Glucose   Date/Time Value Ref Range Status   05/04/2022 1041 210 (H) 70 - 130 mg/dL Final     Comment:     Meter: [id number] : [id number] Sherry Moffett DESHAWN   05/04/2022 0518 125 70 - 130 mg/dL Final     Comment:     Meter: [id number] : [id number] El LOPEZ       No radiology results for the last day  Scheduled Medications  aspirin, 81 mg, Oral, Daily  atorvastatin, 40 mg, Oral, Daily  cefTRIAXone, 1 g, Intravenous, Q24H  doxycycline, 100 mg, Intravenous, Q12H  empagliflozin, 25 mg, Oral, Daily  insulin lispro, 0-9 Units, Subcutaneous, TID AC  isosorbide mononitrate, 30 mg, Oral, Q24H  metoprolol tartrate, 25 mg, Oral, BID  sodium chloride, 10 mL, Intravenous, Q12H    Infusions   Diet  Diet  Regular; Cardiac, Consistent Carbohydrate       Assessment/Plan     Active Hospital Problems    Diagnosis  POA   • **Pneumonia of right lower lobe due to infectious organism [J18.9]  Yes   • Pulmonary emphysema (HCC) [J43.9]  Yes   • Diabetic polyneuropathy associated with type 2 diabetes mellitus (HCC) [E11.42]  Yes   • Hyperlipidemia [E78.5]  Yes   • Obesity (BMI 30-39.9) [E66.9]  Yes   • S/P CABG x 2 [Z95.1]  Not Applicable   • Chronic diastolic congestive heart failure (HCC) [I50.32]  Yes   • Coronary artery disease of native artery of native heart with stable angina pectoris (HCC) [I25.118]  Yes      Resolved Hospital Problems   No resolved problems to display.       76 y.o. male admitted with Pneumonia of right lower lobe due to infectious organism.    -Remains on O2 2-3L NC; wean as tolerated. Monitor WBC trends. Afebrile. Check urine antigens, MRSA screen, sputum. Continue ceftriaxone and doxycycline for now. Follow blood cultures. Pulmonology consulted. Encourage IS. No wheezing on exam  -BNP ok. Echo 2020 EF 61-65%; LV diastolic grade 1 impaired relaxation. Bumex prescribed prn   -Monitor BG trends. Jardiance continued. Correctional scale insulin. A1C 8.4% 3/22/22. Consider restarting levemir  -Further recommendations based on hospital course    SCDs for DVT prophylaxis.  Full code.  Discussed with patient.  Anticipate discharge home in 1-2 days.      MILADYS Good  [facility]  05/04/22  12:19 EDT

## 2022-05-05 NOTE — PROGRESS NOTES
Name: Felix Olmedo ADMIT: 5/3/2022   : 1945  PCP: Hilario Rahman MD    MRN: [id number] LOS: 2 days   AGE/SEX: 76 y.o. male  ROOM: [facility]   Subjective   Chief Complaint   Patient presents with   • Shortness of Breath     Dyspnea fair  On oxygen  On IV abx  +cough    ROS  No f/c  No n/v  No cp/palp  +soa/cough    Objective   Vital Signs  Temp:  [97.6 °F (36.4 °C)-98 °F (36.7 °C)] 98 °F (36.7 °C)  Heart Rate:  [74-83] 83  Resp:  [16-20] 18  BP: (113-153)/(59-72) 121/59  SpO2:  [90 %-93 %] 90 %  on  Flow (L/min):  [3] 3;   Device (Oxygen Therapy): nasal cannula  Body mass index is 33.19 kg/m².    Physical Exam  HENT:      Head: Normocephalic and atraumatic.   Eyes:      General: No scleral icterus.  Cardiovascular:      Rate and Rhythm: Normal rate and regular rhythm.      Heart sounds: Normal heart sounds.   Pulmonary:      Effort: Respiratory distress present.      Breath sounds: Rales present.   Abdominal:      General: There is no distension.      Palpations: Abdomen is soft.      Tenderness: There is no abdominal tenderness.   Musculoskeletal:      Cervical back: Neck supple.   Neurological:      Mental Status: He is alert.   Psychiatric:         Behavior: Behavior normal.         Results Review:       I reviewed the patient's new clinical results.  Results from last 7 days   Lab Units 22  0720 22  1900   WBC 10*3/mm3 13.20* 11.61*   HEMOGLOBIN g/dL 17.5 17.2   PLATELETS 10*3/mm3 222 223     Results from last 7 days   Lab Units 22  0720 22  1900   SODIUM mmol/L 139 141   POTASSIUM mmol/L 4.4 4.3   CHLORIDE mmol/L 104 105   CO2 mmol/L 23.5 21.8*   BUN mg/dL 17 17   CREATININE mg/dL 0.91 1.18   GLUCOSE mg/dL 137* 142*   Estimated Creatinine Clearance: 82.2 mL/min (by C-G formula based on SCr of 0.91 mg/dL).  Results from last 7 days   Lab Units 22  0720 22  1900   ALBUMIN g/dL 3.50 3.50   BILIRUBIN mg/dL 0.6 0.4   ALK PHOS U/L 142* 132*   AST (SGOT) U/L 42* 38   ALT  (SGPT) U/L 26 21     Results from last 7 days   Lab Units 05/04/22  0720 05/03/22  1900   CALCIUM mg/dL 8.9 9.2   ALBUMIN g/dL 3.50 3.50     Results from last 7 days   Lab Units 05/03/22  1900   PROCALCITONIN ng/mL 0.06   LACTATE mmol/L 1.7       Coag     HbA1C   Lab Results   Component Value Date    HGBA1C 8.4 (H) 03/22/2022    HGBA1C 8.7 (H) 01/05/2022    HGBA1C 8.00 (H) 09/20/2021     Infection   Results from last 7 days   Lab Units 05/04/22  1434 05/03/22  1900   BLOODCX   --  No growth at 24 hours   RESPCX  Rejected  --    PROCALCITONIN ng/mL  --  0.06     Radiology(recent) No radiology results for the last day  Troponin T   Date Value Ref Range Status   05/03/2022 <0.010 0.000 - 0.030 ng/mL Final     No components found for: TSH;2    aspirin, 81 mg, Oral, Daily  atorvastatin, 40 mg, Oral, Daily  cefTRIAXone, 1 g, Intravenous, Q24H  empagliflozin, 25 mg, Oral, Daily  insulin lispro, 0-9 Units, Subcutaneous, TID AC  isosorbide mononitrate, 30 mg, Oral, Q24H  metoprolol tartrate, 25 mg, Oral, BID  sodium chloride, 10 mL, Intravenous, Q12H       Diet Regular; Cardiac, Consistent Carbohydrate      Assessment/Plan      Active Hospital Problems    Diagnosis  POA   • **Pneumonia of right lower lobe due to infectious organism [J18.9]  Yes   • Pulmonary emphysema (HCC) [J43.9]  Yes   • Diabetic polyneuropathy associated with type 2 diabetes mellitus (HCC) [E11.42]  Yes   • Hyperlipidemia [E78.5]  Yes   • Obesity (BMI 30-39.9) [E66.9]  Yes   • S/P CABG x 2 [Z95.1]  Not Applicable   • Chronic diastolic congestive heart failure (HCC) [I50.32]  Yes   • Coronary artery disease of native artery of native heart with stable angina pectoris (HCC) [I25.118]  Yes      Resolved Hospital Problems   No resolved problems to display.       · Continue abx  · Ween oxygen as able  · Add IS, flutter, duonebs  · DM agents, monitor BG  · Agents for his CAD as above  · Add bumex daily  · Add lovenox for dvt proph      [place] RN    Dispo to home  when improved. Likely 1-2 days    All problems new to me  Greater than 36 minutes spent with greater than 50% counseling and coordinating care        Francis Hampton MD  [facility]  05/05/22  13:14 EDT

## 2022-05-05 NOTE — PROGRESS NOTES
[facility] INPATIENT PROGRESS NOTE         [address]    2022      PATIENT IDENTIFICATION:  Name: Felix Olmedo ADMIT: 5/3/2022   : 1945  PCP: Hilario Rahman MD    MRN: [id number] LOS: 2 days   AGE/SEX: 76 y.o. male  ROOM: [facility]                     LOS 2    Reason for visit: Pneumonia      SUBJECTIVE:      No new issues overnight.  Complains of fatigue but denies worsening cough.  Denies shortness of breath at rest on supplemental oxygen, nausea or vomiting.  Scattered coarse breath sounds at bases right greater than left but overall improved compared to yesterday.    Objective   OBJECTIVE:    Vital Sign Min/Max for last 24 hours  Temp  Min: 97.6 °F (36.4 °C)  Max: 98 °F (36.7 °C)   BP  Min: 113/64  Max: 153/72   Pulse  Min: 72  Max: 83   Resp  Min: 16  Max: 20   SpO2  Min: 90 %  Max: 93 %   No data recorded   No data recorded                         Body mass index is 33.19 kg/m².    Intake/Output Summary (Last 24 hours) at 2022 0940  Last data filed at 2022 0900  Gross per 24 hour   Intake 1080 ml   Output --   Net 1080 ml         Exam:  GEN:  No distress, appears stated age  EYES:   PERRL, anicteric sclerae  ENT:    External ears/nose normal, OP clear  NECK:  No adenopathy, midline trachea  LUNGS: Normal chest on inspection, palpation and mild coarse breath sounds right base on auscultation.  CV:  Normal S1S2, without murmur  ABD:  Nontender, nondistended, no hepatosplenomegaly, +BS  EXT:  No edema.  No cyanosis or clubbing.  No mottling and normal cap refill.    Assessment     Scheduled meds:  aspirin, 81 mg, Oral, Daily  atorvastatin, 40 mg, Oral, Daily  cefTRIAXone, 1 g, Intravenous, Q24H  empagliflozin, 25 mg, Oral, Daily  insulin lispro, 0-9 Units, Subcutaneous, TID AC  isosorbide mononitrate, 30 mg, Oral, Q24H  metoprolol tartrate, 25 mg, Oral, BID  sodium chloride, 10 mL, Intravenous, Q12H      IV meds:                         Data Review:  Results from last 7  days   Lab Units 05/04/22  0720 05/03/22  1900   SODIUM mmol/L 139 141   POTASSIUM mmol/L 4.4 4.3   CHLORIDE mmol/L 104 105   CO2 mmol/L 23.5 21.8*   BUN mg/dL 17 17   CREATININE mg/dL 0.91 1.18   GLUCOSE mg/dL 137* 142*   CALCIUM mg/dL 8.9 9.2         Estimated Creatinine Clearance: 82.2 mL/min (by C-G formula based on SCr of 0.91 mg/dL).  Results from last 7 days   Lab Units 05/04/22  0720 05/03/22  1900   WBC 10*3/mm3 13.20* 11.61*   HEMOGLOBIN g/dL 17.5 17.2   PLATELETS 10*3/mm3 222 223         Results from last 7 days   Lab Units 05/04/22  0720 05/03/22  1900   ALT (SGPT) U/L 26 21   AST (SGOT) U/L 42* 38     Results from last 7 days   Lab Units 05/03/22  1934   PH, ARTERIAL pH units 7.391   PO2 ART mm Hg 80.7   PCO2, ARTERIAL mm Hg 40.9   HCO3 ART mmol/L 24.8     Results from last 7 days   Lab Units 05/03/22  1900   PROCALCITONIN ng/mL 0.06   LACTATE mmol/L 1.7         Glucose   Date/Time Value Ref Range Status   05/05/2022 0621 163 (H) 70 - 130 mg/dL Final     Comment:     Meter: [id number] : [id number] Jose R Tamela JOHN   05/04/2022 1531 147 (H) 70 - 130 mg/dL Final     Comment:     Meter: [id number] : [id number] Sherry Moffett DESHAWN   05/04/2022 1041 210 (H) 70 - 130 mg/dL Final     Comment:     Meter: [id number] : [id number] Powell Betina DESHAWN   05/04/2022 0518 125 70 - 130 mg/dL Final     Comment:     Meter: [id number] : [id number] El LOPEZ       Chest x-ray 5/3 reviewed        Microbiology reviewed              Active Hospital Problems    Diagnosis  POA   • **Pneumonia of right lower lobe due to infectious organism [J18.9]  Yes   • Pulmonary emphysema (HCC) [J43.9]  Yes   • Diabetic polyneuropathy associated with type 2 diabetes mellitus (HCC) [E11.42]  Yes   • Hyperlipidemia [E78.5]  Yes   • Obesity (BMI 30-39.9) [E66.9]  Yes   • S/P CABG x 2 [Z95.1]  Not Applicable   • Chronic diastolic congestive heart failure (HCC) [I50.32]  Yes   • Coronary artery disease of native artery of native  heart with stable angina pectoris (HCC) [I25.118]  Yes      Resolved Hospital Problems   No resolved problems to display.         ASSESSMENT:    Right lower lobe pneumonia and parapneumonic effusion  COPD with emphysema  Type 2 diabetes  Chronic diastolic CHF  CAD status post CABG  Type 2 diabetes        PLAN:  Encourage pulmonary toilet.  Continue antibiotics.  Weaning oxygen as able.        Juan Ramon Feliciano MD  Pulmonary and Critical Care Medicine  [facility], [facility]  5/5/2022    09:40 EDT

## 2022-05-05 NOTE — PLAN OF CARE
Problem: Adult Inpatient Plan of Care  Goal: Plan of Care Review  Outcome: Ongoing, Progressing  Flowsheets (Taken 5/5/2022 0350)  Progress: improving  Plan of Care Reviewed With: patient  Outcome Evaluation: A&Ox4, VSS, up ad natividad, on 3L O2, soa when ambulating noted, IV rocephin started tonight, educated patient how to use call light, patient resting in bed.

## 2022-05-06 PROBLEM — J44.0 COPD WITH LOWER RESPIRATORY INFECTION (HCC): Status: ACTIVE | Noted: 2022-01-01

## 2022-05-06 NOTE — NURSING NOTE
Walking oximetry, pt was on 3L O2 per N/C, Removed O2 and pt started walking . With in first minute had decreased to 87%, 2nd minute to 85%, by the 4th minute  and into 5th minute down to 81-80% and becoming short of air. Oximetry complete. After replacing O2 at 3L, IT TOOK NEAR A FULL MINUTE TO REACH 90% .

## 2022-05-06 NOTE — CASE MANAGEMENT/SOCIAL WORK
Continued Stay Note  [facility]     Patient Name: Felix Olmedo  MRN: [id number]  Today's Date: 5/6/2022    Admit Date: 5/3/2022     Discharge Plan     Row Name 05/06/22 0916       Plan    Plan Home w/ spouse. Awaiting walking oximetry for home o2 needs    Plan Comments D/c orders noted. Patient is currently on 3L O2. Walking ox requested through RN. ANTOINE, VICKIEW               Discharge Codes    No documentation.               Expected Discharge Date and Time     Expected Discharge Date Expected Discharge Time    May 6, 2022             BERNARDO COBB

## 2022-05-06 NOTE — DISCHARGE PLACEMENT REQUEST
"Lang Brown (76 y.o. Male)             Date of Birth   1945    Social Security Number       [address] [place] [address]    Home Phone   [phone]    MRN   [id number]       Jehovah's witness   Religion    Marital Status                               Admission Date   5/3/22    Admission Type   Emergency    Admitting Provider   Harvey Danielle MD    Attending Provider   Francis Hampton MD    Department, Room/Bed   [address], [address]       Discharge Date       Discharge Disposition   Home or Self Care    Discharge Destination                               Attending Provider: Francis Hampton MD    Allergies: Adhesive Tape    Isolation: None   Infection: None   Code Status: CPR   Advance Care Planning Activity    Ht: 179.1 cm (70.5\")   Wt: 106 kg (234 lb 9.6 oz)    Admission Cmt: None   Principal Problem: Pneumonia of right lower lobe due to infectious organism [J18.9]                 Active Insurance as of 5/3/2022     Primary Coverage     Payor Plan Insurance Group Employer/Plan Group    HUMANA MEDICARE REPLACEMENT HUMANA MEDICARE REPLACEMENT [id number]     Payor Plan Address Payor Plan Phone Number Payor Plan Fax Number Effective Dates    [address] [address] [phone]  1/1/2021 - None Entered    [facility] [id number]       Subscriber Name Subscriber Birth Date Member ID       LANG BROWN 1945 [id number]                 Emergency Contacts      (Rel.) Home Phone Work Phone Mobile Phone    Mendy Brown (Spouse) [phone]    KEVINTENA (Son) [phone] -- --              "

## 2022-05-06 NOTE — CASE MANAGEMENT/SOCIAL WORK
Case Management Discharge Note      Final Note: Home w/ o2 from [facility], family transport    Provided Post Acute Provider List?: Refused  Refused Provider List Comment: declined need    Selected Continued Care - Discharged on 5/6/2022 Admission date: 5/3/2022 - Discharge disposition: Home or Self Care    Destination    No services have been selected for the patient.              Durable Medical Equipment    No services have been selected for the patient.              Dialysis/Infusion    No services have been selected for the patient.              Home Medical Care    No services have been selected for the patient.              Therapy    No services have been selected for the patient.              Community Resources    No services have been selected for the patient.              Community & DME    No services have been selected for the patient.                  Transportation Services  Private: Car    Final Discharge Disposition Code: 01 - home or self-care

## 2022-05-06 NOTE — PLAN OF CARE
Problem: Fall Injury Risk  Goal: Absence of Fall and Fall-Related Injury  Intervention: Identify and Manage Contributors  Flowsheets  Taken 5/6/2022 0434  Medication Review/Management: medications reviewed  Taken 5/6/2022 0020  Medication Review/Management: medications reviewed  Taken 5/5/2022 2223  Medication Review/Management: medications reviewed  Taken 5/5/2022 2020  Medication Review/Management: medications reviewed  Intervention: Promote Injury-Free Environment  Flowsheets  Taken 5/6/2022 0434  Safety Promotion/Fall Prevention:   activity supervised   assistive device/personal items within reach   clutter free environment maintained   fall prevention program maintained   lighting adjusted   nonskid shoes/slippers when out of bed   room organization consistent   safety round/check completed  Taken 5/6/2022 0207  Safety Promotion/Fall Prevention:   activity supervised   assistive device/personal items within reach   clutter free environment maintained   fall prevention program maintained   lighting adjusted   nonskid shoes/slippers when out of bed   room organization consistent   safety round/check completed  Taken 5/6/2022 0020  Safety Promotion/Fall Prevention:   activity supervised   assistive device/personal items within reach   clutter free environment maintained   fall prevention program maintained   lighting adjusted   nonskid shoes/slippers when out of bed   room organization consistent   safety round/check completed  Taken 5/5/2022 2223  Safety Promotion/Fall Prevention:   activity supervised   assistive device/personal items within reach   clutter free environment maintained   fall prevention program maintained   lighting adjusted   nonskid shoes/slippers when out of bed   room organization consistent   safety round/check completed  Taken 5/5/2022 2020  Safety Promotion/Fall Prevention:   activity supervised   assistive device/personal items within reach   clutter free environment maintained   fall  prevention program maintained   lighting adjusted   nonskid shoes/slippers when out of bed   room organization consistent   safety round/check completed     Problem: Adult Inpatient Plan of Care  Goal: Plan of Care Review  Flowsheets (Taken 5/6/2022 0544)  Progress: improving  Plan of Care Reviewed With: patient  Outcome Evaluation: No acute changes. AOx4. VSS. Up ad natividad. O2 in use @ 3L via NC. SOB w/ ambulation. No c/o pain or discomfort. Makes needs known. Bed locked and in lowest position. Side rails up x2. Call light within reach.  Goal: Absence of Hospital-Acquired Illness or Injury  Intervention: Identify and Manage Fall Risk  Flowsheets  Taken 5/6/2022 0434  Safety Promotion/Fall Prevention:   activity supervised   assistive device/personal items within reach   clutter free environment maintained   fall prevention program maintained   lighting adjusted   nonskid shoes/slippers when out of bed   room organization consistent   safety round/check completed  Taken 5/6/2022 0207  Safety Promotion/Fall Prevention:   activity supervised   assistive device/personal items within reach   clutter free environment maintained   fall prevention program maintained   lighting adjusted   nonskid shoes/slippers when out of bed   room organization consistent   safety round/check completed  Taken 5/6/2022 0020  Safety Promotion/Fall Prevention:   activity supervised   assistive device/personal items within reach   clutter free environment maintained   fall prevention program maintained   lighting adjusted   nonskid shoes/slippers when out of bed   room organization consistent   safety round/check completed  Taken 5/5/2022 2223  Safety Promotion/Fall Prevention:   activity supervised   assistive device/personal items within reach   clutter free environment maintained   fall prevention program maintained   lighting adjusted   nonskid shoes/slippers when out of bed   room organization consistent   safety round/check  completed  Taken 5/5/2022 2020  Safety Promotion/Fall Prevention:   activity supervised   assistive device/personal items within reach   clutter free environment maintained   fall prevention program maintained   lighting adjusted   nonskid shoes/slippers when out of bed   room organization consistent   safety round/check completed  Intervention: Prevent Skin Injury  Flowsheets  Taken 5/6/2022 0434  Body Position: position changed independently  Taken 5/6/2022 0207  Body Position: position changed independently  Taken 5/6/2022 0020  Body Position: position changed independently  Skin Protection:   adhesive use limited   incontinence pads utilized   transparent dressing maintained   tubing/devices free from skin contact  Taken 5/5/2022 2223  Body Position: position changed independently  Taken 5/5/2022 2020  Body Position: position changed independently  Skin Protection:   adhesive use limited   incontinence pads utilized   transparent dressing maintained   tubing/devices free from skin contact  Intervention: Prevent and Manage VTE (Venous Thromboembolism) Risk  Flowsheets  Taken 5/6/2022 0434  Activity Management:   activity adjusted per tolerance   activity encouraged  Taken 5/6/2022 0207  Activity Management:   activity adjusted per tolerance   activity encouraged  Taken 5/6/2022 0020  Activity Management:   activity adjusted per tolerance   activity encouraged   up ad natividad  VTE Prevention/Management:   bilateral   dorsiflexion/plantar flexion performed  Taken 5/5/2022 2223  Activity Management:   activity adjusted per tolerance   activity encouraged  Taken 5/5/2022 2020  Activity Management:   activity adjusted per tolerance   activity encouraged   up ad natividad  VTE Prevention/Management:   bilateral   dorsiflexion/plantar flexion performed  Intervention: Prevent Infection  Flowsheets  Taken 5/6/2022 0434  Infection Prevention:   environmental surveillance performed   hand hygiene promoted   rest/sleep promoted    personal protective equipment utilized   single patient room provided   visitors restricted/screened  Taken 5/6/2022 0207  Infection Prevention:   environmental surveillance performed   hand hygiene promoted   personal protective equipment utilized   rest/sleep promoted   single patient room provided   visitors restricted/screened  Taken 5/6/2022 0020  Infection Prevention:   environmental surveillance performed   hand hygiene promoted   personal protective equipment utilized   rest/sleep promoted   single patient room provided   visitors restricted/screened  Taken 5/5/2022 2223  Infection Prevention:   environmental surveillance performed   hand hygiene promoted   personal protective equipment utilized   rest/sleep promoted   single patient room provided   visitors restricted/screened  Taken 5/5/2022 2020  Infection Prevention:   environmental surveillance performed   hand hygiene promoted   personal protective equipment utilized   rest/sleep promoted   single patient room provided   visitors restricted/screened  Goal: Optimal Comfort and Wellbeing  Intervention: Monitor Pain and Promote Comfort  Flowsheets (Taken 5/4/2022 2040 by Dyana Abad RN)  Pain Management Interventions: care clustered  Intervention: Provide Person-Centered Care  Flowsheets  Taken 5/6/2022 0020  Trust Relationship/Rapport:   care explained   choices provided   emotional support provided   empathic listening provided   questions answered   questions encouraged   reassurance provided   thoughts/feelings acknowledged  Taken 5/5/2022 2020  Trust Relationship/Rapport:   care explained   choices provided   emotional support provided   empathic listening provided   questions encouraged   questions answered   reassurance provided   thoughts/feelings acknowledged  Goal: Readiness for Transition of Care  Intervention: Mutually Develop Transition Plan  Flowsheets (Taken 5/4/2022 1400 by Tanya Chaney MSW)  Equipment Currently Used at  Home:   walker, standard   cane, straight  Transportation Anticipated: family or friend will provide  Readmission Within the Last 30 Days: no previous admission in last 30 days  Patient/Family Anticipated Services at Transition: none  Patient/Family Anticipates Transition to: home with family     Problem: COPD (Chronic Obstructive Pulmonary Disease) Comorbidity  Goal: Maintenance of COPD Symptom Control  Intervention: Maintain COPD-Symptom Control  Flowsheets  Taken 5/6/2022 0434  Medication Review/Management: medications reviewed  Taken 5/6/2022 0020  Medication Review/Management: medications reviewed  Taken 5/5/2022 2223  Medication Review/Management: medications reviewed  Taken 5/5/2022 2020  Medication Review/Management: medications reviewed     Problem: Heart Failure Comorbidity  Goal: Maintenance of Heart Failure Symptom Control  Intervention: Maintain Heart Failure-Management  Flowsheets  Taken 5/6/2022 0434  Medication Review/Management: medications reviewed  Taken 5/6/2022 0020  Medication Review/Management: medications reviewed  Taken 5/5/2022 2223  Medication Review/Management: medications reviewed  Taken 5/5/2022 2020  Medication Review/Management: medications reviewed     Problem: Hypertension Comorbidity  Goal: Blood Pressure in Desired Range  Intervention: Maintain Blood Pressure Management  Flowsheets  Taken 5/6/2022 0528  Syncope Management: position changed slowly  Taken 5/6/2022 0434  Medication Review/Management: medications reviewed  Taken 5/6/2022 0020  Medication Review/Management: medications reviewed  Taken 5/5/2022 2223  Medication Review/Management: medications reviewed  Taken 5/5/2022 2020  Medication Review/Management: medications reviewed   Goal Outcome Evaluation:  Plan of Care Reviewed With: patient        Progress: improving  Outcome Evaluation: No acute changes. AOx4. VSS. Up ad natividad. O2 in use @ 3L via NC. SOB w/ ambulation. No c/o pain or discomfort. Makes needs known. Bed locked  and in lowest position. Side rails up x2. Call light within reach.

## 2022-05-06 NOTE — DISCHARGE SUMMARY
NAME: Felix Olmedo ADMIT: 5/3/2022   : 1945  PCP: Hilario Rahman MD    MRN: [id number] LOS: 3 days   AGE/SEX: 76 y.o. male  ROOM: [facility]/     Date of Admission:  5/3/2022  Date of Discharge:  2022    PCP: Hilario Rahman MD    CHIEF COMPLAINT  Shortness of Breath      DISCHARGE DIAGNOSIS  Active Hospital Problems    Diagnosis  POA   • **Pneumonia of right lower lobe due to infectious organism [J18.9]  Yes   • COPD with lower respiratory infection (HCC) [J44.0]  Yes   • Pulmonary emphysema (HCC) [J43.9]  Yes   • Diabetic polyneuropathy associated with type 2 diabetes mellitus (HCC) [E11.42]  Yes   • Hyperlipidemia [E78.5]  Yes   • Obesity (BMI 30-39.9) [E66.9]  Yes   • S/P CABG x 2 [Z95.1]  Not Applicable   • Chronic diastolic congestive heart failure (HCC) [I50.32]  Yes   • Coronary artery disease of native artery of native heart with stable angina pectoris (HCC) [I25.118]  Yes      Resolved Hospital Problems   No resolved problems to display.       SECONDARY DIAGNOSES  Past Medical History:   Diagnosis Date   • Coronary artery disease     Status post 2 vessel CABG 5/10/17 by Dr. Poole (LIMA-LAD, SVG-PDA)   • COVID-19 virus infection 2021   • Diabetes (HCC)    • Diastolic dysfunction    • Hyperlipidemia    • Hypertension    • LAFB (left anterior fascicular block)    • Lung nodule    • Neuropathy    • Osteoarthritis    • Pneumonia 2017   • Pulmonary emphysema (HCC) 2021   • Squamous cell carcinoma     Left cheek s/p resection   • SVT (supraventricular tachycardia) (HCC)     Diagnosed following CABG   • Type 2 diabetes mellitus (HCC)        CONSULTS   Pulmonary    HOSPITAL COURSE  Patient is a 76 y.o. male with history of CHF, coronary artery disease status post previous CABG, diabetes on insulin, COPD and a former smoker who presents with acute respiratory failure and right lower lobe pneumonia.  He was given antibiotics and oxygen with improvement of his symptoms.  He was seen by  pulmonary.  He has improved over the course of his hospitalization and is feeling much better.  He wishes to be discharged today on p.o. antibiotics and he is still requiring oxygen.  He has been prescribed oxygen 3 L continuous and may need this for some time.  He will follow-up with pulmonary as an outpatient.  I gave him the option to stay another day in the hospital but he strongly wishes to be discharged today which is reasonable.  Discussed with Dr. Feliciano pulmonary who also agrees with discharge today.      DIAGNOSTICS  05/06/2022 0939 05/06/2022 1023 Basic Metabolic Panel [[id number]]    (Abnormal)   Blood    Final result Component Value Units   Glucose 230 High  mg/dL   BUN 20 mg/dL   Creatinine 1.05 mg/dL   Sodium 136 mmol/L   Potassium 3.9 mmol/L   Chloride 99 mmol/L   CO2 22.0 mmol/L   Calcium 8.8 mg/dL   BUN/Creatinine Ratio 19.0    Anion Gap 15.0 mmol/L   eGFR 72.7  mL/min/1.73          05/06/2022 0620 05/06/2022 0658 CBC (No Diff) [[id number]]   (Abnormal)   Blood    Final result Component Value Units   WBC 10.72 10*3/mm3   RBC 5.70 10*6/mm3   Hemoglobin 17.5 g/dL   Hematocrit 52.5 High  %   MCV 92.1 fL   MCH 30.7 pg   MCHC 33.3 g/dL   RDW 13.6 %   RDW-SD 45.5 fl   MPV 10.3 fL   Platelets 206 10*3/mm3          05/04/2022 1434 05/04/2022 1803 Respiratory Culture - Sputum, Cough [[id number]]    Sputum from Cough    Final result Component Value   Respiratory Culture Rejected   Gram Stain Moderate (3+) Mixed bacterial morphotypes seen on Gram Stain    Few (2+) WBCs per low power field    Rare (1+) Epithelial cells per low power field             05/04/2022 1333 05/04/2022 1435 Legionella Antigen, Urine - Urine, Urine, Clean Catch [[id number]]   Urine, Clean Catch    Final result Component Value   LEGIONELLA ANTIGEN, URINE Negative             05/04/2022 1333 05/04/2022 1436 S. Pneumo Ag Urine or CSF - Urine, Urine, Clean Catch [[id number]]   Urine, Clean Catch    Final result Component Value   Strep Pneumo Ag  Negative             05/04/2022 1308 05/04/2022 1503 MRSA Screen, PCR (Inpatient) - Swab, Nares [[id number]]   Swab from Nares    Final result Component Value   MRSA PCR No MRSA Detected        XR Chest 1 View [[id number]] Juan Ramon as Reviewed   Order Status: Completed Collected: 05/06/22 0956    Updated: 05/06/22 0959   Narrative:     PORTABLE CHEST X-RAY       HISTORY: Followup pleural effusion and pneumonia. COPD.       TECHNIQUE: Portable frontal chest x-ray is correlated with chest x-ray   05/03/2022 and 12/07/2021.       FINDINGS: There are sternal wires. Asymmetric opacity at the right lung   base with a small associated pleural effusion consistent with pneumonia   and a parapneumonic effusion are again observed and appear no different   than on the recent exam. The left lung is clear. Vascular volume is   normal.       Impression:     No interval change in right lower lobe pneumonia with a   small adjacent pleural effusion.                PHYSICAL EXAM  Objective    Alert  nad  No resp distress  Lungs fairly clear  Wishing for discharge    CONDITION ON DISCHARGE  Stable.      DISCHARGE DISPOSITION   Home or Self Care      DISCHARGE MEDICATIONS       Your medication list      START taking these medications      Instructions Last Dose Given Next Dose Due   cefdinir 300 MG capsule  Commonly known as: OMNICEF      Take 1 capsule by mouth 2 (Two) Times a Day for 3 days.          CHANGE how you take these medications      Instructions Last Dose Given Next Dose Due   albuterol sulfate  (90 Base) MCG/ACT inhaler  Commonly known as: PROVENTIL HFA;VENTOLIN HFA;PROAIR HFA  What changed: Another medication with the same name was added. Make sure you understand how and when to take each.      Inhale 2 puffs Every 4 (Four) Hours As Needed for Wheezing or Shortness of Air.       albuterol sulfate  (90 Base) MCG/ACT inhaler  Commonly known as: PROVENTIL HFA;VENTOLIN HFA;PROAIR HFA  What changed: You were already  taking a medication with the same name, and this prescription was added. Make sure you understand how and when to take each.      Inhale 2 puffs Every 4 (Four) Hours As Needed for Wheezing.       metoprolol tartrate 25 MG tablet  Commonly known as: LOPRESSOR  What changed:   · how much to take  · how to take this  · when to take this  · additional instructions      TAKE 1 TABLET TWICE DAILY          CONTINUE taking these medications      Instructions Last Dose Given Next Dose Due   aspirin 81 MG tablet      Take 81 mg by mouth Every Other Day.       atorvastatin 40 MG tablet  Commonly known as: LIPITOR      TAKE 1 TABLET EVERY DAY       benzonatate 100 MG capsule  Commonly known as: TESSALON      Take 1 capsule by mouth 3 (Three) Times a Day As Needed for Cough.       bumetanide 2 MG tablet  Commonly known as: BUMEX      Take 0.5 tablets by mouth Daily. Prn for swelling       empagliflozin 25 MG tablet tablet  Commonly known as: Jardiance      Take 1 tablet by mouth Daily.       Insulin Pen Needle 31G X 8 MM misc      Use to inject insulin       isosorbide mononitrate 30 MG 24 hr tablet  Commonly known as: IMDUR      Take 1 tablet by mouth 2 (Two) Times a Day. Once a day       Levemir FlexTouch 100 UNIT/ML injection  Generic drug: insulin detemir      Inject 20 Units under the skin into the appropriate area as directed 2 (Two) Times a Day.       Menthol (Topical Analgesic) 4 % gel      Apply 1 application topically Daily As Needed.       Mucinex DM Maximum Strength  MG tablet sustained-release 12 hour      Take 1 tablet by mouth 2 (Two) Times a Day for 10 days.       multivitamin with minerals tablet tablet      Take 1 tablet by mouth Daily.       NovoLOG FlexPen 100 UNIT/ML solution pen-injector sc pen  Generic drug: insulin aspart      10-14 units  3 times daily with meal       potassium chloride 10 MEQ CR capsule  Commonly known as: MICRO-K      Take 1 capsule by mouth Daily. Prn with bumex       True  Metrix Blood Glucose Test test strip  Generic drug: glucose blood      USE TO TEST BLOOD SUGAR  4 TIMES DAILY          STOP taking these medications    doxycycline 100 MG tablet  Commonly known as: VIBRAMYICN              Where to Get Your Medications      These medications were sent to [id number] - [place], [place] - [address] - [phone]  - [phone]  [address], [facility] [address]    Phone: [phone]   · albuterol sulfate  (90 Base) MCG/ACT inhaler  · cefdinir 300 MG capsule     Information about where to get these medications is not yet available    Ask your nurse or doctor about these medications  · bumetanide 2 MG tablet  · isosorbide mononitrate 30 MG 24 hr tablet  · potassium chloride 10 MEQ CR capsule          Future Appointments   Date Time Provider Department Center   5/9/2022  1:45 PM Derick Booker APRN [facility] PC [facility]   5/27/2022 10:00 AM Noah Montejo MD [facility]   6/27/2022  1:30 PM [facility]   7/11/2022  2:15 PM Samira Recinos MD [facility]   10/14/2022  1:30 PM LAB CHAIR 1 CBC [facility]  LAB [facility]   10/14/2022  2:00 PM Jacob Abdul MD [facility] CBC [facility]     Additional Instructions for the Follow-ups that You Need to Schedule     Discharge Follow-up with Specialty: PCP in 1-2 weeks, Pulmonary in 3-4 weeks   As directed      Specialty: PCP in 1-2 weeks, Pulmonary in 3-4 weeks            Follow-up Information     Hilario Rahman MD .    Specialty: Internal Medicine  Contact information:  [address]  [facility] [id number] [phone]                         TEST  RESULTS PENDING AT DISCHARGE  Pending Labs     Order Current Status    Blood Culture - Blood, Arm, Left Preliminary result    Blood Culture - Blood, Arm, Left Preliminary result             Francis Hampton MD  [facility]  05/06/22  12:27 EDT      Time: greater than 32 minutes on  discharge  It was a pleasure taking care of this patient while in the hospital.

## 2022-05-06 NOTE — PROGRESS NOTES
[facility] INPATIENT PROGRESS NOTE         [address]    2022      PATIENT IDENTIFICATION:  Name: Felix Olmedo ADMIT: 5/3/2022   : 1945  PCP: Hilario Rahman MD    MRN: [id number] LOS: 3 days   AGE/SEX: 76 y.o. male  ROOM: [facility]                     LOS 3    Reason for visit: Pneumonia      SUBJECTIVE:      No new issues overnight.  Complains of fatigue but denies worsening cough.  Denies shortness of breath at rest on supplemental oxygen, nausea or vomiting.  Scattered coarse breath sounds at bases right greater than left but overall improved compared to yesterday.    Objective   OBJECTIVE:    Vital Sign Min/Max for last 24 hours  Temp  Min: 97.5 °F (36.4 °C)  Max: 98.5 °F (36.9 °C)   BP  Min: 112/68  Max: 129/73   Pulse  Min: 71  Max: 83   Resp  Min: 16  Max: 20   SpO2  Min: 91 %  Max: 94 %   No data recorded   No data recorded                         Body mass index is 33.19 kg/m².    Intake/Output Summary (Last 24 hours) at 2022 0800  Last data filed at 2022 1827  Gross per 24 hour   Intake 900 ml   Output --   Net 900 ml         Exam:  GEN:  No distress, appears stated age  EYES:   PERRL, anicteric sclerae  ENT:    External ears/nose normal, OP clear  NECK:  No adenopathy, midline trachea  LUNGS: Normal chest on inspection, palpation and mild coarse breath sounds right base on auscultation.  CV:  Normal S1S2, without murmur  ABD:  Nontender, nondistended, no hepatosplenomegaly, +BS  EXT:  No edema.  No cyanosis or clubbing.  No mottling and normal cap refill.    Assessment     Scheduled meds:  aspirin, 81 mg, Oral, Daily  atorvastatin, 40 mg, Oral, Daily  bumetanide, 1 mg, Oral, Daily  cefTRIAXone, 1 g, Intravenous, Q24H  empagliflozin, 25 mg, Oral, Daily  enoxaparin, 40 mg, Subcutaneous, Q24H  insulin lispro, 0-9 Units, Subcutaneous, TID AC  isosorbide mononitrate, 30 mg, Oral, Q24H  metoprolol tartrate, 25 mg, Oral, BID  sodium chloride, 10 mL, Intravenous,  Q12H      IV meds:                         Data Review:  Results from last 7 days   Lab Units 05/04/22  0720 05/03/22  1900   SODIUM mmol/L 139 141   POTASSIUM mmol/L 4.4 4.3   CHLORIDE mmol/L 104 105   CO2 mmol/L 23.5 21.8*   BUN mg/dL 17 17   CREATININE mg/dL 0.91 1.18   GLUCOSE mg/dL 137* 142*   CALCIUM mg/dL 8.9 9.2         Estimated Creatinine Clearance: 82.2 mL/min (by C-G formula based on SCr of 0.91 mg/dL).  Results from last 7 days   Lab Units 05/06/22  0620 05/04/22  0720 05/03/22  1900   WBC 10*3/mm3 10.72 13.20* 11.61*   HEMOGLOBIN g/dL 17.5 17.5 17.2   PLATELETS 10*3/mm3 206 222 223         Results from last 7 days   Lab Units 05/04/22  0720 05/03/22  1900   ALT (SGPT) U/L 26 21   AST (SGOT) U/L 42* 38     Results from last 7 days   Lab Units 05/03/22  1934   PH, ARTERIAL pH units 7.391   PO2 ART mm Hg 80.7   PCO2, ARTERIAL mm Hg 40.9   HCO3 ART mmol/L 24.8     Results from last 7 days   Lab Units 05/03/22  1900   PROCALCITONIN ng/mL 0.06   LACTATE mmol/L 1.7         Glucose   Date/Time Value Ref Range Status   05/06/2022 0607 155 (H) 70 - 130 mg/dL Final     Comment:     Meter: [id number] : [id number] Jennifer Ed NA   05/05/2022 2103 251 (H) 70 - 130 mg/dL Final     Comment:     Meter: [id number] : [id number] Rodriguez Ed NA   05/05/2022 1604 212 (H) 70 - 130 mg/dL Final     Comment:     Meter: [id number] : [id number] Josué Marcee NA   05/05/2022 1046 173 (H) 70 - 130 mg/dL Final     Comment:     Meter: [id number] : [id number] Josué Marcee NA   05/05/2022 0621 163 (H) 70 - 130 mg/dL Final     Comment:     Meter: [id number] : [id number] Jose R Tamela NA   05/04/2022 1531 147 (H) 70 - 130 mg/dL Final     Comment:     Meter: [id number] : [id number] Sherry HESS   05/04/2022 1041 210 (H) 70 - 130 mg/dL Final     Comment:     Meter: [id number] : [id number] Sherry HESS       Chest x-ray 5/3 reviewed        Microbiology reviewed              Active Hospital Problems     Diagnosis  POA   • **Pneumonia of right lower lobe due to infectious organism [J18.9]  Yes   • Pulmonary emphysema (HCC) [J43.9]  Yes   • Diabetic polyneuropathy associated with type 2 diabetes mellitus (HCC) [E11.42]  Yes   • Hyperlipidemia [E78.5]  Yes   • Obesity (BMI 30-39.9) [E66.9]  Yes   • S/P CABG x 2 [Z95.1]  Not Applicable   • Chronic diastolic congestive heart failure (HCC) [I50.32]  Yes   • Coronary artery disease of native artery of native heart with stable angina pectoris (HCC) [I25.118]  Yes      Resolved Hospital Problems   No resolved problems to display.         ASSESSMENT:    Right lower lobe pneumonia and parapneumonic effusion  COPD with emphysema  Type 2 diabetes  Chronic diastolic CHF  CAD status post CABG  Type 2 diabetes        PLAN:  Encourage pulmonary toilet.  I-S/flutter use.  Bronchodilators to help with clearance and COPD.  Continue antibiotics.  Weaning oxygen as able.  On 3 L with saturations 94%.  Repeat x-ray for follow-up of pneumonia and pleural effusion.      Juan Ramon Feliciano MD  Pulmonary and Critical Care Medicine  [facility], [facility]  5/6/2022    08:00 EDT

## 2022-05-06 NOTE — PLAN OF CARE
Problem: Fall Injury Risk  Goal: Absence of Fall and Fall-Related Injury  5/6/2022 1355 by Jesus Richards RN  Outcome: Met  5/6/2022 1338 by Jesus Richards RN  Outcome: Ongoing, Progressing  Intervention: Identify and Manage Contributors  Recent Flowsheet Documentation  Taken 5/6/2022 0815 by Jesus Richards RN  Medication Review/Management: medications reviewed  Intervention: Promote Injury-Free Environment  Recent Flowsheet Documentation  Taken 5/6/2022 1200 by Jesus Richards RN  Safety Promotion/Fall Prevention:   safety round/check completed   activity supervised  Taken 5/6/2022 1000 by Jesus Richards RN  Safety Promotion/Fall Prevention: safety round/check completed  Taken 5/6/2022 0815 by Jesus Richards RN  Safety Promotion/Fall Prevention: safety round/check completed     Problem: Adult Inpatient Plan of Care  Goal: Plan of Care Review  5/6/2022 1355 by Jesus Richards RN  Outcome: Met  Flowsheets (Taken 5/6/2022 1355)  Plan of Care Reviewed With: patient  5/6/2022 1338 by Jesus Richards RN  Outcome: Ongoing, Progressing  Flowsheets (Taken 5/6/2022 1338)  Plan of Care Reviewed With: patient  Goal: Patient-Specific Goal (Individualized)  5/6/2022 1355 by Jesus Richards RN  Outcome: Met  5/6/2022 1338 by Jesus Richards RN  Outcome: Ongoing, Progressing  Goal: Absence of Hospital-Acquired Illness or Injury  5/6/2022 1355 by Jesus Richards RN  Outcome: Met  5/6/2022 1338 by Jesus Richards RN  Outcome: Ongoing, Progressing  Intervention: Identify and Manage Fall Risk  Recent Flowsheet Documentation  Taken 5/6/2022 1200 by Jesus Richards RN  Safety Promotion/Fall Prevention:   safety round/check completed   activity supervised  Taken 5/6/2022 1000 by Jesus Richards RN  Safety Promotion/Fall Prevention: safety round/check completed  Taken 5/6/2022 0815 by Jesus Richards RN  Safety Promotion/Fall Prevention: safety round/check  completed  Intervention: Prevent Skin Injury  Recent Flowsheet Documentation  Taken 5/6/2022 1200 by Jesus Richards RN  Body Position: position changed independently  Taken 5/6/2022 1000 by Jesus Richards RN  Body Position: position changed independently  Taken 5/6/2022 0815 by Jesus Richards RN  Body Position: position changed independently  Intervention: Prevent and Manage VTE (Venous Thromboembolism) Risk  Recent Flowsheet Documentation  Taken 5/6/2022 1200 by Jesus Richards RN  Activity Management:   up ad natividad   ambulated to bathroom  Taken 5/6/2022 1000 by Jesus Richards RN  Activity Management: up ad natividad  Taken 5/6/2022 0815 by Jesus Richards RN  Activity Management: up ad natividad  VTE Prevention/Management:   bilateral   dorsiflexion/plantar flexion performed  Intervention: Prevent Infection  Recent Flowsheet Documentation  Taken 5/6/2022 1200 by Jesus Richards RN  Infection Prevention: hand hygiene promoted  Taken 5/6/2022 0815 by Jesus Richards RN  Infection Prevention: hand hygiene promoted  Goal: Optimal Comfort and Wellbeing  5/6/2022 1355 by Jesus Richards RN  Outcome: Met  5/6/2022 1338 by Jesus Richards RN  Outcome: Ongoing, Progressing  Intervention: Provide Person-Centered Care  Recent Flowsheet Documentation  Taken 5/6/2022 1200 by Jesus Richards RN  Trust Relationship/Rapport: emotional support provided  Taken 5/6/2022 1000 by Jesus Richards RN  Trust Relationship/Rapport: emotional support provided  Taken 5/6/2022 0815 by Jesus Richards RN  Trust Relationship/Rapport:   care explained   emotional support provided  Goal: Readiness for Transition of Care  5/6/2022 1355 by Jesus Richards RN  Outcome: Met  5/6/2022 1338 by Jesus Richards RN  Outcome: Ongoing, Progressing     Problem: COPD (Chronic Obstructive Pulmonary Disease) Comorbidity  Goal: Maintenance of COPD Symptom Control  5/6/2022 1355 by Jesus Richards  RN  Outcome: Met  5/6/2022 1338 by Jesus Richards RN  Outcome: Ongoing, Progressing  Intervention: Maintain COPD-Symptom Control  Recent Flowsheet Documentation  Taken 5/6/2022 0815 by Jesus Richards RN  Medication Review/Management: medications reviewed     Problem: Diabetes Comorbidity  Goal: Blood Glucose Level Within Targeted Range  5/6/2022 1355 by Jesus Richards RN  Outcome: Met  5/6/2022 1338 by Jesus Richards RN  Outcome: Ongoing, Progressing     Problem: Heart Failure Comorbidity  Goal: Maintenance of Heart Failure Symptom Control  5/6/2022 1355 by Jesus Richards RN  Outcome: Met  5/6/2022 1338 by Jesus Richards RN  Outcome: Ongoing, Progressing  Intervention: Maintain Heart Failure-Management  Recent Flowsheet Documentation  Taken 5/6/2022 0815 by Jesus Richards RN  Medication Review/Management: medications reviewed     Problem: Hypertension Comorbidity  Goal: Blood Pressure in Desired Range  5/6/2022 1355 by Jesus Richards RN  Outcome: Met  5/6/2022 1338 by Jesus Richards RN  Outcome: Ongoing, Progressing  Intervention: Maintain Blood Pressure Management  Recent Flowsheet Documentation  Taken 5/6/2022 0815 by Jesus Richards RN  Medication Review/Management: medications reviewed   Goal Outcome Evaluation:  Plan of Care Reviewed With: patient

## 2022-05-06 NOTE — PLAN OF CARE
Goal Outcome Evaluation:  Plan of Care Reviewed With: patient   Pt  AAOX4,O2 3L N/C , Walking oximetry completed and recorded per note, and reported to discharge, set up oxygen for discharge. Ambulated in room, has a cane but walks well without it. Saturations would drop to 80% on room air during walking oximetry, and did become mildly SOA. Returned  90% IN 1 MINUTE WITH 02 3L. Discharge  orders received

## 2022-05-06 NOTE — CASE MANAGEMENT/SOCIAL WORK
Continued Stay Note  [facility]     Patient Name: Felix Olmedo  MRN: [id number]  Today's Date: 5/6/2022    Admit Date: 5/3/2022     Discharge Plan     Row Name 05/06/22 1305       Plan    Plan Home w/ o2 from [facility] (delivered), family to transport    Plan Comments Patient will qualify for home o2. Patient was resting at room-air at 88%. Oxygen delivered at bedside. ANTOINE, VICKIEW               Discharge Codes    No documentation.               Expected Discharge Date and Time     Expected Discharge Date Expected Discharge Time    May 6, 2022             BERNARDO COBB

## 2022-05-07 NOTE — OUTREACH NOTE
Prep Survey    Flowsheet Row Responses   [facility] patient discharged from? [place]   Is LACE score < 7 ? No   Emergency Room discharge w/ pulse ox? No   Eligibility [facility]   Date of Admission 05/03/22   Date of Discharge 05/06/22   Discharge Disposition Home or Self Care   Discharge diagnosis Pneumonia    Does the patient have one of the following disease processes/diagnoses(primary or secondary)? COPD/Pneumonia   Does the patient have Home health ordered? No   Is there a DME ordered? Yes   What DME was ordered? o2 from [facility]    Prep survey completed? Yes          XENIA JOSE - Registered Nurse

## 2022-05-09 NOTE — OUTREACH NOTE
Call Center TCM Note    Flowsheet Row Responses   [facility] patient discharged from? [place]   Does the patient have one of the following disease processes/diagnoses(primary or secondary)? COPD/Pneumonia   Was the primary reason for admission: Pneumonia   TCM attempt successful? Yes   Discharge diagnosis Pneumonia    TCM call completed? Yes   Wrap up additional comments Pt d/c home from [facility] on Friday 05/06/2022, today, Monday 05/09/2022, completed FACE TO FACE TCM APPT with PCP office.           Jackelin Wood MA    5/9/2022, 14:18 EDT

## 2022-05-09 NOTE — PROGRESS NOTES
"[facility] [place] [facility]  Derick Booker APRCORI    Felix Olmedo / 76 y.o. / male  05/09/2022      CC:   Hospital Follow Up Visit (Pneumonia)      VITALS    Visit Vitals  /70   Pulse 94 Comment: 3L O2 NC   Temp 97.1 °F (36.2 °C) (Temporal)   Ht 179.1 cm (70.5\")   Wt 107 kg (235 lb 12.8 oz)   SpO2 98%   BMI 33.36 kg/m²       BP Readings from Last 3 Encounters:   05/09/22 138/70   05/06/22 129/73   05/03/22 126/70     Wt Readings from Last 3 Encounters:   05/09/22 107 kg (235 lb 12.8 oz)   05/03/22 106 kg (234 lb 9.6 oz)   05/03/22 106 kg (234 lb)      Body mass index is 33.36 kg/m².    HPI:     Date of admission/discharge: May 3 to May 6, 2022  Hospital: [facility]  Principle Dx: Pneumonia right lower lobe due to infectious organism  Secondary Dx: COPD with lower respiratory infection, pulmonary emphysema, chronic diastolic heart failure  History prior to hospitalization: Presented to the emergency room with shortness of breath, former smoker.  Found to have acute respiratory failure with right lower lobe pneumonia.  Evaluation/Treatment: Chest x-ray completed on May 3 showing right lung base consolidation and infiltrate with small pleural effusion with left lung base infiltrate likely pneumonia along with tiny calcified benign granulomas seen with both upper lobes.  Heart size within normal limits.  Mediastinum stable, repeat chest x-ray in May 6 showed no interval change in right lower lobe pneumonia with a small adjacent pleural effusion.  Respiratory pathogen panel negative along with strep pneumo.  Gram stain revealed moderate +3 mixed bacterial morphotypes seen on gram stain few +2 white blood cells per low power field and rare epithelial cells.  Negative MRSA.  Last CBC was unremarkable except for mild elevation in hematocrit 52.5.  BMP with glucose of 230, stable kidney function.  BMP and troponin within normal limits.  Course: During hospitalization he was treated with antibiotics and " oxygen with improvement of symptoms.  Evaluated by pulmonary.  Improved over course of hospitalization, discharged on p.o. antibiotics however still required oxygen.  Discharged with 3 L continuous.  Recommending follow-up with pulmonary as outpatient.    Today he has not made follow-up appointment with Dr. Feliciano as of yet.  Acceptable to perform chest x-ray within our office in 4 to 6 weeks unless he is scheduled with pulmonary before this date.  Blood sugars range from 120s to 160s fasting, has follow-up with endocrinology.    Patient Care Team:  Hilario Rahman MD as PCP - General (Internal Medicine)  Noah Montejo MD as Consulting Physician (Cardiology)  Jr Alden Poole MD as Surgeon (Cardiothoracic Surgery)  Samira Recinos MD as Consulting Physician (Endocrinology)  Jacob Abdul MD as Consulting Physician (Hematology and Oncology)  Hilario Rahman MD as Referring Physician (Internal Medicine)  Juan Ramon Feliciano MD as Consulting Physician (Pulmonary Disease)  ____________________________________________________________________    ASSESSMENT & PLAN:    1. Pneumonia of right lower lobe due to infectious organism      Orders Placed This Encounter   Procedures   • XR Chest PA & Lateral       Summary/Discussion:  · Order for chest x-ray to be completed in 4 to 6 weeks for pneumonia resolution  · Follow-up with pulmonary, continue 3 L of oxygen at this time with O2 at 92%.  · Recommend follow-up in 2 weeks to establish care with new provider  · Continue to monitor fasting blood sugars and oxygenation  · Continue flutter valve along with incentive spirometer  · Albuterol as needed, and Mucinex DM as needed for cough suppressant     Return in about 2 weeks (around 5/23/2022) for establish care braden appointment cancelled during hospitization .    ____________________________________________________________________    REVIEW OF SYSTEMS    Review of Systems  Constitutional neg except per HPI   Resp  mild dyspnea with exertion, intermittent cough  CV neg    PHYSICAL EXAMINATION    Physical Exam  Constitutional  No distress  Cardiovascular Rate  normal . Rhythm: regular . Heart sounds:  normal  Pulmonary/Chest  Effort normal. Breath sounds:  diminished   Psychiatric  Alert. Judgment and thought content normal. Mood normal     REVIEWED DATA:    Labs:   Admission on 05/03/2022, Discharged on 05/06/2022   Component Date Value Ref Range Status   • QT Interval 05/03/2022 372  ms Final   • ADENOVIRUS, PCR 05/03/2022 Not Detected  Not Detected Final   • Coronavirus 229E 05/03/2022 Not Detected  Not Detected Final   • Coronavirus HKU1 05/03/2022 Not Detected  Not Detected Final   • Coronavirus NL63 05/03/2022 Not Detected  Not Detected Final   • Coronavirus OC43 05/03/2022 Not Detected  Not Detected Final   • COVID19 05/03/2022 Not Detected  Not Detected - Ref. Range Final   • Human Metapneumovirus 05/03/2022 Not Detected  Not Detected Final   • Human Rhinovirus/Enterovirus 05/03/2022 Not Detected  Not Detected Final   • Influenza A PCR 05/03/2022 Not Detected  Not Detected Final   • Influenza B PCR 05/03/2022 Not Detected  Not Detected Final   • Parainfluenza Virus 1 05/03/2022 Not Detected  Not Detected Final   • Parainfluenza Virus 2 05/03/2022 Not Detected  Not Detected Final   • Parainfluenza Virus 3 05/03/2022 Not Detected  Not Detected Final   • Parainfluenza Virus 4 05/03/2022 Not Detected  Not Detected Final   • RSV, PCR 05/03/2022 Not Detected  Not Detected Final   • Bordetella pertussis pcr 05/03/2022 Not Detected  Not Detected Final   • Bordetella parapertussis PCR 05/03/2022 Not Detected  Not Detected Final   • Chlamydophila pneumoniae PCR 05/03/2022 Not Detected  Not Detected Final   • Mycoplasma pneumo by PCR 05/03/2022 Not Detected  Not Detected Final   • Glucose 05/03/2022 142 (A) 65 - 99 mg/dL Final   • BUN 05/03/2022 17  8 - 23 mg/dL Final   • Creatinine 05/03/2022 1.18  0.76 - 1.27 mg/dL Final   •  Sodium 05/03/2022 141  136 - 145 mmol/L Final   • Potassium 05/03/2022 4.3  3.5 - 5.2 mmol/L Final   • Chloride 05/03/2022 105  98 - 107 mmol/L Final   • CO2 05/03/2022 21.8 (A) 22.0 - 29.0 mmol/L Final   • Calcium 05/03/2022 9.2  8.6 - 10.5 mg/dL Final   • Total Protein 05/03/2022 6.9  6.0 - 8.5 g/dL Final   • Albumin 05/03/2022 3.50  3.50 - 5.20 g/dL Final   • ALT (SGPT) 05/03/2022 21  1 - 41 U/L Final   • AST (SGOT) 05/03/2022 38  1 - 40 U/L Final   • Alkaline Phosphatase 05/03/2022 132 (A) 39 - 117 U/L Final   • Total Bilirubin 05/03/2022 0.4  0.0 - 1.2 mg/dL Final   • Globulin 05/03/2022 3.4  gm/dL Final   • A/G Ratio 05/03/2022 1.0  g/dL Final   • BUN/Creatinine Ratio 05/03/2022 14.4  7.0 - 25.0 Final   • Anion Gap 05/03/2022 14.2  5.0 - 15.0 mmol/L Final   • eGFR 05/03/2022 63.2  >60.0 mL/min/1.73 Final    National Kidney Foundation and American Society of Nephrology (ASN) Task Force recommended calculation based on the Chronic Kidney Disease Epidemiology Collaboration (CKD-EPI) equation refit without adjustment for race.   • Blood Culture 05/03/2022 No growth at 4 days   Preliminary   • Blood Culture 05/03/2022 No growth at 5 days   Final   • Lactate 05/03/2022 1.7  0.5 - 2.0 mmol/L Final   • Troponin T 05/03/2022 <0.010  0.000 - 0.030 ng/mL Final   • proBNP 05/03/2022 103.0  0.0 - 1,800.0 pg/mL Final   • Procalcitonin 05/03/2022 0.06  0.00 - 0.25 ng/mL Final   • WBC 05/03/2022 11.61 (A) 3.40 - 10.80 10*3/mm3 Final   • RBC 05/03/2022 5.67  4.14 - 5.80 10*6/mm3 Final   • Hemoglobin 05/03/2022 17.2  13.0 - 17.7 g/dL Final   • Hematocrit 05/03/2022 52.9 (A) 37.5 - 51.0 % Final   • MCV 05/03/2022 93.3  79.0 - 97.0 fL Final   • MCH 05/03/2022 30.3  26.6 - 33.0 pg Final   • MCHC 05/03/2022 32.5  31.5 - 35.7 g/dL Final   • RDW 05/03/2022 13.9  12.3 - 15.4 % Final   • RDW-SD 05/03/2022 48.6  37.0 - 54.0 fl Final   • MPV 05/03/2022 10.0  6.0 - 12.0 fL Final   • Platelets 05/03/2022 223  140 - 450 10*3/mm3 Final   •  Neutrophil % 05/03/2022 58.2  42.7 - 76.0 % Final   • Lymphocyte % 05/03/2022 23.8  19.6 - 45.3 % Final   • Monocyte % 05/03/2022 12.9 (A) 5.0 - 12.0 % Final   • Eosinophil % 05/03/2022 3.1  0.3 - 6.2 % Final   • Basophil % 05/03/2022 1.5  0.0 - 1.5 % Final   • Immature Grans % 05/03/2022 0.5  0.0 - 0.5 % Final   • Neutrophils, Absolute 05/03/2022 6.76  1.70 - 7.00 10*3/mm3 Final   • Lymphocytes, Absolute 05/03/2022 2.76  0.70 - 3.10 10*3/mm3 Final   • Monocytes, Absolute 05/03/2022 1.50 (A) 0.10 - 0.90 10*3/mm3 Final   • Eosinophils, Absolute 05/03/2022 0.36  0.00 - 0.40 10*3/mm3 Final   • Basophils, Absolute 05/03/2022 0.17  0.00 - 0.20 10*3/mm3 Final   • Immature Grans, Absolute 05/03/2022 0.06 (A) 0.00 - 0.05 10*3/mm3 Final   • nRBC 05/03/2022 0.0  0.0 - 0.2 /100 WBC Final   • Site 05/03/2022 Arterial: right radial   Final   • Grey's Test 05/03/2022 Positive   Final   • pH, Arterial 05/03/2022 7.391  7.350 - 7.450 pH units Final   • pCO2, Arterial 05/03/2022 40.9  35.0 - 45.0 mm Hg Final   • pO2, Arterial 05/03/2022 80.7  80.0 - 100.0 mm Hg Final   • HCO3, Arterial 05/03/2022 24.8  22.0 - 28.0 mmol/L Final   • Base Excess, Arterial 05/03/2022 -0.2 (A) 0.0 - 2.0 mmol/L Final   • O2 Saturation Calculated 05/03/2022 95.7  92.0 - 99.0 % Final   • Barometric Pressure for Blood Gas 05/03/2022 750.1  mmHg Final    92% @1932 Meter: [id number] : [id number] Nina Johnson   • Modality 05/03/2022 Cannula   Final   • Flow Rate 05/03/2022 2  lpm Final   • Rate 05/03/2022 20  Breaths/minute Final   • Glucose 05/04/2022 137 (A) 65 - 99 mg/dL Final   • BUN 05/04/2022 17  8 - 23 mg/dL Final   • Creatinine 05/04/2022 0.91  0.76 - 1.27 mg/dL Final   • Sodium 05/04/2022 139  136 - 145 mmol/L Final   • Potassium 05/04/2022 4.4  3.5 - 5.2 mmol/L Final    Slight hemolysis detected by analyzer. Results may be affected.   • Chloride 05/04/2022 104  98 - 107 mmol/L Final   • CO2 05/04/2022 23.5  22.0 - 29.0 mmol/L Final   •  Calcium 05/04/2022 8.9  8.6 - 10.5 mg/dL Final   • Total Protein 05/04/2022 7.5  6.0 - 8.5 g/dL Final   • Albumin 05/04/2022 3.50  3.50 - 5.20 g/dL Final   • ALT (SGPT) 05/04/2022 26  1 - 41 U/L Final   • AST (SGOT) 05/04/2022 42 (A) 1 - 40 U/L Final   • Alkaline Phosphatase 05/04/2022 142 (A) 39 - 117 U/L Final   • Total Bilirubin 05/04/2022 0.6  0.0 - 1.2 mg/dL Final   • Globulin 05/04/2022 4.0  gm/dL Final   • A/G Ratio 05/04/2022 0.9  g/dL Final   • BUN/Creatinine Ratio 05/04/2022 18.7  7.0 - 25.0 Final   • Anion Gap 05/04/2022 11.5  5.0 - 15.0 mmol/L Final   • eGFR 05/04/2022 86.3  >60.0 mL/min/1.73 Final    National Kidney Foundation and American Society of Nephrology (ASN) Task Force recommended calculation based on the Chronic Kidney Disease Epidemiology Collaboration (CKD-EPI) equation refit without adjustment for race.   • WBC 05/04/2022 13.20 (A) 3.40 - 10.80 10*3/mm3 Final   • RBC 05/04/2022 5.68  4.14 - 5.80 10*6/mm3 Final   • Hemoglobin 05/04/2022 17.5  13.0 - 17.7 g/dL Final   • Hematocrit 05/04/2022 52.0 (A) 37.5 - 51.0 % Final   • MCV 05/04/2022 91.5  79.0 - 97.0 fL Final   • MCH 05/04/2022 30.8  26.6 - 33.0 pg Final   • MCHC 05/04/2022 33.7  31.5 - 35.7 g/dL Final   • RDW 05/04/2022 13.5  12.3 - 15.4 % Final   • RDW-SD 05/04/2022 45.3  37.0 - 54.0 fl Final   • MPV 05/04/2022 10.1  6.0 - 12.0 fL Final   • Platelets 05/04/2022 222  140 - 450 10*3/mm3 Final   • Neutrophil % 05/04/2022 60.7  42.7 - 76.0 % Final   • Lymphocyte % 05/04/2022 21.7  19.6 - 45.3 % Final   • Monocyte % 05/04/2022 11.1  5.0 - 12.0 % Final   • Eosinophil % 05/04/2022 4.4  0.3 - 6.2 % Final   • Basophil % 05/04/2022 1.6 (A) 0.0 - 1.5 % Final   • Immature Grans % 05/04/2022 0.5  0.0 - 0.5 % Final   • Neutrophils, Absolute 05/04/2022 8.01 (A) 1.70 - 7.00 10*3/mm3 Final   • Lymphocytes, Absolute 05/04/2022 2.87  0.70 - 3.10 10*3/mm3 Final   • Monocytes, Absolute 05/04/2022 1.46 (A) 0.10 - 0.90 10*3/mm3 Final   • Eosinophils,  Absolute 05/04/2022 0.58 (A) 0.00 - 0.40 10*3/mm3 Final   • Basophils, Absolute 05/04/2022 0.21 (A) 0.00 - 0.20 10*3/mm3 Final   • Immature Grans, Absolute 05/04/2022 0.07 (A) 0.00 - 0.05 10*3/mm3 Final   • nRBC 05/04/2022 0.0  0.0 - 0.2 /100 WBC Final   • Glucose 05/04/2022 125  70 - 130 mg/dL Final    Meter: [id number] : [id number] El Owens NA   • LEGIONELLA ANTIGEN, URINE 05/04/2022 Negative  Negative Final   • Strep Pneumo Ag 05/04/2022 Negative  Negative Final   • MRSA PCR 05/04/2022 No MRSA Detected  No MRSA Detected Final   • Respiratory Culture 05/04/2022 Rejected   Final   • Gram Stain 05/04/2022 Moderate (3+) Mixed bacterial morphotypes seen on Gram Stain   Final   • Gram Stain 05/04/2022 Few (2+) WBCs per low power field   Final   • Gram Stain 05/04/2022 Rare (1+) Epithelial cells per low power field   Final   • Glucose 05/04/2022 210 (A) 70 - 130 mg/dL Final    Meter: [id number] : [id number] Sherry Moffett DESHAWN   • Glucose 05/04/2022 147 (A) 70 - 130 mg/dL Final    Meter: [id number] : [id number] Sherry Moffett DESHAWN   • Glucose 05/05/2022 163 (A) 70 - 130 mg/dL Final    Meter: [id number] : [id number] Jose R Rapp NA   • Glucose 05/05/2022 173 (A) 70 - 130 mg/dL Final    Meter: [id number] : [id number] Josué Flor NA   • Glucose 05/05/2022 212 (A) 70 - 130 mg/dL Final    Meter: [id number] : [id number] Josué Flor NA   • Glucose 05/05/2022 251 (A) 70 - 130 mg/dL Final    Meter: [id number] : [id number] Jennifer Ward NA   • Glucose 05/06/2022 230 (A) 65 - 99 mg/dL Final   • BUN 05/06/2022 20  8 - 23 mg/dL Final   • Creatinine 05/06/2022 1.05  0.76 - 1.27 mg/dL Final   • Sodium 05/06/2022 136  136 - 145 mmol/L Final   • Potassium 05/06/2022 3.9  3.5 - 5.2 mmol/L Final   • Chloride 05/06/2022 99  98 - 107 mmol/L Final   • CO2 05/06/2022 22.0  22.0 - 29.0 mmol/L Final   • Calcium 05/06/2022 8.8  8.6 - 10.5 mg/dL Final   • BUN/Creatinine Ratio 05/06/2022 19.0  7.0 - 25.0 Final   •  Anion Gap 05/06/2022 15.0  5.0 - 15.0 mmol/L Final   • eGFR 05/06/2022 72.7  >60.0 mL/min/1.73 Final    National Kidney Foundation and American Society of Nephrology (ASN) Task Force recommended calculation based on the Chronic Kidney Disease Epidemiology Collaboration (CKD-EPI) equation refit without adjustment for race.   • WBC 05/06/2022 10.72  3.40 - 10.80 10*3/mm3 Final   • RBC 05/06/2022 5.70  4.14 - 5.80 10*6/mm3 Final   • Hemoglobin 05/06/2022 17.5  13.0 - 17.7 g/dL Final   • Hematocrit 05/06/2022 52.5 (A) 37.5 - 51.0 % Final   • MCV 05/06/2022 92.1  79.0 - 97.0 fL Final   • MCH 05/06/2022 30.7  26.6 - 33.0 pg Final   • MCHC 05/06/2022 33.3  31.5 - 35.7 g/dL Final   • RDW 05/06/2022 13.6  12.3 - 15.4 % Final   • RDW-SD 05/06/2022 45.5  37.0 - 54.0 fl Final   • MPV 05/06/2022 10.3  6.0 - 12.0 fL Final   • Platelets 05/06/2022 206  140 - 450 10*3/mm3 Final   • Glucose 05/06/2022 155 (A) 70 - 130 mg/dL Final    Meter: [id number] : [id number] Jennifer LOPEZ   • Glucose 05/06/2022 184 (A) 70 - 130 mg/dL Final    Meter: [id number] : [id number] Josué LOPEZ       Imaging:   XR Chest 2 View    Result Date: 5/3/2022  Narrative: XR CHEST 2 VW-  Clinical: Persisting cough and short of breath  COMPARISON 12/7/2021  FINDINGS: There is a small right-sided pleural effusion blunting the costophrenic sulcus. There is consolidation and infiltrate demonstrated at the right lung base, likely pneumonia. There are tiny calcified benign granulomas seen within both upper lobes. There are median sternotomy wires in position. Cardiac size within normal limits. The mediastinum is stable. There is mild patchy infiltrate now demonstrated at the left lung base. No vascular congestion seen.  CONCLUSION: Right lung base consolidation and infiltrate with small pleural effusion, left lung base infiltrate. Likely pneumonia.  This report was finalized on 5/3/2022 3:47 PM by Dr. Julio Cesar Garcia M.D.      XR Chest 1 View    Result  Date: 5/6/2022  Narrative: PORTABLE CHEST X-RAY  HISTORY: Followup pleural effusion and pneumonia. COPD.  TECHNIQUE: Portable frontal chest x-ray is correlated with chest x-ray 05/03/2022 and 12/07/2021.  FINDINGS: There are sternal wires. Asymmetric opacity at the right lung base with a small associated pleural effusion consistent with pneumonia and a parapneumonic effusion are again observed and appear no different than on the recent exam. The left lung is clear. Vascular volume is normal.      Impression: No interval change in right lower lobe pneumonia with a small adjacent pleural effusion.  This report was finalized on 5/6/2022 9:56 AM by Dr. Mukul Olson M.D.         Medical Tests:        Summary of old records / correspondence / consultant report:   DC summary re: issues addressed on HPI    Request outside records:       ALLERGIES  Allergies   Allergen Reactions   • Adhesive Tape Rash        MEDICATIONS   Current Outpatient Medications   Medication Sig Dispense Refill   • albuterol sulfate  (90 Base) MCG/ACT inhaler Inhale 2 puffs Every 4 (Four) Hours As Needed for Wheezing. 8 g 0   • aspirin 81 MG tablet Take 81 mg by mouth Every Other Day.     • atorvastatin (LIPITOR) 40 MG tablet TAKE 1 TABLET EVERY DAY 90 tablet 2   • bumetanide (BUMEX) 2 MG tablet Take 0.5 tablets by mouth Daily. Prn for swelling     • Dextromethorphan-guaiFENesin (Mucinex DM Maximum Strength)  MG tablet sustained-release 12 hour Take 1 tablet by mouth 2 (Two) Times a Day for 10 days. 20 tablet 0   • empagliflozin (Jardiance) 25 MG tablet tablet Take 1 tablet by mouth Daily. 90 tablet 3   • insulin aspart (NovoLOG FlexPen) 100 UNIT/ML solution pen-injector sc pen 10-14 units  3 times daily with meal 45 mL 3   • insulin detemir (Levemir FlexTouch) 100 UNIT/ML injection Inject 20 Units under the skin into the appropriate area as directed 2 (Two) Times a Day. 45 mL 1   • Insulin Pen Needle 31G X 8 MM misc Use to inject  insulin 500 each 3   • isosorbide mononitrate (IMDUR) 30 MG 24 hr tablet Take 1 tablet by mouth 2 (Two) Times a Day. Once a day     • Menthol, Topical Analgesic, 4 % gel Apply 1 application topically Daily As Needed.     • metoprolol tartrate (LOPRESSOR) 25 MG tablet TAKE 1 TABLET TWICE DAILY (Patient taking differently: Once a day) 180 tablet 3   • Multiple Vitamins-Minerals (MULTIVITAMIN ADULT PO) Take 1 tablet by mouth Daily.     • potassium chloride (MICRO-K) 10 MEQ CR capsule Take 1 capsule by mouth Daily. Prn with bumex     • True Metrix Blood Glucose Test test strip USE TO TEST BLOOD SUGAR  4 TIMES DAILY 120 each 6     No current facility-administered medications for this visit.       Current outpatient and discharge medications have been reconciled for the patient.  Reviewed by: MILADYS Knox       [facility]:     The following portions of the patient's history were reviewed and updated as appropriate: Allergies / Current Medications / Past Medical History / Surgical History / Social History / Family History    PROBLEM LIST   Patient Active Problem List   Diagnosis   • Type II diabetes mellitus with neurological manifestations, uncontrolled   • Coronary artery disease of native artery of native heart with stable angina pectoris (HCC)   • SVT (supraventricular tachycardia) (HCC)   • Chronic diastolic congestive heart failure (HCC)   • S/P CABG x 2   • Hyperlipidemia   • Obesity (BMI 30-39.9)   • Multiple pulmonary nodules   • Gait difficulty   • Diabetic polyneuropathy associated with type 2 diabetes mellitus (HCC)   • Change in stool caliber   • Pulmonary emphysema (HCC)   • Abnormal CT of the chest   • Leukocytosis   • Increased urinary frequency   • Clinical diagnosis of COVID-19   • Pneumonia of right lower lobe due to infectious organism   • COPD with lower respiratory infection (HCC)       PAST MEDICAL HISTORY  Past Medical History:   Diagnosis Date   • Coronary artery disease     Status post 2 vessel  CABG 5/10/17 by Dr. 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   • SVT (supraventricular tachycardia) (HCC)     Diagnosed following CABG   • Type 2 diabetes mellitus (HCC)        SURGICAL HISTORY  Past Surgical History:   Procedure Laterality Date   • CARDIAC CATHETERIZATION N/A 10/18/2016    Procedure: Coronary angiography;  Surgeon: Jesus Guzman MD;  [facility]AS[facility] LOC[facility];  Service:    • CARDIAC CATHETERIZATION N/A 10/18/2016    Procedure: Left heart cath;  Surgeon: Jesus Guzman MD;  Location[facility] LOC[facility];  Service:    • CARDIAC CATHETERIZATION N/A 10/18/2016    Procedure: Left ventriculography;  Surgeon: Jesus Guzman MD;  Location: [facility] CATH INVASIVE LOCATION;  Service:    • CARDIAC CATHETERIZATION N/A 4/4/2017    Procedure: Coronary angiography;  Surgeon: Jesus Guzman MD;  Location: [facility] CATH INVASIVE LOCATION;  Service:    • CARDIAC CATHETERIZATION N/A 4/4/2017    Procedure: Left heart cath;  Surgeon: Jesus Guzman MD;  [facility] LOC[facility];  Service:    • CARDIAC CATHETERIZATION N/A 4/4/2017    Procedure: Left ventriculography;  Surgeon: Jesus Guzman MD;  Location: [facility] CATH INVASIVE LOCATION;  Service:    • CARDIAC CATHETERIZATION  4/4/2017    Procedure: Functional Flow [place];  Surgeon: Jesus Guzman MD;  Location: [facility] CATH INVASIVE LOCATION;  Service:    • CARDIAC SURGERY     • CATARACT EXTRACTION W/ INTRAOCULAR LENS IMPLANT Left    • COLONOSCOPY     • CORONARY ARTERY BYPASS GRAFT N/A 5/10/2017    Procedure: CORONARY ARTERY BYPASS TIMES TWO UTILIZING THE LEFT  GREATER SAPHENOUS VEIN WITH INTERNAL MAMMARY ARTERY GRAFT;  TRANSESOPHAGEAL ECHOCARDIOGRAM;  Surgeon: Alden Poole MD;  Location: [facility] OR;  Service:    • EYE SURGERY     • KNEE SURGERY Left     Dr. Gallegos   • MOHS SURGERY Left     CHEEK   • SKIN BIOPSY         SOCIAL HISTORY  Social History     Socioeconomic History   • Marital status:      Spouse name: Mendy Vargas   • Number of children: 3   Tobacco Use   • Smoking status: Former Smoker     Packs/day: 1.50     Years: 40.00     Pack years: 60.00     Types: Cigarettes     Quit date:      Years since quittin.3   • Smokeless tobacco: Never Used   • Tobacco comment: Smoked up to 1.5 ppd.  Smoked for 50 years.  Quit .   Vaping Use   • Vaping Use: Never used   Substance and Sexual Activity   • Alcohol use: Yes     Comment: 2-3 BEERS YEARLY    • Drug use: No     Comment: caffeine use   • Sexual activity: Not Currently       FAMILY HISTORY  Family History   Problem Relation Age of Onset   • Cancer Mother         uterine   • Heart disease Father         Father with fatal MI in 80's   • Heart attack Father    • Diabetes Sister    • Esophageal cancer Brother    • Hepatitis Brother    • Alzheimer's disease Maternal Grandfather    • Stroke Maternal Grandfather        Examiner was wearing KN95 mask, face shield and exam gloves during the entire duration of the visit. Patient was masked the entire time.   Minimum social distance of 6 ft maintained entire visit except if physical contact was necessary as documented.     **Dragon Disclaimer:   Much of this encounter note is an electronic transcription/translation of spoken language to printed text. The electronic translation of spoken language may permit erroneous, or at times, nonsensical words or phrases to be inadvertently transcribed. Although I have reviewed the note for such errors, some may still exist.

## 2022-05-12 NOTE — TELEPHONE ENCOUNTER
Caller: Mendy Olmedo    Relationship: Emergency Contact    Best call back number: [phone]    What is the best time to reach you: ANY    Who are you requesting to speak with (clinical staff, provider,  specific staff member): DR STILES/NURSE    What was the call regarding: PATIENT'S WIFE IS CALLING TO GET ADVICE ON THE NEXT STEPS FOR PATIENT'S CARE. HE IS STILL NOT FEELING WELL. HIS OXYGEN KEEPS DROPPING AND HE IS STILL COUGHING REALLY BAD. SHE IS REQUESTING A CALL BACK TO DISCUSS FURTHER.     Do you require a callback: YES

## 2022-05-12 NOTE — TELEPHONE ENCOUNTER
Spoke c pt and he was advised to use inhaler every 4 hrs per MILADYS Bhatia and use all of ABX as directed. Pt stated ABX are now done. He still prefers to wait to discuss with Dr. Rahman and declines appt for today.  Pt was appreciative of the calls making sure he was doing ok.   Pt was once again advised to go to ER is symptoms worsen. Pt verbalized understanding. JAKOB

## 2022-05-12 NOTE — TELEPHONE ENCOUNTER
I spoke with patient and wife and he refused care. Patient refused an appointment for today with the APRN, and he refused to go to the ER. He said that he is not comfortable at the hospital, can't rest and doesn't want to be admitted again.   He said that his O2 dropped in the 70's a couple of times when he gets up and moves around, and then it stays in the 80's.   While talking with patient he is coughing a lot.  I advised him 4 times that he should come in for a visit today with the APRN, or seek medical attention through the ER. Patient said since the Covid pandemic he has lot all confidence in the healthcare industry as well as Dr. Peoples from the [facility] [place].    Wife voiced that she really wants him to go to the Er, but patient still refused and said he would see how he is in a few days.

## 2022-05-12 NOTE — TELEPHONE ENCOUNTER
Spoke with pt and wife he refused to go to ER     Said he will wait until dr chen is back to office

## 2022-05-16 PROBLEM — J44.0 COPD WITH LOWER RESPIRATORY INFECTION (HCC): Chronic | Status: ACTIVE | Noted: 2022-01-01

## 2022-05-16 NOTE — TELEPHONE ENCOUNTER
Spoke c pt. He wanted to be seen today. Pt has been [facility] for 1300. Pt verbalized understanding. MM

## 2022-05-16 NOTE — PROGRESS NOTES
"    I N T E R N A L  M E D I C I N E  J U N O H  K I M,  M D      ENCOUNTER DATE:  05/16/2022    Felix Olmedo / 76 y.o. / male      CHIEF COMPLAINT / REASON FOR OFFICE VISIT     <9>Pneumonia of right lower lobe due to infectious organism  and Shortness of Breath      ASSESSMENT & PLAN     1. Acute on chronic respiratory failure with hypoxia (HCC)    2. Pneumonia of right lower lobe due to infectious organism    3. COPD with lower respiratory infection (HCC)      Orders Placed This Encounter   Procedures   • Home Nebulizer   • Home Nebulizer Accessories   • XR Chest 2 View     New Medications Ordered This Visit   Medications   • ipratropium-albuterol (DUO-NEB) 0.5-2.5 mg/3 ml nebulizer     Sig: Take 3 mL by nebulization 4 (Four) Times a Day.     Dispense:  120 mL     Refill:  2       SUMMARY/DISCUSSION  • Continuous O2 at home   • Start nebulizer treatments every 6 hours with Duo-Neb   • Check chest xray today   • Keep follow-up with APRN here next Monday  • Keep follow-up with pulmonologist in 2 weeks  • Call / Return to office if not improving/or worsening       Next Appointment with me: Visit date not found    Return for worsening or lack of improvement, Next scheduled follow up.        VITAL SIGNS     Visit Vitals  /80 (BP Location: Left arm)   Pulse 90   Temp 97.1 °F (36.2 °C)   Ht 179.1 cm (70.5\")   Wt 104 kg (230 lb)   SpO2 91%   BMI 32.54 kg/m²       BP Readings from Last 3 Encounters:   05/16/22 128/80   05/09/22 138/70   05/06/22 129/73     Wt Readings from Last 3 Encounters:   05/16/22 104 kg (230 lb)   05/09/22 107 kg (235 lb 12.8 oz)   05/03/22 106 kg (234 lb 9.6 oz)     Body mass index is 32.54 kg/m².    Blood pressure readings recorded on patient flowsheet:  No flowsheet data found.     MEDICATIONS AT THE TIME OF OFFICE VISIT     Current Outpatient Medications on File Prior to Visit   Medication Sig   • albuterol sulfate  (90 Base) MCG/ACT inhaler Inhale 2 puffs Every 4 (Four) Hours " As Needed for Wheezing.   • aspirin 81 MG tablet Take 81 mg by mouth Every Other Day.   • atorvastatin (LIPITOR) 40 MG tablet TAKE 1 TABLET EVERY DAY   • bumetanide (BUMEX) 2 MG tablet Take 0.5 tablets by mouth Daily. Prn for swelling   • empagliflozin (Jardiance) 25 MG tablet tablet Take 1 tablet by mouth Daily.   • insulin aspart (NovoLOG FlexPen) 100 UNIT/ML solution pen-injector sc pen 10-14 units  3 times daily with meal   • insulin detemir (Levemir FlexTouch) 100 UNIT/ML injection Inject 20 Units under the skin into the appropriate area as directed 2 (Two) Times a Day. (Patient taking differently: Inject 20 Units under the skin into the appropriate area as directed 2 (Two) Times a Day. Take 38 unit nightly)   • Insulin Pen Needle 31G X 8 MM misc Use to inject insulin   • isosorbide mononitrate (IMDUR) 30 MG 24 hr tablet Take 1 tablet by mouth 2 (Two) Times a Day. Once a day   • Menthol, Topical Analgesic, 4 % gel Apply 1 application topically Daily As Needed.   • metoprolol tartrate (LOPRESSOR) 25 MG tablet TAKE 1 TABLET TWICE DAILY (Patient taking differently: Once a day)   • Multiple Vitamins-Minerals (MULTIVITAMIN ADULT PO) Take 1 tablet by mouth Daily.   • potassium chloride (MICRO-K) 10 MEQ CR capsule Take 1 capsule by mouth Daily. Prn with bumex   • True Metrix Blood Glucose Test test strip USE TO TEST BLOOD SUGAR  4 TIMES DAILY     No current facility-administered medications on file prior to visit.          HISTORY OF PRESENT ILLNESS     Seen by APRN on May 9 for hospital follow-up for pneumonia of the right lower lobe.  During the course of the hospitalization she was treated with IV antibiotics and discharged home on oral cefdinir for 6 days.  During the course of taking the antibiotics he reports having improvement in symptoms.  He was discharged to home on 3 L of oxygen continuously.  He was arranged for follow-up with pulmonologist in several weeks.  Patient states that he has been experiencing  persistent cough productive of grayish phlegm associated with increased shortness of breath.  He denies fever or chills, angina, palpitations, PND/orthopnea/edema.  He is noted to have COPD (emphysema).  He has been using albuterol HFA inhaler with mild relief of his dyspneic symptoms.  He is not on a maintenance inhaler therapy.        REVIEW OF SYSTEMS     No fever or chills  Decreased appetite  No angina or palpitations   No PND/orthopnea, worsening edema   GI negative       PHYSICAL EXAMINATION     Physical Exam  No acute distress ; on O2 by NC  Cardiovascular: Normal rate, regular rhythm. Trace bilateral lower extremities edema  Lungs: no rales or wheezing; decreased BS throughout bilateral lung fields  Alert with normal thought and judgment.       REVIEWED DATA     Labs:     Lab Results   Component Value Date     05/06/2022    K 3.9 05/06/2022    CALCIUM 8.8 05/06/2022    AST 42 (H) 05/04/2022    ALT 26 05/04/2022    BUN 20 05/06/2022    CREATININE 1.05 05/06/2022    CREATININE 0.91 05/04/2022    CREATININE 1.18 05/03/2022    EGFRIFNONA 68 01/05/2022    EGFRIFAFRI 79 01/05/2022       Lab Results   Component Value Date    HGBA1C 8.4 (H) 03/22/2022    HGBA1C 8.7 (H) 01/05/2022    HGBA1C 8.00 (H) 09/20/2021       Lab Results   Component Value Date    LDL 59 01/05/2022    LDL 57 04/26/2021    HDL 52 01/05/2022    TRIG 84 01/05/2022       Lab Results   Component Value Date    TSH 1.860 01/05/2022    TSH 2.540 04/26/2021    TSH 2.490 01/25/2021    FREET4 1.15 01/05/2022    FREET4 1.19 04/26/2021    FREET4 1.21 01/25/2021       Lab Results   Component Value Date    WBC 10.72 05/06/2022    HGB 17.5 05/06/2022     05/06/2022       Lab Results   Component Value Date    MICROALBUR <3.0 04/26/2021           Imaging:           Medical Tests:           Summary of old records / correspondence / consultant report:           Request outside records:             *Examiner was wearing KN95 mask and eye protection  during the entire duration of the visit. Patient was masked the entire time. Minimum social distance of 6 ft maintained entire visit except if physical contact was necessary as documented.       Template created by Helen Rahman MD

## 2022-05-17 NOTE — PROGRESS NOTES
I called and discussed the chest x-ray findings with the patient chest x-ray shows worsening effusion of the lung.  He still gets quite short of breath minimal exertion with oxygen desaturation down to 80% while on oxygen.  I advised him to go to the ER for evaluation. He will need diagnostic/therapeutic thoracentesis.  He seemed reluctant to go to the hospital tonight states he will contact pulmonologist tomorrow.  He agreed to go to [facility] tonight he has any worsening.

## 2022-05-18 PROBLEM — R91.8 LUNG MASS: Status: ACTIVE | Noted: 2022-01-01

## 2022-05-18 PROBLEM — J96.21 ACUTE ON CHRONIC RESPIRATORY FAILURE WITH HYPOXIA (HCC): Status: ACTIVE | Noted: 2022-01-01

## 2022-05-18 PROBLEM — J44.9 COPD (CHRONIC OBSTRUCTIVE PULMONARY DISEASE) (HCC): Chronic | Status: ACTIVE | Noted: 2022-01-01

## 2022-05-18 PROBLEM — J90 PLEURAL EFFUSION: Status: ACTIVE | Noted: 2022-01-01

## 2022-05-18 NOTE — OUTREACH NOTE
COPD/PN Week 2 Survey    Flowsheet Row Responses   [facility] [place] [facility] patient discharged from? [facility]   Does the patient have one of the following disease processes/diagnoses(primary or secondary)? COPD/Pneumonia   Was the primary reason for admission: Pneumonia   Week 2 attempt successful? No   Unsuccessful attempts Attempt 1          AUGUSTUS ROE - Registered Nurse

## 2022-05-18 NOTE — PROGRESS NOTES
Call patient this afternoon to verify he called the pulmonologist or went to the hospital as I recommended yesterday for worsening chest xray.

## 2022-05-19 PROBLEM — E87.20 LACTIC ACIDOSIS: Status: ACTIVE | Noted: 2022-01-01

## 2022-05-19 NOTE — OUTREACH NOTE
COPD/PN Week 2 Survey    Flowsheet Row Responses   [facility] facility patient discharged from? [place]   Does the patient have one of the following disease processes/diagnoses(primary or secondary)? COPD/Pneumonia   Was the primary reason for admission: Pneumonia   Week 2 attempt successful? No   Unsuccessful attempts Attempt 2   Revoke Readmitted          SHIVAM OSUNA - Registered Nurse

## 2022-05-20 NOTE — ANESTHESIA POSTPROCEDURE EVALUATION
Patient: Felix Olmedo    Procedure Summary     Date: 05/20/22 Room / Location:  [facility]    Anesthesia Start: 1028 Anesthesia Stop: 1110    Procedure: BRONCHOSCOPY WITH BAL AND BIOPSIES ENDOBRONCHIAL ULTRASOUND NOT DONE (N/A Bronchus) Diagnosis:       Abnormal CT of the chest      (Abnormal CT of the chest [R93.89])    Surgeons: Torres Sommer MD Provider: Brittani William MD    Anesthesia Type: general ASA Status: 4          Anesthesia Type: general    Vitals  Vitals Value Taken Time   /65 05/20/22 1149   Temp     Pulse 93 05/20/22 1149   Resp 20 05/20/22 1149   SpO2 94 % 05/20/22 1149           Post Anesthesia Care and Evaluation    Patient location during evaluation: bedside  Patient participation: complete - patient participated  Level of consciousness: awake  Pain management: adequate  Airway patency: patent  Anesthetic complications: No anesthetic complications    Cardiovascular status: acceptable  Respiratory status: acceptable  Hydration status: acceptable

## 2022-05-20 NOTE — ANESTHESIA PREPROCEDURE EVALUATION
Anesthesia Evaluation                  Airway   Mallampati: III  TM distance: >3 FB  Neck ROM: full  No difficulty expected  Dental - normal exam     Pulmonary - normal exam   (+) pneumonia , pleural effusion, a smoker Former, COPD, home oxygen,     ROS comment: On 6 L oxygen /min in the hospital now.  Had thoracentesis yesterday.  4 by 5 cm tumor on right side on CT of the lung.  1500 cc removed from right chest yesterday.  Cardiovascular - normal exam    Patient on routine beta blocker and Beta blocker given within 24 hours of surgery    (+) hypertension, CAD, CABG >6 Months, dysrhythmias Tachycardia, angina, CHF Diastolic >=55%, hyperlipidemia,       Neuro/Psych  (+) numbness,    GI/Hepatic/Renal/Endo    (+) obesity,   diabetes mellitus type 2 using insulin,     Musculoskeletal     Abdominal    Substance History   (-) alcohol use     OB/GYN          Other   arthritis,    history of cancer                  Anesthesia Plan    ASA 4     general     intravenous induction     Anesthetic plan, all risks, benefits, and alternatives have been provided, discussed and informed consent has been obtained with: patient.        CODE STATUS:    Code Status (Patient has no pulse and is not breathing): CPR (Attempt to Resuscitate)  Medical Interventions (Patient has pulse or is breathing): Full

## 2022-05-20 NOTE — ANESTHESIA PROCEDURE NOTES
Airway  Urgency: elective    Date/Time: 5/20/2022 10:39 AM    General Information and Staff    Patient location during procedure: floor    Indications and Patient Condition  Indications for airway management: airway protection    Preoxygenated: yes  MILS maintained throughout  Mask difficulty assessment: 0 - not attempted    Final Airway Details  Final airway type: supraglottic airway      Successful airway: classic  Size 4    Number of attempts at approach: 1  Assessment: lips, teeth, and gum same as pre-op and atraumatic intubation

## 2022-05-22 PROBLEM — C34.91 SMALL CELL CARCINOMA OF RIGHT LUNG (HCC): Status: ACTIVE | Noted: 2022-01-01

## 2022-05-22 PROBLEM — J91.0 MALIGNANT PLEURAL EFFUSION: Status: ACTIVE | Noted: 2022-01-01

## 2022-05-24 NOTE — ANESTHESIA PREPROCEDURE EVALUATION
Anesthesia Evaluation                  Airway   Mallampati: II  TM distance: >3 FB  Neck ROM: full  no difficulty expected  Dental    (+) poor dentition    Pulmonary    (+) pneumonia worsening , pleural effusion, lung cancer, COPD severe, shortness of breath, recent URI, rhonchi, decreased breath sounds, wheezes,   Cardiovascular     Rhythm: regular  Rate: abnormal    (+) hypertension, CAD, CABG, dysrhythmias Tachycardia, angina, CHF , ZELAYA, hyperlipidemia,       Neuro/Psych  (+) numbness (neuropathy),    GI/Hepatic/Renal/Endo    (+) obesity,   diabetes mellitus poorly controlled,     Musculoskeletal     Abdominal  - normal exam   Substance History      OB/GYN          Other   arthritis,    history of cancer (lung) active                    Anesthesia Plan    ASA 4 - emergent     general   (I discussed with the patient the relevant risks of general anesthesia including, but not limited to, nausea, vomiting, disorientation, post-op delirium, nerve injury, oral/dental injury, awareness, stroke, and death.  I also reviewed any clinically relevant lab and imaging results.)  intravenous induction     Anesthetic plan, all risks, benefits, and alternatives have been provided, discussed and informed consent has been obtained with: patient.    Plan discussed with CRNA.        CODE STATUS:    Code Status (Patient has no pulse and is not breathing): CPR (Attempt to Resuscitate)  Medical Interventions (Patient has pulse or is breathing): Full

## 2022-05-24 NOTE — ANESTHESIA POSTPROCEDURE EVALUATION
"Patient: Felix Olmedo    Procedure Summary     Date: 05/24/22 Room / Location: [facility] OR 11 / [facility] MAIN OR    Anesthesia Start: 1003 Anesthesia Stop: 1205    Procedures:       INSERTION OF PORTACATH (Left )      PLEURX CATHETER INSERTION (Right ) Diagnosis:       Small cell carcinoma of right lung (HCC)      (Small cell carcinoma of right lung (HCC) [C34.91])    Surgeons: Sophie Stevens MD; Linda Cormier MD Provider: Janet Moise MD    Anesthesia Type: general ASA Status: 4 - Emergent          Anesthesia Type: general    Vitals  Vitals Value Taken Time   /59 05/24/22 1221   Temp 36.6 °C (97.8 °F) 05/24/22 1159   Pulse 75 05/24/22 1223   Resp 18 05/24/22 1210   SpO2 99 % 05/24/22 1223   Vitals shown include unvalidated device data.        Post Anesthesia Care and Evaluation    Patient location during evaluation: bedside  Patient participation: complete - patient cannot participate  Post-procedure mental status: unable to assess...intubated and sedated.  Pain management: adequate  Anesthetic complications: No anesthetic complications    Cardiovascular status: acceptable  Respiratory status: ETT and intubated  Hydration status: stable    Comments: Patient unable to participate...intubated and sedated  BP 98/61 (BP Location: Left arm, Patient Position: Lying)   Pulse 78   Temp 36.6 °C (97.8 °F) (Oral)   Resp 18   Ht 177.8 cm (70\")   Wt 104 kg (229 lb)   SpO2 99%   BMI 32.86 kg/m²         "

## 2022-05-24 NOTE — ANESTHESIA PROCEDURE NOTES
Airway  Urgency: elective    Date/Time: 5/24/2022 9:40 AM  End Time:5/24/2022 9:42 AM  Airway not difficult    General Information and Staff    Patient location during procedure: OR  Anesthesiologist: Ronnie Melendrez MD    Indications and Patient Condition  Indications for airway management: respiratory distress/failure    Preoxygenated: yes      Final Airway Details  Final airway type: endotracheal airway      Successful airway: ETT  Cuffed: yes   Successful intubation technique: video laryngoscopy  Endotracheal tube insertion site: oral  Blade: CMAC  Blade size: D  ETT size (mm): 8.0  Cormack-Lehane Classification: grade I - full view of glottis  Placement verified by: chest auscultation and capnometry   Measured from: teeth  ETT/EBT  to teeth (cm): 23  Number of attempts at approach: 1  Assessment: lips, teeth, and gum same as pre-op and atraumatic intubation

## 2022-06-17 LAB — FUNGUS WND CULT: NORMAL

## 2022-06-21 NOTE — DISCHARGE SUMMARY
[facility]  Discharge Summary    Date of Admission: 5/18/2022  Date of Death: 5/29/2022    PCP: Hilario Rahman MD    Principle Cause of Death:   Small cell Lung cancer    Secondary Diagnoses:     Malignant pleural effusion    SVT (supraventricular tachycardia) (HCC)    Chronic diastolic congestive heart failure (HCC)    S/P CABG x 2    Obesity (BMI 30-39.9)    Multiple pulmonary nodules    Diabetic polyneuropathy associated with type 2 diabetes mellitus (HCC)    COPD (chronic obstructive pulmonary disease)     Small cell carcinoma of right lung (HCC)    Acute on chronic respiratory failure with hypoxia     Lactic acidosis  1. Acute hypoxemic respiratory failure postop for ventilator management  2. Malignant pleural effusion status post Pleurx catheter  3. Small cell lung cancer confirmed by biopsy  4. COPD  5. Coronary artery disease  6. Diabetes mellitus type 2 with hyperglycemia  7. Chronic hypoxemic respiratory failure  8. Mild increase in AST -   9. Mild lactic acidosis on presentation, resolved  10. Leukocytosis   11. transaminits       Hospital Course  Patient was a 76 y.o. male who presented to [facility] and underwent evaluation of lung mass with results showing small cell lung cancer.  He unfortunately required mechanical ventilation post operatively and upon oncology discussions with family regarding goals of care the family requested to proceed with terminal extubation.        Trey oSmmer MD  06/21/22  17:51 EDT

## 2022-07-01 LAB
MYCOBACTERIUM SPEC CULT: NORMAL
NIGHT BLUE STAIN TISS: NORMAL

## 2022-09-06 NOTE — TELEPHONE ENCOUNTER
Patient called the oncall service on Friday 11/26. Pt state he tested positive for COVID-19 on 11/23/21. Pt is needing advise on next steps on quarantine & treating symptoms. Symptoms include cough with phlegm & fever, that started 10 days ago. Mucinex is helping.  
Schedule patient for telehealth visit with APRN if available. If no availability and he is feeling poorly, have him go to [facility].   
Scheduled   
40

## (undated) DEVICE — GOWN,NON-REINFORCED,SIRUS,SET IN SLV,XXL: Brand: MEDLINE

## (undated) DEVICE — BNDG ELAS ELITE V/CLOSE 4IN 5YD LF STRL

## (undated) DEVICE — SENSR CERBRL O2 PK/2

## (undated) DEVICE — GLV SURG BIOGEL M LTX PF 6 1/2

## (undated) DEVICE — ST ACC MICROPUNCTURE STFF .018 ECHO/PLDM/TP 4F/10CM 21G/7CM

## (undated) DEVICE — PK ATS CUST W CARDIOTOMY RESEVOIR

## (undated) DEVICE — TUBING, SUCTION, 1/4" X 10', STRAIGHT: Brand: MEDLINE

## (undated) DEVICE — ADAPT CLN BIOGUARD AIR/H2O DISP

## (undated) DEVICE — ADHS SKIN DERMABOND TOP ADVANCED

## (undated) DEVICE — PK UNIV COMPL 40

## (undated) DEVICE — SINGLE USE BIOPSY VALVE MAJ-210: Brand: SINGLE USE BIOPSY VALVE (STERILE)

## (undated) DEVICE — PATIENT RETURN ELECTRODE, SINGLE-USE, CONTACT QUALITY MONITORING, ADULT, WITH 9FT CORD, FOR PATIENTS WEIGING OVER 33LBS. (15KG): Brand: MEGADYNE

## (undated) DEVICE — BLOWER/MISTER AXIOUS OPCAB W/TBG

## (undated) DEVICE — SUT PROLN 3/0 SH D/A 36IN 8522H

## (undated) DEVICE — Device

## (undated) DEVICE — TRAP FLD MINIVAC MEGADYNE 100ML

## (undated) DEVICE — LOU OPEN HEART DR POLLOCK: Brand: MEDLINE INDUSTRIES, INC.

## (undated) DEVICE — BIOPATCH™ ANTIMICROBIAL DRESSING WITH CHLORHEXIDINE GLUCONATE IS A HYDROPHILLIC POLYURETHANE ABSORPTIVE FOAM WITH CHLORHEXIDINE GLUCONATE (CHG) WHICH INHIBITS BACTERIAL GROWTH UNDER THE DRESSING. THE DRESSING IS INTENDED TO BE USED TO ABSORB EXUDATE, COVER A WOUND CAUSED BY VASCULAR AND NONVASCULAR PERCUTANEOUS MEDICAL DEVICES DURING SURGERY, AS WELL AS REDUCE LOCAL INFECTION AND COLONIZATION OF MICROORGANISMS.: Brand: BIOPATCH

## (undated) DEVICE — SYR LUERLOK 20CC BX/50

## (undated) DEVICE — ADHS SKIN SURG TISS VISC PREMIERPRO EXOFIN HI/VISC FAST/DRY

## (undated) DEVICE — SOL IRR H2O BTL 1000ML STRL

## (undated) DEVICE — SYR LL 3CC

## (undated) DEVICE — SPNG GZ WOVN 4X4IN 12PLY 10/BX STRL

## (undated) DEVICE — SUT PROLN MO.5 7/0 DBLARM BV175 6 2X30 BX/12

## (undated) DEVICE — PK HEART OPN 40

## (undated) DEVICE — DRP C/ARM 41X74IN

## (undated) DEVICE — EXTENSION SET, MALE LUER LOCK ADAPTER WITH RETRACTABLE COLLAR

## (undated) DEVICE — FRCP BX RADJAW4 PULM WO NDL STD1.8X2 100

## (undated) DEVICE — APPL CHLORAPREP W/TINT 26ML ORNG

## (undated) DEVICE — CORONARY ARTERY BYPASS GRAFT MARKERS, STAINLESS STEEL, DISTAL, WITHOUT HOLDER: Brand: ANASTOMARK CORONARY ARTERY BYPASS GRAFT MARKERS, STAINLESS STEEL, DISTAL

## (undated) DEVICE — TBG PENCL TELESCP MEGADYNE SMOKE EVAC 10FT

## (undated) DEVICE — DRSNG WND GEL FIBR OPTICELL AG PLS W/SLV LF 4X5IN  STRL

## (undated) DEVICE — KT CATH DRN PLEURL PLEURX/SAFE/T SIL 15.5F 66CM 4BT/1000ML

## (undated) DEVICE — GLIDESHEATH BASIC HYDROPHILIC COATED INTRODUCER SHEATH: Brand: GLIDESHEATH

## (undated) DEVICE — CATH DIAG IMPULSE FL3.5 5F 100CM

## (undated) DEVICE — BITEBLOCK OMNI BLOC

## (undated) DEVICE — MEDICINE CUP, GRADUATED, STER: Brand: MEDLINE

## (undated) DEVICE — VITAL SIGNS™ JACKSON-REES CIRCUITS: Brand: VITAL SIGNS™

## (undated) DEVICE — SYS PERFUS SEP PLATLT W TIPS CUST

## (undated) DEVICE — ANTIBACTERIAL UNDYED BRAIDED (POLYGLACTIN 910), SYNTHETIC ABSORBABLE SUTURE: Brand: COATED VICRYL

## (undated) DEVICE — GW PRESSUREWIRE AERIS W/ AGILE TP 175CM

## (undated) DEVICE — 32 FR RIGHT ANGLE – SOFT PVC CATHETER: Brand: PVC THORACIC CATHETERS

## (undated) DEVICE — SUT SILK 2/0 TIES 18IN A185H

## (undated) DEVICE — SINGLE USE ASPIRATION NEEDLE: Brand: SINGLE USE ASPIRATION NEEDLE

## (undated) DEVICE — HARMONIC SYNERGY DISSECTING HOOK WITH TORQUE WRENCH. FOR USE WITH BLUE HAND PIECE ONLY: Brand: HARMONIC SYNERGY

## (undated) DEVICE — TRAP,MUCUS SPECIMEN, 80CC: Brand: MEDLINE

## (undated) DEVICE — LN SMPL O2 NASL/ORL SMART/CAPNOLINE PLS A/

## (undated) DEVICE — SOL IRR NACL 0.9PCT BT 1000ML

## (undated) DEVICE — DRSNG SURESITE WNDW 2.38X2.75

## (undated) DEVICE — GLV SURG SENSICARE W/ALOE PF LF 7.5 STRL

## (undated) DEVICE — 3M™ IOBAN™ 2 ANTIMICROBIAL INCISE DRAPE 6650EZ: Brand: IOBAN™ 2

## (undated) DEVICE — MSK AIRWY LARYNG LMA PILOT SZ4

## (undated) DEVICE — BNDG ELAS ELITE V/CLOSE 6IN 5YD LF STRL

## (undated) DEVICE — VASOVIEW HEMOPRO: Brand: VASOVIEW HEMOPRO

## (undated) DEVICE — DRSNG BRDR MEPILEX P/OP SIL 4X12IN

## (undated) DEVICE — INTENDED FOR TISSUE SEPARATION, AND OTHER PROCEDURES THAT REQUIRE A SHARP SURGICAL BLADE TO PUNCTURE OR CUT.: Brand: BARD-PARKER ® CARBON RIB-BACK BLADES

## (undated) DEVICE — HEMOCONCENTRATOR PERFUS LPS06

## (undated) DEVICE — SOL ISO/ALC RUB 70PCT 4OZ

## (undated) DEVICE — ST. SORBAVIEW ULTIMATE IJ SYSTEM A,C: Brand: CENTURION

## (undated) DEVICE — ADAPT SWVL FIBROPTIC BRONCH

## (undated) DEVICE — SINGLE USE SUCTION VALVE MAJ-209: Brand: SINGLE USE SUCTION VALVE (STERILE)

## (undated) DEVICE — DRN WND CH RND FUL/FLUT NO/TROC 3/8IN 28F

## (undated) DEVICE — ROTATING SURGICAL PUNCHES, 1 PER POUCH: Brand: A&E MEDICAL / ROTATING SURGICAL PUNCHES

## (undated) DEVICE — CATH DIAG IMPULSE PIG 5F 100CM

## (undated) DEVICE — Device: Brand: LEVEL 1

## (undated) DEVICE — DECANT BG O JET

## (undated) DEVICE — CATH DIAG IMPULSE FR5 5F 100CM

## (undated) DEVICE — CONN TBG Y 5 IN 1 LF STRL

## (undated) DEVICE — PK CATH CARD 40

## (undated) DEVICE — SENSR O2 OXIMAX FNGR A/ 18IN NONSTR

## (undated) DEVICE — NDL HYPO PRECISIONGLIDE REG 25G 1 1/2

## (undated) DEVICE — GLV SURG BIOGEL M LTX PF 7 1/2

## (undated) DEVICE — INSUFFLATION TUBING,LAPAROSCOPIC: Brand: DEROYAL

## (undated) DEVICE — CVR PROB 96IN LF STRL

## (undated) DEVICE — CANN ART EOPA 3D NV W/CONN 22F

## (undated) DEVICE — 6 FOOT DISPOSABLE EXTENSION CABLE WITH SAFE CONNECT / SCREW-DOWN

## (undated) DEVICE — GW EMR FIX EXCHG J STD .035 3MM 260CM

## (undated) DEVICE — KT MANIFLD CARDIAC

## (undated) DEVICE — PK PERFUS CUST W/CARDIOPLEGIA